# Patient Record
Sex: FEMALE | Race: WHITE | Employment: UNEMPLOYED | URBAN - METROPOLITAN AREA
[De-identification: names, ages, dates, MRNs, and addresses within clinical notes are randomized per-mention and may not be internally consistent; named-entity substitution may affect disease eponyms.]

---

## 2017-03-14 ENCOUNTER — OFFICE VISIT (OUTPATIENT)
Dept: OBGYN | Age: 72
End: 2017-03-14

## 2017-03-14 VITALS
BODY MASS INDEX: 36.11 KG/M2 | WEIGHT: 217 LBS | HEART RATE: 68 BPM | DIASTOLIC BLOOD PRESSURE: 68 MMHG | SYSTOLIC BLOOD PRESSURE: 110 MMHG

## 2017-03-14 DIAGNOSIS — N81.5 VAGINAL ENTEROCELE: ICD-10-CM

## 2017-03-14 DIAGNOSIS — N90.89 VULVAR IRRITATION: Primary | ICD-10-CM

## 2017-03-14 DIAGNOSIS — N76.0 ACUTE VAGINITIS: ICD-10-CM

## 2017-03-14 PROCEDURE — 99213 OFFICE O/P EST LOW 20 MIN: CPT | Performed by: OBSTETRICS & GYNECOLOGY

## 2017-03-14 RX ORDER — METRONIDAZOLE 500 MG/1
500 TABLET ORAL 2 TIMES DAILY
Qty: 14 TABLET | Refills: 0 | Status: SHIPPED | OUTPATIENT
Start: 2017-03-14 | End: 2017-03-21

## 2017-03-14 RX ORDER — FLUCONAZOLE 150 MG/1
TABLET ORAL
Qty: 3 TABLET | Refills: 0 | Status: SHIPPED | OUTPATIENT
Start: 2017-03-14 | End: 2017-04-18 | Stop reason: CLARIF

## 2017-03-14 ASSESSMENT — ENCOUNTER SYMPTOMS
SHORTNESS OF BREATH: 0
COUGH: 0
NAUSEA: 0
VOMITING: 0

## 2017-03-21 ENCOUNTER — OFFICE VISIT (OUTPATIENT)
Dept: OBGYN | Age: 72
End: 2017-03-21

## 2017-03-21 VITALS
WEIGHT: 217 LBS | DIASTOLIC BLOOD PRESSURE: 74 MMHG | BODY MASS INDEX: 36.11 KG/M2 | HEART RATE: 84 BPM | SYSTOLIC BLOOD PRESSURE: 122 MMHG

## 2017-03-21 DIAGNOSIS — N81.5 VAGINAL ENTEROCELE: ICD-10-CM

## 2017-03-21 DIAGNOSIS — N89.8 VAGINAL IRRITATION: Primary | ICD-10-CM

## 2017-03-21 PROCEDURE — 99213 OFFICE O/P EST LOW 20 MIN: CPT | Performed by: OBSTETRICS & GYNECOLOGY

## 2017-03-31 ASSESSMENT — ENCOUNTER SYMPTOMS
COUGH: 0
NAUSEA: 0
SHORTNESS OF BREATH: 0
VOMITING: 0

## 2017-04-18 ENCOUNTER — OFFICE VISIT (OUTPATIENT)
Dept: UROLOGY | Age: 72
End: 2017-04-18

## 2017-04-18 VITALS
SYSTOLIC BLOOD PRESSURE: 138 MMHG | HEIGHT: 65 IN | DIASTOLIC BLOOD PRESSURE: 76 MMHG | HEART RATE: 62 BPM | WEIGHT: 217 LBS | BODY MASS INDEX: 36.15 KG/M2

## 2017-04-18 DIAGNOSIS — R31.9 HEMATURIA: Primary | ICD-10-CM

## 2017-04-18 LAB
BILIRUBIN, POC: ABNORMAL
BLOOD URINE, POC: ABNORMAL
CLARITY, POC: CLEAR
COLOR, POC: YELLOW
GLUCOSE URINE, POC: ABNORMAL
KETONES, POC: ABNORMAL
LEUKOCYTE EST, POC: ABNORMAL
NITRITE, POC: ABNORMAL
PH, POC: 5.5
PROTEIN, POC: ABNORMAL
SPECIFIC GRAVITY, POC: 1.02
UROBILINOGEN, POC: 0.2

## 2017-04-18 PROCEDURE — 81003 URINALYSIS AUTO W/O SCOPE: CPT | Performed by: UROLOGY

## 2017-04-18 PROCEDURE — 99212 OFFICE O/P EST SF 10 MIN: CPT | Performed by: UROLOGY

## 2017-04-18 ASSESSMENT — ENCOUNTER SYMPTOMS
ABDOMINAL DISTENTION: 0
SHORTNESS OF BREATH: 0
ABDOMINAL PAIN: 0

## 2017-06-16 ENCOUNTER — APPOINTMENT (OUTPATIENT)
Dept: GENERAL RADIOLOGY | Age: 72
End: 2017-06-16
Payer: MEDICARE

## 2017-06-16 ENCOUNTER — HOSPITAL ENCOUNTER (EMERGENCY)
Age: 72
Discharge: HOME | End: 2017-06-16
Attending: EMERGENCY MEDICINE
Payer: MEDICARE

## 2017-06-16 VITALS
DIASTOLIC BLOOD PRESSURE: 74 MMHG | SYSTOLIC BLOOD PRESSURE: 118 MMHG | HEART RATE: 52 BPM | WEIGHT: 220 LBS | RESPIRATION RATE: 22 BRPM | BODY MASS INDEX: 37.56 KG/M2 | OXYGEN SATURATION: 94 % | HEIGHT: 64 IN | TEMPERATURE: 97.9 F

## 2017-06-16 DIAGNOSIS — J01.90 ACUTE SINUSITIS, RECURRENCE NOT SPECIFIED, UNSPECIFIED LOCATION: Primary | ICD-10-CM

## 2017-06-16 DIAGNOSIS — R05.9 COUGH: ICD-10-CM

## 2017-06-16 LAB
ALBUMIN SERPL-MCNC: 3.9 G/DL (ref 3.9–4.9)
ALP BLD-CCNC: 99 U/L (ref 40–130)
ALT SERPL-CCNC: 11 U/L (ref 0–33)
AMORPHOUS: ABNORMAL
ANION GAP SERPL CALCULATED.3IONS-SCNC: 13 MEQ/L (ref 7–13)
APTT: 25.8 SEC (ref 21.6–35.4)
AST SERPL-CCNC: 14 U/L (ref 0–35)
BACTERIA: ABNORMAL /HPF
BASOPHILS ABSOLUTE: 0.1 K/UL (ref 0–0.2)
BASOPHILS RELATIVE PERCENT: 0.8 %
BILIRUB SERPL-MCNC: 0.3 MG/DL (ref 0–1.2)
BILIRUBIN URINE: NEGATIVE
BLOOD, URINE: NEGATIVE
BUN BLDV-MCNC: 18 MG/DL (ref 8–23)
CALCIUM SERPL-MCNC: 8.7 MG/DL (ref 8.6–10.2)
CHLORIDE BLD-SCNC: 105 MEQ/L (ref 98–107)
CK MB: 1.8 NG/ML (ref 0–3.8)
CLARITY: ABNORMAL
CO2: 22 MEQ/L (ref 22–29)
COLOR: YELLOW
CREAT SERPL-MCNC: 0.95 MG/DL (ref 0.5–0.9)
CREATINE KINASE-MB INDEX: 2.8 % (ref 0–3.5)
EKG ATRIAL RATE: 61 BPM
EKG P AXIS: 31 DEGREES
EKG P-R INTERVAL: 146 MS
EKG Q-T INTERVAL: 442 MS
EKG QRS DURATION: 98 MS
EKG QTC CALCULATION (BAZETT): 444 MS
EKG R AXIS: -8 DEGREES
EKG T AXIS: 20 DEGREES
EKG VENTRICULAR RATE: 61 BPM
EOSINOPHILS ABSOLUTE: 0.1 K/UL (ref 0–0.7)
EOSINOPHILS RELATIVE PERCENT: 2 %
EPITHELIAL CELLS, UA: ABNORMAL /HPF
GFR AFRICAN AMERICAN: >60
GFR NON-AFRICAN AMERICAN: 57.9
GLOBULIN: 2.6 G/DL (ref 2.3–3.5)
GLUCOSE BLD-MCNC: 109 MG/DL (ref 74–109)
GLUCOSE URINE: NEGATIVE MG/DL
HCT VFR BLD CALC: 40.7 % (ref 37–47)
HEMOGLOBIN: 13.2 G/DL (ref 12–16)
INR BLD: 0.9
KETONES, URINE: NEGATIVE MG/DL
LACTIC ACID: 1.2 MMOL/L (ref 0.5–2.2)
LEUKOCYTE ESTERASE, URINE: ABNORMAL
LYMPHOCYTES ABSOLUTE: 1.2 K/UL (ref 1–4.8)
LYMPHOCYTES RELATIVE PERCENT: 16.3 %
MCH RBC QN AUTO: 27.4 PG (ref 27–31.3)
MCHC RBC AUTO-ENTMCNC: 32.5 % (ref 33–37)
MCV RBC AUTO: 84.3 FL (ref 82–100)
MONOCYTES ABSOLUTE: 0.6 K/UL (ref 0.2–0.8)
MONOCYTES RELATIVE PERCENT: 7.9 %
NEUTROPHILS ABSOLUTE: 5.4 K/UL (ref 1.4–6.5)
NEUTROPHILS RELATIVE PERCENT: 73 %
NITRITE, URINE: NEGATIVE
PDW BLD-RTO: 14.8 % (ref 11.5–14.5)
PH UA: 5 (ref 5–9)
PLATELET # BLD: 228 K/UL (ref 130–400)
POTASSIUM SERPL-SCNC: 4 MEQ/L (ref 3.5–5.1)
PRO-BNP: 330 PG/ML
PROTEIN UA: NEGATIVE MG/DL
PROTHROMBIN TIME: 9.6 SEC (ref 8.1–13.7)
RBC # BLD: 4.83 M/UL (ref 4.2–5.4)
RBC UA: ABNORMAL /HPF (ref 0–2)
SODIUM BLD-SCNC: 140 MEQ/L (ref 132–144)
SPECIFIC GRAVITY UA: 1.02 (ref 1–1.03)
TOTAL CK: 65 U/L (ref 0–170)
TOTAL PROTEIN: 6.5 G/DL (ref 6.4–8.1)
TROPONIN: <0.01 NG/ML (ref 0–0.01)
UROBILINOGEN, URINE: 0.2 E.U./DL
WBC # BLD: 7.4 K/UL (ref 4.8–10.8)
WBC UA: ABNORMAL /HPF (ref 0–5)

## 2017-06-16 PROCEDURE — 83880 ASSAY OF NATRIURETIC PEPTIDE: CPT

## 2017-06-16 PROCEDURE — 94640 AIRWAY INHALATION TREATMENT: CPT

## 2017-06-16 PROCEDURE — 99285 EMERGENCY DEPT VISIT HI MDM: CPT

## 2017-06-16 PROCEDURE — 2580000003 HC RX 258: Performed by: PHYSICIAN ASSISTANT

## 2017-06-16 PROCEDURE — 96365 THER/PROPH/DIAG IV INF INIT: CPT

## 2017-06-16 PROCEDURE — 6360000002 HC RX W HCPCS: Performed by: PHYSICIAN ASSISTANT

## 2017-06-16 PROCEDURE — 85730 THROMBOPLASTIN TIME PARTIAL: CPT

## 2017-06-16 PROCEDURE — 83605 ASSAY OF LACTIC ACID: CPT

## 2017-06-16 PROCEDURE — 71010 XR CHEST PORTABLE: CPT

## 2017-06-16 PROCEDURE — 84484 ASSAY OF TROPONIN QUANT: CPT

## 2017-06-16 PROCEDURE — 82553 CREATINE MB FRACTION: CPT

## 2017-06-16 PROCEDURE — 80053 COMPREHEN METABOLIC PANEL: CPT

## 2017-06-16 PROCEDURE — 6370000000 HC RX 637 (ALT 250 FOR IP): Performed by: PHYSICIAN ASSISTANT

## 2017-06-16 PROCEDURE — 82550 ASSAY OF CK (CPK): CPT

## 2017-06-16 PROCEDURE — 93005 ELECTROCARDIOGRAM TRACING: CPT

## 2017-06-16 PROCEDURE — 36415 COLL VENOUS BLD VENIPUNCTURE: CPT

## 2017-06-16 PROCEDURE — 85610 PROTHROMBIN TIME: CPT

## 2017-06-16 PROCEDURE — 81001 URINALYSIS AUTO W/SCOPE: CPT

## 2017-06-16 PROCEDURE — 96375 TX/PRO/DX INJ NEW DRUG ADDON: CPT

## 2017-06-16 PROCEDURE — 85025 COMPLETE CBC W/AUTO DIFF WBC: CPT

## 2017-06-16 RX ORDER — NITROGLYCERIN 0.4 MG/1
0.4 TABLET SUBLINGUAL EVERY 5 MIN PRN
Status: DISCONTINUED | OUTPATIENT
Start: 2017-06-16 | End: 2017-06-16 | Stop reason: HOSPADM

## 2017-06-16 RX ORDER — ASPIRIN 81 MG/1
324 TABLET, CHEWABLE ORAL ONCE
Status: COMPLETED | OUTPATIENT
Start: 2017-06-16 | End: 2017-06-16

## 2017-06-16 RX ORDER — IPRATROPIUM BROMIDE AND ALBUTEROL SULFATE 2.5; .5 MG/3ML; MG/3ML
1 SOLUTION RESPIRATORY (INHALATION) ONCE
Status: COMPLETED | OUTPATIENT
Start: 2017-06-16 | End: 2017-06-16

## 2017-06-16 RX ORDER — AMOXICILLIN AND CLAVULANATE POTASSIUM 875; 125 MG/1; MG/1
1 TABLET, FILM COATED ORAL 2 TIMES DAILY
Qty: 20 TABLET | Refills: 0 | Status: SHIPPED | OUTPATIENT
Start: 2017-06-16 | End: 2017-06-26

## 2017-06-16 RX ORDER — METHYLPREDNISOLONE SODIUM SUCCINATE 125 MG/2ML
125 INJECTION, POWDER, LYOPHILIZED, FOR SOLUTION INTRAMUSCULAR; INTRAVENOUS ONCE
Status: COMPLETED | OUTPATIENT
Start: 2017-06-16 | End: 2017-06-16

## 2017-06-16 RX ADMIN — METHYLPREDNISOLONE SODIUM SUCCINATE 125 MG: 125 INJECTION, POWDER, FOR SOLUTION INTRAMUSCULAR; INTRAVENOUS at 07:06

## 2017-06-16 RX ADMIN — IPRATROPIUM BROMIDE AND ALBUTEROL SULFATE 1 AMPULE: 2.5; .5 SOLUTION RESPIRATORY (INHALATION) at 06:51

## 2017-06-16 RX ADMIN — NITROGLYCERIN 0.4 MG: 0.4 TABLET SUBLINGUAL at 07:06

## 2017-06-16 RX ADMIN — ASPIRIN 81 MG CHEWABLE TABLET 324 MG: 81 TABLET CHEWABLE at 07:06

## 2017-06-16 RX ADMIN — CEFTRIAXONE SODIUM 1 G: 1 INJECTION, POWDER, FOR SOLUTION INTRAMUSCULAR; INTRAVENOUS at 07:06

## 2017-06-16 ASSESSMENT — PAIN DESCRIPTION - FREQUENCY: FREQUENCY: CONTINUOUS

## 2017-06-16 ASSESSMENT — PAIN DESCRIPTION - DESCRIPTORS: DESCRIPTORS: PRESSURE

## 2017-06-16 ASSESSMENT — ENCOUNTER SYMPTOMS
COUGH: 1
SHORTNESS OF BREATH: 0
VOMITING: 0
NAUSEA: 0
DIARRHEA: 0
ABDOMINAL PAIN: 0

## 2017-06-16 ASSESSMENT — PAIN DESCRIPTION - LOCATION: LOCATION: BACK;CHEST

## 2017-06-16 ASSESSMENT — PAIN SCALES - GENERAL: PAINLEVEL_OUTOF10: 4

## 2017-06-16 ASSESSMENT — PAIN DESCRIPTION - PAIN TYPE: TYPE: ACUTE PAIN

## 2017-08-10 LAB — TOTAL CK: 84 U/L (ref 0–170)

## 2018-05-07 ENCOUNTER — HOSPITAL ENCOUNTER (OUTPATIENT)
Dept: GENERAL RADIOLOGY | Age: 73
Discharge: HOME | End: 2018-05-09
Payer: MEDICARE

## 2018-05-07 DIAGNOSIS — M75.82 TENDINITIS OF LEFT ROTATOR CUFF: ICD-10-CM

## 2018-05-07 DIAGNOSIS — M50.30 DDD (DEGENERATIVE DISC DISEASE), CERVICAL: ICD-10-CM

## 2018-05-07 DIAGNOSIS — L71.9 ROSACEA: ICD-10-CM

## 2018-05-07 DIAGNOSIS — Z13.9 ENCOUNTER FOR SCREENING: ICD-10-CM

## 2018-05-07 PROCEDURE — 73030 X-RAY EXAM OF SHOULDER: CPT

## 2018-05-07 PROCEDURE — 72050 X-RAY EXAM NECK SPINE 4/5VWS: CPT

## 2018-06-05 ENCOUNTER — OFFICE VISIT (OUTPATIENT)
Dept: GASTROENTEROLOGY | Age: 73
End: 2018-06-05
Payer: MEDICARE

## 2018-06-05 VITALS
BODY MASS INDEX: 37.76 KG/M2 | SYSTOLIC BLOOD PRESSURE: 157 MMHG | DIASTOLIC BLOOD PRESSURE: 91 MMHG | HEART RATE: 72 BPM | WEIGHT: 220 LBS

## 2018-06-05 DIAGNOSIS — Z80.0 FAMILY HISTORY OF COLON CANCER: ICD-10-CM

## 2018-06-05 DIAGNOSIS — R13.10 DYSPHAGIA, UNSPECIFIED TYPE: Primary | ICD-10-CM

## 2018-06-05 PROCEDURE — 99203 OFFICE O/P NEW LOW 30 MIN: CPT | Performed by: INTERNAL MEDICINE

## 2018-06-05 RX ORDER — NAPROXEN SODIUM 550 MG/1
TABLET ORAL
Refills: 3 | Status: ON HOLD | COMMUNITY
Start: 2018-05-07 | End: 2019-01-31

## 2018-06-05 RX ORDER — GABAPENTIN 300 MG/1
CAPSULE ORAL
Refills: 1 | Status: ON HOLD | COMMUNITY
Start: 2018-05-07 | End: 2019-01-31

## 2018-06-05 RX ORDER — RANITIDINE 150 MG/1
150 TABLET ORAL
Status: ON HOLD | COMMUNITY
Start: 2017-06-29 | End: 2019-01-31

## 2018-06-05 RX ORDER — DOXYCYCLINE HYCLATE 50 MG/1
CAPSULE ORAL
Refills: 3 | Status: ON HOLD | COMMUNITY
Start: 2018-05-07 | End: 2019-01-31

## 2018-06-05 RX ORDER — CYCLOBENZAPRINE HCL 10 MG
TABLET ORAL
Refills: 0 | Status: ON HOLD | COMMUNITY
Start: 2018-05-07 | End: 2019-02-01 | Stop reason: HOSPADM

## 2018-06-11 ENCOUNTER — ANESTHESIA (OUTPATIENT)
Dept: ENDOSCOPY | Age: 73
End: 2018-06-11
Payer: MEDICARE

## 2018-06-11 ENCOUNTER — HOSPITAL ENCOUNTER (OUTPATIENT)
Age: 73
Setting detail: OUTPATIENT SURGERY
Discharge: HOME | End: 2018-06-11
Attending: INTERNAL MEDICINE | Admitting: INTERNAL MEDICINE
Payer: MEDICARE

## 2018-06-11 ENCOUNTER — ANESTHESIA EVENT (OUTPATIENT)
Dept: ENDOSCOPY | Age: 73
End: 2018-06-11
Payer: MEDICARE

## 2018-06-11 VITALS
RESPIRATION RATE: 8 BRPM | SYSTOLIC BLOOD PRESSURE: 126 MMHG | OXYGEN SATURATION: 96 % | DIASTOLIC BLOOD PRESSURE: 79 MMHG

## 2018-06-11 VITALS
SYSTOLIC BLOOD PRESSURE: 152 MMHG | RESPIRATION RATE: 16 BRPM | HEIGHT: 65 IN | BODY MASS INDEX: 36.65 KG/M2 | HEART RATE: 61 BPM | DIASTOLIC BLOOD PRESSURE: 75 MMHG | OXYGEN SATURATION: 95 % | TEMPERATURE: 99.4 F | WEIGHT: 220 LBS

## 2018-06-11 PROCEDURE — 2500000003 HC RX 250 WO HCPCS: Performed by: NURSE ANESTHETIST, CERTIFIED REGISTERED

## 2018-06-11 PROCEDURE — 6360000002 HC RX W HCPCS: Performed by: NURSE ANESTHETIST, CERTIFIED REGISTERED

## 2018-06-11 PROCEDURE — 43248 EGD GUIDE WIRE INSERTION: CPT | Performed by: INTERNAL MEDICINE

## 2018-06-11 PROCEDURE — 43239 EGD BIOPSY SINGLE/MULTIPLE: CPT | Performed by: INTERNAL MEDICINE

## 2018-06-11 PROCEDURE — 7100000010 HC PHASE II RECOVERY - FIRST 15 MIN: Performed by: INTERNAL MEDICINE

## 2018-06-11 PROCEDURE — 43251 EGD REMOVE LESION SNARE: CPT | Performed by: INTERNAL MEDICINE

## 2018-06-11 PROCEDURE — 3700000001 HC ADD 15 MINUTES (ANESTHESIA): Performed by: INTERNAL MEDICINE

## 2018-06-11 PROCEDURE — 3609027000 HC COLONOSCOPY: Performed by: INTERNAL MEDICINE

## 2018-06-11 PROCEDURE — 2580000003 HC RX 258: Performed by: NURSE ANESTHETIST, CERTIFIED REGISTERED

## 2018-06-11 PROCEDURE — 3609017100 HC EGD: Performed by: INTERNAL MEDICINE

## 2018-06-11 PROCEDURE — 3700000000 HC ANESTHESIA ATTENDED CARE: Performed by: INTERNAL MEDICINE

## 2018-06-11 PROCEDURE — 45385 COLONOSCOPY W/LESION REMOVAL: CPT | Performed by: INTERNAL MEDICINE

## 2018-06-11 RX ORDER — SODIUM CHLORIDE 0.9 % (FLUSH) 0.9 %
SYRINGE (ML) INJECTION PRN
Status: DISCONTINUED | OUTPATIENT
Start: 2018-06-11 | End: 2018-06-11 | Stop reason: SDUPTHER

## 2018-06-11 RX ORDER — PROPOFOL 10 MG/ML
INJECTION, EMULSION INTRAVENOUS PRN
Status: DISCONTINUED | OUTPATIENT
Start: 2018-06-11 | End: 2018-06-11 | Stop reason: SDUPTHER

## 2018-06-11 RX ORDER — SODIUM CHLORIDE 9 MG/ML
INJECTION, SOLUTION INTRAVENOUS CONTINUOUS
Status: DISCONTINUED | OUTPATIENT
Start: 2018-06-11 | End: 2018-06-11 | Stop reason: HOSPADM

## 2018-06-11 RX ORDER — ONDANSETRON 2 MG/ML
4 INJECTION INTRAMUSCULAR; INTRAVENOUS
Status: DISCONTINUED | OUTPATIENT
Start: 2018-06-11 | End: 2018-06-11 | Stop reason: HOSPADM

## 2018-06-11 RX ORDER — ASPIRIN 81 MG/1
81 TABLET, CHEWABLE ORAL DAILY
Status: ON HOLD | COMMUNITY
End: 2021-12-10

## 2018-06-11 RX ORDER — LIDOCAINE HYDROCHLORIDE 20 MG/ML
INJECTION, SOLUTION INFILTRATION; PERINEURAL PRN
Status: DISCONTINUED | OUTPATIENT
Start: 2018-06-11 | End: 2018-06-11 | Stop reason: SDUPTHER

## 2018-06-11 RX ADMIN — PROPOFOL 50 MG: 10 INJECTION, EMULSION INTRAVENOUS at 10:46

## 2018-06-11 RX ADMIN — SODIUM CHLORIDE, PRESERVATIVE FREE 10 ML: 5 INJECTION INTRAVENOUS at 10:44

## 2018-06-11 RX ADMIN — PROPOFOL 100 MG: 10 INJECTION, EMULSION INTRAVENOUS at 10:33

## 2018-06-11 RX ADMIN — PROPOFOL 150 MG: 10 INJECTION, EMULSION INTRAVENOUS at 10:29

## 2018-06-11 RX ADMIN — LIDOCAINE HYDROCHLORIDE 80 MG: 20 INJECTION, SOLUTION INFILTRATION; PERINEURAL at 10:29

## 2018-06-11 RX ADMIN — SODIUM CHLORIDE, PRESERVATIVE FREE 20 ML: 5 INJECTION INTRAVENOUS at 10:32

## 2018-06-11 ASSESSMENT — PAIN - FUNCTIONAL ASSESSMENT: PAIN_FUNCTIONAL_ASSESSMENT: 0-10

## 2018-06-26 ENCOUNTER — OFFICE VISIT (OUTPATIENT)
Dept: GASTROENTEROLOGY | Age: 73
End: 2018-06-26
Payer: MEDICARE

## 2018-06-26 VITALS
BODY MASS INDEX: 36.28 KG/M2 | DIASTOLIC BLOOD PRESSURE: 82 MMHG | WEIGHT: 218 LBS | SYSTOLIC BLOOD PRESSURE: 152 MMHG | HEART RATE: 67 BPM

## 2018-06-26 DIAGNOSIS — R13.10 DYSPHAGIA, UNSPECIFIED TYPE: Primary | ICD-10-CM

## 2018-06-26 PROCEDURE — 99213 OFFICE O/P EST LOW 20 MIN: CPT | Performed by: INTERNAL MEDICINE

## 2018-06-26 RX ORDER — AMOXICILLIN AND CLAVULANATE POTASSIUM 875; 125 MG/1; MG/1
TABLET, FILM COATED ORAL
Refills: 0 | COMMUNITY
Start: 2018-06-23 | End: 2019-01-28

## 2018-06-26 RX ORDER — ALBUTEROL SULFATE 2.5 MG/3ML
SOLUTION RESPIRATORY (INHALATION)
Refills: 3 | COMMUNITY
Start: 2018-06-23

## 2018-08-03 ENCOUNTER — HOSPITAL ENCOUNTER (OUTPATIENT)
Dept: ULTRASOUND IMAGING | Age: 73
Discharge: HOME | End: 2018-08-05
Payer: MEDICARE

## 2018-08-03 DIAGNOSIS — I71.40 ABDOMINAL AORTIC ANEURYSM WITHOUT RUPTURE: ICD-10-CM

## 2018-08-03 LAB
CHOLESTEROL, TOTAL: 146 MG/DL (ref 0–199)
HDLC SERPL-MCNC: 45 MG/DL (ref 40–59)
LDL CHOLESTEROL CALCULATED: 83 MG/DL (ref 0–129)
TOTAL CK: 62 U/L (ref 0–170)
TRIGL SERPL-MCNC: 90 MG/DL (ref 0–200)

## 2018-08-03 PROCEDURE — 93978 VASCULAR STUDY: CPT

## 2018-10-03 LAB
CHOLESTEROL, TOTAL: 141 MG/DL (ref 0–199)
HDLC SERPL-MCNC: 45 MG/DL (ref 40–59)
LDL CHOLESTEROL CALCULATED: 74 MG/DL (ref 0–129)
TOTAL CK: 61 U/L (ref 0–170)
TRIGL SERPL-MCNC: 108 MG/DL (ref 0–200)

## 2018-12-10 ENCOUNTER — OFFICE VISIT (OUTPATIENT)
Dept: OBGYN CLINIC | Age: 73
End: 2018-12-10
Payer: MEDICARE

## 2018-12-10 VITALS
DIASTOLIC BLOOD PRESSURE: 82 MMHG | WEIGHT: 216 LBS | BODY MASS INDEX: 35.99 KG/M2 | SYSTOLIC BLOOD PRESSURE: 118 MMHG | HEART RATE: 88 BPM | HEIGHT: 65 IN

## 2018-12-10 DIAGNOSIS — N89.8 VAGINAL IRRITATION: Primary | ICD-10-CM

## 2018-12-10 PROCEDURE — 99213 OFFICE O/P EST LOW 20 MIN: CPT | Performed by: OBSTETRICS & GYNECOLOGY

## 2018-12-10 RX ORDER — FLUCONAZOLE 150 MG/1
TABLET ORAL
Qty: 3 TABLET | Refills: 0 | Status: SHIPPED | OUTPATIENT
Start: 2018-12-10 | End: 2019-01-28

## 2018-12-10 RX ORDER — OXYBUTYNIN CHLORIDE 5 MG/1
5 TABLET, EXTENDED RELEASE ORAL DAILY
Qty: 30 TABLET | Refills: 1 | Status: SHIPPED | OUTPATIENT
Start: 2018-12-10 | End: 2018-12-18 | Stop reason: SDUPTHER

## 2018-12-14 ENCOUNTER — TELEPHONE (OUTPATIENT)
Dept: OBGYN CLINIC | Age: 73
End: 2018-12-14

## 2018-12-14 NOTE — TELEPHONE ENCOUNTER
Patient was given diflucan and oxybutinin last week and was taking them when she should. She is not any better. Her urinary issues are doing a lot better but the irritation is not going away. She has a 7 day yeast infection cream clotrimazole from Dr. Fredi Headley. Should she take this now or should she take something else. She has an appt scheduled next Tuesday, but wants to be better before she comes in.

## 2018-12-18 ENCOUNTER — OFFICE VISIT (OUTPATIENT)
Dept: OBGYN CLINIC | Age: 73
End: 2018-12-18
Payer: MEDICARE

## 2018-12-18 VITALS
BODY MASS INDEX: 36.32 KG/M2 | DIASTOLIC BLOOD PRESSURE: 84 MMHG | HEIGHT: 65 IN | HEART RATE: 68 BPM | SYSTOLIC BLOOD PRESSURE: 122 MMHG | WEIGHT: 218 LBS

## 2018-12-18 DIAGNOSIS — N81.5 VAGINAL ENTEROCELE: ICD-10-CM

## 2018-12-18 DIAGNOSIS — N89.8 VAGINAL IRRITATION: ICD-10-CM

## 2018-12-18 DIAGNOSIS — N32.81 OAB (OVERACTIVE BLADDER): Primary | ICD-10-CM

## 2018-12-18 PROCEDURE — 99213 OFFICE O/P EST LOW 20 MIN: CPT | Performed by: OBSTETRICS & GYNECOLOGY

## 2018-12-18 RX ORDER — CLOBETASOL PROPIONATE 0.5 MG/G
OINTMENT TOPICAL
Qty: 1 TUBE | Refills: 1 | Status: SHIPPED | OUTPATIENT
Start: 2018-12-18 | End: 2019-01-07 | Stop reason: SDUPTHER

## 2018-12-18 RX ORDER — OXYBUTYNIN CHLORIDE 5 MG/1
5 TABLET, EXTENDED RELEASE ORAL DAILY
Qty: 30 TABLET | Refills: 6 | Status: SHIPPED | OUTPATIENT
Start: 2018-12-18 | End: 2019-03-25 | Stop reason: SDUPTHER

## 2018-12-18 NOTE — PROGRESS NOTES
Medication FollowUp     Dena Boo is a 68y.o. year old female here todiscuss symptoms after use of medications prescribed last visit. Symptoms  Urinary urgency and incontinence, vaginal irritation . Medication/s prescribed Diflucan, oxybutynin . Side effects reported : none. Mentions symptomsimproved : Oxybutynin didn't eliminate her urinary symptoms. Diflucan did delineate some of the vaginal irritation symptoms but patient still complains of the same symptoms. Vitals:  /84 (Site: Right Upper Arm, Position: Sitting, Cuff Size: Medium Adult)   Pulse 68   Ht 5' 5\" (1.651 m)   Wt 218 lb (98.9 kg)   BMI 36.28 kg/m²   Allergies:  Codeine and Morphine  Past Medical History:   Diagnosis Date    CAD (coronary artery disease)     Environmental and seasonal allergies     GERD (gastroesophageal reflux disease)     GI bleed     Hyperlipidemia     Hypertension     Myocardial infarct (HCC)     Pityriasis rosea      Past Surgical History:   Procedure Laterality Date    ARTERIAL BYPASS SURGRY      triple    BREAST SURGERY      reduction and cysts biopsy    CATARACT REMOVAL WITH IMPLANT      CHOLECYSTECTOMY      CYSTOURETHROSCOPY  04/18/2017    FLEXIBLE    HAND SURGERY Right     plate    HYSTERECTOMY      OTHER SURGICAL HISTORY      head fatty tumors removed    IA COLORECTAL SCRN; HI RISK IND N/A 6/11/2018    COLONOSCOPY performed by Joanna Heredia MD at . [facility] ESOPHAGOGASTRODUODENOSCOPY TRANSORAL DIAGNOSTIC N/A 6/11/2018    EGD ESOPHAGOGASTRODUODENOSCOPY WITH DILATION performed by Joanna Heredia MD at [address] Left 08/2016     OB History     No data available        Family History   Problem Relation Age of Onset    Colon Cancer Father      Social History     Social History    Marital status:      Spouse name: N/A    Number of children: N/A    Years of education: N/A     Occupational History    Not on file. follow-up. Follow up:  Return in about 2 weeks (around 1/1/2019) for medication assessment.         Richy Nichols MD

## 2019-01-07 ENCOUNTER — OFFICE VISIT (OUTPATIENT)
Dept: OBGYN CLINIC | Age: 74
End: 2019-01-07
Payer: MEDICARE

## 2019-01-07 VITALS
BODY MASS INDEX: 36.32 KG/M2 | SYSTOLIC BLOOD PRESSURE: 112 MMHG | HEART RATE: 64 BPM | DIASTOLIC BLOOD PRESSURE: 74 MMHG | WEIGHT: 218 LBS | HEIGHT: 65 IN

## 2019-01-07 DIAGNOSIS — N81.6 BADEN-WALKER GRADE 2 RECTOCELE: ICD-10-CM

## 2019-01-07 DIAGNOSIS — N81.5 VAGINAL ENTEROCELE: Primary | ICD-10-CM

## 2019-01-07 PROCEDURE — 99214 OFFICE O/P EST MOD 30 MIN: CPT | Performed by: OBSTETRICS & GYNECOLOGY

## 2019-01-07 RX ORDER — FLUCONAZOLE 150 MG/1
TABLET ORAL
Qty: 3 TABLET | Refills: 0 | Status: ON HOLD | OUTPATIENT
Start: 2019-01-07 | End: 2019-05-06 | Stop reason: HOSPADM

## 2019-01-07 RX ORDER — CLOBETASOL PROPIONATE 0.5 MG/G
OINTMENT TOPICAL
Qty: 1 TUBE | Refills: 3 | Status: SHIPPED | OUTPATIENT
Start: 2019-01-07 | End: 2019-05-22

## 2019-01-21 ENCOUNTER — TELEPHONE (OUTPATIENT)
Dept: OBGYN CLINIC | Age: 74
End: 2019-01-21

## 2019-01-28 ENCOUNTER — HOSPITAL ENCOUNTER (OUTPATIENT)
Dept: PREADMISSION TESTING | Age: 74
Discharge: HOME | End: 2019-02-01
Payer: MEDICARE

## 2019-01-28 VITALS
WEIGHT: 217.2 LBS | HEART RATE: 65 BPM | DIASTOLIC BLOOD PRESSURE: 88 MMHG | BODY MASS INDEX: 36.19 KG/M2 | SYSTOLIC BLOOD PRESSURE: 157 MMHG | OXYGEN SATURATION: 98 % | HEIGHT: 65 IN | TEMPERATURE: 98 F | RESPIRATION RATE: 16 BRPM

## 2019-01-28 LAB
ABO/RH: NORMAL
ANION GAP SERPL CALCULATED.3IONS-SCNC: 10 MEQ/L (ref 7–13)
ANTIBODY SCREEN: NORMAL
BUN BLDV-MCNC: 16 MG/DL (ref 8–23)
CALCIUM SERPL-MCNC: 9.1 MG/DL (ref 8.6–10.2)
CHLORIDE BLD-SCNC: 105 MEQ/L (ref 98–107)
CO2: 25 MEQ/L (ref 22–29)
CREAT SERPL-MCNC: 0.98 MG/DL (ref 0.5–0.9)
GFR AFRICAN AMERICAN: >60
GFR NON-AFRICAN AMERICAN: 55.6
GLUCOSE BLD-MCNC: 98 MG/DL (ref 74–109)
HCT VFR BLD CALC: 40.3 % (ref 37–47)
HEMOGLOBIN: 13.7 G/DL (ref 12–16)
MCH RBC QN AUTO: 29.5 PG (ref 27–31.3)
MCHC RBC AUTO-ENTMCNC: 34.1 % (ref 33–37)
MCV RBC AUTO: 86.4 FL (ref 82–100)
PDW BLD-RTO: 14.7 % (ref 11.5–14.5)
PLATELET # BLD: 224 K/UL (ref 130–400)
POTASSIUM SERPL-SCNC: 4.2 MEQ/L (ref 3.5–5.1)
RBC # BLD: 4.66 M/UL (ref 4.2–5.4)
SODIUM BLD-SCNC: 140 MEQ/L (ref 132–144)
WBC # BLD: 11.9 K/UL (ref 4.8–10.8)

## 2019-01-28 PROCEDURE — 86900 BLOOD TYPING SEROLOGIC ABO: CPT

## 2019-01-28 PROCEDURE — 85027 COMPLETE CBC AUTOMATED: CPT

## 2019-01-28 PROCEDURE — 86901 BLOOD TYPING SEROLOGIC RH(D): CPT

## 2019-01-28 PROCEDURE — 86850 RBC ANTIBODY SCREEN: CPT

## 2019-01-28 PROCEDURE — 80048 BASIC METABOLIC PNL TOTAL CA: CPT

## 2019-01-28 RX ORDER — SODIUM CHLORIDE 0.9 % (FLUSH) 0.9 %
10 SYRINGE (ML) INJECTION EVERY 12 HOURS SCHEDULED
Status: CANCELLED | OUTPATIENT
Start: 2019-01-28

## 2019-01-28 RX ORDER — LIDOCAINE HYDROCHLORIDE 10 MG/ML
1 INJECTION, SOLUTION EPIDURAL; INFILTRATION; INTRACAUDAL; PERINEURAL
Status: CANCELLED | OUTPATIENT
Start: 2019-01-28 | End: 2019-01-28

## 2019-01-28 RX ORDER — SODIUM CHLORIDE 0.9 % (FLUSH) 0.9 %
10 SYRINGE (ML) INJECTION PRN
Status: CANCELLED | OUTPATIENT
Start: 2019-01-28

## 2019-01-28 RX ORDER — CEFAZOLIN SODIUM 2 G/50ML
2 SOLUTION INTRAVENOUS ONCE
Status: CANCELLED | OUTPATIENT
Start: 2019-01-31

## 2019-01-28 RX ORDER — SODIUM CHLORIDE, SODIUM LACTATE, POTASSIUM CHLORIDE, CALCIUM CHLORIDE 600; 310; 30; 20 MG/100ML; MG/100ML; MG/100ML; MG/100ML
INJECTION, SOLUTION INTRAVENOUS CONTINUOUS
Status: CANCELLED | OUTPATIENT
Start: 2019-01-28

## 2019-01-31 ENCOUNTER — HOSPITAL ENCOUNTER (OUTPATIENT)
Age: 74
Discharge: HOME | End: 2019-02-01
Attending: OBSTETRICS & GYNECOLOGY | Admitting: OBSTETRICS & GYNECOLOGY
Payer: MEDICARE

## 2019-01-31 ENCOUNTER — ANESTHESIA (OUTPATIENT)
Dept: OPERATING ROOM | Age: 74
End: 2019-01-31
Payer: MEDICARE

## 2019-01-31 ENCOUNTER — ANESTHESIA EVENT (OUTPATIENT)
Dept: OPERATING ROOM | Age: 74
End: 2019-01-31
Payer: MEDICARE

## 2019-01-31 VITALS
RESPIRATION RATE: 13 BRPM | OXYGEN SATURATION: 100 % | DIASTOLIC BLOOD PRESSURE: 59 MMHG | SYSTOLIC BLOOD PRESSURE: 113 MMHG | TEMPERATURE: 96.3 F

## 2019-01-31 DIAGNOSIS — G89.18 POSTOPERATIVE PAIN: Primary | ICD-10-CM

## 2019-01-31 PROBLEM — N81.6 RECTOCELE: Status: ACTIVE | Noted: 2019-01-31

## 2019-01-31 PROCEDURE — 2500000003 HC RX 250 WO HCPCS: Performed by: OBSTETRICS & GYNECOLOGY

## 2019-01-31 PROCEDURE — 3700000001 HC ADD 15 MINUTES (ANESTHESIA): Performed by: OBSTETRICS & GYNECOLOGY

## 2019-01-31 PROCEDURE — 57250 REPAIR RECTUM & VAGINA: CPT | Performed by: OBSTETRICS & GYNECOLOGY

## 2019-01-31 PROCEDURE — 2580000003 HC RX 258: Performed by: OBSTETRICS & GYNECOLOGY

## 2019-01-31 PROCEDURE — 2580000003 HC RX 258: Performed by: NURSE PRACTITIONER

## 2019-01-31 PROCEDURE — 6360000002 HC RX W HCPCS: Performed by: NURSE PRACTITIONER

## 2019-01-31 PROCEDURE — 2500000003 HC RX 250 WO HCPCS: Performed by: ANESTHESIOLOGY

## 2019-01-31 PROCEDURE — 3700000000 HC ANESTHESIA ATTENDED CARE: Performed by: OBSTETRICS & GYNECOLOGY

## 2019-01-31 PROCEDURE — 7100000001 HC PACU RECOVERY - ADDTL 15 MIN: Performed by: OBSTETRICS & GYNECOLOGY

## 2019-01-31 PROCEDURE — 6370000000 HC RX 637 (ALT 250 FOR IP): Performed by: OBSTETRICS & GYNECOLOGY

## 2019-01-31 PROCEDURE — 6360000002 HC RX W HCPCS: Performed by: ANESTHESIOLOGY

## 2019-01-31 PROCEDURE — 3600000004 HC SURGERY LEVEL 4 BASE: Performed by: OBSTETRICS & GYNECOLOGY

## 2019-01-31 PROCEDURE — 6360000002 HC RX W HCPCS: Performed by: OBSTETRICS & GYNECOLOGY

## 2019-01-31 PROCEDURE — 57282 COLPOPEXY EXTRAPERITONEAL: CPT | Performed by: OBSTETRICS & GYNECOLOGY

## 2019-01-31 PROCEDURE — 2500000003 HC RX 250 WO HCPCS: Performed by: NURSE PRACTITIONER

## 2019-01-31 PROCEDURE — 3600000014 HC SURGERY LEVEL 4 ADDTL 15MIN: Performed by: OBSTETRICS & GYNECOLOGY

## 2019-01-31 PROCEDURE — C2631 REP DEV, URINARY, W/O SLING: HCPCS | Performed by: OBSTETRICS & GYNECOLOGY

## 2019-01-31 PROCEDURE — 7100000000 HC PACU RECOVERY - FIRST 15 MIN: Performed by: OBSTETRICS & GYNECOLOGY

## 2019-01-31 PROCEDURE — 2709999900 HC NON-CHARGEABLE SUPPLY: Performed by: OBSTETRICS & GYNECOLOGY

## 2019-01-31 RX ORDER — FENTANYL CITRATE 50 UG/ML
INJECTION, SOLUTION INTRAMUSCULAR; INTRAVENOUS PRN
Status: DISCONTINUED | OUTPATIENT
Start: 2019-01-31 | End: 2019-01-31 | Stop reason: SDUPTHER

## 2019-01-31 RX ORDER — SODIUM CHLORIDE 0.9 % (FLUSH) 0.9 %
10 SYRINGE (ML) INJECTION PRN
Status: DISCONTINUED | OUTPATIENT
Start: 2019-01-31 | End: 2019-01-31 | Stop reason: HOSPADM

## 2019-01-31 RX ORDER — KETOROLAC TROMETHAMINE 30 MG/ML
30 INJECTION, SOLUTION INTRAMUSCULAR; INTRAVENOUS EVERY 6 HOURS
Status: DISCONTINUED | OUTPATIENT
Start: 2019-01-31 | End: 2019-02-01 | Stop reason: HOSPADM

## 2019-01-31 RX ORDER — DEXAMETHASONE SODIUM PHOSPHATE 4 MG/ML
INJECTION, SOLUTION INTRA-ARTICULAR; INTRALESIONAL; INTRAMUSCULAR; INTRAVENOUS; SOFT TISSUE PRN
Status: DISCONTINUED | OUTPATIENT
Start: 2019-01-31 | End: 2019-01-31 | Stop reason: SDUPTHER

## 2019-01-31 RX ORDER — OXYCODONE HYDROCHLORIDE AND ACETAMINOPHEN 5; 325 MG/1; MG/1
1 TABLET ORAL EVERY 4 HOURS PRN
Status: DISCONTINUED | OUTPATIENT
Start: 2019-01-31 | End: 2019-02-01 | Stop reason: HOSPADM

## 2019-01-31 RX ORDER — DIPHENHYDRAMINE HYDROCHLORIDE 50 MG/ML
12.5 INJECTION INTRAMUSCULAR; INTRAVENOUS
Status: DISCONTINUED | OUTPATIENT
Start: 2019-01-31 | End: 2019-01-31 | Stop reason: HOSPADM

## 2019-01-31 RX ORDER — HYDROCODONE BITARTRATE AND ACETAMINOPHEN 5; 325 MG/1; MG/1
1 TABLET ORAL PRN
Status: DISCONTINUED | OUTPATIENT
Start: 2019-01-31 | End: 2019-01-31 | Stop reason: HOSPADM

## 2019-01-31 RX ORDER — PROPOFOL 10 MG/ML
INJECTION, EMULSION INTRAVENOUS PRN
Status: DISCONTINUED | OUTPATIENT
Start: 2019-01-31 | End: 2019-01-31 | Stop reason: SDUPTHER

## 2019-01-31 RX ORDER — FENTANYL CITRATE 50 UG/ML
50 INJECTION, SOLUTION INTRAMUSCULAR; INTRAVENOUS EVERY 10 MIN PRN
Status: DISCONTINUED | OUTPATIENT
Start: 2019-01-31 | End: 2019-01-31 | Stop reason: HOSPADM

## 2019-01-31 RX ORDER — MAGNESIUM HYDROXIDE 1200 MG/15ML
LIQUID ORAL CONTINUOUS PRN
Status: COMPLETED | OUTPATIENT
Start: 2019-01-31 | End: 2019-01-31

## 2019-01-31 RX ORDER — SIMETHICONE 80 MG
80 TABLET,CHEWABLE ORAL EVERY 6 HOURS PRN
Status: DISCONTINUED | OUTPATIENT
Start: 2019-01-31 | End: 2019-02-01 | Stop reason: HOSPADM

## 2019-01-31 RX ORDER — SODIUM CHLORIDE 0.9 % (FLUSH) 0.9 %
10 SYRINGE (ML) INJECTION PRN
Status: DISCONTINUED | OUTPATIENT
Start: 2019-01-31 | End: 2019-02-01 | Stop reason: HOSPADM

## 2019-01-31 RX ORDER — ONDANSETRON 2 MG/ML
4 INJECTION INTRAMUSCULAR; INTRAVENOUS
Status: DISCONTINUED | OUTPATIENT
Start: 2019-01-31 | End: 2019-01-31 | Stop reason: HOSPADM

## 2019-01-31 RX ORDER — BUPIVACAINE HYDROCHLORIDE AND EPINEPHRINE 5; 5 MG/ML; UG/ML
INJECTION, SOLUTION EPIDURAL; INTRACAUDAL; PERINEURAL PRN
Status: DISCONTINUED | OUTPATIENT
Start: 2019-01-31 | End: 2019-01-31 | Stop reason: HOSPADM

## 2019-01-31 RX ORDER — PANTOPRAZOLE SODIUM 40 MG/1
40 TABLET, DELAYED RELEASE ORAL DAILY
COMMUNITY

## 2019-01-31 RX ORDER — SODIUM CHLORIDE 9 MG/ML
INJECTION, SOLUTION INTRAVENOUS CONTINUOUS
Status: DISCONTINUED | OUTPATIENT
Start: 2019-01-31 | End: 2019-02-01 | Stop reason: HOSPADM

## 2019-01-31 RX ORDER — MORPHINE SULFATE 2 MG/ML
2 INJECTION, SOLUTION INTRAMUSCULAR; INTRAVENOUS
Status: DISCONTINUED | OUTPATIENT
Start: 2019-01-31 | End: 2019-02-01 | Stop reason: HOSPADM

## 2019-01-31 RX ORDER — ACETAMINOPHEN 325 MG/1
650 TABLET ORAL EVERY 4 HOURS PRN
Status: DISCONTINUED | OUTPATIENT
Start: 2019-01-31 | End: 2019-02-01 | Stop reason: HOSPADM

## 2019-01-31 RX ORDER — CEFAZOLIN SODIUM 2 G/50ML
2 SOLUTION INTRAVENOUS EVERY 8 HOURS
Status: COMPLETED | OUTPATIENT
Start: 2019-01-31 | End: 2019-02-01

## 2019-01-31 RX ORDER — FAMOTIDINE 20 MG/1
20 TABLET, FILM COATED ORAL 2 TIMES DAILY
Status: DISCONTINUED | OUTPATIENT
Start: 2019-01-31 | End: 2019-02-01 | Stop reason: HOSPADM

## 2019-01-31 RX ORDER — CEFAZOLIN SODIUM 2 G/50ML
2 SOLUTION INTRAVENOUS ONCE
Status: COMPLETED | OUTPATIENT
Start: 2019-01-31 | End: 2019-01-31

## 2019-01-31 RX ORDER — MIDAZOLAM HYDROCHLORIDE 1 MG/ML
INJECTION INTRAMUSCULAR; INTRAVENOUS PRN
Status: DISCONTINUED | OUTPATIENT
Start: 2019-01-31 | End: 2019-01-31 | Stop reason: SDUPTHER

## 2019-01-31 RX ORDER — LIDOCAINE HYDROCHLORIDE 10 MG/ML
1 INJECTION, SOLUTION EPIDURAL; INFILTRATION; INTRACAUDAL; PERINEURAL
Status: COMPLETED | OUTPATIENT
Start: 2019-01-31 | End: 2019-01-31

## 2019-01-31 RX ORDER — ROCURONIUM BROMIDE 10 MG/ML
INJECTION, SOLUTION INTRAVENOUS PRN
Status: DISCONTINUED | OUTPATIENT
Start: 2019-01-31 | End: 2019-01-31 | Stop reason: SDUPTHER

## 2019-01-31 RX ORDER — ONDANSETRON 2 MG/ML
INJECTION INTRAMUSCULAR; INTRAVENOUS PRN
Status: DISCONTINUED | OUTPATIENT
Start: 2019-01-31 | End: 2019-01-31 | Stop reason: SDUPTHER

## 2019-01-31 RX ORDER — METOCLOPRAMIDE HYDROCHLORIDE 5 MG/ML
10 INJECTION INTRAMUSCULAR; INTRAVENOUS
Status: DISCONTINUED | OUTPATIENT
Start: 2019-01-31 | End: 2019-01-31 | Stop reason: HOSPADM

## 2019-01-31 RX ORDER — MEPERIDINE HYDROCHLORIDE 25 MG/ML
12.5 INJECTION INTRAMUSCULAR; INTRAVENOUS; SUBCUTANEOUS EVERY 5 MIN PRN
Status: DISCONTINUED | OUTPATIENT
Start: 2019-01-31 | End: 2019-01-31 | Stop reason: HOSPADM

## 2019-01-31 RX ORDER — ONDANSETRON 2 MG/ML
4 INJECTION INTRAMUSCULAR; INTRAVENOUS EVERY 8 HOURS PRN
Status: DISCONTINUED | OUTPATIENT
Start: 2019-01-31 | End: 2019-02-01 | Stop reason: HOSPADM

## 2019-01-31 RX ORDER — SODIUM CHLORIDE, SODIUM LACTATE, POTASSIUM CHLORIDE, CALCIUM CHLORIDE 600; 310; 30; 20 MG/100ML; MG/100ML; MG/100ML; MG/100ML
INJECTION, SOLUTION INTRAVENOUS CONTINUOUS
Status: DISCONTINUED | OUTPATIENT
Start: 2019-01-31 | End: 2019-01-31

## 2019-01-31 RX ORDER — CALCIUM CARBONATE 200(500)MG
500 TABLET,CHEWABLE ORAL 3 TIMES DAILY PRN
Status: DISCONTINUED | OUTPATIENT
Start: 2019-01-31 | End: 2019-02-01 | Stop reason: HOSPADM

## 2019-01-31 RX ORDER — DOCUSATE SODIUM 100 MG/1
100 CAPSULE, LIQUID FILLED ORAL 2 TIMES DAILY
Status: DISCONTINUED | OUTPATIENT
Start: 2019-01-31 | End: 2019-02-01 | Stop reason: HOSPADM

## 2019-01-31 RX ORDER — SODIUM CHLORIDE 0.9 % (FLUSH) 0.9 %
10 SYRINGE (ML) INJECTION EVERY 12 HOURS SCHEDULED
Status: DISCONTINUED | OUTPATIENT
Start: 2019-01-31 | End: 2019-01-31 | Stop reason: HOSPADM

## 2019-01-31 RX ORDER — SODIUM CHLORIDE 0.9 % (FLUSH) 0.9 %
10 SYRINGE (ML) INJECTION EVERY 12 HOURS SCHEDULED
Status: DISCONTINUED | OUTPATIENT
Start: 2019-01-31 | End: 2019-02-01 | Stop reason: HOSPADM

## 2019-01-31 RX ORDER — HYDROCODONE BITARTRATE AND ACETAMINOPHEN 5; 325 MG/1; MG/1
2 TABLET ORAL PRN
Status: DISCONTINUED | OUTPATIENT
Start: 2019-01-31 | End: 2019-01-31 | Stop reason: HOSPADM

## 2019-01-31 RX ADMIN — ROCURONIUM BROMIDE 50 MG: 10 INJECTION, SOLUTION INTRAVENOUS at 07:55

## 2019-01-31 RX ADMIN — ANTACID TABLETS 500 MG: 500 TABLET, CHEWABLE ORAL at 19:58

## 2019-01-31 RX ADMIN — FAMOTIDINE 20 MG: 20 TABLET, FILM COATED ORAL at 20:01

## 2019-01-31 RX ADMIN — ONDANSETRON 4 MG: 2 INJECTION INTRAMUSCULAR; INTRAVENOUS at 08:12

## 2019-01-31 RX ADMIN — CEFAZOLIN SODIUM 2 G: 2 SOLUTION INTRAVENOUS at 08:06

## 2019-01-31 RX ADMIN — OXYCODONE AND ACETAMINOPHEN 1 TABLET: 5; 325 TABLET ORAL at 17:29

## 2019-01-31 RX ADMIN — FENTANYL CITRATE 50 MCG: 50 INJECTION, SOLUTION INTRAMUSCULAR; INTRAVENOUS at 10:31

## 2019-01-31 RX ADMIN — KETOROLAC TROMETHAMINE 30 MG: 30 INJECTION, SOLUTION INTRAMUSCULAR; INTRAVENOUS at 10:19

## 2019-01-31 RX ADMIN — DEXAMETHASONE SODIUM PHOSPHATE 4 MG: 4 INJECTION, SOLUTION INTRA-ARTICULAR; INTRALESIONAL; INTRAMUSCULAR; INTRAVENOUS; SOFT TISSUE at 08:13

## 2019-01-31 RX ADMIN — FENTANYL CITRATE 50 MCG: 50 INJECTION, SOLUTION INTRAMUSCULAR; INTRAVENOUS at 07:55

## 2019-01-31 RX ADMIN — KETOROLAC TROMETHAMINE 30 MG: 30 INJECTION, SOLUTION INTRAMUSCULAR; INTRAVENOUS at 16:31

## 2019-01-31 RX ADMIN — MIDAZOLAM HYDROCHLORIDE 2 MG: 1 INJECTION, SOLUTION INTRAMUSCULAR; INTRAVENOUS at 08:07

## 2019-01-31 RX ADMIN — LIDOCAINE HYDROCHLORIDE 5 ML: 10 INJECTION, SOLUTION EPIDURAL; INFILTRATION; INTRACAUDAL; PERINEURAL at 07:55

## 2019-01-31 RX ADMIN — FENTANYL CITRATE 50 MCG: 50 INJECTION, SOLUTION INTRAMUSCULAR; INTRAVENOUS at 09:10

## 2019-01-31 RX ADMIN — OXYCODONE AND ACETAMINOPHEN 1 TABLET: 5; 325 TABLET ORAL at 12:14

## 2019-01-31 RX ADMIN — SODIUM CHLORIDE, POTASSIUM CHLORIDE, SODIUM LACTATE AND CALCIUM CHLORIDE: 600; 310; 30; 20 INJECTION, SOLUTION INTRAVENOUS at 06:49

## 2019-01-31 RX ADMIN — KETOROLAC TROMETHAMINE 30 MG: 30 INJECTION, SOLUTION INTRAMUSCULAR; INTRAVENOUS at 22:30

## 2019-01-31 RX ADMIN — DOCUSATE SODIUM 100 MG: 100 CAPSULE, LIQUID FILLED ORAL at 12:14

## 2019-01-31 RX ADMIN — PROPOFOL 200 MG: 10 INJECTION, EMULSION INTRAVENOUS at 07:55

## 2019-01-31 RX ADMIN — SUGAMMADEX 200 MG: 100 INJECTION, SOLUTION INTRAVENOUS at 09:40

## 2019-01-31 RX ADMIN — DOCUSATE SODIUM 100 MG: 100 CAPSULE, LIQUID FILLED ORAL at 20:01

## 2019-01-31 RX ADMIN — CEFAZOLIN SODIUM 2 G: 2 SOLUTION INTRAVENOUS at 16:31

## 2019-01-31 ASSESSMENT — PULMONARY FUNCTION TESTS
PIF_VALUE: 20
PIF_VALUE: 21
PIF_VALUE: 21
PIF_VALUE: 18
PIF_VALUE: 21
PIF_VALUE: 18
PIF_VALUE: 19
PIF_VALUE: 18
PIF_VALUE: 1
PIF_VALUE: 21
PIF_VALUE: 22
PIF_VALUE: 20
PIF_VALUE: 2
PIF_VALUE: 21
PIF_VALUE: 21
PIF_VALUE: 0
PIF_VALUE: 21
PIF_VALUE: 19
PIF_VALUE: 21
PIF_VALUE: 0
PIF_VALUE: 19
PIF_VALUE: 20
PIF_VALUE: 21
PIF_VALUE: 21
PIF_VALUE: 20
PIF_VALUE: 19
PIF_VALUE: 22
PIF_VALUE: 21
PIF_VALUE: 19
PIF_VALUE: 37
PIF_VALUE: 3
PIF_VALUE: 20
PIF_VALUE: 19
PIF_VALUE: 21
PIF_VALUE: 19
PIF_VALUE: 3
PIF_VALUE: 21
PIF_VALUE: 0
PIF_VALUE: 2
PIF_VALUE: 18
PIF_VALUE: 20
PIF_VALUE: 20
PIF_VALUE: 19
PIF_VALUE: 20
PIF_VALUE: 19
PIF_VALUE: 21
PIF_VALUE: 20
PIF_VALUE: 21
PIF_VALUE: 21
PIF_VALUE: 22
PIF_VALUE: 4
PIF_VALUE: 1
PIF_VALUE: 20
PIF_VALUE: 21
PIF_VALUE: 0
PIF_VALUE: 21
PIF_VALUE: 20
PIF_VALUE: 21
PIF_VALUE: 20
PIF_VALUE: 21
PIF_VALUE: 20
PIF_VALUE: 19
PIF_VALUE: 19
PIF_VALUE: 21
PIF_VALUE: 20
PIF_VALUE: 21
PIF_VALUE: 3
PIF_VALUE: 20
PIF_VALUE: 19
PIF_VALUE: 19
PIF_VALUE: 23
PIF_VALUE: 20
PIF_VALUE: 0
PIF_VALUE: 21
PIF_VALUE: 18
PIF_VALUE: 21
PIF_VALUE: 19
PIF_VALUE: 20
PIF_VALUE: 21
PIF_VALUE: 21
PIF_VALUE: 3
PIF_VALUE: 21
PIF_VALUE: 21
PIF_VALUE: 33
PIF_VALUE: 20
PIF_VALUE: 19
PIF_VALUE: 19
PIF_VALUE: 3
PIF_VALUE: 20
PIF_VALUE: 25
PIF_VALUE: 20
PIF_VALUE: 19
PIF_VALUE: 21
PIF_VALUE: 18
PIF_VALUE: 21
PIF_VALUE: 20
PIF_VALUE: 18
PIF_VALUE: 19
PIF_VALUE: 22
PIF_VALUE: 21
PIF_VALUE: 20
PIF_VALUE: 18
PIF_VALUE: 22
PIF_VALUE: 21
PIF_VALUE: 21
PIF_VALUE: 19
PIF_VALUE: 20
PIF_VALUE: 21
PIF_VALUE: 19
PIF_VALUE: 22

## 2019-01-31 ASSESSMENT — PAIN SCALES - GENERAL
PAINLEVEL_OUTOF10: 5
PAINLEVEL_OUTOF10: 3
PAINLEVEL_OUTOF10: 5
PAINLEVEL_OUTOF10: 3
PAINLEVEL_OUTOF10: 7
PAINLEVEL_OUTOF10: 7
PAINLEVEL_OUTOF10: 3

## 2019-01-31 ASSESSMENT — PAIN - FUNCTIONAL ASSESSMENT: PAIN_FUNCTIONAL_ASSESSMENT: 0-10

## 2019-01-31 ASSESSMENT — PAIN DESCRIPTION - LOCATION
LOCATION: ABDOMEN;RECTUM

## 2019-01-31 ASSESSMENT — PAIN DESCRIPTION - PAIN TYPE
TYPE: SURGICAL PAIN

## 2019-01-31 ASSESSMENT — PAIN DESCRIPTION - ORIENTATION
ORIENTATION: LOWER

## 2019-02-01 VITALS
HEART RATE: 61 BPM | SYSTOLIC BLOOD PRESSURE: 139 MMHG | HEIGHT: 65 IN | TEMPERATURE: 97.5 F | RESPIRATION RATE: 16 BRPM | OXYGEN SATURATION: 99 % | DIASTOLIC BLOOD PRESSURE: 70 MMHG | BODY MASS INDEX: 35.99 KG/M2 | WEIGHT: 216 LBS

## 2019-02-01 LAB
HCT VFR BLD CALC: 34.4 % (ref 37–47)
HEMOGLOBIN: 11.9 G/DL (ref 12–16)
MCH RBC QN AUTO: 30 PG (ref 27–31.3)
MCHC RBC AUTO-ENTMCNC: 34.6 % (ref 33–37)
MCV RBC AUTO: 86.5 FL (ref 82–100)
PDW BLD-RTO: 14.5 % (ref 11.5–14.5)
PLATELET # BLD: 208 K/UL (ref 130–400)
RBC # BLD: 3.98 M/UL (ref 4.2–5.4)
WBC # BLD: 10.9 K/UL (ref 4.8–10.8)

## 2019-02-01 PROCEDURE — 85027 COMPLETE CBC AUTOMATED: CPT

## 2019-02-01 PROCEDURE — 2580000003 HC RX 258: Performed by: OBSTETRICS & GYNECOLOGY

## 2019-02-01 PROCEDURE — 6360000002 HC RX W HCPCS: Performed by: OBSTETRICS & GYNECOLOGY

## 2019-02-01 PROCEDURE — 36415 COLL VENOUS BLD VENIPUNCTURE: CPT

## 2019-02-01 PROCEDURE — 6370000000 HC RX 637 (ALT 250 FOR IP): Performed by: OBSTETRICS & GYNECOLOGY

## 2019-02-01 PROCEDURE — 2700000000 HC OXYGEN THERAPY PER DAY

## 2019-02-01 RX ORDER — POLYETHYLENE GLYCOL 3350 17 G/17G
17 POWDER, FOR SOLUTION ORAL 2 TIMES DAILY PRN
Qty: 1020 G | Refills: 1 | Status: SHIPPED | OUTPATIENT
Start: 2019-02-01 | End: 2019-03-03

## 2019-02-01 RX ORDER — IBUPROFEN 600 MG/1
600 TABLET ORAL EVERY 6 HOURS PRN
Qty: 60 TABLET | Refills: 1 | Status: SHIPPED | OUTPATIENT
Start: 2019-02-01 | End: 2019-05-22

## 2019-02-01 RX ORDER — SIMETHICONE 80 MG
80 TABLET,CHEWABLE ORAL 4 TIMES DAILY PRN
Qty: 180 TABLET | Refills: 1 | Status: SHIPPED | OUTPATIENT
Start: 2019-02-01 | End: 2019-05-22

## 2019-02-01 RX ORDER — OXYCODONE HYDROCHLORIDE AND ACETAMINOPHEN 5; 325 MG/1; MG/1
1 TABLET ORAL EVERY 6 HOURS PRN
Qty: 20 TABLET | Refills: 0 | Status: SHIPPED | OUTPATIENT
Start: 2019-02-01 | End: 2019-02-08

## 2019-02-01 RX ORDER — RANITIDINE 150 MG/1
150 TABLET ORAL 2 TIMES DAILY
Qty: 60 TABLET | Refills: 3 | Status: ON HOLD | OUTPATIENT
Start: 2019-02-01 | End: 2021-12-10

## 2019-02-01 RX ORDER — ACETAMINOPHEN 500 MG
1000 TABLET ORAL EVERY 6 HOURS PRN
Qty: 60 TABLET | Refills: 1 | Status: SHIPPED | OUTPATIENT
Start: 2019-02-01

## 2019-02-01 RX ADMIN — Medication 10 ML: at 08:14

## 2019-02-01 RX ADMIN — DOCUSATE SODIUM 100 MG: 100 CAPSULE, LIQUID FILLED ORAL at 08:20

## 2019-02-01 RX ADMIN — FAMOTIDINE 20 MG: 20 TABLET, FILM COATED ORAL at 08:20

## 2019-02-01 RX ADMIN — KETOROLAC TROMETHAMINE 30 MG: 30 INJECTION, SOLUTION INTRAMUSCULAR; INTRAVENOUS at 09:50

## 2019-02-01 RX ADMIN — KETOROLAC TROMETHAMINE 30 MG: 30 INJECTION, SOLUTION INTRAMUSCULAR; INTRAVENOUS at 04:49

## 2019-02-01 RX ADMIN — CEFAZOLIN SODIUM 2 G: 2 SOLUTION INTRAVENOUS at 00:24

## 2019-02-01 ASSESSMENT — PAIN SCALES - GENERAL
PAINLEVEL_OUTOF10: 0

## 2019-02-11 ENCOUNTER — TELEPHONE (OUTPATIENT)
Dept: OBGYN CLINIC | Age: 74
End: 2019-02-11

## 2019-02-15 ENCOUNTER — TELEPHONE (OUTPATIENT)
Dept: OBGYN CLINIC | Age: 74
End: 2019-02-15

## 2019-02-19 ENCOUNTER — OFFICE VISIT (OUTPATIENT)
Dept: OBGYN CLINIC | Age: 74
End: 2019-02-19

## 2019-02-19 VITALS
SYSTOLIC BLOOD PRESSURE: 154 MMHG | BODY MASS INDEX: 36.15 KG/M2 | HEIGHT: 65 IN | WEIGHT: 217 LBS | DIASTOLIC BLOOD PRESSURE: 92 MMHG | HEART RATE: 80 BPM

## 2019-02-19 DIAGNOSIS — N30.00 ACUTE CYSTITIS WITHOUT HEMATURIA: ICD-10-CM

## 2019-02-19 DIAGNOSIS — Z09 POSTOP CHECK: Primary | ICD-10-CM

## 2019-02-19 PROCEDURE — 99024 POSTOP FOLLOW-UP VISIT: CPT | Performed by: OBSTETRICS & GYNECOLOGY

## 2019-02-19 RX ORDER — NITROFURANTOIN 25; 75 MG/1; MG/1
100 CAPSULE ORAL 2 TIMES DAILY
COMMUNITY
End: 2019-04-12 | Stop reason: ALTCHOICE

## 2019-02-21 DIAGNOSIS — N30.00 ACUTE CYSTITIS WITHOUT HEMATURIA: ICD-10-CM

## 2019-02-21 LAB
AMORPHOUS: ABNORMAL
BACTERIA: NEGATIVE /HPF
BILIRUBIN URINE: NEGATIVE
BLOOD, URINE: ABNORMAL
CLARITY: ABNORMAL
COLOR: YELLOW
EPITHELIAL CELLS, UA: >100 /HPF (ref 0–5)
GLUCOSE URINE: NEGATIVE MG/DL
KETONES, URINE: ABNORMAL MG/DL
LEUKOCYTE ESTERASE, URINE: ABNORMAL
NITRITE, URINE: NEGATIVE
PH UA: 5 (ref 5–9)
PROTEIN UA: 30 MG/DL
RBC UA: ABNORMAL /HPF (ref 0–5)
RENAL EPITHELIAL, UA: ABNORMAL /HPF
SPECIFIC GRAVITY UA: 1.02 (ref 1–1.03)
UROBILINOGEN, URINE: 0.2 E.U./DL
WBC UA: ABNORMAL /HPF (ref 0–5)

## 2019-02-23 LAB — URINE CULTURE, ROUTINE: NORMAL

## 2019-02-25 ENCOUNTER — TELEPHONE (OUTPATIENT)
Dept: OBGYN CLINIC | Age: 74
End: 2019-02-25

## 2019-02-25 RX ORDER — NITROFURANTOIN 25; 75 MG/1; MG/1
100 CAPSULE ORAL 2 TIMES DAILY
Qty: 20 CAPSULE | Refills: 0 | Status: SHIPPED | OUTPATIENT
Start: 2019-02-25 | End: 2019-03-07

## 2019-02-26 ENCOUNTER — HOSPITAL ENCOUNTER (OUTPATIENT)
Dept: NON INVASIVE DIAGNOSTICS | Age: 74
Discharge: HOME | End: 2019-02-26
Payer: MEDICARE

## 2019-02-26 LAB
LV EF: 65 %
LVEF MODALITY: NORMAL

## 2019-02-26 PROCEDURE — 93306 TTE W/DOPPLER COMPLETE: CPT

## 2019-03-04 ENCOUNTER — OFFICE VISIT (OUTPATIENT)
Dept: OBGYN CLINIC | Age: 74
End: 2019-03-04
Payer: MEDICARE

## 2019-03-04 VITALS
WEIGHT: 219 LBS | DIASTOLIC BLOOD PRESSURE: 84 MMHG | SYSTOLIC BLOOD PRESSURE: 138 MMHG | BODY MASS INDEX: 36.49 KG/M2 | HEIGHT: 65 IN | HEART RATE: 76 BPM

## 2019-03-04 DIAGNOSIS — N30.00 ACUTE CYSTITIS WITHOUT HEMATURIA: ICD-10-CM

## 2019-03-04 DIAGNOSIS — N30.00 ACUTE CYSTITIS WITHOUT HEMATURIA: Primary | ICD-10-CM

## 2019-03-04 LAB
AMORPHOUS: ABNORMAL
BACTERIA: ABNORMAL /HPF
BILIRUBIN URINE: ABNORMAL
BILIRUBIN, POC: ABNORMAL
BLOOD URINE, POC: ABNORMAL
BLOOD, URINE: ABNORMAL
CLARITY, POC: CLEAR
CLARITY: ABNORMAL
COLOR, POC: ABNORMAL
COLOR: ABNORMAL
GLUCOSE URINE, POC: ABNORMAL
GLUCOSE URINE: NEGATIVE MG/DL
KETONES, POC: ABNORMAL
KETONES, URINE: 15 MG/DL
LEUKOCYTE EST, POC: ABNORMAL
LEUKOCYTE ESTERASE, URINE: ABNORMAL
MUCUS: PRESENT
NITRITE, POC: ABNORMAL
NITRITE, URINE: NEGATIVE
PH UA: 5 (ref 5–9)
PH, POC: 5.5
PROTEIN UA: ABNORMAL MG/DL
PROTEIN, POC: ABNORMAL
RBC UA: ABNORMAL /HPF (ref 0–2)
RBC UA: ABNORMAL /HPF (ref 0–5)
SPECIFIC GRAVITY UA: 1.03 (ref 1–1.03)
SPECIFIC GRAVITY, POC: 1.03
UROBILINOGEN, POC: ABNORMAL
UROBILINOGEN, URINE: 1 E.U./DL
WBC UA: ABNORMAL /HPF (ref 0–5)
WBC UA: ABNORMAL /HPF (ref 0–5)

## 2019-03-04 PROCEDURE — 99024 POSTOP FOLLOW-UP VISIT: CPT | Performed by: OBSTETRICS & GYNECOLOGY

## 2019-03-04 PROCEDURE — 81003 URINALYSIS AUTO W/O SCOPE: CPT | Performed by: OBSTETRICS & GYNECOLOGY

## 2019-03-04 RX ORDER — OXYBUTYNIN CHLORIDE 5 MG/1
5 TABLET, EXTENDED RELEASE ORAL DAILY
Qty: 30 TABLET | Refills: 1 | Status: SHIPPED | OUTPATIENT
Start: 2019-03-04 | End: 2019-03-14

## 2019-03-07 LAB
ORGANISM: ABNORMAL
URINE CULTURE, ROUTINE: ABNORMAL
URINE CULTURE, ROUTINE: ABNORMAL

## 2019-03-11 RX ORDER — CIPROFLOXACIN 500 MG/1
500 TABLET, FILM COATED ORAL 2 TIMES DAILY
Qty: 20 TABLET | Refills: 0 | Status: SHIPPED | OUTPATIENT
Start: 2019-03-11 | End: 2019-03-21

## 2019-03-12 ENCOUNTER — TELEPHONE (OUTPATIENT)
Dept: OBGYN CLINIC | Age: 74
End: 2019-03-12

## 2019-03-13 ENCOUNTER — TELEPHONE (OUTPATIENT)
Dept: OBGYN CLINIC | Age: 74
End: 2019-03-13

## 2019-03-14 ENCOUNTER — HOSPITAL ENCOUNTER (OUTPATIENT)
Dept: INFUSION THERAPY | Age: 74
Setting detail: INFUSION SERIES
Discharge: HOME | End: 2019-03-14
Payer: MEDICARE

## 2019-03-14 VITALS
DIASTOLIC BLOOD PRESSURE: 63 MMHG | SYSTOLIC BLOOD PRESSURE: 119 MMHG | RESPIRATION RATE: 18 BRPM | TEMPERATURE: 98.2 F | HEART RATE: 57 BPM

## 2019-03-14 PROCEDURE — 2580000003 HC RX 258: Performed by: OBSTETRICS & GYNECOLOGY

## 2019-03-14 PROCEDURE — 96365 THER/PROPH/DIAG IV INF INIT: CPT

## 2019-03-14 PROCEDURE — 6360000002 HC RX W HCPCS: Performed by: OBSTETRICS & GYNECOLOGY

## 2019-03-14 RX ADMIN — TOBRAMYCIN SULFATE 496.5 MG: 40 INJECTION, SOLUTION INTRAMUSCULAR; INTRAVENOUS at 12:22

## 2019-03-14 NOTE — PROGRESS NOTES
Pt to [facility] for new IV antibiotics. Has never had tobramycin. lexicomp info given. Consents completed.

## 2019-03-15 ENCOUNTER — HOSPITAL ENCOUNTER (OUTPATIENT)
Dept: INFUSION THERAPY | Age: 74
Setting detail: INFUSION SERIES
Discharge: HOME | End: 2019-03-15
Payer: MEDICARE

## 2019-03-15 VITALS
SYSTOLIC BLOOD PRESSURE: 161 MMHG | DIASTOLIC BLOOD PRESSURE: 79 MMHG | HEART RATE: 53 BPM | TEMPERATURE: 97.2 F | RESPIRATION RATE: 18 BRPM

## 2019-03-15 DIAGNOSIS — N39.0 URINARY TRACT INFECTION WITHOUT HEMATURIA, SITE UNSPECIFIED: Primary | ICD-10-CM

## 2019-03-15 PROCEDURE — 6360000002 HC RX W HCPCS: Performed by: OBSTETRICS & GYNECOLOGY

## 2019-03-15 PROCEDURE — 96365 THER/PROPH/DIAG IV INF INIT: CPT

## 2019-03-15 PROCEDURE — 2580000003 HC RX 258: Performed by: OBSTETRICS & GYNECOLOGY

## 2019-03-15 RX ADMIN — TOBRAMYCIN SULFATE 496.5 MG: 40 INJECTION, SOLUTION INTRAMUSCULAR; INTRAVENOUS at 12:51

## 2019-03-15 NOTE — FLOWSHEET NOTE
Patient to the floor ambulatory for her iv antibiotic infusion. Vital signs taken. Denies any discomfort. Call light within reach.

## 2019-03-16 ENCOUNTER — HOSPITAL ENCOUNTER (OUTPATIENT)
Age: 74
Discharge: HOME | End: 2019-03-16
Attending: OBSTETRICS & GYNECOLOGY | Admitting: OBSTETRICS & GYNECOLOGY
Payer: MEDICARE

## 2019-03-16 VITALS
OXYGEN SATURATION: 98 % | HEART RATE: 62 BPM | TEMPERATURE: 97.6 F | DIASTOLIC BLOOD PRESSURE: 66 MMHG | RESPIRATION RATE: 18 BRPM | SYSTOLIC BLOOD PRESSURE: 116 MMHG

## 2019-03-16 DIAGNOSIS — N39.0 URINARY TRACT INFECTION WITHOUT HEMATURIA, SITE UNSPECIFIED: Primary | ICD-10-CM

## 2019-03-16 PROCEDURE — 96365 THER/PROPH/DIAG IV INF INIT: CPT

## 2019-03-16 PROCEDURE — 6360000002 HC RX W HCPCS: Performed by: OBSTETRICS & GYNECOLOGY

## 2019-03-16 PROCEDURE — 2580000003 HC RX 258: Performed by: OBSTETRICS & GYNECOLOGY

## 2019-03-16 RX ADMIN — TOBRAMYCIN SULFATE 496.5 MG: 40 INJECTION, SOLUTION INTRAMUSCULAR; INTRAVENOUS at 11:08

## 2019-03-17 ENCOUNTER — HOSPITAL ENCOUNTER (OUTPATIENT)
Age: 74
Discharge: HOME | End: 2019-03-17
Attending: OBSTETRICS & GYNECOLOGY | Admitting: OBSTETRICS & GYNECOLOGY
Payer: MEDICARE

## 2019-03-17 DIAGNOSIS — N39.0 URINARY TRACT INFECTION WITHOUT HEMATURIA, SITE UNSPECIFIED: Primary | ICD-10-CM

## 2019-03-17 PROCEDURE — 96365 THER/PROPH/DIAG IV INF INIT: CPT

## 2019-03-17 PROCEDURE — 6360000002 HC RX W HCPCS: Performed by: OBSTETRICS & GYNECOLOGY

## 2019-03-17 PROCEDURE — 2580000003 HC RX 258: Performed by: OBSTETRICS & GYNECOLOGY

## 2019-03-17 RX ADMIN — TOBRAMYCIN SULFATE 496.5 MG: 40 INJECTION, SOLUTION INTRAMUSCULAR; INTRAVENOUS at 11:13

## 2019-03-18 ENCOUNTER — TELEPHONE (OUTPATIENT)
Dept: OBGYN CLINIC | Age: 74
End: 2019-03-18

## 2019-03-18 ENCOUNTER — HOSPITAL ENCOUNTER (OUTPATIENT)
Dept: INFUSION THERAPY | Age: 74
Setting detail: INFUSION SERIES
Discharge: HOME | End: 2019-03-18
Payer: MEDICARE

## 2019-03-18 VITALS
DIASTOLIC BLOOD PRESSURE: 90 MMHG | RESPIRATION RATE: 18 BRPM | TEMPERATURE: 98.8 F | SYSTOLIC BLOOD PRESSURE: 150 MMHG | OXYGEN SATURATION: 98 % | HEART RATE: 77 BPM

## 2019-03-18 DIAGNOSIS — N39.0 URINARY TRACT INFECTION WITHOUT HEMATURIA, SITE UNSPECIFIED: Primary | ICD-10-CM

## 2019-03-18 PROCEDURE — 2580000003 HC RX 258: Performed by: OBSTETRICS & GYNECOLOGY

## 2019-03-18 PROCEDURE — 96365 THER/PROPH/DIAG IV INF INIT: CPT

## 2019-03-18 PROCEDURE — 6360000002 HC RX W HCPCS: Performed by: OBSTETRICS & GYNECOLOGY

## 2019-03-18 RX ADMIN — TOBRAMYCIN SULFATE 496.5 MG: 40 INJECTION, SOLUTION INTRAMUSCULAR; INTRAVENOUS at 12:04

## 2019-03-18 NOTE — PROGRESS NOTES
Patient ambulatory to infusion center for infusion. IV placed in LUE. Patient states that her last IV infiltrated during infusion. Redness noted around previous infusion site. Patient states that swelling has came down since. VSS. Infusion started and patient tolerating well.

## 2019-03-19 ENCOUNTER — TELEPHONE (OUTPATIENT)
Dept: OBGYN CLINIC | Age: 74
End: 2019-03-19

## 2019-03-19 ENCOUNTER — HOSPITAL ENCOUNTER (OUTPATIENT)
Dept: INFUSION THERAPY | Age: 74
Setting detail: INFUSION SERIES
Discharge: HOME | End: 2019-03-19
Payer: MEDICARE

## 2019-03-19 VITALS
HEART RATE: 67 BPM | SYSTOLIC BLOOD PRESSURE: 123 MMHG | RESPIRATION RATE: 18 BRPM | OXYGEN SATURATION: 98 % | TEMPERATURE: 98 F | DIASTOLIC BLOOD PRESSURE: 70 MMHG

## 2019-03-19 DIAGNOSIS — N39.0 URINARY TRACT INFECTION WITHOUT HEMATURIA, SITE UNSPECIFIED: Primary | ICD-10-CM

## 2019-03-19 PROCEDURE — 6360000002 HC RX W HCPCS: Performed by: OBSTETRICS & GYNECOLOGY

## 2019-03-19 PROCEDURE — 96365 THER/PROPH/DIAG IV INF INIT: CPT

## 2019-03-19 PROCEDURE — 2580000003 HC RX 258: Performed by: OBSTETRICS & GYNECOLOGY

## 2019-03-19 RX ADMIN — TOBRAMYCIN SULFATE 496.5 MG: 40 INJECTION, SOLUTION INTRAMUSCULAR; INTRAVENOUS at 11:47

## 2019-03-19 NOTE — PROGRESS NOTES
Patient A&OX4. Patient ambulatory to infusion center for infusion of tobramycin. IV flushed and patent. VSS. Allergies reviewed.

## 2019-03-20 ENCOUNTER — HOSPITAL ENCOUNTER (OUTPATIENT)
Dept: INFUSION THERAPY | Age: 74
Setting detail: INFUSION SERIES
Discharge: HOME | End: 2019-03-20
Payer: MEDICARE

## 2019-03-20 VITALS
DIASTOLIC BLOOD PRESSURE: 81 MMHG | RESPIRATION RATE: 18 BRPM | TEMPERATURE: 98.1 F | HEART RATE: 55 BPM | SYSTOLIC BLOOD PRESSURE: 144 MMHG | OXYGEN SATURATION: 98 %

## 2019-03-20 DIAGNOSIS — N39.0 URINARY TRACT INFECTION WITHOUT HEMATURIA, SITE UNSPECIFIED: Primary | ICD-10-CM

## 2019-03-20 PROCEDURE — 6360000002 HC RX W HCPCS: Performed by: OBSTETRICS & GYNECOLOGY

## 2019-03-20 PROCEDURE — 2580000003 HC RX 258: Performed by: OBSTETRICS & GYNECOLOGY

## 2019-03-20 PROCEDURE — 96365 THER/PROPH/DIAG IV INF INIT: CPT

## 2019-03-20 RX ADMIN — TOBRAMYCIN SULFATE 496.5 MG: 40 INJECTION, SOLUTION INTRAMUSCULAR; INTRAVENOUS at 12:22

## 2019-03-20 NOTE — PROGRESS NOTES
Infusion completed. Pt tolerated well. No c/o or requests. All equipment used in the care for this patient has been cleaned.

## 2019-03-21 ENCOUNTER — HOSPITAL ENCOUNTER (OUTPATIENT)
Dept: INFUSION THERAPY | Age: 74
Setting detail: INFUSION SERIES
Discharge: HOME | End: 2019-03-21
Payer: MEDICARE

## 2019-03-21 DIAGNOSIS — N39.0 URINARY TRACT INFECTION WITHOUT HEMATURIA, SITE UNSPECIFIED: Primary | ICD-10-CM

## 2019-03-21 LAB
CREAT SERPL-MCNC: 1.1 MG/DL (ref 0.5–0.9)
GFR AFRICAN AMERICAN: 58.9
GFR NON-AFRICAN AMERICAN: 48.7

## 2019-03-21 PROCEDURE — 96365 THER/PROPH/DIAG IV INF INIT: CPT

## 2019-03-21 PROCEDURE — 6360000002 HC RX W HCPCS: Performed by: OBSTETRICS & GYNECOLOGY

## 2019-03-21 PROCEDURE — 2580000003 HC RX 258: Performed by: OBSTETRICS & GYNECOLOGY

## 2019-03-21 PROCEDURE — 82565 ASSAY OF CREATININE: CPT

## 2019-03-21 RX ADMIN — TOBRAMYCIN SULFATE 496.5 MG: 40 INJECTION, SOLUTION INTRAMUSCULAR; INTRAVENOUS at 12:47

## 2019-03-22 ENCOUNTER — HOSPITAL ENCOUNTER (OUTPATIENT)
Dept: INFUSION THERAPY | Age: 74
Setting detail: INFUSION SERIES
Discharge: HOME | End: 2019-03-22
Payer: MEDICARE

## 2019-03-22 VITALS
SYSTOLIC BLOOD PRESSURE: 142 MMHG | DIASTOLIC BLOOD PRESSURE: 82 MMHG | HEART RATE: 72 BPM | RESPIRATION RATE: 18 BRPM | TEMPERATURE: 98.3 F

## 2019-03-22 DIAGNOSIS — N39.0 URINARY TRACT INFECTION WITHOUT HEMATURIA, SITE UNSPECIFIED: Primary | ICD-10-CM

## 2019-03-22 PROCEDURE — 2580000003 HC RX 258: Performed by: OBSTETRICS & GYNECOLOGY

## 2019-03-22 PROCEDURE — 96365 THER/PROPH/DIAG IV INF INIT: CPT

## 2019-03-22 PROCEDURE — 6360000002 HC RX W HCPCS: Performed by: OBSTETRICS & GYNECOLOGY

## 2019-03-22 RX ADMIN — TOBRAMYCIN SULFATE 496.5 MG: 40 INJECTION, SOLUTION INTRAMUSCULAR; INTRAVENOUS at 13:18

## 2019-03-23 ENCOUNTER — HOSPITAL ENCOUNTER (OUTPATIENT)
Dept: INFUSION THERAPY | Age: 74
Setting detail: INFUSION SERIES
Discharge: HOME | End: 2019-03-23
Payer: MEDICARE

## 2019-03-23 VITALS
DIASTOLIC BLOOD PRESSURE: 80 MMHG | HEART RATE: 59 BPM | SYSTOLIC BLOOD PRESSURE: 147 MMHG | TEMPERATURE: 97 F | RESPIRATION RATE: 18 BRPM

## 2019-03-23 DIAGNOSIS — N39.0 URINARY TRACT INFECTION WITHOUT HEMATURIA, SITE UNSPECIFIED: Primary | ICD-10-CM

## 2019-03-23 PROCEDURE — 6360000002 HC RX W HCPCS: Performed by: OBSTETRICS & GYNECOLOGY

## 2019-03-23 PROCEDURE — 2580000003 HC RX 258: Performed by: OBSTETRICS & GYNECOLOGY

## 2019-03-23 PROCEDURE — 96365 THER/PROPH/DIAG IV INF INIT: CPT

## 2019-03-23 RX ADMIN — TOBRAMYCIN SULFATE 496.5 MG: 40 INJECTION, SOLUTION INTRAMUSCULAR; INTRAVENOUS at 10:36

## 2019-03-23 NOTE — FLOWSHEET NOTE
Infusion complete. Tolerated well. Iv removed. Patient ambulated off of the unit. All equipment used in the care for this patient has been cleaned.

## 2019-03-23 NOTE — FLOWSHEET NOTE
Patient to the floor ambulatory for her last iv antibiotic. Vitals signs taken. Denies any discomfort. Call light within reach.

## 2019-03-25 ENCOUNTER — OFFICE VISIT (OUTPATIENT)
Dept: OBGYN CLINIC | Age: 74
End: 2019-03-25

## 2019-03-25 VITALS
HEIGHT: 65 IN | SYSTOLIC BLOOD PRESSURE: 114 MMHG | HEART RATE: 84 BPM | DIASTOLIC BLOOD PRESSURE: 72 MMHG | BODY MASS INDEX: 36.15 KG/M2 | WEIGHT: 217 LBS | TEMPERATURE: 97.3 F

## 2019-03-25 DIAGNOSIS — N30.00 ACUTE CYSTITIS WITHOUT HEMATURIA: ICD-10-CM

## 2019-03-25 DIAGNOSIS — N10 ACUTE PYELONEPHRITIS: Primary | ICD-10-CM

## 2019-03-25 DIAGNOSIS — N10 ACUTE PYELONEPHRITIS: ICD-10-CM

## 2019-03-25 LAB
BILIRUBIN URINE: NEGATIVE
BLOOD, URINE: NEGATIVE
CLARITY: CLEAR
COLOR: YELLOW
GLUCOSE URINE: NEGATIVE MG/DL
KETONES, URINE: NEGATIVE MG/DL
LEUKOCYTE ESTERASE, URINE: NEGATIVE
NITRITE, URINE: NEGATIVE
PH UA: 5.5 (ref 5–9)
PROTEIN UA: NEGATIVE MG/DL
SPECIFIC GRAVITY UA: 1.02 (ref 1–1.03)
UROBILINOGEN, URINE: 0.2 E.U./DL

## 2019-03-25 PROCEDURE — 99024 POSTOP FOLLOW-UP VISIT: CPT | Performed by: OBSTETRICS & GYNECOLOGY

## 2019-03-25 RX ORDER — OXYBUTYNIN CHLORIDE 5 MG/1
5 TABLET, EXTENDED RELEASE ORAL DAILY
Qty: 30 TABLET | Refills: 6 | Status: ON HOLD | OUTPATIENT
Start: 2019-03-25 | End: 2019-05-06 | Stop reason: HOSPADM

## 2019-03-28 DIAGNOSIS — N30.00 ACUTE CYSTITIS WITHOUT HEMATURIA: Primary | ICD-10-CM

## 2019-03-28 LAB
ORGANISM: ABNORMAL
URINE CULTURE, ROUTINE: ABNORMAL
URINE CULTURE, ROUTINE: ABNORMAL

## 2019-03-28 RX ORDER — NITROFURANTOIN 25; 75 MG/1; MG/1
100 CAPSULE ORAL 2 TIMES DAILY
Qty: 20 CAPSULE | Refills: 0 | Status: SHIPPED | OUTPATIENT
Start: 2019-03-28 | End: 2019-06-07 | Stop reason: SDUPTHER

## 2019-03-29 ENCOUNTER — OFFICE VISIT (OUTPATIENT)
Dept: OBGYN CLINIC | Age: 74
End: 2019-03-29

## 2019-03-29 VITALS
DIASTOLIC BLOOD PRESSURE: 80 MMHG | HEART RATE: 64 BPM | SYSTOLIC BLOOD PRESSURE: 148 MMHG | WEIGHT: 217 LBS | BODY MASS INDEX: 36.15 KG/M2 | HEIGHT: 65 IN

## 2019-03-29 DIAGNOSIS — N30.00 ACUTE CYSTITIS WITHOUT HEMATURIA: Primary | ICD-10-CM

## 2019-03-29 PROCEDURE — 99024 POSTOP FOLLOW-UP VISIT: CPT | Performed by: OBSTETRICS & GYNECOLOGY

## 2019-04-08 ENCOUNTER — OFFICE VISIT (OUTPATIENT)
Dept: OBGYN CLINIC | Age: 74
End: 2019-04-08
Payer: MEDICARE

## 2019-04-08 VITALS
DIASTOLIC BLOOD PRESSURE: 86 MMHG | HEART RATE: 68 BPM | WEIGHT: 215 LBS | SYSTOLIC BLOOD PRESSURE: 134 MMHG | BODY MASS INDEX: 35.82 KG/M2 | HEIGHT: 65 IN

## 2019-04-08 DIAGNOSIS — N30.00 ACUTE CYSTITIS WITHOUT HEMATURIA: Primary | ICD-10-CM

## 2019-04-08 DIAGNOSIS — N30.00 ACUTE CYSTITIS WITHOUT HEMATURIA: ICD-10-CM

## 2019-04-08 LAB
BACTERIA: NEGATIVE /HPF
BILIRUBIN URINE: NEGATIVE
BLOOD, URINE: NEGATIVE
CLARITY: ABNORMAL
COLOR: YELLOW
EPITHELIAL CELLS, UA: ABNORMAL /HPF (ref 0–5)
GLUCOSE URINE: NEGATIVE MG/DL
HYALINE CASTS: ABNORMAL /HPF (ref 0–5)
KETONES, URINE: NEGATIVE MG/DL
LEUKOCYTE ESTERASE, URINE: ABNORMAL
NITRITE, URINE: NEGATIVE
PH UA: 5 (ref 5–9)
PROTEIN UA: NEGATIVE MG/DL
RBC UA: ABNORMAL /HPF (ref 0–5)
SPECIFIC GRAVITY UA: 1.02 (ref 1–1.03)
UROBILINOGEN, URINE: 0.2 E.U./DL
WBC UA: ABNORMAL /HPF (ref 0–5)

## 2019-04-08 PROCEDURE — 99213 OFFICE O/P EST LOW 20 MIN: CPT | Performed by: OBSTETRICS & GYNECOLOGY

## 2019-04-08 RX ORDER — HYDROXYZINE HYDROCHLORIDE 25 MG/1
25 TABLET, FILM COATED ORAL NIGHTLY
Qty: 30 TABLET | Refills: 0 | Status: SHIPPED | OUTPATIENT
Start: 2019-04-08 | End: 2019-05-08

## 2019-04-09 NOTE — PROGRESS NOTES
Results Review      Queen Brigida is a 69y.o. year old female here to discuss results. PREVIOUS VISIT: was complaining of urgency, frequency, incontinence, UTI multi drug resistant staph. Simulans , ONLY SENSITIVE TO macrobid or vancomycin.      Vitals:  /86 (Site: Left Upper Arm, Position: Sitting, Cuff Size: Large Adult)   Pulse 68   Ht 5' 5\" (1.651 m)   Wt 215 lb (97.5 kg)   BMI 35.78 kg/m²   Allergies:  Codeine and Morphine  Past Medical History:   Diagnosis Date    CAD (coronary artery disease) 2005    3 vessel cardiac bypass    Environmental and seasonal allergies     GERD (gastroesophageal reflux disease)     GI bleed 2017    with LTKR / blood transfusion    History of blood transfusion 2017    post op LTKR    History of blood transfusion 2005    post op cardiac bypass    Hyperlipidemia     meds > 15 yrs    Hypertension     meds > 20 yrs    Myocardial infarct ([facility] [address] 2005    followed by 3 vessal cardiac bypass    Pityriasis rosea     Prolonged emergence from general anesthesia      Past Surgical History:   Procedure Laterality Date    ARTERIAL BYPASS SURGRY      triple / cardiac    BREAST SURGERY Bilateral     reduction and cysts biopsy / has had several biopsies    CARDIAC SURGERY  2005    3 vessel bypass    CATARACT REMOVAL WITH IMPLANT      CHOLECYSTECTOMY      COLONOSCOPY      CYSTOURETHROSCOPY  04/18/2017    FLEXIBLE / due to UTI    ENDOSCOPY, COLON, DIAGNOSTIC      EYE SURGERY      Phaco with IOL OU    HAND SURGERY Right     plate fracture wrist / hardware still remains    HYSTERECTOMY      JOINT REPLACEMENT  2017    LTKR    OTHER SURGICAL HISTORY      head fatty tumors removed    SC COLORECTAL SCRN; HI RISK IND N/A 6/11/2018    COLONOSCOPY performed by Elza Sr MD at . [facility] [address] ESOPHAGOGASTRODUODENOSCOPY TRANSORAL DIAGNOSTIC N/A 6/11/2018    EGD ESOPHAGOGASTRODUODENOSCOPY WITH DILATION performed by Elza Sr MD at [facility] referral , appointment with Dr Isael Marroquin on Friday . D/w patient . Repeat UA and CX   C/o of anxiety will give hydroxyzine 25 mg prn for sedation. Orders Placed This Encounter   Procedures    Urine Culture     Standing Status:   Future     Number of Occurrences:   1     Standing Expiration Date:   4/7/2020     Order Specific Question:   Specify (ex-cath, midstream, cysto, etc)? Answer:   midstream    Urinalysis     Standing Status:   Future     Number of Occurrences:   1     Standing Expiration Date:   4/7/2020     Orders Placed This Encounter   Medications    hydrOXYzine (ATARAX) 25 MG tablet     Sig: Take 1 tablet by mouth nightly     Dispense:  30 tablet     Refill:  0       Follow up:  Return if symptoms worsen or fail to improve, for f/u with ID as discussed. F/U with me as needed.  Wilford Schilder, MD

## 2019-04-10 LAB — URINE CULTURE, ROUTINE: NORMAL

## 2019-04-12 ENCOUNTER — OFFICE VISIT (OUTPATIENT)
Dept: INFECTIOUS DISEASES | Age: 74
End: 2019-04-12
Payer: MEDICARE

## 2019-04-12 VITALS
WEIGHT: 215.6 LBS | BODY MASS INDEX: 35.92 KG/M2 | RESPIRATION RATE: 16 BRPM | HEIGHT: 65 IN | HEART RATE: 54 BPM | TEMPERATURE: 97.9 F | DIASTOLIC BLOOD PRESSURE: 86 MMHG | SYSTOLIC BLOOD PRESSURE: 137 MMHG

## 2019-04-12 DIAGNOSIS — R42 DIZZINESS: Primary | ICD-10-CM

## 2019-04-12 DIAGNOSIS — R42 DIZZINESS: ICD-10-CM

## 2019-04-12 DIAGNOSIS — N39.0 RECURRENT UTI: Primary | ICD-10-CM

## 2019-04-12 LAB
ANION GAP SERPL CALCULATED.3IONS-SCNC: 17 MEQ/L (ref 9–15)
BUN BLDV-MCNC: 22 MG/DL (ref 8–23)
CALCIUM SERPL-MCNC: 8.9 MG/DL (ref 8.5–9.9)
CHLORIDE BLD-SCNC: 104 MEQ/L (ref 95–107)
CO2: 18 MEQ/L (ref 20–31)
CREAT SERPL-MCNC: 1.49 MG/DL (ref 0.5–0.9)
GFR AFRICAN AMERICAN: 41.5
GFR NON-AFRICAN AMERICAN: 34.3
GLUCOSE BLD-MCNC: 99 MG/DL (ref 70–99)
HCT VFR BLD CALC: 39.3 % (ref 37–47)
HEMOGLOBIN: 13 G/DL (ref 12–16)
MCH RBC QN AUTO: 28.4 PG (ref 27–31.3)
MCHC RBC AUTO-ENTMCNC: 33.1 % (ref 33–37)
MCV RBC AUTO: 86 FL (ref 82–100)
PDW BLD-RTO: 15.1 % (ref 11.5–14.5)
PLATELET # BLD: 226 K/UL (ref 130–400)
POTASSIUM SERPL-SCNC: 4.2 MEQ/L (ref 3.4–4.9)
RBC # BLD: 4.57 M/UL (ref 4.2–5.4)
SODIUM BLD-SCNC: 139 MEQ/L (ref 135–144)
WBC # BLD: 6.7 K/UL (ref 4.8–10.8)

## 2019-04-12 PROCEDURE — 99203 OFFICE O/P NEW LOW 30 MIN: CPT | Performed by: INTERNAL MEDICINE

## 2019-04-12 NOTE — PROGRESS NOTES
Subjective:      Patient ID: Alma Rosa Butlre is a 69 y.o. female. HPI  Referred by Dr Elijah Rice for MRSE cystitis. Was treated with different courses of oral antibiotics since January. Last Rx was PO Bactrim, done 1 week ago. Has H/O recurrent UTI with fatigue and frequency. S/P release of ureter and cystoscopy on 01/21/2019  Still with frequency and 2 x nocturia  Past Medical History:   Diagnosis Date    CAD (coronary artery disease) 2005    3 vessel cardiac bypass    Environmental and seasonal allergies     GERD (gastroesophageal reflux disease)     GI bleed 2017    with LTKR / blood transfusion    History of blood transfusion 2017    post op LTKR    History of blood transfusion 2005    post op cardiac bypass    Hyperlipidemia     meds > 15 yrs    Hypertension     meds > 20 yrs    Myocardial infarct ([facility] [address] 2005    followed by 3 vessal cardiac bypass    Pityriasis rosea     Prolonged emergence from general anesthesia      Past Surgical History:   Procedure Laterality Date    ARTERIAL BYPASS SURGRY      triple / cardiac    BREAST SURGERY Bilateral     reduction and cysts biopsy / has had several biopsies    CARDIAC SURGERY  2005    3 vessel bypass    CATARACT REMOVAL WITH IMPLANT      CHOLECYSTECTOMY      COLONOSCOPY      CYSTOURETHROSCOPY  04/18/2017    FLEXIBLE / due to UTI    ENDOSCOPY, COLON, DIAGNOSTIC      EYE SURGERY      Phaco with IOL OU    HAND SURGERY Right     plate fracture wrist / hardware still remains    HYSTERECTOMY      JOINT REPLACEMENT  2017    LTKR    OTHER SURGICAL HISTORY      head fatty tumors removed    MT COLORECTAL SCRN; HI RISK IND N/A 6/11/2018    COLONOSCOPY performed by Jamaal Carter MD at . [facility] [address] ESOPHAGOGASTRODUODENOSCOPY TRANSORAL DIAGNOSTIC N/A 6/11/2018    EGD ESOPHAGOGASTRODUODENOSCOPY WITH DILATION performed by Jamaal Carter MD at . [address] N/A 1/31/2019    WITH S.S. L. S performed by Loli Sanchez MD at [address] Left 2016    VAGINA SURGERY N/A 2019    RECTOCELE REPAIR WITH ENTEROCELE REPAIR performed by Loli Sanchez MD at [address] History     Socioeconomic History    Marital status:      Spouse name: Not on file    Number of children: Not on file    Years of education: Not on file    Highest education level: Not on file   Occupational History    Not on file   Social Needs    Financial resource strain: Not on file    Food insecurity:     Worry: Not on file     Inability: Not on file    Transportation needs:     Medical: Not on file     Non-medical: Not on file   Tobacco Use    Smoking status: Never Smoker    Smokeless tobacco: Never Used   Substance and Sexual Activity    Alcohol use:  Yes     Alcohol/week: 0.0 oz     Comment: rare social    Drug use: No    Sexual activity: Not on file   Lifestyle    Physical activity:     Days per week: Not on file     Minutes per session: Not on file    Stress: Not on file   Relationships    Social connections:     Talks on phone: Not on file     Gets together: Not on file     Attends Confucianism service: Not on file     Active member of club or organization: Not on file     Attends meetings of clubs or organizations: Not on file     Relationship status: Not on file    Intimate partner violence:     Fear of current or ex partner: Not on file     Emotionally abused: Not on file     Physically abused: Not on file     Forced sexual activity: Not on file   Other Topics Concern    Not on file   Social History Narrative    Not on file     Family History   Problem Relation Age of Onset    Colon Cancer Father     Other Father         polio    Heart Disease Mother         pacemaker    Cancer Mother         stomach cancer    High Blood Pressure Mother     High Cholesterol Mother     Other Brother          at age 27 / incident in care facility / pt was mentally challenged    Heart Disease negative. Objective:   Physical Exam   Constitutional: She is oriented to person, place, and time. No distress. HENT:   Mouth/Throat: No oropharyngeal exudate. Eyes: No scleral icterus. Neck: Normal range of motion. Neck supple. Cardiovascular: Normal rate, regular rhythm and normal heart sounds. No murmur heard. Pulmonary/Chest: Effort normal and breath sounds normal. No respiratory distress. She has no wheezes. She has no rales. Abdominal: Soft. Bowel sounds are normal. She exhibits no distension and no mass. There is no hepatosplenomegaly. There is no tenderness. There is no CVA tenderness. Musculoskeletal: She exhibits edema (trace). Lymphadenopathy:     She has no cervical adenopathy. Neurological: She is alert and oriented to person, place, and time. No cranial nerve deficit. She exhibits normal muscle tone. Coordination normal.   Skin: No rash noted. No erythema. Psychiatric: She has a normal mood and affect. Her behavior is normal.   Nursing note and vitals reviewed. 4/10/2019  8:39 AM - Veronica Davenport Incoming Lab Results From Soft     Specimen Information: Urine, clean catch        Component Collected Lab   Urine Culture, Routine 04/08/2019  6:52 PM [address] [facility]   No growth 24 hours    Testing Performed By     Lab - [address] Name Director     4/8/2019  7:58 PM - Veronica Davenport Incoming Lab Results From Soft     Component Value Ref Range & Units Status Collected Lab   Bacteria, UA Negative  /HPF Final 04/08/2019  6:51 PM [address] [facility]   Hyaline Casts, UA 10-20  0 - 5 /HPF Final 04/08/2019  6:51 PM [address] [facility]   Effective 11/26/2018     Urinalysis microscopic performed using the   automated methodology (AUWI analyzer).     WBC, UA 20-50Abnormal   0 - 5 /HPF Final 04/08/2019  6:51 PM [address] [facility]   Effective 11/26/2018     Urinalysis microscopic performed        Assessment:      Recurrent UTI  cystocele      Plan:      Urine with reflex Cx  Chronic suppressive Rx with low dose antibiotics, Renal and postvoid U/S if recurrent UTI          Maribell Coronado MD

## 2019-04-14 PROBLEM — N39.0 UTI (URINARY TRACT INFECTION): Status: RESOLVED | Noted: 2019-03-15 | Resolved: 2019-04-14

## 2019-04-15 ENCOUNTER — TELEPHONE (OUTPATIENT)
Dept: OBGYN CLINIC | Age: 74
End: 2019-04-15

## 2019-04-15 ENCOUNTER — TELEPHONE (OUTPATIENT)
Dept: INFECTIOUS DISEASES | Age: 74
End: 2019-04-15

## 2019-04-17 NOTE — TELEPHONE ENCOUNTER
Patient returned call. She states she still has Dr Brenton Hampton and also see's Dr Moi Carey. Advised the Labs drawn, per Dr Cornelius Marialuisa are Fine. But the Creatinine is slightly elevated. We will send OV Progness Note with recent labs to PCP and Nephrologist.   Patient agreed and appreciated the update. Patient still has C/o of Headache and is trying to see PCP. She thinks it may be her Sinuses, she has a Cold.

## 2019-04-18 NOTE — TELEPHONE ENCOUNTER
Patient contacted and made aware of prescription not being covered and out of pocket cost. She said she will call the office back and let us know.

## 2019-04-23 ENCOUNTER — TELEPHONE (OUTPATIENT)
Dept: OBGYN CLINIC | Age: 74
End: 2019-04-23

## 2019-04-24 NOTE — TELEPHONE ENCOUNTER
Myriam Meyer called again this morning in regards to this. She said she stopped taking the Oxybutnin because of side effects but she's still dizzy.

## 2019-04-24 NOTE — TELEPHONE ENCOUNTER
If she is still dizzy after stopping the oxybutynin, she needs to go to ED. The dizziness from the oxybutynin should stop, and plus its a very rare side effect , she is the first patient that complains of that on the medication, typically it causes sedation not dizziness.  TD

## 2019-05-03 ENCOUNTER — HOSPITAL ENCOUNTER (OUTPATIENT)
Dept: GENERAL RADIOLOGY | Age: 74
Discharge: HOME | DRG: 149 | End: 2019-05-05
Payer: MEDICARE

## 2019-05-03 DIAGNOSIS — I10 HYPERTENSION, UNSPECIFIED TYPE: ICD-10-CM

## 2019-05-03 DIAGNOSIS — G44.201 ACUTE INTRACTABLE TENSION-TYPE HEADACHE: ICD-10-CM

## 2019-05-03 PROCEDURE — 72050 X-RAY EXAM NECK SPINE 4/5VWS: CPT

## 2019-05-04 ENCOUNTER — APPOINTMENT (OUTPATIENT)
Dept: CT IMAGING | Age: 74
DRG: 149 | End: 2019-05-04
Payer: MEDICARE

## 2019-05-04 ENCOUNTER — APPOINTMENT (OUTPATIENT)
Dept: MRI IMAGING | Age: 74
DRG: 149 | End: 2019-05-04
Payer: MEDICARE

## 2019-05-04 ENCOUNTER — HOSPITAL ENCOUNTER (INPATIENT)
Age: 74
LOS: 1 days | Discharge: HOME | DRG: 149 | End: 2019-05-06
Attending: EMERGENCY MEDICINE | Admitting: INTERNAL MEDICINE
Payer: MEDICARE

## 2019-05-04 ENCOUNTER — APPOINTMENT (OUTPATIENT)
Dept: GENERAL RADIOLOGY | Age: 74
DRG: 149 | End: 2019-05-04
Payer: MEDICARE

## 2019-05-04 PROBLEM — R27.0 ATAXIA: Status: ACTIVE | Noted: 2019-05-04

## 2019-05-04 LAB
ALBUMIN SERPL-MCNC: 3.6 G/DL (ref 3.5–4.6)
ALP BLD-CCNC: 92 U/L (ref 40–130)
ALT SERPL-CCNC: 14 U/L (ref 0–33)
ANION GAP SERPL CALCULATED.3IONS-SCNC: 13 MEQ/L (ref 9–15)
AST SERPL-CCNC: 15 U/L (ref 0–35)
BACTERIA: NEGATIVE /HPF
BASOPHILS ABSOLUTE: 0.1 K/UL (ref 0–0.2)
BASOPHILS RELATIVE PERCENT: 0.7 %
BILIRUB SERPL-MCNC: 0.3 MG/DL (ref 0.2–0.7)
BILIRUBIN URINE: NEGATIVE
BLOOD, URINE: NEGATIVE
BUN BLDV-MCNC: 15 MG/DL (ref 8–23)
CALCIUM SERPL-MCNC: 8.6 MG/DL (ref 8.5–9.9)
CHLORIDE BLD-SCNC: 106 MEQ/L (ref 95–107)
CLARITY: CLEAR
CO2: 22 MEQ/L (ref 20–31)
COLOR: YELLOW
CREAT SERPL-MCNC: 1.01 MG/DL (ref 0.5–0.9)
EOSINOPHILS ABSOLUTE: 0.2 K/UL (ref 0–0.7)
EOSINOPHILS RELATIVE PERCENT: 2.3 %
EPITHELIAL CELLS, UA: NORMAL /HPF (ref 0–5)
GFR AFRICAN AMERICAN: >60
GFR NON-AFRICAN AMERICAN: 53.7
GLOBULIN: 3.2 G/DL (ref 2.3–3.5)
GLUCOSE BLD-MCNC: 113 MG/DL (ref 70–99)
GLUCOSE URINE: NEGATIVE MG/DL
HCT VFR BLD CALC: 36.7 % (ref 37–47)
HEMOGLOBIN: 12.6 G/DL (ref 12–16)
HYALINE CASTS: NORMAL /HPF (ref 0–5)
KETONES, URINE: NEGATIVE MG/DL
LEUKOCYTE ESTERASE, URINE: ABNORMAL
LYMPHOCYTES ABSOLUTE: 1.4 K/UL (ref 1–4.8)
LYMPHOCYTES RELATIVE PERCENT: 19.9 %
MCH RBC QN AUTO: 29.8 PG (ref 27–31.3)
MCHC RBC AUTO-ENTMCNC: 34.4 % (ref 33–37)
MCV RBC AUTO: 86.5 FL (ref 82–100)
MONOCYTES ABSOLUTE: 0.7 K/UL (ref 0.2–0.8)
MONOCYTES RELATIVE PERCENT: 10 %
NEUTROPHILS ABSOLUTE: 4.8 K/UL (ref 1.4–6.5)
NEUTROPHILS RELATIVE PERCENT: 67.1 %
NITRITE, URINE: NEGATIVE
PDW BLD-RTO: 15.1 % (ref 11.5–14.5)
PH UA: 5.5 (ref 5–9)
PLATELET # BLD: 219 K/UL (ref 130–400)
POTASSIUM SERPL-SCNC: 3.9 MEQ/L (ref 3.4–4.9)
PROTEIN UA: NEGATIVE MG/DL
RBC # BLD: 4.25 M/UL (ref 4.2–5.4)
RBC UA: NORMAL /HPF (ref 0–5)
SODIUM BLD-SCNC: 141 MEQ/L (ref 135–144)
SPECIFIC GRAVITY UA: 1.01 (ref 1–1.03)
TOTAL PROTEIN: 6.8 G/DL (ref 6.3–8)
URINE REFLEX TO CULTURE: YES
UROBILINOGEN, URINE: 0.2 E.U./DL
WBC # BLD: 7.2 K/UL (ref 4.8–10.8)
WBC UA: NORMAL /HPF (ref 0–5)

## 2019-05-04 PROCEDURE — 85025 COMPLETE CBC W/AUTO DIFF WBC: CPT

## 2019-05-04 PROCEDURE — 36415 COLL VENOUS BLD VENIPUNCTURE: CPT

## 2019-05-04 PROCEDURE — 6370000000 HC RX 637 (ALT 250 FOR IP): Performed by: STUDENT IN AN ORGANIZED HEALTH CARE EDUCATION/TRAINING PROGRAM

## 2019-05-04 PROCEDURE — 87086 URINE CULTURE/COLONY COUNT: CPT

## 2019-05-04 PROCEDURE — 80053 COMPREHEN METABOLIC PANEL: CPT

## 2019-05-04 PROCEDURE — 70551 MRI BRAIN STEM W/O DYE: CPT

## 2019-05-04 PROCEDURE — 99285 EMERGENCY DEPT VISIT HI MDM: CPT

## 2019-05-04 PROCEDURE — 81001 URINALYSIS AUTO W/SCOPE: CPT

## 2019-05-04 PROCEDURE — 71045 X-RAY EXAM CHEST 1 VIEW: CPT

## 2019-05-04 PROCEDURE — G0378 HOSPITAL OBSERVATION PER HR: HCPCS

## 2019-05-04 PROCEDURE — 70450 CT HEAD/BRAIN W/O DYE: CPT

## 2019-05-04 PROCEDURE — 93005 ELECTROCARDIOGRAM TRACING: CPT

## 2019-05-04 PROCEDURE — 2580000003 HC RX 258: Performed by: INTERNAL MEDICINE

## 2019-05-04 PROCEDURE — 97165 OT EVAL LOW COMPLEX 30 MIN: CPT

## 2019-05-04 PROCEDURE — 94664 DEMO&/EVAL PT USE INHALER: CPT

## 2019-05-04 PROCEDURE — 97161 PT EVAL LOW COMPLEX 20 MIN: CPT

## 2019-05-04 PROCEDURE — 6370000000 HC RX 637 (ALT 250 FOR IP): Performed by: EMERGENCY MEDICINE

## 2019-05-04 PROCEDURE — 70544 MR ANGIOGRAPHY HEAD W/O DYE: CPT

## 2019-05-04 PROCEDURE — 97162 PT EVAL MOD COMPLEX 30 MIN: CPT

## 2019-05-04 PROCEDURE — 6370000000 HC RX 637 (ALT 250 FOR IP): Performed by: INTERNAL MEDICINE

## 2019-05-04 RX ORDER — SIMETHICONE 80 MG
80 TABLET,CHEWABLE ORAL 4 TIMES DAILY PRN
Status: DISCONTINUED | OUTPATIENT
Start: 2019-05-04 | End: 2019-05-06 | Stop reason: HOSPADM

## 2019-05-04 RX ORDER — ATORVASTATIN CALCIUM 80 MG/1
80 TABLET, FILM COATED ORAL NIGHTLY
Status: DISCONTINUED | OUTPATIENT
Start: 2019-05-04 | End: 2019-05-04

## 2019-05-04 RX ORDER — NITROGLYCERIN 0.4 MG/1
0.4 TABLET SUBLINGUAL EVERY 5 MIN PRN
Status: DISCONTINUED | OUTPATIENT
Start: 2019-05-04 | End: 2019-05-06 | Stop reason: HOSPADM

## 2019-05-04 RX ORDER — ATORVASTATIN CALCIUM 40 MG/1
40 TABLET, FILM COATED ORAL NIGHTLY
Status: DISCONTINUED | OUTPATIENT
Start: 2019-05-04 | End: 2019-05-06 | Stop reason: HOSPADM

## 2019-05-04 RX ORDER — LEVOTHYROXINE SODIUM 0.03 MG/1
25 TABLET ORAL DAILY
COMMUNITY

## 2019-05-04 RX ORDER — AMLODIPINE BESYLATE 2.5 MG/1
2.5 TABLET ORAL DAILY
Status: DISCONTINUED | OUTPATIENT
Start: 2019-05-04 | End: 2019-05-06

## 2019-05-04 RX ORDER — MECLIZINE HCL 12.5 MG/1
12.5 TABLET ORAL 2 TIMES DAILY
Status: ON HOLD | COMMUNITY
End: 2019-05-06 | Stop reason: HOSPADM

## 2019-05-04 RX ORDER — ALBUTEROL SULFATE 2.5 MG/3ML
2.5 SOLUTION RESPIRATORY (INHALATION) EVERY 4 HOURS PRN
Status: DISCONTINUED | OUTPATIENT
Start: 2019-05-04 | End: 2019-05-06 | Stop reason: HOSPADM

## 2019-05-04 RX ORDER — HYDROXYZINE HYDROCHLORIDE 25 MG/1
25 TABLET, FILM COATED ORAL NIGHTLY
Status: DISCONTINUED | OUTPATIENT
Start: 2019-05-04 | End: 2019-05-06 | Stop reason: HOSPADM

## 2019-05-04 RX ORDER — ASPIRIN 81 MG/1
81 TABLET, CHEWABLE ORAL DAILY
Status: DISCONTINUED | OUTPATIENT
Start: 2019-05-04 | End: 2019-05-04

## 2019-05-04 RX ORDER — AMLODIPINE BESYLATE 2.5 MG/1
2.5 TABLET ORAL DAILY
Status: DISCONTINUED | OUTPATIENT
Start: 2019-05-04 | End: 2019-05-04

## 2019-05-04 RX ORDER — ACETAMINOPHEN 325 MG/1
650 TABLET ORAL EVERY 4 HOURS PRN
Status: DISCONTINUED | OUTPATIENT
Start: 2019-05-04 | End: 2019-05-06 | Stop reason: HOSPADM

## 2019-05-04 RX ORDER — CARVEDILOL 3.12 MG/1
6.25 TABLET ORAL 2 TIMES DAILY WITH MEALS
Status: DISCONTINUED | OUTPATIENT
Start: 2019-05-04 | End: 2019-05-05

## 2019-05-04 RX ORDER — MECLIZINE HCL 12.5 MG/1
12.5 TABLET ORAL 2 TIMES DAILY
Status: DISCONTINUED | OUTPATIENT
Start: 2019-05-04 | End: 2019-05-05

## 2019-05-04 RX ORDER — ONDANSETRON 2 MG/ML
4 INJECTION INTRAMUSCULAR; INTRAVENOUS EVERY 6 HOURS PRN
Status: DISCONTINUED | OUTPATIENT
Start: 2019-05-04 | End: 2019-05-06 | Stop reason: HOSPADM

## 2019-05-04 RX ORDER — SODIUM CHLORIDE 0.9 % (FLUSH) 0.9 %
10 SYRINGE (ML) INJECTION EVERY 12 HOURS SCHEDULED
Status: DISCONTINUED | OUTPATIENT
Start: 2019-05-04 | End: 2019-05-06 | Stop reason: HOSPADM

## 2019-05-04 RX ORDER — CLONIDINE HYDROCHLORIDE 0.1 MG/1
0.1 TABLET ORAL ONCE
Status: COMPLETED | OUTPATIENT
Start: 2019-05-04 | End: 2019-05-04

## 2019-05-04 RX ORDER — SODIUM CHLORIDE 0.9 % (FLUSH) 0.9 %
10 SYRINGE (ML) INJECTION PRN
Status: DISCONTINUED | OUTPATIENT
Start: 2019-05-04 | End: 2019-05-06 | Stop reason: HOSPADM

## 2019-05-04 RX ORDER — PANTOPRAZOLE SODIUM 40 MG/1
40 TABLET, DELAYED RELEASE ORAL DAILY
Status: DISCONTINUED | OUTPATIENT
Start: 2019-05-04 | End: 2019-05-06 | Stop reason: HOSPADM

## 2019-05-04 RX ORDER — ASPIRIN 81 MG/1
81 TABLET ORAL DAILY
Status: DISCONTINUED | OUTPATIENT
Start: 2019-05-04 | End: 2019-05-06 | Stop reason: HOSPADM

## 2019-05-04 RX ORDER — LEVOTHYROXINE SODIUM 0.03 MG/1
25 TABLET ORAL DAILY
Status: DISCONTINUED | OUTPATIENT
Start: 2019-05-04 | End: 2019-05-06 | Stop reason: HOSPADM

## 2019-05-04 RX ORDER — AMLODIPINE BESYLATE 2.5 MG/1
2.5 TABLET ORAL DAILY
COMMUNITY
End: 2019-05-22

## 2019-05-04 RX ORDER — ASPIRIN 81 MG/1
81 TABLET, CHEWABLE ORAL ONCE
Status: COMPLETED | OUTPATIENT
Start: 2019-05-04 | End: 2019-05-04

## 2019-05-04 RX ORDER — OXYBUTYNIN CHLORIDE 5 MG/1
5 TABLET, EXTENDED RELEASE ORAL DAILY
Status: DISCONTINUED | OUTPATIENT
Start: 2019-05-04 | End: 2019-05-05

## 2019-05-04 RX ADMIN — MECLIZINE HCL 12.5 MG: 12.5 TABLET ORAL at 22:55

## 2019-05-04 RX ADMIN — ATORVASTATIN CALCIUM 40 MG: 40 TABLET, FILM COATED ORAL at 22:55

## 2019-05-04 RX ADMIN — Medication 10 ML: at 22:53

## 2019-05-04 RX ADMIN — CARVEDILOL 6.25 MG: 3.12 TABLET ORAL at 16:24

## 2019-05-04 RX ADMIN — HYDROXYZINE HYDROCHLORIDE 25 MG: 25 TABLET, FILM COATED ORAL at 22:54

## 2019-05-04 RX ADMIN — LEVOTHYROXINE SODIUM 25 MCG: 0.03 TABLET ORAL at 16:24

## 2019-05-04 RX ADMIN — PANTOPRAZOLE SODIUM 40 MG: 40 TABLET, DELAYED RELEASE ORAL at 16:24

## 2019-05-04 RX ADMIN — AMLODIPINE BESYLATE 2.5 MG: 2.5 TABLET ORAL at 16:24

## 2019-05-04 RX ADMIN — OXYBUTYNIN CHLORIDE 5 MG: 5 TABLET, EXTENDED RELEASE ORAL at 16:24

## 2019-05-04 RX ADMIN — CLONIDINE HYDROCHLORIDE 0.1 MG: 0.1 TABLET ORAL at 05:09

## 2019-05-04 RX ADMIN — ASPIRIN 81 MG 81 MG: 81 TABLET ORAL at 08:06

## 2019-05-04 ASSESSMENT — ENCOUNTER SYMPTOMS
PHOTOPHOBIA: 0
SHORTNESS OF BREATH: 0
BLOOD IN STOOL: 0
EYE REDNESS: 0
EYE DISCHARGE: 0
WHEEZING: 0
COUGH: 0
ABDOMINAL PAIN: 0
CONSTIPATION: 0
SORE THROAT: 0
STRIDOR: 0
VOMITING: 0
DIARRHEA: 0
BACK PAIN: 0
NAUSEA: 0
EYE PAIN: 0

## 2019-05-04 ASSESSMENT — PAIN SCALES - GENERAL
PAINLEVEL_OUTOF10: 8
PAINLEVEL_OUTOF10: 5

## 2019-05-04 ASSESSMENT — PAIN DESCRIPTION - LOCATION: LOCATION: HEAD

## 2019-05-04 NOTE — PROGRESS NOTES
off regular upper body clothing?: None  How much help for taking care of personal grooming?: None  How much help for eating meals?: None  AM-PAC Inpatient Daily Activity Raw Score: 24  AM-PAC Inpatient ADL T-Scale Score : 57.54  ADL Inpatient CMS 0-100% Score: 0    Plan:  Plan  Times per week: No acute OT recommended at this time. Goals:  N/A    Patient Goal:    Home   Discussed and agreed upon: Yes Comments:     Therapy Time:   OT Individual Minutes  Time In: 1550  Time Out: [address] [facility]  Minutes: 20    Electronically signed by:     ESME Paz  5/4/2019, 4:18 PM

## 2019-05-04 NOTE — H&P
 ARTERIAL BYPASS SURGRY      triple / cardiac    BREAST SURGERY Bilateral     reduction and cysts biopsy / has had several biopsies    CARDIAC SURGERY  2005    3 vessel bypass    CATARACT REMOVAL WITH IMPLANT      CHOLECYSTECTOMY      COLONOSCOPY      CYSTOURETHROSCOPY  04/18/2017    FLEXIBLE / due to UTI    ENDOSCOPY, COLON, DIAGNOSTIC      EYE SURGERY      Phaco with IOL OU    HAND SURGERY Right     plate fracture wrist / hardware still remains    HYSTERECTOMY      JOINT REPLACEMENT  2017    LTKR    OTHER SURGICAL HISTORY      head fatty tumors removed    MT COLORECTAL SCRN; HI RISK IND N/A 6/11/2018    COLONOSCOPY performed by Saige Nayak MD at [facility] ESOPHAGOGASTRODUODENOSCOPY TRANSORAL DIAGNOSTIC N/A 6/11/2018    EGD ESOPHAGOGASTRODUODENOSCOPY WITH DILATION performed by Saige Nayak MD at [place] [address] 1/31/2019    WITH S.S. L. S performed by Jorge Verma MD at [address] [place] [address] 08/2016    VAGINA SURGERY N/A 1/31/2019    RECTOCELE REPAIR WITH ENTEROCELE REPAIR performed by Jorge Verma MD at [facility]       Medications Prior to Admission:    Prior to Admission medications    Medication Sig Start Date End Date Taking?  Authorizing Provider   meclizine (ANTIVERT) 12.5 MG tablet Take 12.5 mg by mouth 2 times daily   Yes Historical Provider, MD   amLODIPine (NORVASC) 2.5 MG tablet Take 2.5 mg by mouth daily   Yes Historical Provider, MD   levothyroxine (SYNTHROID) 25 MCG tablet Take 25 mcg by mouth Daily   Yes Historical Provider, MD   hydrOXYzine (ATARAX) 25 MG tablet Take 1 tablet by mouth nightly 4/8/19 5/8/19 Yes Jorge Verma MD   oxybutynin (DITROPAN-XL) 5 MG extended release tablet Take 1 tablet by mouth daily 3/25/19  Yes Jorge Verma MD   ranitidine (ZANTAC) 150 MG tablet Take 1 tablet by mouth 2 times daily 2/1/19  Yes Jorge Verma MD   acetaminophen (APAP EXTRA STRENGTH) 500 MG tablet Take 2 Active Problem List   Diagnosis Code    Gastric polyp K31.7    Dysphagia R13.10    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

## 2019-05-04 NOTE — PROGRESS NOTES
plate fracture wrist / hardware still remains    HYSTERECTOMY      JOINT REPLACEMENT  2017    LTKR    OTHER SURGICAL HISTORY      head fatty tumors removed    AR COLORECTAL SCRN; HI RISK IND N/A 6/11/2018    COLONOSCOPY performed by Tia Thomas MD at . [address] ESOPHAGOGASTRODUODENOSCOPY TRANSORAL DIAGNOSTIC N/A 6/11/2018    EGD ESOPHAGOGASTRODUODENOSCOPY WITH DILATION performed by Tia Thomas MD at . [address] N/A 1/31/2019    WITH S.S. L. S performed by Shantanu Waldron MD at [facility] Left 08/2016    VAGINA SURGERY N/A 1/31/2019    RECTOCELE REPAIR WITH ENTEROCELE REPAIR performed by Shantanu Waldron MD at [facility]            Restrictions:  Restrictions/Precautions: Fall Risk     SUBJECTIVE:     Pt denies pain. Upset that she has not had meds today. Per RN, waiting on pharmacy    Post Treatment Pain Screening:    none    Prior Level of Function:  Social/Functional History  Lives With: Alone  Type of Home: House  Home Layout: Multi-level(5 down, up 5, up 5)  Home Access: Stairs to enter with rails  Entrance Stairs - Number of Steps: Step up   Entrance Stairs - Rails: Left(up )  Bathroom Shower/Tub: Walk-in shower  Bathroom Toilet: Handicap height  Bathroom Equipment: Grab bars in shower, Shower chair  Home Equipment: Standard walker, Cane  ADL Assistance: Independent  Homemaking Assistance: Independent  Ambulation Assistance: Independent(no AD)  Transfer Assistance: Independent  Active : Yes    OBJECTIVE:   Vision/Hearing:  Vision: Within Functional Limits  Hearing: Exceptions to [facility]  Hearing Exceptions: Hard of hearing/hearing concerns(pt reported [place])    Cognition:none     Observation/Palpation  Observation: Pt in bed, no signs of distress.      ROM:  RLE AROM: WFL  LLE AROM : WFL     Strength:  Strength RLE  Strength RLE: WFL  Strength LLE  Strength LLE: WFL    Neuro:  Balance  Sitting - Static: Good  Sitting - Dynamic: Good  Standing - Static: Good  Standing - Dynamic: Good;-  Comments: Pt able to  object from floor without LOB     Motor Control  Gross Motor?: WFL  Sensation  Overall Sensation Status: WFL    Bed mobility  Supine to Sit: Modified independent  Sit to Supine: Modified independent    Transfers  Stand to sit: Supervision  Bed to Chair: Supervision    Ambulation  Ambulation?: Yes  Ambulation 1  Surface: level tile  Device: No Device  Assistance: Supervision  Distance: 25ft x 2  Comments: Very difficulty to redirect pt this date d/t pt perseverating regarding PMH, excessive bills from medical care and IV being infultrated during last hospital admission. Pt demo's poor safety awarenss however no LOB noted during eval. Pt feels like she was near baseline. ASSESSMENT:   Body structures, Functions, Activity limitations: Decreased balance  Decision Making: Medium Complexity  History: MED  Exam: MED  Clinical Presentation: LOW    Prognosis: Good  Patient Education: PT POC, none,     DISCHARGE RECOMMENDATIONS:  Assessment: Pt amb with supervision with no AD while in house and non compliant with fall risk protocol. Pt educated in benefit in out pt PT if pt desires though pt expressing concern regarding expense of medical care. REQUIRES PT FOLLOW UP: Yes      PLAN OF CARE:  Plan  Times per week: eval only  Safety Devices  Type of devices:  All fall risk precautions in place    [facility] (6 CLICK) Ludin Barrios 28 Inpatient Mobility Raw Score : 24     Therapy Time:   Individual   Time In 1550   Time Out 1610   Minutes Primo Grady [place], 05/04/19 at 4:31 PM

## 2019-05-04 NOTE — PROGRESS NOTES
Pulmonary Disorder  (acute or chronic)  []   Severe or Chronic w/ Exacerbation  []     Surgical Status No [x]   Surgeries     General []   Surgery Lower []   Abdominal Thoracic or []   Upper Abdominal Thoracic with  PulmonaryDisorder  []     Chest X-ray Clear/Not  Ordered     [x]  Chronic Changes  Results Pending  []  Infiltrates, atelectasis, pleural effusion, or edema  []  Infiltrates in more than one lobe []  Infiltrate + Atelectasis, &/or pleural effusion  []    Respiratory Pattern Regular,  RR = 12-20 [x]  Increased,  RR = 21-25 []  GIL, irregular,  or RR = 26-30 []  Decreased FEV1  or RR = 31-35 []  Severe SOB, use  of accessory muscles, or RR ? 35  []    Mental Status Alert, oriented,  Cooperative [x]  Confused but Follows commands []  Lethargic or unable to follow commands []  Obtunded  []  Comatose  []    Breath Sounds Clear to  auscultation  []  Decreased unilaterally or  in bases only [x]  Decreased  bilaterally  []  Crackles or intermittent wheezes []  Wheezes []    Cough Strong, Spontan., & nonproductive [x]  Strong,  spontaneous, &  productive []  Weak,  Nonproductive []  Weak, productive or  with wheezes []  No spontaneous  cough or may require suctioning []    Level of Activity Ambulatory []  Ambulatory w/ Assist  [x]  Non-ambulatory []  Paraplegic []  Quadriplegic []    Total    Score:____4___     Triage Score:___5_____      Tri       Triage:     1. (>20) Freq: Q3    2. (16-20) Freq: Q4   3. (11-15) Freq: QID & Albuterol Q2 PRN    4. (6-10) Freq: TID & Albuterol Q2 PRN    5. (0-5) Freq Q4prn

## 2019-05-05 ENCOUNTER — APPOINTMENT (OUTPATIENT)
Dept: ULTRASOUND IMAGING | Age: 74
DRG: 149 | End: 2019-05-05
Payer: MEDICARE

## 2019-05-05 PROBLEM — R42 DIZZINESS: Status: ACTIVE | Noted: 2019-05-05

## 2019-05-05 LAB
CHOLESTEROL, TOTAL: 166 MG/DL (ref 0–199)
HDLC SERPL-MCNC: 39 MG/DL (ref 40–59)
LDL CHOLESTEROL CALCULATED: 100 MG/DL (ref 0–129)
TRIGL SERPL-MCNC: 135 MG/DL (ref 0–150)
URINE CULTURE, ROUTINE: NORMAL

## 2019-05-05 PROCEDURE — 1210000000 HC MED SURG R&B

## 2019-05-05 PROCEDURE — 2580000003 HC RX 258: Performed by: INTERNAL MEDICINE

## 2019-05-05 PROCEDURE — 93880 EXTRACRANIAL BILAT STUDY: CPT

## 2019-05-05 PROCEDURE — 80061 LIPID PANEL: CPT

## 2019-05-05 PROCEDURE — 36415 COLL VENOUS BLD VENIPUNCTURE: CPT

## 2019-05-05 PROCEDURE — 6370000000 HC RX 637 (ALT 250 FOR IP): Performed by: INTERNAL MEDICINE

## 2019-05-05 RX ORDER — FAMOTIDINE 20 MG/1
20 TABLET, FILM COATED ORAL 2 TIMES DAILY
Status: DISCONTINUED | OUTPATIENT
Start: 2019-05-05 | End: 2019-05-06 | Stop reason: HOSPADM

## 2019-05-05 RX ORDER — CARVEDILOL 6.25 MG/1
6.25 TABLET ORAL
Status: DISCONTINUED | OUTPATIENT
Start: 2019-05-05 | End: 2019-05-06

## 2019-05-05 RX ORDER — CETIRIZINE HYDROCHLORIDE 10 MG/1
10 TABLET ORAL DAILY
Status: DISCONTINUED | OUTPATIENT
Start: 2019-05-05 | End: 2019-05-06 | Stop reason: HOSPADM

## 2019-05-05 RX ADMIN — CETIRIZINE HYDROCHLORIDE 10 MG: 10 TABLET, FILM COATED ORAL at 10:27

## 2019-05-05 RX ADMIN — ATORVASTATIN CALCIUM 40 MG: 40 TABLET, FILM COATED ORAL at 21:59

## 2019-05-05 RX ADMIN — LEVOTHYROXINE SODIUM 25 MCG: 0.03 TABLET ORAL at 05:31

## 2019-05-05 RX ADMIN — AMLODIPINE BESYLATE 2.5 MG: 2.5 TABLET ORAL at 10:26

## 2019-05-05 RX ADMIN — HYDROXYZINE HYDROCHLORIDE 25 MG: 25 TABLET, FILM COATED ORAL at 22:00

## 2019-05-05 RX ADMIN — Medication 10 ML: at 22:04

## 2019-05-05 RX ADMIN — ASPIRIN 81 MG: 81 TABLET, COATED ORAL at 10:27

## 2019-05-05 RX ADMIN — CARVEDILOL 6.25 MG: 6.25 TABLET ORAL at 16:48

## 2019-05-05 RX ADMIN — FAMOTIDINE 20 MG: 20 TABLET ORAL at 22:00

## 2019-05-05 RX ADMIN — CARVEDILOL 6.25 MG: 3.12 TABLET ORAL at 10:26

## 2019-05-05 RX ADMIN — CARVEDILOL 6.25 MG: 6.25 TABLET ORAL at 22:00

## 2019-05-05 RX ADMIN — PANTOPRAZOLE SODIUM 40 MG: 40 TABLET, DELAYED RELEASE ORAL at 10:25

## 2019-05-05 RX ADMIN — MECLIZINE HCL 12.5 MG: 12.5 TABLET ORAL at 10:25

## 2019-05-05 NOTE — PROGRESS NOTES
2/2 ?uncontrolled HTN    Functional Status: Fall precautions. Up with assistance.  PT OT  Diet: regular   DVT ppx: Lovenox           DISCHARGE PLANNING  Pending possible placement        Electronically signed by Max Waite DO on 5/5/2019 at 9:56 AM

## 2019-05-06 VITALS
HEART RATE: 51 BPM | OXYGEN SATURATION: 98 % | WEIGHT: 216 LBS | RESPIRATION RATE: 18 BRPM | SYSTOLIC BLOOD PRESSURE: 144 MMHG | TEMPERATURE: 97.7 F | BODY MASS INDEX: 36.88 KG/M2 | DIASTOLIC BLOOD PRESSURE: 73 MMHG | HEIGHT: 64 IN

## 2019-05-06 LAB
ALBUMIN SERPL-MCNC: 3.2 G/DL (ref 3.5–4.6)
ALP BLD-CCNC: 92 U/L (ref 40–130)
ALT SERPL-CCNC: 12 U/L (ref 0–33)
ANION GAP SERPL CALCULATED.3IONS-SCNC: 15 MEQ/L (ref 9–15)
AST SERPL-CCNC: 14 U/L (ref 0–35)
BILIRUB SERPL-MCNC: 0.4 MG/DL (ref 0.2–0.7)
BUN BLDV-MCNC: 20 MG/DL (ref 8–23)
C-REACTIVE PROTEIN, HIGH SENSITIVITY: 4.5 MG/L (ref 0–5)
CALCIUM SERPL-MCNC: 8.6 MG/DL (ref 8.5–9.9)
CHLORIDE BLD-SCNC: 106 MEQ/L (ref 95–107)
CO2: 19 MEQ/L (ref 20–31)
CREAT SERPL-MCNC: 1.2 MG/DL (ref 0.5–0.9)
EKG ATRIAL RATE: 53 BPM
EKG ATRIAL RATE: 55 BPM
EKG P AXIS: 49 DEGREES
EKG P AXIS: 92 DEGREES
EKG P-R INTERVAL: 170 MS
EKG P-R INTERVAL: 172 MS
EKG Q-T INTERVAL: 460 MS
EKG Q-T INTERVAL: 474 MS
EKG QRS DURATION: 118 MS
EKG QRS DURATION: 128 MS
EKG QTC CALCULATION (BAZETT): 440 MS
EKG QTC CALCULATION (BAZETT): 444 MS
EKG R AXIS: -13 DEGREES
EKG R AXIS: -5 DEGREES
EKG T AXIS: 14 DEGREES
EKG T AXIS: 18 DEGREES
EKG VENTRICULAR RATE: 53 BPM
EKG VENTRICULAR RATE: 55 BPM
GFR AFRICAN AMERICAN: 53.3
GFR NON-AFRICAN AMERICAN: 44
GLOBULIN: 3.1 G/DL (ref 2.3–3.5)
GLUCOSE BLD-MCNC: 101 MG/DL (ref 70–99)
HCT VFR BLD CALC: 36.7 % (ref 37–47)
HEMOGLOBIN: 12.1 G/DL (ref 12–16)
MAGNESIUM: 2 MG/DL (ref 1.7–2.4)
MCH RBC QN AUTO: 28.7 PG (ref 27–31.3)
MCHC RBC AUTO-ENTMCNC: 32.9 % (ref 33–37)
MCV RBC AUTO: 87.3 FL (ref 82–100)
PDW BLD-RTO: 15.4 % (ref 11.5–14.5)
PLATELET # BLD: 197 K/UL (ref 130–400)
PLATELET SLIDE REVIEW: ADEQUATE
POTASSIUM SERPL-SCNC: 4 MEQ/L (ref 3.4–4.9)
RBC # BLD: 4.2 M/UL (ref 4.2–5.4)
REASON FOR REJECTION: NORMAL
REJECTED TEST: NORMAL
SEDIMENTATION RATE, ERYTHROCYTE: 16 MM (ref 0–30)
SODIUM BLD-SCNC: 140 MEQ/L (ref 135–144)
TOTAL PROTEIN: 6.3 G/DL (ref 6.3–8)
TROPONIN: <0.01 NG/ML (ref 0–0.01)
WBC # BLD: 8.1 K/UL (ref 4.8–10.8)

## 2019-05-06 PROCEDURE — 85027 COMPLETE CBC AUTOMATED: CPT

## 2019-05-06 PROCEDURE — 93005 ELECTROCARDIOGRAM TRACING: CPT

## 2019-05-06 PROCEDURE — 83735 ASSAY OF MAGNESIUM: CPT

## 2019-05-06 PROCEDURE — 84484 ASSAY OF TROPONIN QUANT: CPT

## 2019-05-06 PROCEDURE — 86141 C-REACTIVE PROTEIN HS: CPT

## 2019-05-06 PROCEDURE — 93010 ELECTROCARDIOGRAM REPORT: CPT | Performed by: INTERNAL MEDICINE

## 2019-05-06 PROCEDURE — 36415 COLL VENOUS BLD VENIPUNCTURE: CPT

## 2019-05-06 PROCEDURE — 6370000000 HC RX 637 (ALT 250 FOR IP): Performed by: INTERNAL MEDICINE

## 2019-05-06 PROCEDURE — 80053 COMPREHEN METABOLIC PANEL: CPT

## 2019-05-06 PROCEDURE — 85652 RBC SED RATE AUTOMATED: CPT

## 2019-05-06 RX ORDER — CARVEDILOL 6.25 MG/1
6.25 TABLET ORAL 2 TIMES DAILY
Status: DISCONTINUED | OUTPATIENT
Start: 2019-05-06 | End: 2019-05-06 | Stop reason: HOSPADM

## 2019-05-06 RX ORDER — AMLODIPINE BESYLATE 5 MG/1
5 TABLET ORAL DAILY
Status: DISCONTINUED | OUTPATIENT
Start: 2019-05-07 | End: 2019-05-06 | Stop reason: HOSPADM

## 2019-05-06 RX ORDER — MECLIZINE HYDROCHLORIDE 25 MG/1
25 TABLET ORAL 3 TIMES DAILY
Qty: 30 TABLET | Refills: 0 | Status: SHIPPED | OUTPATIENT
Start: 2019-05-06 | End: 2019-05-16

## 2019-05-06 RX ORDER — AMLODIPINE BESYLATE 5 MG/1
5 TABLET ORAL DAILY
Qty: 30 TABLET | Refills: 3 | Status: SHIPPED | OUTPATIENT
Start: 2019-05-07 | End: 2019-05-22

## 2019-05-06 RX ORDER — CARVEDILOL 6.25 MG/1
6.25 TABLET ORAL
Qty: 60 TABLET | Refills: 3 | Status: SHIPPED | OUTPATIENT
Start: 2019-05-06 | End: 2019-05-06 | Stop reason: HOSPADM

## 2019-05-06 RX ORDER — CARVEDILOL 6.25 MG/1
6.25 TABLET ORAL 2 TIMES DAILY
Qty: 60 TABLET | Refills: 3 | Status: SHIPPED | OUTPATIENT
Start: 2019-05-06

## 2019-05-06 RX ADMIN — PANTOPRAZOLE SODIUM 40 MG: 40 TABLET, DELAYED RELEASE ORAL at 10:25

## 2019-05-06 RX ADMIN — CETIRIZINE HYDROCHLORIDE 10 MG: 10 TABLET, FILM COATED ORAL at 10:27

## 2019-05-06 RX ADMIN — FAMOTIDINE 20 MG: 20 TABLET ORAL at 10:22

## 2019-05-06 RX ADMIN — LEVOTHYROXINE SODIUM 25 MCG: 0.03 TABLET ORAL at 05:42

## 2019-05-06 RX ADMIN — AMLODIPINE BESYLATE 2.5 MG: 2.5 TABLET ORAL at 10:24

## 2019-05-06 RX ADMIN — CARVEDILOL 6.25 MG: 6.25 TABLET ORAL at 10:23

## 2019-05-06 RX ADMIN — CARVEDILOL 6.25 MG: 6.25 TABLET ORAL at 13:20

## 2019-05-06 RX ADMIN — ASPIRIN 81 MG: 81 TABLET, COATED ORAL at 10:22

## 2019-05-06 ASSESSMENT — ENCOUNTER SYMPTOMS
COUGH: 0
CHEST TIGHTNESS: 0
VOMITING: 0
COLOR CHANGE: 0
CONSTIPATION: 0
TROUBLE SWALLOWING: 0
SHORTNESS OF BREATH: 0
WHEEZING: 0
NAUSEA: 0

## 2019-05-06 ASSESSMENT — PAIN SCALES - GENERAL: PAINLEVEL_OUTOF10: 0

## 2019-05-06 NOTE — PROGRESS NOTES
Neurology Daily Progress Note  Name: Carlee Webb  Age: 69 y.o. Gender: female  CodeStatus: Full Code  Allergies: Codeine  Morphine    Chief Complaint:Hypertension    Primary Care Provider: Saundra Wing DO  InpatientTreatment Team: Treatment Team: Attending Provider: Nicole Todd MD; Consulting Physician: Sanjana Rojo MD; Consulting Physician: Marion Quiroz MD; Registered Nurse: Phil Maguire LPN; : Merle Richardson RN; Patient Care Tech: Dianne Schaumann  Admission Date: 5/4/2019      HPI   Pt seen and examined for neuro follow up for dizziness. A&O x3, NAD, pleasant and cooperative. No dizziness today. Ambulating without difficulty. No tinnitus. Denies  Headache, double vision, blurry vision, difficulty with speech, difficulty with swallowing, weakness, numbness, pain, nausea, vomiting, choking, neck pain, dizziness. No new focal deficits. Vitals:    05/06/19 0551   BP: (!) 144/73   Pulse: 51   Resp: 18   Temp: 97.7 °F (36.5 °C)   SpO2: 98%        Physical Exam   Constitutional: She is oriented to person, place, and time. She appears well-nourished. No distress. HENT:   Head: Normocephalic and atraumatic. Eyes: Pupils are equal, round, and reactive to light. EOM are normal. Right eye exhibits no discharge. Neck: Neck supple. No JVD present. Cardiovascular: Normal rate and regular rhythm. Pulmonary/Chest: Effort normal and breath sounds normal. No respiratory distress. Abdominal: Soft. Bowel sounds are normal. She exhibits no distension. There is no tenderness. Musculoskeletal: She exhibits no edema or deformity. Neurological: She is alert and oriented to person, place, and time. No cranial nerve deficit. Skin: Skin is warm and dry. She is not diaphoretic. Psychiatric: She has a normal mood and affect. Review of Systems   Constitutional: Negative for appetite change, chills, fatigue and fever.    HENT: Negative for hearing loss and trouble swallowing. Eyes: Negative for visual disturbance. Respiratory: Negative for cough, chest tightness, shortness of breath and wheezing. Cardiovascular: Negative for chest pain, palpitations and leg swelling. Gastrointestinal: Negative for constipation, nausea and vomiting. Musculoskeletal: Negative for gait problem. Skin: Negative for color change and rash. Neurological: Negative for dizziness, tremors, seizures, syncope, facial asymmetry, speech difficulty, weakness, light-headedness, numbness and headaches. Psychiatric/Behavioral: Negative for agitation, confusion and hallucinations. The patient is not nervous/anxious. Medications:  Reviewed    Infusion Medications:   Scheduled Medications:    cetirizine  10 mg Oral Daily    famotidine  20 mg Oral BID    carvedilol  6.25 mg Oral 4x Daily WC    sodium chloride flush  10 mL Intravenous 2 times per day    enoxaparin  40 mg Subcutaneous Daily    aspirin  81 mg Oral Daily    atorvastatin  40 mg Oral Nightly    hydrOXYzine  25 mg Oral Nightly    levothyroxine  25 mcg Oral Daily    pantoprazole  40 mg Oral Daily    amLODIPine  2.5 mg Oral Daily     PRN Meds: sodium chloride flush, magnesium hydroxide, ondansetron, acetaminophen, albuterol, nitroGLYCERIN, hypromellose, simethicone    Labs:   Recent Labs     05/04/19  0500 05/06/19  0539   WBC 7.2 8.1   HGB 12.6 12.1   HCT 36.7* 36.7*    197     Recent Labs     05/04/19  0500 05/06/19  0539    140   K 3.9 4.0    106   CO2 22 19*   BUN 15 20   CREATININE 1.01* 1.20*   CALCIUM 8.6 8.6     Recent Labs     05/04/19  0500 05/06/19  0539   AST 15 14   ALT 14 12   BILITOT 0.3 0.4   ALKPHOS 92 92     No results for input(s): INR in the last 72 hours.   Recent Labs     05/06/19  0539   TROPONINI <0.010       Urinalysis:   Lab Results   Component Value Date    NITRU Negative 05/04/2019    WBCUA 3-5 05/04/2019    BACTERIA Negative 05/04/2019    RBCUA 0-2 05/04/2019    BLOODU

## 2019-05-06 NOTE — DISCHARGE SUMMARY
Physician Discharge Summary     Patient ID:  Maria Teresa Mancia  [id number]  69 y.o.  1945    Admit date: 5/4/2019    Discharge date and time: 5/6/19    Admitting Physician: Olimpia Dela Cruz DO     Discharge Physician:  [facility]    Admission Diagnoses: Ataxia [R27.0]  Dizziness [R42]    Discharge Diagnoses: atxia, BPPV, accelerated HTN  Past Medical History:   Diagnosis Date    CAD (coronary artery disease) 2005    3 vessel cardiac bypass    Environmental and seasonal allergies     GERD (gastroesophageal reflux disease)     GI bleed 2017    with LTKR / blood transfusion    History of blood transfusion 2017    post op LTKR    History of blood transfusion 2005    post op cardiac bypass    Hyperlipidemia     meds > 15 yrs    Hypertension     meds > 20 yrs    Myocardial infarct [facility]) 2005    followed by 3 vessal cardiac bypass    Pityriasis rosea     Prolonged emergence from general anesthesia          Admission Condition: stable  Discharged Condition: stable    Indication for Admission: dizziness    Hospital Course: CT head, MRI head, cartoid u/s    Consults: neuro    Significant Diagnostic Studies:   Lab Results   Component Value Date    WBC 8.1 05/06/2019    HGB 12.1 05/06/2019    HCT 36.7 (L) 05/06/2019    MCV 87.3 05/06/2019     05/06/2019     Lab Results   Component Value Date     05/06/2019    K 4.0 05/06/2019     05/06/2019    CO2 19 05/06/2019    BUN 20 05/06/2019    CREATININE 1.20 05/06/2019    GLUCOSE 101 05/06/2019    GLUCOSE 88 01/09/2012    CALCIUM 8.6 05/06/2019          Treatments:   Current Facility-Administered Medications   Medication Dose Route Frequency Provider Last Rate Last Dose    cetirizine (ZYRTEC) tablet 10 mg  10 mg Oral Daily Olimpia Dela Cruz DO   10 mg at 05/06/19 1027    famotidine (PEPCID) tablet 20 mg  20 mg Oral BID Olimpia Dela Cruz DO   20 mg at 05/06/19 1022    carvedilol (COREG) tablet 6.25 mg  6.25 mg Oral 4x Daily  Avila Bacon MD Activity: as tolerated  Diet: regular diet    Follow-up with PCP in 1 week  Overtime on dc summary was 45 min    Signed:  Brooke Romero  5/6/2019  11:10 AM

## 2019-05-07 ENCOUNTER — TELEPHONE (OUTPATIENT)
Dept: OBGYN CLINIC | Age: 74
End: 2019-05-07

## 2019-05-07 NOTE — TELEPHONE ENCOUNTER
Alyssa Hernández called today stating that while she was in the hospital, they gave her Hydroxyzine. The prescription for this was never filled by the patient because the prior authorization was denied. She needs to try Buspirone, Duloxetine, Escitalopram, Sertraline, or Venlafaxine. Also, while she was in the hospital, they gave her one dose of Oxybutynin. She said she was fine while she was in the hospital but last night she woke up to use the restroom and had to change her clothes three times. She's currently complaining of pressure, urgency, and leaking.

## 2019-05-07 NOTE — PROGRESS NOTES
Physical Therapy  Facility/Department: [facility] MED SURG [id number]  Physical Therapy Discharge      NAME: Ronen Pontiff    : 1945 (84 y.o.)  MRN: [id number]    Account: [de-identified]  Gender: female      Patient has been discharged from acute care hospital. DC patient from current PT program.      Electronically signed by Diane Davis PT on 19 at 11:41 AM

## 2019-05-08 NOTE — TELEPHONE ENCOUNTER
That these are a lot of complaints cannot be addressed over messaging. The leakage in the sudden change in her urinary symptoms could be associated with the urinary tract infection that she was being treated for a urine specimen is needed.   Patient could come in for evaluation

## 2019-05-14 ENCOUNTER — OFFICE VISIT (OUTPATIENT)
Dept: OBGYN CLINIC | Age: 74
End: 2019-05-14
Payer: MEDICARE

## 2019-05-14 ENCOUNTER — TELEPHONE (OUTPATIENT)
Dept: OBGYN CLINIC | Age: 74
End: 2019-05-14

## 2019-05-14 ENCOUNTER — HOSPITAL ENCOUNTER (OUTPATIENT)
Dept: PHYSICAL THERAPY | Age: 74
Setting detail: THERAPIES SERIES
Discharge: HOME | End: 2019-05-14
Payer: MEDICARE

## 2019-05-14 VITALS
WEIGHT: 220 LBS | DIASTOLIC BLOOD PRESSURE: 62 MMHG | HEART RATE: 64 BPM | SYSTOLIC BLOOD PRESSURE: 114 MMHG | BODY MASS INDEX: 37.56 KG/M2 | HEIGHT: 64 IN

## 2019-05-14 DIAGNOSIS — N39.41 URGE INCONTINENCE OF URINE: ICD-10-CM

## 2019-05-14 DIAGNOSIS — N32.81 OAB (OVERACTIVE BLADDER): ICD-10-CM

## 2019-05-14 DIAGNOSIS — N39.41 URGE INCONTINENCE OF URINE: Primary | ICD-10-CM

## 2019-05-14 LAB
BACTERIA: ABNORMAL /HPF
BILIRUBIN URINE: NEGATIVE
BLOOD, URINE: NEGATIVE
CLARITY: CLEAR
COLOR: YELLOW
EPITHELIAL CELLS, UA: ABNORMAL /HPF (ref 0–5)
GLUCOSE URINE: NEGATIVE MG/DL
HYALINE CASTS: ABNORMAL /HPF (ref 0–5)
KETONES, URINE: ABNORMAL MG/DL
LEUKOCYTE ESTERASE, URINE: ABNORMAL
NITRITE, URINE: NEGATIVE
PH UA: 5 (ref 5–9)
PROTEIN UA: NEGATIVE MG/DL
RBC UA: ABNORMAL /HPF (ref 0–5)
SPECIFIC GRAVITY UA: 1.02 (ref 1–1.03)
UROBILINOGEN, URINE: 0.2 E.U./DL
WBC UA: ABNORMAL /HPF (ref 0–5)

## 2019-05-14 PROCEDURE — 99213 OFFICE O/P EST LOW 20 MIN: CPT | Performed by: OBSTETRICS & GYNECOLOGY

## 2019-05-14 PROCEDURE — 97162 PT EVAL MOD COMPLEX 30 MIN: CPT

## 2019-05-14 ASSESSMENT — PAIN SCALES - GENERAL: PAINLEVEL_OUTOF10: 8

## 2019-05-14 ASSESSMENT — PAIN DESCRIPTION - LOCATION: LOCATION: HEAD

## 2019-05-14 NOTE — PROGRESS NOTES
[address] [facility]    [] [address]    [] [facility]             of [address]    Date: 2019  Patient: Gabriele Torres  : 1945  ACCT #: [de-identified]    Referring Practitioner: Dr. Eli Ruiz    Referral Date : 19    Diagnosis: Vertigo    Treatment Diagnosis: Imbalance, Vertigo  PT Insurance Information: [employer] Medicare    Per Physician Order  Total # of Visits to Date: 1  No Show: 0  Canceled Appointment: 0    History   has a past medical history of CAD (coronary artery disease), Environmental and seasonal allergies, GERD (gastroesophageal reflux disease), GI bleed, History of blood transfusion, History of blood transfusion, Hyperlipidemia, Hypertension, Myocardial infarct ([facility] [address] Pityriasis rosea, and Prolonged emergence from general anesthesia. has a past surgical history that includes Cataract removal with implant; Hysterectomy; Cholecystectomy; Hand surgery (Right); Arterial bypass surgry; other surgical history; Total knee arthroplasty (Left, 2016); cystourethroscopy (2017); pr esophagogastroduodenoscopy transoral diagnostic (N/A, 2018); pr colorectal scrn; hi risk ind (N/A, 2018); eye surgery; Endoscopy, colon, diagnostic; Colonoscopy; joint replacement (); Breast surgery (Bilateral); Cardiac surgery (); Vagina surgery (N/A, 2019); and pr release of ureter (N/A, 2019). Subjective  Subjective: Has a HA and is dizzy. Doctor told her she had an ear problem and something is out of line. Had surgery 19 followed by UTI, then pneumonia. Started having HA and dizziness ~. BP was going up - went to ER last Saturday. DR Dylan Villalobos said pt has something going on with inner ear. HAs seem to just hit her. Dizziness isn't too bad with sitting but gets worse with walking. Feels she loses her balance. No spinning sensation but just feels weird.   No falls right((+) emely Rt > LT with difficulty focusing, pt with difficulty relaxing)       Positional Vertigo:   Intensity (0-5) Nystagmus Duration   Sit -> Rt Sidelying 0     Rt Sidelying -> Sit 0     Sit -> Lt Sidelying 0     Lt Sidelying -> Sit 0       Exercises:   Exercises  Exercise 1: Static standing*  Exercise 2: Single stepping*  Exercise 3: Foam/Tuning board*  Exercise 4: Gait drills*  Exercise 5: DGI tasks*  Exercise 6: Dual tasking  Exercise 7: VOR*  Exercise 8: Saccades*      POST-PAIN    Pain Rating (0-10 pain scale): 8/10  Location and Pain Description same as pre-pain unless otherwise indicated. Action: [] NA  [] Call Physician  [] Perform HEP  [x] Meds as prescribed     Assessment   Conditions Requiring Skilled Therapeutic Intervention  Body structures, Functions, Activity limitations: Decreased functional mobility , Decreased balance, Decreased coordination, Vestibular Impairment  Assessment: Pt presents with ongoing dizziness and off balance feeling. Pt with normal smooth pursuits but reports increased symptoms with saccades with occasional undershooting. Pt also with symptoms with VOR with difficulty focusing. Pt with with 2-3 corrective saccades with emely Head Thrust test but unable to differentiate if pt having difficulty focusing vs positive test.  Pt is at an increased risk for falls per Coronel and DGI score. Pt would benefit from further skilled PT to improve her symptoms, balance and gait with increased safety. Treatment Diagnosis: Imbalance, Vertigo  Prognosis: Good, Fair  Decision Making: Medium Complexity  History: PMH: HTN, MI, OA, emely TKR  Exam: Decreased balance with increased dizziness symptoms impacting safety and mobility.   Coronel = 41/56, DGI = 12/24  Clinical Presentation: Evolving  REQUIRES PT FOLLOW UP: Yes  Discharge Recommendations: Continue to assess pending progress     Plan:  Plan  Times per week: 2  Plan weeks: 5  Current Treatment Recommendations: Balance Training, Functional Mobility Training, Gait Training, Stair training, Neuromuscular Re-education, Transfer Training, Strengthening, Manual Therapy - Soft Tissue Mobilization, Home Exercise Program, Patient/Caregiver Education & Training, Equipment Evaluation, Education, & procurement, Vestibular Rehab       Evaluation and patient rights have been reviewed and patient agrees with plan of care. [x] Yes         [] No,    Signature:  Electronically signed by Laurence Sahu PT on 5/14/2019 at 5:40 PM    PT Individual Minutes  Time In: 1411  Time Out: 1500  Minutes: 52     Procedure Minutes:49'    Montaño Fall Risk Assessment  Risk Factor Scale  Score   History of Falls [] Yes  [x] No 25  0 0   Secondary Diagnosis [] Yes  [x] No 15  0 0   Ambulatory Aid [] Furniture  [] Crutches/cane/walker  [x] None/bedrest/wheelchair/nurse 30  15  0 0   IV/Heparin Lock [] Yes  [x] No 20  0 0   Gait/Transferring [x] Impaired  [] Weak  [] Normal/bedrest/immobile 20  10  0 20   Mental Status [] Forgets limitations  [x] Oriented to own ability 15  0 0      Total:20     Based on the Assessment score: check the appropriate box.   [x]  No intervention needed   Low =   Score of 0-24  []  Use standard prevention interventions Moderate =  Score of 24-44   [] Discuss fall prevention strategies   [] Indicate moderate falls risk on eval  []  Use high risk prevention interventions High = Score of 45 and higher   [] Discuss fall prevention strategies   [] Provide supervision during treatment time

## 2019-05-14 NOTE — TELEPHONE ENCOUNTER
Pt was given Mirabegron today, there is $100 deductable, can't afford it, so cancelled prescription. Asking what else she should do, do you have any other suggestions.

## 2019-05-14 NOTE — PROGRESS NOTES
Medication FollowUp     Erica Lundborg is a 69y.o. year old female here todiscuss symptoms after use of medications prescribed last visit. Symptoms  Urgency , frequency, incontinence . Medication/s prescribed oxybutynin xl 5 mg QD  . Side effects reported : cannot tolerate medications, many side effects related to medication , stopped medication . Mentions symptomsimproved : yes - but with many side effects.      Vitals:  /62 (Site: Right Upper Arm, Position: Sitting, Cuff Size: Large Adult)   Pulse 64   Ht 5' 4\" (1.626 m)   Wt 220 lb (99.8 kg)   BMI 37.76 kg/m²   Allergies:  Codeine and Morphine  Past Medical History:   Diagnosis Date    CAD (coronary artery disease) 2005    3 vessel cardiac bypass    Environmental and seasonal allergies     GERD (gastroesophageal reflux disease)     GI bleed 2017    with LTKR / blood transfusion    History of blood transfusion 2017    post op LTKR    History of blood transfusion 2005    post op cardiac bypass    Hyperlipidemia     meds > 15 yrs    Hypertension     meds > 20 yrs    Myocardial infarct ([facility] [address] 2005    followed by 3 vessal cardiac bypass    Pityriasis rosea     Prolonged emergence from general anesthesia         Past Surgical History:   Procedure Laterality Date    ARTERIAL BYPASS SURGRY      triple / cardiac    BREAST SURGERY Bilateral     reduction and cysts biopsy / has had several biopsies    CARDIAC SURGERY  2005    3 vessel bypass    CATARACT REMOVAL WITH IMPLANT      CHOLECYSTECTOMY      COLONOSCOPY      CYSTOURETHROSCOPY  04/18/2017    FLEXIBLE / due to UTI    ENDOSCOPY, COLON, DIAGNOSTIC      EYE SURGERY      [place] with IOL OU    HAND SURGERY Right     plate fracture wrist / hardware still remains    HYSTERECTOMY      JOINT REPLACEMENT  2017    LTKR    OTHER SURGICAL HISTORY      head fatty tumors removed    [place] COLORECTAL SCRN; HI RISK IND N/A 6/11/2018    COLONOSCOPY [facility]red[facility] [place]    OR ESOPHAGOGASTRODUODENOSCOPY TRANSORAL DIAGNOSTIC N/A 2018    EGD ESOPHAGOGASTRODUODENOSCOPY WITH DILATION performed by Albert Moran MD at .  [place]  N/A 2019    WITH S.S. L. S performed [facility] Left 2016    VAGINA SURGERY N/A 2019    RECTOCELE REPAIR WITH ENTEROCELE REPAIR performed by Toney Arell[facility] at [facility]     OB History    None       Family History   Problem Relation Age of Onset    Colon Cancer Father     Other Father         polio    Heart Disease Mother         pacemaker    Cancer Mother         stomach cancer    High Blood Pressure Mother     High Cholesterol Mother     Other Brother          at age 27 / incident in care facility / pt was mentally challenged    Heart Disease Daughter     Other Son         blood dyscrasia     Social History     Socioeconomic History    Marital status:      Spouse name: Not on file    Number of children: Not on file    Years of education: Not on file    Highest education level: Not on file   Occupational History    Not on file   Social Needs    Financial resource strain: Not on file    Food insecurity:     Worry: Not on file     Inability: Not on file    Transportation needs:     Medical: Not on file     Non-medical: Not on file   Tobacco Use    Smoking status: Never Smoker    Smokeless tobacco: Never Used   Substance and Sexual Activity    Alcohol use:  Yes     Alcohol/week: 0.0 oz     Comment: rare social    Drug use: No    Sexual activity: Not on file   Lifestyle    Physical activity:     Days per week: Not on file     Minutes per session: Not on file    Stress: Not on file   Relationships    Social connections:     Talks on phone: Not on file     Gets together: Not on file     Attends [employer] service: Not on file     Active member of club or organization: Not on file     Attends meetings of clubs or organizations: Not on file     Relationship status: Not on file    Intimate partner violence:     Fear of current or ex partner: Not on file     Emotionally abused: Not on file     Physically abused: Not on file     Forced sexual activity: Not on file   Other Topics Concern    Not on file   Social History Narrative    Not on file         Patient's medications,allergies, past medical, surgical, social and family histories were reviewed andupdated as appropriate. Current Outpatient Medications:     Mirabegron ER 25 MG TB24, Take 1 tablet by mouth daily, Disp: 30 tablet, Rfl: 0    meclizine (ANTIVERT) 25 MG tablet, Take 1 tablet by mouth 3 times daily for 10 days, Disp: 30 tablet, Rfl: 0    carvedilol (COREG) 6.25 MG tablet, Take 1 tablet by mouth 2 times daily, Disp: 60 tablet, Rfl: 3    amLODIPine (NORVASC) 5 MG tablet, Take 1 tablet by mouth daily, Disp: 30 tablet, Rfl: 3    amLODIPine (NORVASC) 2.5 MG tablet, Take 2.5 mg by mouth daily, Disp: , Rfl:     levothyroxine (SYNTHROID) 25 MCG tablet, Take 25 mcg by mouth Daily, Disp: , Rfl:     ranitidine (ZANTAC) 150 MG tablet, Take 1 tablet by mouth 2 times daily, Disp: 60 tablet, Rfl: 3    ibuprofen (ADVIL;MOTRIN) 600 MG tablet, Take 1 tablet by mouth every 6 hours as needed for Pain, Disp: 60 tablet, Rfl: 1    acetaminophen (APAP EXTRA STRENGTH) 500 MG tablet, Take 2 tablets by mouth every 6 hours as needed for Pain, Disp: 60 tablet, Rfl: 1    simethicone (MYLICON) 80 MG chewable tablet, Take 1 tablet by mouth 4 times daily as needed for Flatulence, Disp: 180 tablet, Rfl: 1    pantoprazole (PROTONIX) 40 MG tablet, Take 40 mg by mouth daily, Disp: , Rfl:     polyethyl glycol-propyl glycol 0.4-0.3 % (SYSTANE) 0.4-0.3 % ophthalmic solution, Place 1 drop into both eyes as needed for Dry Eyes, Disp: , Rfl:     clobetasol (TEMOVATE) 0.05 % ointment, Apply topically 2 times daily. , Disp: 1 Tube, Rfl: 3    albuterol (PROVENTIL) (2.5 MG/3ML) 0.083% nebulizer solution, use 1 ampule with nebulizer every 4 hours as needed, Disp: , Rfl: 3    LORATADINE PO, Take 10 mg by mouth daily , Disp: , Rfl:     aspirin 81 MG chewable tablet, Take 81 mg by mouth daily, Disp: , Rfl:     atorvastatin (LIPITOR) 80 MG tablet, Take 80 mg by mouth nightly , Disp: , Rfl:     nitroGLYCERIN (NITROSTAT) 0.4 MG SL tablet, Place 0.4 mg under the tongue , Disp: , Rfl:     cetirizine (ZYRTEC) 10 MG tablet, Take 1 tablet by mouth daily, Disp: , Rfl:     Review of Systems  Review of Systems    All other systems reviewed and are negative. Urgency , frequency , incontinence. Physical exam :   General Appearance: alert and oriented to person, placeand time, well-developed and well-nourished, in no acute distress  Pulmonary/Chest:clear to auscultation bilaterally- no wheezes, rales or rhonchi, normal air movement,no respiratory distress  Cardiovascular: normal rate, normal S1 and S2, no gallops,intact distal pulses and no carotid bruits  Abdomen: soft, non-tender  Pelvic:deferred    Assessment:      Diagnosis Orders   1. Urge incontinence of urine     2. OAB (overactive bladder)         Was medication effective in resolving symptomsfor patient ? yes - many side effects   Plan:     Switch to Mirabegron 25 mg po daily , patient cannot tolerate anti-cholinergics. No orders of the defined types were placed in this encounter. Orders Placed This Encounter   Medications    Mirabegron ER 25 MG TB24     Sig: Take 1 tablet by mouth daily     Dispense:  30 tablet     Refill:  0     Patient was seen with total face to face time of 15 minutes. More than 50% of this visit was counseling and education regarding The primary encounter diagnosis was Urge incontinence of urine. A diagnosis of OAB (overactive bladder) was also pertinent to this visit. and Incontinence (c/o pressure, urgency, and leaking. )   as well as  counseling on preventative health maintenance follow-up.       Follow up: Return for medication assessment.         Vianney Braun MD

## 2019-05-14 NOTE — PROGRESS NOTES
[address]     Ph: [phone]  Fax: [phone]    [x] Certification  [] Recertification []  Plan of Care  [] Progress Note [] Discharge      To:  Dr. Dina Mason      From:  Dennis Ruiz, PT  Patient: Deidra Samano     : 1945  Diagnosis: Vertigo     Date: 2019  Treatment Diagnosis: Imbalance, Vertigo    Plan of Care/Certification Expiration Date: 19  Progress Report Period from:  2019  to 2019    Total # of Visits to Date: 1   No Show: 0    Canceled Appointment: 0     OBJECTIVE:   Long Term Goals - Time Frame for Long term goals : 5 weeks  Goals Current/ Discharge status Met   Long term goal 1: Pt will report >/= 50% reduction in dizziness and off balance symptoms. TBD [] yes  [] no   Long term goal 2: Pt will ambulate throughout clinic with minimal to no deivaitons. Ambulation 1  Surface: carpet  Device: No Device  Assistance: Supervision, Independent  Quality of Gait: Occasional veering and unsteadiness with self correction  Distance: [de-identified]' [] yes  [] no   Long term goal 3: Coronel >/= 47/56 and DGI >/= 18/24 to reduce risk for falls. Coronel Balance Score: 41  Dynamic Gait Total Score: 12 [] yes  [] no   Long term goal 4: Saccades and VOR WFL with decreased symptoms. Increased symptoms with occasional undershooting with saccades, Symptoms and possibly (+) emely Head Thrust test [] yes  [] no       Body structures, Functions, Activity limitations: Decreased functional mobility , Decreased balance, Decreased coordination, Vestibular Impairment  Assessment: Pt presents with ongoing dizziness and off balance feeling. Pt with normal smooth pursuits but reports increased symptoms with saccades with occasional undershooting. Pt also with symptoms with VOR with difficulty focusing.   Pt with with 2-3 corrective saccades with emely Head Thrust test but unable to differentiate if pt having

## 2019-05-17 ENCOUNTER — HOSPITAL ENCOUNTER (OUTPATIENT)
Dept: PHYSICAL THERAPY | Age: 74
Setting detail: THERAPIES SERIES
Discharge: HOME | End: 2019-05-17
Payer: MEDICARE

## 2019-05-17 PROCEDURE — 97112 NEUROMUSCULAR REEDUCATION: CPT

## 2019-05-17 NOTE — PROGRESS NOTES
[address] [address]  Outpatient Physical Therapy    Treatment Note        Date: 2019  Patient: Sandrine Grant  : 1945  ACCT #: [de-identified]  Referring Practitioner: Dr. Edith Knight  Diagnosis: Vertigo    Visit Information:  PT Visit Information  PT Insurance Information: Aetna Medicare  Total # of Visits to Date: 2  Plan of Care/Certification Expiration Date: 19  No Show: 0  Progress Note Due Date: 19  Canceled Appointment: 0  Progress Note Counter: 2/10    Subjective: Pt reports reducing BP meds , increased leg swelling. Pt reporting having a miserable night, coughing and sinus issues. still reports 3-4/5 dizziness     HEP Compliance:  [] Good [x] Fair [] Poor [] Reports not doing due to:    Vital Signs  Patient Currently in Pain: Denies   Pain Screening  Patient Currently in Pain: Denies    OBJECTIVE:   Exercises  Exercise 1: Static standing: partial tandem, FT/EO/EC, SLS 3-4s b/l  Exercise 2: Single stepping ove rTB x 10  Exercise 3: Foam/Tuning board: FOAM DISCs with head turns, H>v sxs  Exercise 4: Gait drills: /L//March/  Exercise 5: DGI tasks gait with head turns and scanning  Exercise 6: Dual tasking: stepping with reaching, F> L inc'd sx's, carrying ball on cone with head turns, multile drops  Exercise 7: VOR, seated no sx's, on foam, slight c/o dizziness with vertical head turns  Exercise 8: Saccades, no sx's  Exercise 9: alt toe taps with arms crossed        *Indicates exercise, modality, or manual techniques to be initiated when appropriate    Assessment: Body structures, Functions, Activity limitations: Decreased functional mobility , Decreased balance, Decreased coordination, Vestibular Impairment  Assessment: Pt observed with lateral excursion upon walking into clinic. Pt demo'd no sx's with saccades or seated VOR, slight c/o dizziness on compliant surface with vertical VOR, no LOB , reports legs feel shaky.  Pt with difficulty halina tasking with head turns and carying ball on cone, dropping several times. Pt reports overall doing better since MD reduced BP meds. \" Im much better than I was the first time she seen me\"   Treatment Diagnosis: Imbalance, Vertigo     Goals:       Long term goals  Time Frame for Long term goals : 5 weeks  Long term goal 1: Pt will report >/= 50% reduction in dizziness and off balance symptoms. Long term goal 2: Pt will ambulate throughout clinic with minimal to no deivaitons. Long term goal 3: Coronel >/= 47/56 and DGI >/= 18/24 to reduce risk for falls. Long term goal 4: Saccades and VOR WFL with decreased symptoms. Progress toward goals:saccades, dynamic balance    POST-PAIN       Pain Rating (0-10 pain scale):  0 /10   Location and pain description same as pre-treatment unless indicated. Action: [x] NA   [] Perform HEP  [] Meds as prescribed  [] Modalities as prescribed   [] Call Physician     Frequency/Duration:  Plan  Times per week: 2  Plan weeks: 5  Current Treatment Recommendations: Balance Training, Functional Mobility Training, Gait Training, Stair training, Neuromuscular Re-education, Transfer Training, Strengthening, Manual Therapy - Soft Tissue Mobilization, Home Exercise Program, Patient/Caregiver Education & Training, Equipment Evaluation, Education, & procurement, Vestibular Rehab     Pt to continue current HEP. See objective section for any therapeutic exercise changes, additions or modifications this date.          PT Individual Minutes  Time In: 0800  Time Out: 8653  Minutes: 55   Neuro re ed x 55  Procedure Minutes:0    Signature:  Electronically signed by Scott Charles PTA on 5/17/19 at 2:55 PM

## 2019-05-17 NOTE — TELEPHONE ENCOUNTER
Needs to come in to discuss. I do not have any other suggestions because she cannot tolerate anti-cholinergics.  TD

## 2019-05-21 ENCOUNTER — TELEPHONE (OUTPATIENT)
Dept: OBGYN CLINIC | Age: 74
End: 2019-05-21

## 2019-05-21 NOTE — TELEPHONE ENCOUNTER
Spoke with [address] MICRO state UC was not ordered.  Pt with appt 5/28, would you like pt to come in and leave urine sample

## 2019-05-21 NOTE — TELEPHONE ENCOUNTER
----- Message from Marcela Mai MD sent at 5/21/2019 10:01 AM EDT -----  Was a urine culture ordered for this patient.  TD

## 2019-05-22 ENCOUNTER — HOSPITAL ENCOUNTER (OUTPATIENT)
Dept: PHYSICAL THERAPY | Age: 74
Setting detail: THERAPIES SERIES
Discharge: HOME | End: 2019-05-22
Payer: MEDICARE

## 2019-05-22 DIAGNOSIS — N39.41 URGE INCONTINENCE OF URINE: ICD-10-CM

## 2019-05-22 PROCEDURE — 97112 NEUROMUSCULAR REEDUCATION: CPT

## 2019-05-22 NOTE — PROGRESS NOTES
[address] [address]  Outpatient Physical Therapy    Treatment Note        Date: 2019  Patient: Lanre Alvarez  : 1945  ACCT #: [de-identified]  Referring Practitioner: Dr. Marce Ortiz  Diagnosis: Vertigo    Visit Information:  PT Visit Information  PT Insurance Information: Aetna Medicare  Total # of Visits to Date: 3  Plan of Care/Certification Expiration Date: 19  No Show: 0  Progress Note Due Date: 19  Canceled Appointment: 0  Progress Note Counter: 3/10    Subjective: Pt reports taking thyroid medication and pt states she is confident that medication is contributing to dizziness. Reports was feeling okay this morning prior to taking medication. Unable to rate dizziness, though states head feels \"heavy. \" States has been feeling like there is water in her L ear periodically. HEP Compliance:  [x] Good [] Fair [] Poor [] Reports not doing due to:    Vital Signs  Patient Currently in Pain: Denies   Pain Screening  Patient Currently in Pain: Denies    OBJECTIVE:   Exercises  Exercise 1: Static standing: partial tandem, SLS x3s R, x5s L  Exercise 4: Gait drills: L/R/March/  Exercise 5: DGI tasks gait with head turns and scanning  Exercise 7: VOR, standing no sx's, on foam, slight c/o dizziness with vertical head turns  Exercise 9: alt toe taps on 6\" step without UE support  Exercise 10: Smooth pursuits without increased symptoms. *Indicates exercise, modality, or manual techniques to be initiated when appropriate    Assessment: Body structures, Functions, Activity limitations: Decreased functional mobility , Decreased balance, Decreased coordination, Vestibular Impairment  Assessment: Therapist encouraged pt to call MD before discharging medications. Pt observed with lateral excursion upon walking into clinic. Noted corrective saccades with horizontal smooth pursuits though without report of increased dizziness. Overshoots noted with vertical smooth pursuits.  Pt reports floaters with vertical VORs only though denies dizziness. Treatment Diagnosis: Imbalance, Vertigo  Prognosis: Good, Fair     Goals:  Long term goals  Time Frame for Long term goals : 5 weeks  Long term goal 1: Pt will report >/= 50% reduction in dizziness and off balance symptoms. Long term goal 2: Pt will ambulate throughout clinic with minimal to no deivaitons. Long term goal 3: Coronel >/= 47/56 and DGI >/= 18/24 to reduce risk for falls. Long term goal 4: Saccades and VOR WFL with decreased symptoms. Progress toward goals: Progressing towards all    POST-PAIN       Pain Rating (0-10 pain scale):   0/10   Location and pain description same as pre-treatment unless indicated. Action: [] NA   [x] Perform HEP  [] Meds as prescribed  [] Modalities as prescribed   [] Call Physician     Frequency/Duration:  Plan  Times per week: 2  Plan weeks: 5  Current Treatment Recommendations: Balance Training, Functional Mobility Training, Gait Training, Stair training, Neuromuscular Re-education, Transfer Training, Strengthening, Manual Therapy - Soft Tissue Mobilization, Home Exercise Program, Patient/Caregiver Education & Training, Equipment Evaluation, Education, & procurement, Vestibular Rehab     Pt to continue current HEP. See objective section for any therapeutic exercise changes, additions or modifications this date.          PT Individual Minutes  Time In: 4464  Time Out: 6781  Minutes: 54  Timed Code Treatment Minutes: 54 Minutes  Procedure Minutes: 0    Activity Minutes Units   Neuro Ed 54 4         Signature:  Electronically signed by Lilly Gagnon PTA on 5/22/19 at 9:17 AM

## 2019-05-22 NOTE — TELEPHONE ENCOUNTER
Called patient to see if she could stop by the office to leave a urine sample. She started to rant about \"having enough of all these doctors\" and the phone disconnected. Will try calling her back later.

## 2019-05-22 NOTE — TELEPHONE ENCOUNTER
Patient called back and will stop by the office to leave a urine sample when she is done with therapy.

## 2019-05-24 ENCOUNTER — HOSPITAL ENCOUNTER (OUTPATIENT)
Dept: PHYSICAL THERAPY | Age: 74
Setting detail: THERAPIES SERIES
Discharge: HOME | End: 2019-05-24
Payer: MEDICARE

## 2019-05-24 PROCEDURE — 97112 NEUROMUSCULAR REEDUCATION: CPT

## 2019-05-24 NOTE — PROGRESS NOTES
[address] [address]  Outpatient Physical Therapy    Treatment Note        Date: 2019  Patient: Marisel Perez  : 1945  ACCT #: [de-identified]  Referring Practitioner: Dr. Daquan Ojeda  Diagnosis: Vertigo    Visit Information:  PT Visit Information  PT Insurance Information: Aetna Medicare  Total # of Visits to Date: 4  Plan of Care/Certification Expiration Date: 19  No Show: 0  Progress Note Due Date: 19  Canceled Appointment: 0  Progress Note Counter: 4/10    Subjective: Pt reports feeling of fullness in both ears. Reports hearing feels muffled. States tries ear wash solution in R ear without relief. Pt reports is going to call PCP following tx to try to schedule appt. Notes increased lateral sway with walking this date. Pt reports fell onto backside while beding forward to pick something up in yard yesterday. Denies injury. HEP Compliance:  [] Good [] Fair [] Poor [] Reports not doing due to:    Vital Signs  Patient Currently in Pain: Denies   Pain Screening  Patient Currently in Pain: Denies    OBJECTIVE:   Exercises  Exercise 1: Static standing: FT x60s, semitandem 30s x 2  Exercise 2: 4 square stepping over TB, 90 deg turns with 4 square  Exercise 3: Tuning board: toe taps, static stand with head turns and with trunk rotation  Exercise 4: Gait drills: March/  Exercise 5: DGI tasks gait with head turns and scanning  Exercise 6: Dual tasking: walk with rubber ball on cone with and without naming objects and head turns - one drop with person walking by in hallway  Exercise 7: VOR, standing no sx's, on foam - no report of dizziness, though states feels \"weird\" with head turn to R    *Indicates exercise, modality, or manual techniques to be initiated when appropriate    Assessment:    Body structures, Functions, Activity limitations: Decreased functional mobility , Decreased balance, Decreased coordination, Vestibular Impairment  Assessment: Pt denies dizziness, though states feeling very off balance with tx this date. /67 with activity. Increased scissoring and lateral sway with ambulation with pt reaching for walls and furniture walking into clinic. Grossly steady with tx activities this date with cues to decrease speed and for spotting with most balance activities. Treatment Diagnosis: Imbalance, Vertigo  Prognosis: Good, Fair     Goals:  Long term goals  Time Frame for Long term goals : 5 weeks  Long term goal 1: Pt will report >/= 50% reduction in dizziness and off balance symptoms. Long term goal 2: Pt will ambulate throughout clinic with minimal to no deivaitons. Long term goal 3: Coronel >/= 47/56 and DGI >/= 18/24 to reduce risk for falls. Long term goal 4: Saccades and VOR WFL with decreased symptoms. Progress toward goals: Progressing towards all    POST-PAIN       Pain Rating (0-10 pain scale):   0/10   Location and pain description same as pre-treatment unless indicated. Action: [] NA   [x] Perform HEP  [] Meds as prescribed  [] Modalities as prescribed   [] Call Physician     Frequency/Duration:  Plan  Times per week: 2  Plan weeks: 5  Current Treatment Recommendations: Balance Training, Functional Mobility Training, Gait Training, Stair training, Neuromuscular Re-education, Transfer Training, Strengthening, Manual Therapy - Soft Tissue Mobilization, Home Exercise Program, Patient/Caregiver Education & Training, Equipment Evaluation, Education, & procurement, Vestibular Rehab     Pt to continue current HEP. See objective section for any therapeutic exercise changes, additions or modifications this date.          PT Individual Minutes  Time In: 2228  Time Out: 9744  Minutes: 57  Timed Code Treatment Minutes: 57 Minutes  Procedure Minutes: 0    Activity Minutes Units   Neuro Ed 57 4         Signature:  Electronically signed by Carlee Corral PTA on 5/24/19 at 9:03 AM

## 2019-05-25 LAB
ORGANISM: ABNORMAL
URINE CULTURE, ROUTINE: ABNORMAL
URINE CULTURE, ROUTINE: ABNORMAL

## 2019-05-30 ENCOUNTER — HOSPITAL ENCOUNTER (OUTPATIENT)
Dept: PHYSICAL THERAPY | Age: 74
Setting detail: THERAPIES SERIES
Discharge: HOME | End: 2019-05-30
Payer: MEDICARE

## 2019-05-30 NOTE — PROGRESS NOTES
[facility]    [x]  [address]  []  [address] Sydnee Daniel [address] [place] [address]  Ph: [phone]     Ph: [phone]  Fax: [phone]     Fax: [phone]    [] Certification  [] Recertification []  Plan of Care  [] Progress Note [x] Discharge    Date: 2019  Patient Name: Kenneth Homans  : 1945  MRN: [id number]    To:  Referring Practitioner: Dr. Elia Pate    From: Laurence Sahu PT DPT     [x]    FYI: Pt cancelling all further appointments and requesting to be discharged. Thank you for your referral and the opportunity to treat this patient. Please contact us with any questions or concerns.     Electronically signed by Laurence Sahu PT on 2019 at 9:30 AM

## 2019-05-30 NOTE — PROGRESS NOTES
[address]       Physical Therapy  Cancellation/No-show Note  Patient Name:  Toya Hazel  :  1945   Date:  2019  Referring Practitioner: Dr. Toni Acevedo  Diagnosis: Vertigo    Visit Information:  PT Visit Information  PT Insurance Information: Aetna Medicare  Total # of Visits to Date: 4  Plan of Care/Certification Expiration Date: 19  No Show: 0  Progress Note Due Date: 19  Canceled Appointment: 1  Progress Note Counter: 4/10 cxl 19    For today's appointment patient:  [x]  Cancelled  []  Rescheduled appointment  []  No-show   []  Called pt to remind of next appointment     Reason given by patient:  []  Patient ill  []  Conflicting appointment  []  No transportation    []  Conflict with work  []  No reason given  [x]  Other:  Requesting D/C from therapy     Comments:       Signature: Electronically signed by Diana Garrido PTA on 19 at 11:26 AM

## 2019-06-05 ENCOUNTER — TELEPHONE (OUTPATIENT)
Dept: OBGYN CLINIC | Age: 74
End: 2019-06-05

## 2019-06-05 NOTE — TELEPHONE ENCOUNTER
Gael Strong called this afternoon requesting urine culture results. She also c/o frequency, having to change her clothes 2-3 times per night, and leakage. She's wondering if another medication can be sent besides Oxybutynin.

## 2019-06-06 ENCOUNTER — TELEPHONE (OUTPATIENT)
Dept: INFECTIOUS DISEASES | Age: 74
End: 2019-06-06

## 2019-06-06 NOTE — TELEPHONE ENCOUNTER
Pt is aware and would like an RX sent to [facility]. She states that what you gave her before worked.  Elaina Lan

## 2019-06-06 NOTE — TELEPHONE ENCOUNTER
Patient does have a urinary tract infeciton. Please ask her which antibiotic worked for her last time. I know patient is very particular about the antibiotics she receives based on previous attempts.  TD

## 2019-06-07 ENCOUNTER — TELEPHONE (OUTPATIENT)
Dept: FAMILY MEDICINE CLINIC | Age: 74
End: 2019-06-07

## 2019-06-07 RX ORDER — NITROFURANTOIN 25; 75 MG/1; MG/1
100 CAPSULE ORAL DAILY
Qty: 30 CAPSULE | Refills: 0 | Status: SHIPPED | OUTPATIENT
Start: 2019-06-18 | End: 2019-06-07

## 2019-06-07 RX ORDER — NITROFURANTOIN 25; 75 MG/1; MG/1
100 CAPSULE ORAL 2 TIMES DAILY
Qty: 20 CAPSULE | Refills: 0 | Status: SHIPPED | OUTPATIENT
Start: 2019-06-07 | End: 2019-06-17

## 2019-06-07 RX ORDER — NITROFURANTOIN 25; 75 MG/1; MG/1
100 CAPSULE ORAL DAILY
Qty: 30 CAPSULE | Refills: 0 | Status: SHIPPED | OUTPATIENT
Start: 2019-06-18 | End: 2019-07-18

## 2019-06-10 ENCOUNTER — TELEPHONE (OUTPATIENT)
Dept: INFECTIOUS DISEASES | Age: 74
End: 2019-06-10

## 2019-06-10 NOTE — TELEPHONE ENCOUNTER
Patient calls to inform office she started on Sat. 6/8/19 with the 'Macrobid'. She states she could not afford this, but the Pharmacist found a discount. F/u appt provided with Dr Vivienne Salas for Mon. 6-24-19. Patient verbalized understanding.

## 2019-06-14 NOTE — TELEPHONE ENCOUNTER
I contacted pharmacy and spoke to Keely Rodney, it is confirmed that the patient picked up 10 day supply of Macrobid on 6-8-19.

## 2019-06-24 ENCOUNTER — OFFICE VISIT (OUTPATIENT)
Dept: INFECTIOUS DISEASES | Age: 74
End: 2019-06-24
Payer: MEDICARE

## 2019-06-24 VITALS
RESPIRATION RATE: 22 BRPM | HEART RATE: 58 BPM | SYSTOLIC BLOOD PRESSURE: 114 MMHG | TEMPERATURE: 97.7 F | DIASTOLIC BLOOD PRESSURE: 71 MMHG | WEIGHT: 216 LBS | BODY MASS INDEX: 36.88 KG/M2 | HEIGHT: 64 IN

## 2019-06-24 DIAGNOSIS — N39.0 RECURRENT UTI: Primary | ICD-10-CM

## 2019-06-24 PROCEDURE — 99213 OFFICE O/P EST LOW 20 MIN: CPT | Performed by: INTERNAL MEDICINE

## 2019-06-24 RX ORDER — SULFAMETHOXAZOLE AND TRIMETHOPRIM 800; 160 MG/1; MG/1
1 TABLET ORAL DAILY
Qty: 30 TABLET | Refills: 0 | Status: SHIPPED | OUTPATIENT
Start: 2019-06-24 | End: 2019-09-28 | Stop reason: SDUPTHER

## 2019-11-07 LAB
ANION GAP SERPL CALCULATED.3IONS-SCNC: 14 MEQ/L (ref 9–15)
BUN BLDV-MCNC: 21 MG/DL (ref 8–23)
CALCIUM SERPL-MCNC: 8.9 MG/DL (ref 8.5–9.9)
CHLORIDE BLD-SCNC: 105 MEQ/L (ref 95–107)
CHOLESTEROL, TOTAL: 182 MG/DL (ref 0–199)
CO2: 21 MEQ/L (ref 20–31)
CREAT SERPL-MCNC: 1.29 MG/DL (ref 0.5–0.9)
GFR AFRICAN AMERICAN: 48.9
GFR NON-AFRICAN AMERICAN: 40.4
GLUCOSE BLD-MCNC: 98 MG/DL (ref 70–99)
HDLC SERPL-MCNC: 50 MG/DL (ref 40–59)
LDL CHOLESTEROL CALCULATED: 112 MG/DL (ref 0–129)
MAGNESIUM: 2.2 MG/DL (ref 1.7–2.4)
POTASSIUM SERPL-SCNC: 4.4 MEQ/L (ref 3.4–4.9)
SODIUM BLD-SCNC: 140 MEQ/L (ref 135–144)
TOTAL CK: 77 U/L (ref 0–170)
TRIGL SERPL-MCNC: 98 MG/DL (ref 0–150)

## 2020-02-27 ENCOUNTER — HOSPITAL ENCOUNTER (OUTPATIENT)
Dept: ULTRASOUND IMAGING | Age: 75
Discharge: HOME | End: 2020-02-29
Payer: MEDICARE

## 2020-02-27 PROCEDURE — 76700 US EXAM ABDOM COMPLETE: CPT

## 2020-09-11 LAB
ANION GAP SERPL CALCULATED.3IONS-SCNC: 14 MEQ/L (ref 9–15)
BUN BLDV-MCNC: 15 MG/DL (ref 8–23)
CHLORIDE BLD-SCNC: 105 MEQ/L (ref 95–107)
CHOLESTEROL, TOTAL: 134 MG/DL (ref 0–199)
CO2: 22 MEQ/L (ref 20–31)
CREAT SERPL-MCNC: 1.1 MG/DL (ref 0.5–0.9)
GFR AFRICAN AMERICAN: 58.7
GFR NON-AFRICAN AMERICAN: 48.5
HDLC SERPL-MCNC: 48 MG/DL (ref 40–59)
LDL CHOLESTEROL CALCULATED: 65 MG/DL (ref 0–129)
MAGNESIUM: 2.1 MG/DL (ref 1.7–2.4)
POTASSIUM SERPL-SCNC: 4.1 MEQ/L (ref 3.4–4.9)
SODIUM BLD-SCNC: 141 MEQ/L (ref 135–144)
TOTAL CK: 58 U/L (ref 0–170)
TRIGL SERPL-MCNC: 105 MG/DL (ref 0–150)

## 2020-12-17 ENCOUNTER — APPOINTMENT (OUTPATIENT)
Dept: GENERAL RADIOLOGY | Age: 75
End: 2020-12-17
Payer: COMMERCIAL

## 2020-12-17 ENCOUNTER — HOSPITAL ENCOUNTER (EMERGENCY)
Age: 75
Discharge: HOME | End: 2020-12-17
Payer: COMMERCIAL

## 2020-12-17 ENCOUNTER — APPOINTMENT (OUTPATIENT)
Dept: CT IMAGING | Age: 75
End: 2020-12-17
Payer: COMMERCIAL

## 2020-12-17 VITALS
HEART RATE: 55 BPM | TEMPERATURE: 98.8 F | WEIGHT: 216 LBS | OXYGEN SATURATION: 100 % | SYSTOLIC BLOOD PRESSURE: 148 MMHG | DIASTOLIC BLOOD PRESSURE: 69 MMHG | RESPIRATION RATE: 16 BRPM | BODY MASS INDEX: 35.99 KG/M2 | HEIGHT: 65 IN

## 2020-12-17 LAB
ALBUMIN SERPL-MCNC: 4 G/DL (ref 3.5–4.6)
ALP BLD-CCNC: 93 U/L (ref 40–130)
ALT SERPL-CCNC: 12 U/L (ref 0–33)
ANION GAP SERPL CALCULATED.3IONS-SCNC: 10 MEQ/L (ref 9–15)
APTT: 30.4 SEC (ref 24.4–36.8)
AST SERPL-CCNC: 17 U/L (ref 0–35)
BASOPHILS ABSOLUTE: 0.1 K/UL (ref 0–0.2)
BASOPHILS RELATIVE PERCENT: 0.9 %
BILIRUB SERPL-MCNC: 0.7 MG/DL (ref 0.2–0.7)
BUN BLDV-MCNC: 17 MG/DL (ref 8–23)
CALCIUM SERPL-MCNC: 9.2 MG/DL (ref 8.5–9.9)
CHLORIDE BLD-SCNC: 104 MEQ/L (ref 95–107)
CO2: 26 MEQ/L (ref 20–31)
CREAT SERPL-MCNC: 1.18 MG/DL (ref 0.5–0.9)
EKG ATRIAL RATE: 56 BPM
EKG P AXIS: 42 DEGREES
EKG P-R INTERVAL: 162 MS
EKG Q-T INTERVAL: 482 MS
EKG QRS DURATION: 128 MS
EKG QTC CALCULATION (BAZETT): 465 MS
EKG R AXIS: -14 DEGREES
EKG T AXIS: 29 DEGREES
EKG VENTRICULAR RATE: 56 BPM
EOSINOPHILS ABSOLUTE: 0.1 K/UL (ref 0–0.7)
EOSINOPHILS RELATIVE PERCENT: 1.8 %
ETHANOL PERCENT: NORMAL G/DL
ETHANOL: <10 MG/DL (ref 0–0.08)
GFR AFRICAN AMERICAN: 48
GFR AFRICAN AMERICAN: 54.1
GFR NON-AFRICAN AMERICAN: 40
GFR NON-AFRICAN AMERICAN: 44.7
GLOBULIN: 3.5 G/DL (ref 2.3–3.5)
GLUCOSE BLD-MCNC: 94 MG/DL (ref 70–99)
HCT VFR BLD CALC: 39.9 % (ref 37–47)
HEMOGLOBIN: 13.5 G/DL (ref 12–16)
INR BLD: 1
LYMPHOCYTES ABSOLUTE: 1.3 K/UL (ref 1–4.8)
LYMPHOCYTES RELATIVE PERCENT: 21.6 %
MCH RBC QN AUTO: 29.4 PG (ref 27–31.3)
MCHC RBC AUTO-ENTMCNC: 33.9 % (ref 33–37)
MCV RBC AUTO: 86.8 FL (ref 82–100)
MONOCYTES ABSOLUTE: 0.5 K/UL (ref 0.2–0.8)
MONOCYTES RELATIVE PERCENT: 8.9 %
NEUTROPHILS ABSOLUTE: 4.1 K/UL (ref 1.4–6.5)
NEUTROPHILS RELATIVE PERCENT: 66.8 %
PDW BLD-RTO: 14.1 % (ref 11.5–14.5)
PERFORMED ON: ABNORMAL
PLATELET # BLD: 195 K/UL (ref 130–400)
POC CREATININE WHOLE BLOOD: 1.3
POC CREATININE: 1.3 MG/DL (ref 0.6–1.2)
POC SAMPLE TYPE: ABNORMAL
POTASSIUM SERPL-SCNC: 4.8 MEQ/L (ref 3.4–4.9)
PROTHROMBIN TIME: 12.8 SEC (ref 12.3–14.9)
RBC # BLD: 4.59 M/UL (ref 4.2–5.4)
SODIUM BLD-SCNC: 140 MEQ/L (ref 135–144)
TOTAL PROTEIN: 7.5 G/DL (ref 6.3–8)
TROPONIN: <0.01 NG/ML (ref 0–0.01)
WBC # BLD: 6.2 K/UL (ref 4.8–10.8)

## 2020-12-17 PROCEDURE — 6370000000 HC RX 637 (ALT 250 FOR IP): Performed by: PERSONAL EMERGENCY RESPONSE ATTENDANT

## 2020-12-17 PROCEDURE — 99283 EMERGENCY DEPT VISIT LOW MDM: CPT | Performed by: SURGERY

## 2020-12-17 PROCEDURE — 70450 CT HEAD/BRAIN W/O DYE: CPT

## 2020-12-17 PROCEDURE — 93005 ELECTROCARDIOGRAM TRACING: CPT | Performed by: PHYSICIAN ASSISTANT

## 2020-12-17 PROCEDURE — 85610 PROTHROMBIN TIME: CPT

## 2020-12-17 PROCEDURE — 84484 ASSAY OF TROPONIN QUANT: CPT

## 2020-12-17 PROCEDURE — 6360000004 HC RX CONTRAST MEDICATION: Performed by: PHYSICIAN ASSISTANT

## 2020-12-17 PROCEDURE — 80053 COMPREHEN METABOLIC PANEL: CPT

## 2020-12-17 PROCEDURE — 73610 X-RAY EXAM OF ANKLE: CPT

## 2020-12-17 PROCEDURE — G0480 DRUG TEST DEF 1-7 CLASSES: HCPCS

## 2020-12-17 PROCEDURE — 72125 CT NECK SPINE W/O DYE: CPT

## 2020-12-17 PROCEDURE — 71260 CT THORAX DX C+: CPT

## 2020-12-17 PROCEDURE — 36415 COLL VENOUS BLD VENIPUNCTURE: CPT

## 2020-12-17 PROCEDURE — 73564 X-RAY EXAM KNEE 4 OR MORE: CPT

## 2020-12-17 PROCEDURE — 6830039000 HC L3 TRAUMA ALERT

## 2020-12-17 PROCEDURE — 85730 THROMBOPLASTIN TIME PARTIAL: CPT

## 2020-12-17 PROCEDURE — 85025 COMPLETE CBC W/AUTO DIFF WBC: CPT

## 2020-12-17 PROCEDURE — 99285 EMERGENCY DEPT VISIT HI MDM: CPT

## 2020-12-17 RX ORDER — HYDROCODONE BITARTRATE AND ACETAMINOPHEN 5; 325 MG/1; MG/1
1 TABLET ORAL ONCE
Status: COMPLETED | OUTPATIENT
Start: 2020-12-17 | End: 2020-12-17

## 2020-12-17 RX ORDER — HYDROCODONE BITARTRATE AND ACETAMINOPHEN 5; 325 MG/1; MG/1
1 TABLET ORAL EVERY 6 HOURS PRN
Qty: 10 TABLET | Refills: 0 | Status: SHIPPED | OUTPATIENT
Start: 2020-12-17 | End: 2020-12-20

## 2020-12-17 RX ADMIN — IOPAMIDOL 100 ML: 612 INJECTION, SOLUTION INTRAVENOUS at 17:53

## 2020-12-17 RX ADMIN — HYDROCODONE BITARTRATE AND ACETAMINOPHEN 1 TABLET: 5; 325 TABLET ORAL at 19:41

## 2020-12-17 ASSESSMENT — ENCOUNTER SYMPTOMS
SHORTNESS OF BREATH: 0
RHINORRHEA: 0
ABDOMINAL DISTENTION: 0
EYE DISCHARGE: 0
SORE THROAT: 0
ABDOMINAL PAIN: 0
COLOR CHANGE: 0
BACK PAIN: 0
CONSTIPATION: 0

## 2020-12-17 ASSESSMENT — PAIN DESCRIPTION - PAIN TYPE: TYPE: ACUTE PAIN

## 2020-12-17 ASSESSMENT — PAIN SCALES - GENERAL
PAINLEVEL_OUTOF10: 7
PAINLEVEL_OUTOF10: 5

## 2020-12-17 ASSESSMENT — PAIN DESCRIPTION - DESCRIPTORS: DESCRIPTORS: ACHING

## 2020-12-17 ASSESSMENT — PAIN DESCRIPTION - ORIENTATION: ORIENTATION: RIGHT;LEFT

## 2020-12-17 ASSESSMENT — PAIN DESCRIPTION - FREQUENCY: FREQUENCY: CONTINUOUS
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
## 2020-12-18 PROCEDURE — 93010 ELECTROCARDIOGRAM REPORT: CPT | Performed by: INTERNAL MEDICINE

## 2021-12-09 ENCOUNTER — HOSPITAL ENCOUNTER (INPATIENT)
Age: 76
LOS: 4 days | Discharge: HOME HEALTH | DRG: 481 | End: 2021-12-13
Attending: ORTHOPAEDIC SURGERY | Admitting: ORTHOPAEDIC SURGERY
Payer: MEDICARE

## 2021-12-09 ENCOUNTER — APPOINTMENT (OUTPATIENT)
Dept: GENERAL RADIOLOGY | Age: 76
DRG: 481 | End: 2021-12-09
Payer: MEDICARE

## 2021-12-09 ENCOUNTER — APPOINTMENT (OUTPATIENT)
Dept: CT IMAGING | Age: 76
DRG: 481 | End: 2021-12-09
Payer: MEDICARE

## 2021-12-09 DIAGNOSIS — S72.001A CLOSED FRACTURE OF NECK OF RIGHT FEMUR, INITIAL ENCOUNTER (HCC): Primary | ICD-10-CM

## 2021-12-09 DIAGNOSIS — G89.18 POST-OP PAIN: ICD-10-CM

## 2021-12-09 LAB
ABO/RH: NORMAL
ANION GAP SERPL CALCULATED.3IONS-SCNC: 12 MEQ/L (ref 9–15)
ANTIBODY SCREEN: NORMAL
APTT: 28 SEC (ref 24.4–36.8)
BASOPHILS ABSOLUTE: 0 K/UL (ref 0–0.2)
BASOPHILS RELATIVE PERCENT: 0.2 %
BILIRUBIN URINE: NEGATIVE
BLOOD, URINE: NEGATIVE
BUN BLDV-MCNC: 16 MG/DL (ref 8–23)
CALCIUM SERPL-MCNC: 9.6 MG/DL (ref 8.5–9.9)
CHLORIDE BLD-SCNC: 101 MEQ/L (ref 95–107)
CLARITY: CLEAR
CO2: 24 MEQ/L (ref 20–31)
COLOR: YELLOW
CREAT SERPL-MCNC: 1.38 MG/DL (ref 0.5–0.9)
EOSINOPHILS ABSOLUTE: 0.1 K/UL (ref 0–0.7)
EOSINOPHILS RELATIVE PERCENT: 0.5 %
GFR AFRICAN AMERICAN: 45
GFR NON-AFRICAN AMERICAN: 37.2
GLUCOSE BLD-MCNC: 98 MG/DL (ref 70–99)
GLUCOSE URINE: NEGATIVE MG/DL
HCT VFR BLD CALC: 44.2 % (ref 37–47)
HEMOGLOBIN: 14.3 G/DL (ref 12–16)
INR BLD: 1
KETONES, URINE: NEGATIVE MG/DL
LEUKOCYTE ESTERASE, URINE: NEGATIVE
LYMPHOCYTES ABSOLUTE: 1.3 K/UL (ref 1–4.8)
LYMPHOCYTES RELATIVE PERCENT: 10.2 %
MCH RBC QN AUTO: 28.9 PG (ref 27–31.3)
MCHC RBC AUTO-ENTMCNC: 32.4 % (ref 33–37)
MCV RBC AUTO: 89.2 FL (ref 82–100)
MONOCYTES ABSOLUTE: 0.9 K/UL (ref 0.2–0.8)
MONOCYTES RELATIVE PERCENT: 6.9 %
NEUTROPHILS ABSOLUTE: 10.3 K/UL (ref 1.4–6.5)
NEUTROPHILS RELATIVE PERCENT: 82.2 %
NITRITE, URINE: NEGATIVE
PDW BLD-RTO: 14.2 % (ref 11.5–14.5)
PH UA: 7 (ref 5–9)
PLATELET # BLD: 198 K/UL (ref 130–400)
POTASSIUM SERPL-SCNC: 4.7 MEQ/L (ref 3.4–4.9)
POTASSIUM SERPL-SCNC: 5.3 MEQ/L (ref 3.4–4.9)
PROTEIN UA: NEGATIVE MG/DL
PROTHROMBIN TIME: 12.8 SEC (ref 12.3–14.9)
RBC # BLD: 4.95 M/UL (ref 4.2–5.4)
SODIUM BLD-SCNC: 137 MEQ/L (ref 135–144)
SPECIFIC GRAVITY UA: 1.02 (ref 1–1.03)
URINE REFLEX TO CULTURE: NORMAL
UROBILINOGEN, URINE: 0.2 E.U./DL
WBC # BLD: 12.5 K/UL (ref 4.8–10.8)

## 2021-12-09 PROCEDURE — 86900 BLOOD TYPING SEROLOGIC ABO: CPT

## 2021-12-09 PROCEDURE — 81003 URINALYSIS AUTO W/O SCOPE: CPT

## 2021-12-09 PROCEDURE — 73610 X-RAY EXAM OF ANKLE: CPT

## 2021-12-09 PROCEDURE — 86901 BLOOD TYPING SEROLOGIC RH(D): CPT

## 2021-12-09 PROCEDURE — 73030 X-RAY EXAM OF SHOULDER: CPT

## 2021-12-09 PROCEDURE — 96375 TX/PRO/DX INJ NEW DRUG ADDON: CPT

## 2021-12-09 PROCEDURE — 85610 PROTHROMBIN TIME: CPT

## 2021-12-09 PROCEDURE — 74176 CT ABD & PELVIS W/O CONTRAST: CPT

## 2021-12-09 PROCEDURE — 73552 X-RAY EXAM OF FEMUR 2/>: CPT

## 2021-12-09 PROCEDURE — 85025 COMPLETE CBC W/AUTO DIFF WBC: CPT

## 2021-12-09 PROCEDURE — 86850 RBC ANTIBODY SCREEN: CPT

## 2021-12-09 PROCEDURE — 80048 BASIC METABOLIC PNL TOTAL CA: CPT

## 2021-12-09 PROCEDURE — 6360000002 HC RX W HCPCS: Performed by: STUDENT IN AN ORGANIZED HEALTH CARE EDUCATION/TRAINING PROGRAM

## 2021-12-09 PROCEDURE — 36415 COLL VENOUS BLD VENIPUNCTURE: CPT

## 2021-12-09 PROCEDURE — 96374 THER/PROPH/DIAG INJ IV PUSH: CPT

## 2021-12-09 PROCEDURE — 73502 X-RAY EXAM HIP UNI 2-3 VIEWS: CPT

## 2021-12-09 PROCEDURE — 85730 THROMBOPLASTIN TIME PARTIAL: CPT

## 2021-12-09 PROCEDURE — 99285 EMERGENCY DEPT VISIT HI MDM: CPT

## 2021-12-09 PROCEDURE — 2580000003 HC RX 258: Performed by: STUDENT IN AN ORGANIZED HEALTH CARE EDUCATION/TRAINING PROGRAM

## 2021-12-09 PROCEDURE — 84132 ASSAY OF SERUM POTASSIUM: CPT

## 2021-12-09 PROCEDURE — 71045 X-RAY EXAM CHEST 1 VIEW: CPT

## 2021-12-09 PROCEDURE — 93005 ELECTROCARDIOGRAM TRACING: CPT | Performed by: STUDENT IN AN ORGANIZED HEALTH CARE EDUCATION/TRAINING PROGRAM

## 2021-12-09 PROCEDURE — 1210000000 HC MED SURG R&B

## 2021-12-09 RX ORDER — ONDANSETRON 2 MG/ML
4 INJECTION INTRAMUSCULAR; INTRAVENOUS ONCE
Status: COMPLETED | OUTPATIENT
Start: 2021-12-09 | End: 2021-12-09

## 2021-12-09 RX ORDER — ONDANSETRON 2 MG/ML
4 INJECTION INTRAMUSCULAR; INTRAVENOUS EVERY 6 HOURS PRN
Status: DISCONTINUED | OUTPATIENT
Start: 2021-12-09 | End: 2021-12-13 | Stop reason: HOSPADM

## 2021-12-09 RX ORDER — SODIUM CHLORIDE 0.9 % (FLUSH) 0.9 %
5-40 SYRINGE (ML) INJECTION EVERY 12 HOURS SCHEDULED
Status: DISCONTINUED | OUTPATIENT
Start: 2021-12-10 | End: 2021-12-13 | Stop reason: HOSPADM

## 2021-12-09 RX ORDER — ACETAMINOPHEN 325 MG/1
650 TABLET ORAL EVERY 4 HOURS PRN
Status: DISCONTINUED | OUTPATIENT
Start: 2021-12-09 | End: 2021-12-13 | Stop reason: HOSPADM

## 2021-12-09 RX ORDER — 0.9 % SODIUM CHLORIDE 0.9 %
500 INTRAVENOUS SOLUTION INTRAVENOUS ONCE
Status: COMPLETED | OUTPATIENT
Start: 2021-12-09 | End: 2021-12-09

## 2021-12-09 RX ORDER — ORPHENADRINE CITRATE 30 MG/ML
60 INJECTION INTRAMUSCULAR; INTRAVENOUS ONCE
Status: DISCONTINUED | OUTPATIENT
Start: 2021-12-09 | End: 2021-12-09

## 2021-12-09 RX ORDER — FENTANYL CITRATE 50 UG/ML
25 INJECTION, SOLUTION INTRAMUSCULAR; INTRAVENOUS ONCE
Status: COMPLETED | OUTPATIENT
Start: 2021-12-09 | End: 2021-12-09

## 2021-12-09 RX ORDER — KETOROLAC TROMETHAMINE 30 MG/ML
30 INJECTION, SOLUTION INTRAMUSCULAR; INTRAVENOUS ONCE
Status: DISCONTINUED | OUTPATIENT
Start: 2021-12-09 | End: 2021-12-09

## 2021-12-09 RX ORDER — FENTANYL CITRATE 50 UG/ML
25 INJECTION, SOLUTION INTRAMUSCULAR; INTRAVENOUS
Status: DISCONTINUED | OUTPATIENT
Start: 2021-12-09 | End: 2021-12-10 | Stop reason: CLARIF

## 2021-12-09 RX ORDER — SODIUM CHLORIDE 9 MG/ML
25 INJECTION, SOLUTION INTRAVENOUS PRN
Status: DISCONTINUED | OUTPATIENT
Start: 2021-12-09 | End: 2021-12-13 | Stop reason: HOSPADM

## 2021-12-09 RX ORDER — SODIUM CHLORIDE 0.9 % (FLUSH) 0.9 %
5-40 SYRINGE (ML) INJECTION PRN
Status: DISCONTINUED | OUTPATIENT
Start: 2021-12-09 | End: 2021-12-13 | Stop reason: HOSPADM

## 2021-12-09 RX ORDER — ONDANSETRON 4 MG/1
4 TABLET, ORALLY DISINTEGRATING ORAL EVERY 8 HOURS PRN
Status: DISCONTINUED | OUTPATIENT
Start: 2021-12-09 | End: 2021-12-13 | Stop reason: HOSPADM

## 2021-12-09 RX ADMIN — ONDANSETRON 4 MG: 2 INJECTION INTRAMUSCULAR; INTRAVENOUS at 18:51

## 2021-12-09 RX ADMIN — FENTANYL CITRATE 25 MCG: 50 INJECTION INTRAMUSCULAR; INTRAVENOUS at 18:51

## 2021-12-09 RX ADMIN — ONDANSETRON 4 MG: 2 INJECTION INTRAMUSCULAR; INTRAVENOUS at 22:18

## 2021-12-09 RX ADMIN — SODIUM CHLORIDE 500 ML: 9 INJECTION, SOLUTION INTRAVENOUS at 18:51

## 2021-12-09 RX ADMIN — FENTANYL CITRATE 25 MCG: 50 INJECTION INTRAMUSCULAR; INTRAVENOUS at 22:17

## 2021-12-09 ASSESSMENT — PAIN DESCRIPTION - DESCRIPTORS: DESCRIPTORS: ACHING

## 2021-12-09 ASSESSMENT — PAIN DESCRIPTION - LOCATION: LOCATION: HIP;GROIN

## 2021-12-09 ASSESSMENT — ENCOUNTER SYMPTOMS
EYE PAIN: 0
APNEA: 0
ALLERGIC/IMMUNOLOGIC NEGATIVE: 1
SHORTNESS OF BREATH: 0
COLOR CHANGE: 0
TROUBLE SWALLOWING: 0
ABDOMINAL PAIN: 0

## 2021-12-09 ASSESSMENT — PAIN DESCRIPTION - PAIN TYPE: TYPE: ACUTE PAIN

## 2021-12-09 ASSESSMENT — PAIN SCALES - GENERAL
PAINLEVEL_OUTOF10: 10
PAINLEVEL_OUTOF10: 10
PAINLEVEL_OUTOF10: 7

## 2021-12-09 ASSESSMENT — PAIN DESCRIPTION - FREQUENCY: FREQUENCY: CONTINUOUS

## 2021-12-09 ASSESSMENT — PAIN DESCRIPTION - ORIENTATION: ORIENTATION: RIGHT

## 2021-12-09 NOTE — ED TRIAGE NOTES
[facility] with [facility] fall from standing right hip. groin pain. Pt states she is unable to move her right leg. Pt states she did not hit her head no loc. Pt is aox4 pwd.

## 2021-12-09 NOTE — ED PROVIDER NOTES
[facility]  eMERGENCYdEPARTMENT eNCOUnter      Pt Name: Babar Salazar  MRN: [id number]  Jilliangfambrosio 1945  Date of evaluation: 12/9/2021  Provider:Rylee Layne PA-C    CHIEF COMPLAINT       Chief Complaint   Patient presents with   [place] from standing right hip, groin pain         HISTORY OF PRESENT ILLNESS  (Location/Symptom, Timing/Onset, Context/Setting, Quality, Duration, Modifying Factors, Severity.)   Babar Salazar is a 77 y.o. female who per chart review has pmhx of MI, CAD, GERD, HLD, HTN presents to the emergency department with complaints of right leg right hip and groin pain as well as mild right shoulder pain following a mechanical fall at home prior to arrival. Patient states her ankle twisted when she got up from her chair, which causing her to fall forward, Landing on the R side. No head or neck pain or injury. No thinners. Cannot lift leg due to pain, bear weight or ambulate. HPI    Nursing Notes were reviewed and I agree. REVIEW OF SYSTEMS    (2-9 systems for level 4, 10 or more for level 5)     Review of Systems   Constitutional: Negative for diaphoresis and fever. HENT: Negative for hearing loss and trouble swallowing. Eyes: Negative for pain. Respiratory: Negative for apnea and shortness of breath. Cardiovascular: Negative for chest pain. Gastrointestinal: Negative for abdominal pain. Endocrine: Negative. Genitourinary: Negative for hematuria. Musculoskeletal: Positive for arthralgias and gait problem. Negative for neck pain and neck stiffness. Skin: Negative for color change. Allergic/Immunologic: Negative. Neurological: Negative for dizziness and numbness. Hematological: Negative. Psychiatric/Behavioral: Negative. All other systems reviewed and are negative. Except as noted above the remainder of the review of systems was reviewed and negative.        PAST MEDICAL HISTORY     Past Medical History:   Diagnosis Date    CAD (coronary artery disease) 2005    3 vessel cardiac bypass    Environmental and seasonal allergies     GERD (gastroesophageal reflux disease)     GI bleed 2017    with LTKR / blood transfusion    History of blood transfusion 2017    post op LTKR    History of blood transfusion 2005    post op cardiac bypass    Hyperlipidemia     meds > 15 yrs    Hypertension     meds > 20 yrs    Myocardial infarct [facility]) 2005    followed by 3 vessal cardiac bypass    Pityriasis rosea     Prolonged emergence from general anesthesia          SURGICAL HISTORY       Past Surgical History:   Procedure Laterality Date    ARTERIAL BYPASS SURGRY      triple / cardiac    BREAST SURGERY Bilateral     reduction and cysts biopsy / has had several biopsies    CARDIAC SURGERY  2005    3 vessel bypass    CATARACT REMOVAL WITH IMPLANT      CHOLECYSTECTOMY      COLONOSCOPY      CYSTOURETHROSCOPY  04/18/2017    FLEXIBLE / due to UTI    ENDOSCOPY, COLON, DIAGNOSTIC      EYE SURGERY      [place] with IOL OU    HAND SURGERY Right     plate fracture wrist / hardware still remains    HYSTERECTOMY      JOINT REPLACEMENT  2017    LTKR    OTHER SURGICAL HISTORY      head fatty tumors removed    OK COLORECTAL SCRN; HI RISK IND N/A 6/11/2018    COLONOSCOPY performed by Ivy Bae MD at . [address] ESOPHAGOGASTRODUODENOSCOPY TRANSORAL DIAGNOSTIC N/A 6/11/2018    EGD ESOPHAGOGASTRODUODENOSCOPY WITH DILATION performed by Ivy Bae MD at . [address] N/A 1/31/2019    WITH S.S. L. S performed by Kiran Millan MD at [address] Left 08/2016    VAGINA SURGERY N/A 1/31/2019    RECTOCELE REPAIR WITH ENTEROCELE REPAIR performed [facility] [address]       Current Discharge Medication List      CONTINUE these medications which have NOT CHANGED    Details   carvedilol (COREG) 6.25 MG tablet Take 1 tablet by mouth 2 times daily  Qty: 60 tablet, Refills: 3      acetaminophen (APAP EXTRA STRENGTH) 500 MG tablet Take 2 tablets by mouth every 6 hours as needed for Pain  Qty: 60 tablet, Refills: 1      pantoprazole (PROTONIX) 40 MG tablet Take 40 mg by mouth daily      atorvastatin (LIPITOR) 80 MG tablet Take 80 mg by mouth nightly       levothyroxine (SYNTHROID) 25 MCG tablet Take 25 mcg by mouth Daily      polyethyl glycol-propyl glycol 0.4-0.3 % (SYSTANE) 0.4-0.3 % ophthalmic solution Place 1 drop into both eyes as needed for Dry Eyes      albuterol (PROVENTIL) (2.5 MG/3ML) 0.083% nebulizer solution use 1 ampule with nebulizer every 4 hours as needed  Refills: 3      LORATADINE PO Take 10 mg by mouth daily       cetirizine (ZYRTEC) 10 MG tablet Take 1 tablet by mouth daily      nitroGLYCERIN (NITROSTAT) 0.4 MG SL tablet Place 0.4 mg under the tongue              ALLERGIES     Codeine and Morphine    FAMILY HISTORY       Family History   Problem Relation Age of Onset    Colon Cancer Father     Other Father         polio    Heart Disease Mother         pacemaker    Cancer Mother         stomach cancer    High Blood Pressure Mother     High Cholesterol Mother     Other Brother          at age 27 / incident in care facility / pt was mentally challenged    Heart Disease Daughter     Other Son         blood dyscrasia          SOCIAL HISTORY       Social History     Socioeconomic History    Marital status:      Spouse name: None    Number of children: None    Years of education: None    Highest education level: None   Occupational History    None   Tobacco Use    Smoking status: Never Smoker    Smokeless tobacco: Never Used   Vaping Use    Vaping Use: Never used   Substance and Sexual Activity    Alcohol use:  Yes     Alcohol/week: 0.0 standard drinks     Comment: rare social    Drug use: No    Sexual activity: None   Other Topics Concern    None   Social History Narrative    None     Social Determinants of Health     Financial Resource Strain:     Difficulty of Paying Living Expenses: Not on file   Food Insecurity:     Worried About Running Out of Food in the Last Year: Not on file    Judy of Food in the Last Year: Not on file   Transportation Needs:     Lack of Transportation (Medical): Not on file    Lack of Transportation (Non-Medical): Not on file   Physical Activity:     Days of Exercise per Week: Not on file    Minutes of Exercise per Session: Not on file   Stress:     Feeling of Stress : Not on file   Social Connections:     Frequency of Communication with Friends and Family: Not on file    Frequency of Social Gatherings with Friends and Family: Not on file    Attends Restorationism Services: Not on file    Active Member of [address] [place] [address] [employer] or Organizations: Not on file    Attends Club or Organization Meetings: Not on file    Marital Status: Not on file   Intimate Partner Violence:     Fear of Current or Ex-Partner: Not on file    Emotionally Abused: Not on file    Physically Abused: Not on file    Sexually Abused: Not on file   Housing Stability:     Unable to Pay for Housing in the Last Year: Not on file    Number of [place] in the Last Year: Not on file    Unstable Housing in the Last Year: Not on file       SCREENINGS    [place] Coma Scale  Eye Opening: Spontaneous  Best Verbal Response: Oriented  Best Motor Response: Obeys commands  [place] Coma Scale Score: 15      PHYSICAL EXAM    (up to 7 forlevel 4, 8 or more for level 5)     ED Triage Vitals   BP Temp Temp src Pulse Resp SpO2 Height Weight   12/09/21 1816 12/09/21 1816 -- 12/09/21 1816 12/09/21 1816 12/09/21 1816 12/09/21 1818 12/09/21 1818   (!) 139/95 97.9 °F (36.6 °C)  60 18 98 % 5' 4\" (1.626 m) 220 lb (99.8 kg)       Physical Exam  Vitals and nursing note reviewed. Constitutional:       General: She is not in acute distress. Appearance: She is well-developed. She is not diaphoretic.    HENT:      Head: Normocephalic and atraumatic. Mouth/Throat:      Pharynx: No oropharyngeal exudate. Eyes:      General: No scleral icterus. Conjunctiva/sclera: Conjunctivae normal.      Pupils: Pupils are equal, round, and reactive to light. Neck:      Trachea: No tracheal deviation. Cardiovascular:      Rate and Rhythm: Normal rate. Heart sounds: Normal heart sounds. Pulmonary:      Effort: Pulmonary effort is normal. No respiratory distress. Breath sounds: Normal breath sounds. Abdominal:      General: Bowel sounds are normal. There is no distension. Palpations: Abdomen is soft. Musculoskeletal:      Cervical back: Normal range of motion and neck supple. Right hip: Tenderness present. Decreased range of motion. Legs:    Skin:     General: Skin is warm and dry. Findings: No erythema or rash. Neurological:      Mental Status: She is alert and oriented to person, place, and time. Cranial Nerves: No cranial nerve deficit. Motor: No abnormal muscle tone. Psychiatric:         Behavior: Behavior normal.         Thought Content: Thought content normal.         Judgment: Judgment normal.           DIAGNOSTIC RESULTS     RADIOLOGY:   Non-plain film images such as CT, Ultrasound and MRI are read by the radiologist. Plain radiographic images are visualized and preliminarilyinterpreted by Gretchen Medina PA-C with the below findings:    EKG shows NSR with HR 70, normal axis, normal intervals, no ST changes. Incomplete RBBB.            Interpretation per the Radiologist below, if available at the time of this note:    XR HIP 2-3 VW W PELVIS RIGHT    (Results Pending)   XR FEMUR RIGHT (MIN 2 VIEWS)    (Results Pending)   XR CHEST (SINGLE VIEW FRONTAL)    (Results Pending)   XR SHOULDER RIGHT (MIN 2 VIEWS)    (Results Pending)   CT ABDOMEN PELVIS WO CONTRAST Additional Contrast? None    (Results Pending)   XR ANKLE RIGHT (MIN 3 VIEWS)    (Results Pending)       LABS:  Labs Reviewed   CBC WITH AUTO DIFFERENTIAL - Abnormal; Notable for the following components:       Result Value    WBC 12.5 (*)     MCHC 32.4 (*)     Neutrophils Absolute 10.3 (*)     Monocytes Absolute 0.9 (*)     All other components within normal limits   BASIC METABOLIC PANEL - Abnormal; Notable for the following components:    Potassium 5.3 (*)     CREATININE 1.38 (*)     GFR Non- 37.2 (*)     GFR  45.0 (*)     All other components within normal limits   APTT   PROTIME-INR   URINE RT REFLEX TO CULTURE   POTASSIUM   TYPE AND SCREEN       All other labs were within normal range or not returnedas of this dictation. EMERGENCYDEPARTMENT COURSE and DIFFERENTIAL DIAGNOSIS/MDM:   Vitals:    Vitals:    12/09/21 1912 12/09/21 1915 12/09/21 1930 12/09/21 2350   BP: (!) 139/95  (!) 179/77 (!) 153/88   Pulse: 60 64 68 80   Resp: 18 21 26 18   Temp: 97.9 °F (36.6 °C)   98.6 °F (37 °C)   TempSrc: Oral   Oral   SpO2: 98% 96% 95% 95%   Weight:       Height:           REASSESSMENT        MDM     Pt is a 76 yo F who presents to the ED s/p mechanical fall. She is afebrile and HD stable. Given 500 cc IV NS, IV fentanyl, IV zofran in the ED. CBC remarkable for white count of 12.5, likely reactive, denies any signs/sx of infection at this time, urine pending. BMP remarkable fo K of 5.3, will repeat after fluids. Cr 1.38, GFR 37, stable kidney function. Repeat K 4.7. CT abd pelvis shows an impacted subcapital fracture of the R femoral neck. Incidental findings include moderate hiatal hernia, ectatic infrarenal aorta measuring 2.8 cm. Remainder of imaging negative for traumatic injury. Pt has seen Dr. Jeremy Kee in the past therefore [facility] placed [facility] for admission. Dr. Jazz Lopez accepts pt to his service. Npo after midnight for surgery. Transition orders placed. Spoke with Dr. Nicky Siddiqi who will consult for operative medical clearance tonight. Pt stable for admission.       PROCEDURES:    Procedures      FINAL IMPRESSION 1. Closed fracture of neck of right femur, initial encounter ([facility] [address]          DISPOSITION/PLAN   DISPOSITION        PATIENT REFERRED TO:  No follow-up provider specified.     DISCHARGE MEDICATIONS:  Current Discharge Medication List          (Please note that portions of this note were completed with a voice recognition program.  Efforts were made to edit the dictations but occasionally words are mis-transcribed.)    SHWETHA Gr, [place]  12/10/21 4436

## 2021-12-10 ENCOUNTER — ANESTHESIA (OUTPATIENT)
Dept: OPERATING ROOM | Age: 76
DRG: 481 | End: 2021-12-10
Payer: MEDICARE

## 2021-12-10 ENCOUNTER — ANESTHESIA EVENT (OUTPATIENT)
Dept: OPERATING ROOM | Age: 76
DRG: 481 | End: 2021-12-10
Payer: MEDICARE

## 2021-12-10 ENCOUNTER — APPOINTMENT (OUTPATIENT)
Dept: GENERAL RADIOLOGY | Age: 76
DRG: 481 | End: 2021-12-10
Payer: MEDICARE

## 2021-12-10 VITALS — SYSTOLIC BLOOD PRESSURE: 107 MMHG | DIASTOLIC BLOOD PRESSURE: 58 MMHG | OXYGEN SATURATION: 80 %

## 2021-12-10 PROBLEM — E78.5 HLD (HYPERLIPIDEMIA): Status: ACTIVE | Noted: 2021-12-10

## 2021-12-10 PROBLEM — I10 HTN (HYPERTENSION): Status: ACTIVE | Noted: 2021-12-10

## 2021-12-10 PROBLEM — K21.9 GERD (GASTROESOPHAGEAL REFLUX DISEASE): Status: ACTIVE | Noted: 2021-12-10

## 2021-12-10 LAB
ANION GAP SERPL CALCULATED.3IONS-SCNC: 13 MEQ/L (ref 9–15)
BASOPHILS ABSOLUTE: 0 K/UL (ref 0–0.2)
BASOPHILS RELATIVE PERCENT: 0.2 %
BUN BLDV-MCNC: 13 MG/DL (ref 8–23)
CALCIUM SERPL-MCNC: 8.9 MG/DL (ref 8.5–9.9)
CHLORIDE BLD-SCNC: 106 MEQ/L (ref 95–107)
CO2: 22 MEQ/L (ref 20–31)
CREAT SERPL-MCNC: 1.27 MG/DL (ref 0.5–0.9)
EKG ATRIAL RATE: 70 BPM
EKG ATRIAL RATE: 73 BPM
EKG P AXIS: 39 DEGREES
EKG P AXIS: 65 DEGREES
EKG P-R INTERVAL: 164 MS
EKG P-R INTERVAL: 168 MS
EKG Q-T INTERVAL: 422 MS
EKG Q-T INTERVAL: 430 MS
EKG QRS DURATION: 118 MS
EKG QRS DURATION: 126 MS
EKG QTC CALCULATION (BAZETT): 464 MS
EKG QTC CALCULATION (BAZETT): 464 MS
EKG R AXIS: -16 DEGREES
EKG R AXIS: -4 DEGREES
EKG T AXIS: 31 DEGREES
EKG T AXIS: 35 DEGREES
EKG VENTRICULAR RATE: 70 BPM
EKG VENTRICULAR RATE: 73 BPM
EOSINOPHILS ABSOLUTE: 0.1 K/UL (ref 0–0.7)
EOSINOPHILS RELATIVE PERCENT: 1.4 %
GFR AFRICAN AMERICAN: 49.5
GFR NON-AFRICAN AMERICAN: 40.9
GLUCOSE BLD-MCNC: 122 MG/DL (ref 70–99)
HCT VFR BLD CALC: 40.1 % (ref 37–47)
HEMOGLOBIN: 13.3 G/DL (ref 12–16)
LYMPHOCYTES ABSOLUTE: 0.7 K/UL (ref 1–4.8)
LYMPHOCYTES RELATIVE PERCENT: 7.4 %
MAGNESIUM: 2 MG/DL (ref 1.7–2.4)
MCH RBC QN AUTO: 29.4 PG (ref 27–31.3)
MCHC RBC AUTO-ENTMCNC: 33.3 % (ref 33–37)
MCV RBC AUTO: 88.2 FL (ref 82–100)
MONOCYTES ABSOLUTE: 0.7 K/UL (ref 0.2–0.8)
MONOCYTES RELATIVE PERCENT: 7.2 %
NEUTROPHILS ABSOLUTE: 7.8 K/UL (ref 1.4–6.5)
NEUTROPHILS RELATIVE PERCENT: 83.8 %
PDW BLD-RTO: 14.2 % (ref 11.5–14.5)
PLATELET # BLD: 185 K/UL (ref 130–400)
POTASSIUM SERPL-SCNC: 4.3 MEQ/L (ref 3.4–4.9)
RBC # BLD: 4.54 M/UL (ref 4.2–5.4)
SODIUM BLD-SCNC: 141 MEQ/L (ref 135–144)
WBC # BLD: 9.3 K/UL (ref 4.8–10.8)

## 2021-12-10 PROCEDURE — 7100000000 HC PACU RECOVERY - FIRST 15 MIN: Performed by: ORTHOPAEDIC SURGERY

## 2021-12-10 PROCEDURE — 85025 COMPLETE CBC W/AUTO DIFF WBC: CPT

## 2021-12-10 PROCEDURE — 36415 COLL VENOUS BLD VENIPUNCTURE: CPT

## 2021-12-10 PROCEDURE — 93010 ELECTROCARDIOGRAM REPORT: CPT | Performed by: INTERNAL MEDICINE

## 2021-12-10 PROCEDURE — 3E0T3BZ INTRODUCTION OF ANESTHETIC AGENT INTO PERIPHERAL NERVES AND PLEXI, PERCUTANEOUS APPROACH: ICD-10-PCS | Performed by: ORTHOPAEDIC SURGERY

## 2021-12-10 PROCEDURE — 2709999900 HC NON-CHARGEABLE SUPPLY: Performed by: ORTHOPAEDIC SURGERY

## 2021-12-10 PROCEDURE — 6360000002 HC RX W HCPCS: Performed by: STUDENT IN AN ORGANIZED HEALTH CARE EDUCATION/TRAINING PROGRAM

## 2021-12-10 PROCEDURE — 6360000002 HC RX W HCPCS: Performed by: NURSE PRACTITIONER

## 2021-12-10 PROCEDURE — 1210000000 HC MED SURG R&B

## 2021-12-10 PROCEDURE — 6370000000 HC RX 637 (ALT 250 FOR IP): Performed by: INTERNAL MEDICINE

## 2021-12-10 PROCEDURE — C9113 INJ PANTOPRAZOLE SODIUM, VIA: HCPCS | Performed by: NURSE PRACTITIONER

## 2021-12-10 PROCEDURE — 7100000001 HC PACU RECOVERY - ADDTL 15 MIN: Performed by: ORTHOPAEDIC SURGERY

## 2021-12-10 PROCEDURE — 3209999900 FLUORO FOR SURGICAL PROCEDURES

## 2021-12-10 PROCEDURE — 3600000004 HC SURGERY LEVEL 4 BASE: Performed by: ORTHOPAEDIC SURGERY

## 2021-12-10 PROCEDURE — 6370000000 HC RX 637 (ALT 250 FOR IP): Performed by: ORTHOPAEDIC SURGERY

## 2021-12-10 PROCEDURE — 2580000003 HC RX 258: Performed by: NURSE PRACTITIONER

## 2021-12-10 PROCEDURE — 76942 ECHO GUIDE FOR BIOPSY: CPT | Performed by: STUDENT IN AN ORGANIZED HEALTH CARE EDUCATION/TRAINING PROGRAM

## 2021-12-10 PROCEDURE — 6360000002 HC RX W HCPCS: Performed by: NURSE ANESTHETIST, CERTIFIED REGISTERED

## 2021-12-10 PROCEDURE — 3600000014 HC SURGERY LEVEL 4 ADDTL 15MIN: Performed by: ORTHOPAEDIC SURGERY

## 2021-12-10 PROCEDURE — 3700000000 HC ANESTHESIA ATTENDED CARE: Performed by: ORTHOPAEDIC SURGERY

## 2021-12-10 PROCEDURE — 80048 BASIC METABOLIC PNL TOTAL CA: CPT

## 2021-12-10 PROCEDURE — 0QSB34Z REPOSITION RIGHT LOWER FEMUR WITH INTERNAL FIXATION DEVICE, PERCUTANEOUS APPROACH: ICD-10-PCS | Performed by: ORTHOPAEDIC SURGERY

## 2021-12-10 PROCEDURE — 94150 VITAL CAPACITY TEST: CPT

## 2021-12-10 PROCEDURE — 83735 ASSAY OF MAGNESIUM: CPT

## 2021-12-10 PROCEDURE — 94761 N-INVAS EAR/PLS OXIMETRY MLT: CPT

## 2021-12-10 PROCEDURE — 3700000001 HC ADD 15 MINUTES (ANESTHESIA): Performed by: ORTHOPAEDIC SURGERY

## 2021-12-10 PROCEDURE — 2580000003 HC RX 258: Performed by: NURSE ANESTHETIST, CERTIFIED REGISTERED

## 2021-12-10 PROCEDURE — 2500000003 HC RX 250 WO HCPCS: Performed by: NURSE ANESTHETIST, CERTIFIED REGISTERED

## 2021-12-10 PROCEDURE — 6370000000 HC RX 637 (ALT 250 FOR IP): Performed by: NURSE PRACTITIONER

## 2021-12-10 PROCEDURE — 2580000003 HC RX 258: Performed by: ORTHOPAEDIC SURGERY

## 2021-12-10 PROCEDURE — C1713 ANCHOR/SCREW BN/BN,TIS/BN: HCPCS | Performed by: ORTHOPAEDIC SURGERY

## 2021-12-10 PROCEDURE — C1769 GUIDE WIRE: HCPCS | Performed by: ORTHOPAEDIC SURGERY

## 2021-12-10 DEVICE — SCREW BNE L85MM DIA7.3MM THRD L16MM CORT TI ST SELF DRL: Type: IMPLANTABLE DEVICE | Site: HIP | Status: FUNCTIONAL

## 2021-12-10 DEVICE — IMPLANTABLE DEVICE: Type: IMPLANTABLE DEVICE | Site: HIP | Status: FUNCTIONAL

## 2021-12-10 RX ORDER — EPHEDRINE SULFATE/0.9% NACL/PF 50 MG/5 ML
SYRINGE (ML) INTRAVENOUS PRN
Status: DISCONTINUED | OUTPATIENT
Start: 2021-12-10 | End: 2021-12-10 | Stop reason: SDUPTHER

## 2021-12-10 RX ORDER — ONDANSETRON 2 MG/ML
4 INJECTION INTRAMUSCULAR; INTRAVENOUS
Status: DISCONTINUED | OUTPATIENT
Start: 2021-12-10 | End: 2021-12-10 | Stop reason: HOSPADM

## 2021-12-10 RX ORDER — ASPIRIN 81 MG/1
81 TABLET ORAL 2 TIMES DAILY
Status: DISCONTINUED | OUTPATIENT
Start: 2021-12-10 | End: 2021-12-13 | Stop reason: HOSPADM

## 2021-12-10 RX ORDER — TIZANIDINE 4 MG/1
4 TABLET ORAL EVERY 6 HOURS PRN
Status: DISCONTINUED | OUTPATIENT
Start: 2021-12-10 | End: 2021-12-13 | Stop reason: HOSPADM

## 2021-12-10 RX ORDER — MEPERIDINE HYDROCHLORIDE 25 MG/ML
12.5 INJECTION INTRAMUSCULAR; INTRAVENOUS; SUBCUTANEOUS EVERY 5 MIN PRN
Status: DISCONTINUED | OUTPATIENT
Start: 2021-12-10 | End: 2021-12-10 | Stop reason: HOSPADM

## 2021-12-10 RX ORDER — SODIUM CHLORIDE 9 MG/ML
10 INJECTION INTRAVENOUS DAILY
Status: DISCONTINUED | OUTPATIENT
Start: 2021-12-10 | End: 2021-12-10

## 2021-12-10 RX ORDER — DIPHENHYDRAMINE HYDROCHLORIDE 50 MG/ML
12.5 INJECTION INTRAMUSCULAR; INTRAVENOUS
Status: DISCONTINUED | OUTPATIENT
Start: 2021-12-10 | End: 2021-12-10 | Stop reason: HOSPADM

## 2021-12-10 RX ORDER — METOCLOPRAMIDE HYDROCHLORIDE 5 MG/ML
10 INJECTION INTRAMUSCULAR; INTRAVENOUS
Status: DISCONTINUED | OUTPATIENT
Start: 2021-12-10 | End: 2021-12-10 | Stop reason: HOSPADM

## 2021-12-10 RX ORDER — DEXAMETHASONE SODIUM PHOSPHATE 10 MG/ML
INJECTION INTRAMUSCULAR; INTRAVENOUS PRN
Status: DISCONTINUED | OUTPATIENT
Start: 2021-12-10 | End: 2021-12-10 | Stop reason: SDUPTHER

## 2021-12-10 RX ORDER — LIDOCAINE HYDROCHLORIDE 10 MG/ML
1 INJECTION, SOLUTION EPIDURAL; INFILTRATION; INTRACAUDAL; PERINEURAL
Status: DISCONTINUED | OUTPATIENT
Start: 2021-12-10 | End: 2021-12-10 | Stop reason: HOSPADM

## 2021-12-10 RX ORDER — SODIUM CHLORIDE 9 MG/ML
25 INJECTION, SOLUTION INTRAVENOUS PRN
Status: DISCONTINUED | OUTPATIENT
Start: 2021-12-10 | End: 2021-12-10 | Stop reason: HOSPADM

## 2021-12-10 RX ORDER — CEFAZOLIN SODIUM 1 G/3ML
INJECTION, POWDER, FOR SOLUTION INTRAMUSCULAR; INTRAVENOUS PRN
Status: DISCONTINUED | OUTPATIENT
Start: 2021-12-10 | End: 2021-12-10 | Stop reason: SDUPTHER

## 2021-12-10 RX ORDER — SODIUM CHLORIDE 0.9 % (FLUSH) 0.9 %
10 SYRINGE (ML) INJECTION EVERY 12 HOURS SCHEDULED
Status: DISCONTINUED | OUTPATIENT
Start: 2021-12-10 | End: 2021-12-10 | Stop reason: HOSPADM

## 2021-12-10 RX ORDER — SODIUM CHLORIDE 0.9 % (FLUSH) 0.9 %
10 SYRINGE (ML) INJECTION PRN
Status: DISCONTINUED | OUTPATIENT
Start: 2021-12-10 | End: 2021-12-10 | Stop reason: HOSPADM

## 2021-12-10 RX ORDER — SODIUM CHLORIDE 9 MG/ML
25 INJECTION, SOLUTION INTRAVENOUS PRN
Status: DISCONTINUED | OUTPATIENT
Start: 2021-12-10 | End: 2021-12-13 | Stop reason: HOSPADM

## 2021-12-10 RX ORDER — FENTANYL CITRATE 50 UG/ML
50 INJECTION, SOLUTION INTRAMUSCULAR; INTRAVENOUS EVERY 10 MIN PRN
Status: DISCONTINUED | OUTPATIENT
Start: 2021-12-10 | End: 2021-12-10 | Stop reason: HOSPADM

## 2021-12-10 RX ORDER — ATORVASTATIN CALCIUM 80 MG/1
80 TABLET, FILM COATED ORAL NIGHTLY
Status: DISCONTINUED | OUTPATIENT
Start: 2021-12-10 | End: 2021-12-13 | Stop reason: HOSPADM

## 2021-12-10 RX ORDER — PANTOPRAZOLE SODIUM 40 MG/10ML
20 INJECTION, POWDER, LYOPHILIZED, FOR SOLUTION INTRAVENOUS DAILY
Status: DISCONTINUED | OUTPATIENT
Start: 2021-12-10 | End: 2021-12-10

## 2021-12-10 RX ORDER — SODIUM CHLORIDE 0.9 % (FLUSH) 0.9 %
10 SYRINGE (ML) INJECTION PRN
Status: DISCONTINUED | OUTPATIENT
Start: 2021-12-10 | End: 2021-12-13 | Stop reason: HOSPADM

## 2021-12-10 RX ORDER — LABETALOL HYDROCHLORIDE 5 MG/ML
10 INJECTION, SOLUTION INTRAVENOUS EVERY 4 HOURS PRN
Status: DISCONTINUED | OUTPATIENT
Start: 2021-12-10 | End: 2021-12-13 | Stop reason: HOSPADM

## 2021-12-10 RX ORDER — OXYCODONE HYDROCHLORIDE 5 MG/1
10 TABLET ORAL EVERY 4 HOURS PRN
Status: DISCONTINUED | OUTPATIENT
Start: 2021-12-10 | End: 2021-12-13 | Stop reason: HOSPADM

## 2021-12-10 RX ORDER — ROPIVACAINE HYDROCHLORIDE 5 MG/ML
INJECTION, SOLUTION EPIDURAL; INFILTRATION; PERINEURAL
Status: COMPLETED | OUTPATIENT
Start: 2021-12-10 | End: 2021-12-10

## 2021-12-10 RX ORDER — PROPOFOL 10 MG/ML
INJECTION, EMULSION INTRAVENOUS PRN
Status: DISCONTINUED | OUTPATIENT
Start: 2021-12-10 | End: 2021-12-10 | Stop reason: SDUPTHER

## 2021-12-10 RX ORDER — CARVEDILOL 3.12 MG/1
6.25 TABLET ORAL 2 TIMES DAILY
Status: DISCONTINUED | OUTPATIENT
Start: 2021-12-10 | End: 2021-12-13

## 2021-12-10 RX ORDER — SENNA AND DOCUSATE SODIUM 50; 8.6 MG/1; MG/1
1 TABLET, FILM COATED ORAL 2 TIMES DAILY
Status: DISCONTINUED | OUTPATIENT
Start: 2021-12-10 | End: 2021-12-13 | Stop reason: HOSPADM

## 2021-12-10 RX ORDER — SODIUM CHLORIDE, SODIUM LACTATE, POTASSIUM CHLORIDE, CALCIUM CHLORIDE 600; 310; 30; 20 MG/100ML; MG/100ML; MG/100ML; MG/100ML
INJECTION, SOLUTION INTRAVENOUS CONTINUOUS
Status: DISCONTINUED | OUTPATIENT
Start: 2021-12-10 | End: 2021-12-13 | Stop reason: HOSPADM

## 2021-12-10 RX ORDER — LEVOTHYROXINE SODIUM 0.03 MG/1
25 TABLET ORAL DAILY
Status: DISCONTINUED | OUTPATIENT
Start: 2021-12-11 | End: 2021-12-13 | Stop reason: HOSPADM

## 2021-12-10 RX ORDER — MAGNESIUM HYDROXIDE 1200 MG/15ML
LIQUID ORAL CONTINUOUS PRN
Status: COMPLETED | OUTPATIENT
Start: 2021-12-10 | End: 2021-12-10

## 2021-12-10 RX ORDER — ACETAMINOPHEN 325 MG/1
650 TABLET ORAL EVERY 6 HOURS
Status: DISCONTINUED | OUTPATIENT
Start: 2021-12-10 | End: 2021-12-13 | Stop reason: HOSPADM

## 2021-12-10 RX ORDER — MIDAZOLAM HYDROCHLORIDE 1 MG/ML
INJECTION INTRAMUSCULAR; INTRAVENOUS PRN
Status: DISCONTINUED | OUTPATIENT
Start: 2021-12-10 | End: 2021-12-10 | Stop reason: SDUPTHER

## 2021-12-10 RX ORDER — PANTOPRAZOLE SODIUM 40 MG/1
40 TABLET, DELAYED RELEASE ORAL DAILY
Status: DISCONTINUED | OUTPATIENT
Start: 2021-12-10 | End: 2021-12-13 | Stop reason: HOSPADM

## 2021-12-10 RX ORDER — ONDANSETRON 2 MG/ML
INJECTION INTRAMUSCULAR; INTRAVENOUS PRN
Status: DISCONTINUED | OUTPATIENT
Start: 2021-12-10 | End: 2021-12-10 | Stop reason: SDUPTHER

## 2021-12-10 RX ORDER — SODIUM CHLORIDE 9 MG/ML
INJECTION, SOLUTION INTRAVENOUS CONTINUOUS
Status: DISPENSED | OUTPATIENT
Start: 2021-12-10 | End: 2021-12-10

## 2021-12-10 RX ORDER — SODIUM CHLORIDE, SODIUM LACTATE, POTASSIUM CHLORIDE, CALCIUM CHLORIDE 600; 310; 30; 20 MG/100ML; MG/100ML; MG/100ML; MG/100ML
INJECTION, SOLUTION INTRAVENOUS CONTINUOUS
Status: DISPENSED | OUTPATIENT
Start: 2021-12-10 | End: 2021-12-11

## 2021-12-10 RX ORDER — OXYCODONE HYDROCHLORIDE 5 MG/1
5 TABLET ORAL EVERY 4 HOURS PRN
Status: DISCONTINUED | OUTPATIENT
Start: 2021-12-10 | End: 2021-12-13 | Stop reason: HOSPADM

## 2021-12-10 RX ORDER — SODIUM CHLORIDE, SODIUM LACTATE, POTASSIUM CHLORIDE, CALCIUM CHLORIDE 600; 310; 30; 20 MG/100ML; MG/100ML; MG/100ML; MG/100ML
INJECTION, SOLUTION INTRAVENOUS CONTINUOUS PRN
Status: DISCONTINUED | OUTPATIENT
Start: 2021-12-10 | End: 2021-12-10 | Stop reason: SDUPTHER

## 2021-12-10 RX ORDER — ONDANSETRON 2 MG/ML
4 INJECTION INTRAMUSCULAR; INTRAVENOUS ONCE
Status: COMPLETED | OUTPATIENT
Start: 2021-12-10 | End: 2021-12-10

## 2021-12-10 RX ORDER — SODIUM CHLORIDE 0.9 % (FLUSH) 0.9 %
10 SYRINGE (ML) INJECTION EVERY 12 HOURS SCHEDULED
Status: DISCONTINUED | OUTPATIENT
Start: 2021-12-10 | End: 2021-12-13 | Stop reason: HOSPADM

## 2021-12-10 RX ADMIN — HYDROMORPHONE HYDROCHLORIDE 0.5 MG: 1 INJECTION, SOLUTION INTRAMUSCULAR; INTRAVENOUS; SUBCUTANEOUS at 08:41

## 2021-12-10 RX ADMIN — SODIUM CHLORIDE, POTASSIUM CHLORIDE, SODIUM LACTATE AND CALCIUM CHLORIDE: 600; 310; 30; 20 INJECTION, SOLUTION INTRAVENOUS at 18:22

## 2021-12-10 RX ADMIN — ACETAMINOPHEN 650 MG: 325 TABLET ORAL at 08:39

## 2021-12-10 RX ADMIN — OXYCODONE 5 MG: 5 TABLET ORAL at 18:04

## 2021-12-10 RX ADMIN — FENTANYL CITRATE 50 MCG: 50 INJECTION INTRAMUSCULAR; INTRAVENOUS at 12:09

## 2021-12-10 RX ADMIN — Medication 10 MG: at 10:52

## 2021-12-10 RX ADMIN — SENNOSIDES AND DOCUSATE SODIUM 1 TABLET: 50; 8.6 TABLET ORAL at 21:24

## 2021-12-10 RX ADMIN — ASPIRIN 81 MG: 81 TABLET, COATED ORAL at 21:24

## 2021-12-10 RX ADMIN — SODIUM CHLORIDE, PRESERVATIVE FREE 10 ML: 5 INJECTION INTRAVENOUS at 08:47

## 2021-12-10 RX ADMIN — CEFAZOLIN 2000 MG: 10 INJECTION, POWDER, FOR SOLUTION INTRAVENOUS at 18:23

## 2021-12-10 RX ADMIN — Medication 15 MG: at 10:54

## 2021-12-10 RX ADMIN — PANTOPRAZOLE SODIUM 40 MG: 40 TABLET, DELAYED RELEASE ORAL at 21:24

## 2021-12-10 RX ADMIN — Medication 10 MG: at 10:51

## 2021-12-10 RX ADMIN — PROPOFOL 150 MG: 10 INJECTION, EMULSION INTRAVENOUS at 10:44

## 2021-12-10 RX ADMIN — ACETAMINOPHEN 650 MG: 325 TABLET ORAL at 18:04

## 2021-12-10 RX ADMIN — MIDAZOLAM HYDROCHLORIDE 2 MG: 2 INJECTION, SOLUTION INTRAMUSCULAR; INTRAVENOUS at 10:31

## 2021-12-10 RX ADMIN — DEXAMETHASONE SODIUM PHOSPHATE 10 MG: 10 INJECTION INTRAMUSCULAR; INTRAVENOUS at 11:00

## 2021-12-10 RX ADMIN — ATORVASTATIN CALCIUM 80 MG: 80 TABLET, FILM COATED ORAL at 21:24

## 2021-12-10 RX ADMIN — ONDANSETRON 4 MG: 2 INJECTION INTRAMUSCULAR; INTRAVENOUS at 11:00

## 2021-12-10 RX ADMIN — HYDROMORPHONE HYDROCHLORIDE 0.5 MG: 1 INJECTION, SOLUTION INTRAMUSCULAR; INTRAVENOUS; SUBCUTANEOUS at 04:25

## 2021-12-10 RX ADMIN — ONDANSETRON 4 MG: 2 INJECTION INTRAMUSCULAR; INTRAVENOUS at 04:24

## 2021-12-10 RX ADMIN — PANTOPRAZOLE SODIUM 20 MG: 40 INJECTION, POWDER, FOR SOLUTION INTRAVENOUS at 08:38

## 2021-12-10 RX ADMIN — SODIUM CHLORIDE, POTASSIUM CHLORIDE, SODIUM LACTATE AND CALCIUM CHLORIDE: 600; 310; 30; 20 INJECTION, SOLUTION INTRAVENOUS at 10:39

## 2021-12-10 RX ADMIN — FENTANYL CITRATE 50 MCG: 50 INJECTION INTRAMUSCULAR; INTRAVENOUS at 12:29

## 2021-12-10 RX ADMIN — CEFAZOLIN 2000 MG: 1 INJECTION, POWDER, FOR SOLUTION INTRAMUSCULAR; INTRAVENOUS at 10:46

## 2021-12-10 RX ADMIN — CARVEDILOL 6.25 MG: 3.12 TABLET, FILM COATED ORAL at 21:24

## 2021-12-10 RX ADMIN — ROPIVACAINE HYDROCHLORIDE 20 ML: 5 INJECTION, SOLUTION EPIDURAL; INFILTRATION; PERINEURAL at 10:37

## 2021-12-10 RX ADMIN — SODIUM CHLORIDE: 9 INJECTION, SOLUTION INTRAVENOUS at 04:24

## 2021-12-10 ASSESSMENT — PAIN - FUNCTIONAL ASSESSMENT: PAIN_FUNCTIONAL_ASSESSMENT: 0-10

## 2021-12-10 ASSESSMENT — PULMONARY FUNCTION TESTS
PIF_VALUE: 3
PIF_VALUE: 4
PIF_VALUE: 12
PIF_VALUE: 4
PIF_VALUE: 4
PIF_VALUE: 3
PIF_VALUE: 3
PIF_VALUE: 4
PIF_VALUE: 0
PIF_VALUE: 3
PIF_VALUE: 0
PIF_VALUE: 11
PIF_VALUE: 11
PIF_VALUE: 4
PIF_VALUE: 22
PIF_VALUE: 5
PIF_VALUE: 11
PIF_VALUE: 5
PIF_VALUE: 11
PIF_VALUE: 14
PIF_VALUE: 4
PIF_VALUE: 11
PIF_VALUE: 17
PIF_VALUE: 11
PIF_VALUE: 11
PIF_VALUE: 4
PIF_VALUE: 11
PIF_VALUE: 4
PIF_VALUE: 12
PIF_VALUE: 3
PIF_VALUE: 0
PIF_VALUE: 10
PIF_VALUE: 3
PIF_VALUE: 11
PIF_VALUE: 11
PIF_VALUE: 12
PIF_VALUE: 10
PIF_VALUE: 3
PIF_VALUE: 11
PIF_VALUE: 4
PIF_VALUE: 11
PIF_VALUE: 11
PIF_VALUE: 0
PIF_VALUE: 11
PIF_VALUE: 12
PIF_VALUE: 0
PIF_VALUE: 11
PIF_VALUE: 3
PIF_VALUE: 11
PIF_VALUE: 3
PIF_VALUE: 3
PIF_VALUE: 11
PIF_VALUE: 11
PIF_VALUE: 1
PIF_VALUE: 11
PIF_VALUE: 4
PIF_VALUE: 4
PIF_VALUE: 11
PIF_VALUE: 3
PIF_VALUE: 3
PIF_VALUE: 4
PIF_VALUE: 0
PIF_VALUE: 4
PIF_VALUE: 5
PIF_VALUE: 11
PIF_VALUE: 4
PIF_VALUE: 10

## 2021-12-10 ASSESSMENT — ENCOUNTER SYMPTOMS
NAUSEA: 0
ALLERGIC/IMMUNOLOGIC NEGATIVE: 1
EYES NEGATIVE: 1
SHORTNESS OF BREATH: 0
BACK PAIN: 0
VOMITING: 0
RHINORRHEA: 0
PHOTOPHOBIA: 0
ABDOMINAL PAIN: 0
WHEEZING: 0
SORE THROAT: 0

## 2021-12-10 ASSESSMENT — PAIN SCALES - GENERAL
PAINLEVEL_OUTOF10: 0
PAINLEVEL_OUTOF10: 3
PAINLEVEL_OUTOF10: 7
PAINLEVEL_OUTOF10: 3
PAINLEVEL_OUTOF10: 3
PAINLEVEL_OUTOF10: 9
PAINLEVEL_OUTOF10: 9
PAINLEVEL_OUTOF10: 6

## 2021-12-10 ASSESSMENT — PAIN DESCRIPTION - ORIENTATION: ORIENTATION: RIGHT

## 2021-12-10 ASSESSMENT — PAIN DESCRIPTION - DESCRIPTORS: DESCRIPTORS: ACHING

## 2021-12-10 ASSESSMENT — PAIN DESCRIPTION - LOCATION: LOCATION: BACK

## 2021-12-10 ASSESSMENT — PAIN DESCRIPTION - PAIN TYPE: TYPE: ACUTE PAIN

## 2021-12-10 ASSESSMENT — PAIN DESCRIPTION - FREQUENCY: FREQUENCY: CONTINUOUS

## 2021-12-10 NOTE — CONSULTS
Consult Note    Reason for Consult: Medical management and medical clearance for surgery    Requesting Physician:  Donis Diaz DO    HISTORY OF PRESENT ILLNESS:       Patient admitted for right femur fracture due to mechanical fall at home. Planned surgery to be managed by orthopedics. Patient is a pleasant, alert and oriented x3,  female, 55-year-old. Patient reports that she got up turned when walking and stumbled resulting in falling on her hip. Patient states that she has generalized pain is worse in the hip. Patient typically is ambulatory without any assistive devices. Patient denies any chest pain prior to fall or currently. Patient denies any recent illness including fever, cough, shortness of breath, abdominal pain, nausea, or vomiting. Patient has a past medical history of CAD with CABG, hypertension, hyperlipidemia, GERD, and CKD 3A. Patient reports complete compliance with her medication at home. Patient reports she has had multiple surgeries without any complications and has handled sedation without any issue.       Past Medical History:   Diagnosis Date    CAD (coronary artery disease) 2005    3 vessel cardiac bypass    Environmental and seasonal allergies     GERD (gastroesophageal reflux disease)     GI bleed 2017    with [facility] / blood transfusion    History of blood transfusion 2017    post op LTKR    History of blood transfusion 2005    post op cardiac bypass    Hyperlipidemia     meds > 15 yrs    Hypertension     meds > 20 yrs    Myocardial infarct [facility]) 2005    followed by 3 vessal cardiac bypass    Pityriasis rosea     Prolonged emergence from general anesthesia        Past Surgical History:   Procedure Laterality Date    ARTERIAL BYPASS SURGRY      triple / cardiac    BREAST SURGERY Bilateral     reduction and cysts biopsy / has had several biopsies    CARDIAC SURGERY  2005    3 vessel bypass    CATARACT REMOVAL WITH IMPLANT      CHOLECYSTECTOMY      COLONOSCOPY      CYSTOURETHROSCOPY  04/18/2017    FLEXIBLE / due to UTI    ENDOSCOPY, COLON, DIAGNOSTIC      EYE SURGERY      [place] with IOL OU    HAND SURGERY Right     plate fracture wrist / hardware still remains    HYSTERECTOMY      JOINT REPLACEMENT  2017    LTKR    OTHER SURGICAL HISTORY      head fatty tumors removed    CO COLORECTAL SCRN; HI RISK IND N/A 6/11/2018    COLONOSCOPY performed by Nata Morales MD at . [address] ESOPHAGOGASTRODUODENOSCOPY TRANSORAL DIAGNOSTIC N/A 6/11/2018    EGD ESOPHAGOGASTRODUODENOSCOPY WITH DILATION performed by Nata Morales MD at . [address] N/A 1/31/2019    WITH S.S. L. S performed by Jammie Maharaj MD at [address] [place] [address] Rd Left 08/2016    VAGINA SURGERY N/A 1/31/2019    RECTOCELE REPAIR WITH ENTEROCELE REPAIR performed by Jammie Maharaj MD at [facility]       Prior to Admission medications    Medication Sig Start Date End Date Taking?  Authorizing Provider   carvedilol (COREG) 6.25 MG tablet Take 1 tablet by mouth 2 times daily 5/6/19  Yes Rito Quinn MD   acetaminophen (APAP EXTRA STRENGTH) 500 MG tablet Take 2 tablets by mouth every 6 hours as needed for Pain 2/1/19  Yes Jammie Maharaj MD   pantoprazole (PROTONIX) 40 MG tablet Take 40 mg by mouth daily   Yes Historical Provider, MD   atorvastatin (LIPITOR) 80 MG tablet Take 80 mg by mouth nightly  10/8/16  Yes Historical Provider, MD   levothyroxine (SYNTHROID) 25 MCG tablet Take 25 mcg by mouth Daily    Historical Provider, MD   polyethyl glycol-propyl glycol 0.4-0.3 % (SYSTANE) 0.4-0.3 % ophthalmic solution Place 1 drop into both eyes as needed for Dry Eyes    Historical Provider, MD   albuterol (PROVENTIL) (2.5 MG/3ML) 0.083% nebulizer solution use 1 ampule with nebulizer every 4 hours as needed 6/23/18   Historical Provider, MD   LORATADINE PO Take 10 mg by mouth daily     Historical Provider, MD   cetirizine (ZYRTEC) 10 MG tablet Take 1 tablet by mouth daily 10/26/16   Historical Provider, MD   nitroGLYCERIN (NITROSTAT) 0.4 MG SL tablet Place 0.4 mg under the tongue     Historical Provider, MD       Scheduled Meds:   sodium chloride flush  5-40 mL IntraVENous 2 times per day     Continuous Infusions:   sodium chloride      sodium chloride       PRN Meds:sodium chloride flush, sodium chloride, acetaminophen, ondansetron **OR** ondansetron    Allergies   Allergen Reactions    Codeine Other (See Comments)     Makes her very sleepy    Morphine Rash       Social History     Socioeconomic History    Marital status:      Spouse name: Not on file    Number of children: Not on file    Years of education: Not on file    Highest education level: Not on file   Occupational History    Not on file   Tobacco Use    Smoking status: Never Smoker    Smokeless tobacco: Never Used   Vaping Use    Vaping Use: Never used   Substance and Sexual Activity    Alcohol use: Yes     Alcohol/week: 0.0 standard drinks     Comment: rare social    Drug use: No    Sexual activity: Not on file   Other Topics Concern    Not on file   Social History Narrative    Not on file     Social Determinants of Health     Financial Resource Strain:     Difficulty of Paying Living Expenses: Not on file   Food Insecurity:     Worried About Running Out of Food in the Last Year: Not on file    Judy of Food in the Last Year: Not on file   Transportation Needs:     Lack of Transportation (Medical): Not on file    Lack of Transportation (Non-Medical):  Not on file   Physical Activity:     Days of Exercise per Week: Not on file    Minutes of Exercise per Session: Not on file   Stress:     Feeling of Stress : Not on file   Social Connections:     Frequency of Communication with Friends and Family: Not on file    Frequency of Social Gatherings with Friends and Family: Not on file    Attends Latter day Services: Not on file   [employer] of Clubs or Organizations: Not on file    Attends Club or Organization Meetings: Not on file    Marital Status: Not on file   Intimate Partner Violence:     Fear of Current or Ex-Partner: Not on file    Emotionally Abused: Not on file    Physically Abused: Not on file    Sexually Abused: Not on file   Housing Stability:     Unable to Pay for Housing in the Last Year: Not on file    Number of [place] in the Last Year: Not on file    Unstable Housing in the Last Year: Not on file       Family History   Problem Relation Age of Onset    Colon Cancer Father     Other Father         polio    Heart Disease Mother         pacemaker    Cancer Mother         stomach cancer    High Blood Pressure Mother     High Cholesterol Mother     Other Brother          at age 27 / incident in care facility / pt was mentally challenged    Heart Disease Daughter     Other Son         blood dyscrasia       Review Of Systems:   Review of Systems   Constitutional: Negative for appetite change, fatigue, fever and unexpected weight change. HENT: Negative for congestion, rhinorrhea and sore throat. Eyes: Negative. Negative for photophobia and visual disturbance. Respiratory: Negative for shortness of breath and wheezing. Cardiovascular: Negative for chest pain. Gastrointestinal: Negative for abdominal pain, nausea and vomiting. Endocrine: Negative. Negative for polydipsia, polyphagia and polyuria. Genitourinary: Negative for difficulty urinating, dysuria and pelvic pain. Musculoskeletal: Positive for arthralgias and myalgias. Negative for back pain. Skin: Negative. Negative for rash. Allergic/Immunologic: Negative. Neurological: Negative. Negative for dizziness, speech difficulty and weakness. Hematological: Negative. Psychiatric/Behavioral: Negative. Negative for behavioral problems and confusion. ROS as noted in HPI, 12 point ROS reviewed and otherwise negative.   Physical Exam:  Vitals: 12/09/21 1912 12/09/21 1915 12/09/21 1930 12/09/21 2350   BP: (!) 139/95  (!) 179/77 (!) 153/88   Pulse: 60 64 68 80   Resp: 18 21 26 18   Temp: 97.9 °F (36.6 °C)   98.6 °F (37 °C)   TempSrc: Oral   Oral   SpO2: 98% 96% 95% 95%   Weight:       Height:           Physical Exam  Vitals and nursing note reviewed. Constitutional:       General: She is not in acute distress. Appearance: Normal appearance. She is not ill-appearing. HENT:      Head: Normocephalic and atraumatic. Mouth/Throat:      Mouth: Mucous membranes are moist.   Eyes:      Pupils: Pupils are equal, round, and reactive to light. Cardiovascular:      Rate and Rhythm: Normal rate and regular rhythm. Pulses: Normal pulses. Heart sounds: Normal heart sounds. Pulmonary:      Effort: Pulmonary effort is normal. No respiratory distress. Breath sounds: Normal breath sounds. No wheezing, rhonchi or rales. Abdominal:      General: Abdomen is flat. Bowel sounds are normal.      Tenderness: There is no abdominal tenderness. Musculoskeletal:         General: Tenderness ( Right hip) present. Skin:     General: Skin is warm and dry. Capillary Refill: Capillary refill takes less than 2 seconds. Neurological:      General: No focal deficit present. Mental Status: She is alert and oriented to person, place, and time.    Psychiatric:         Mood and Affect: Mood normal.          Labs:  Recent Results (from the past 72 hour(s))   CBC Auto Differential    Collection Time: 12/09/21  6:45 PM   Result Value Ref Range    WBC 12.5 (H) 4.8 - 10.8 K/uL    RBC 4.95 4.20 - 5.40 M/uL    Hemoglobin 14.3 12.0 - 16.0 g/dL    Hematocrit 44.2 37.0 - 47.0 %    MCV 89.2 82.0 - 100.0 fL    MCH 28.9 27.0 - 31.3 pg    MCHC 32.4 (L) 33.0 - 37.0 %    RDW 14.2 11.5 - 14.5 %    Platelets 058 314 - 668 K/uL    Neutrophils % 82.2 %    Lymphocytes % 10.2 %    Monocytes % 6.9 %    Eosinophils % 0.5 %    Basophils % 0.2 %    Neutrophils Absolute 10.3 (H) 1.4 - 6.5 K/uL    Lymphocytes Absolute 1.3 1.0 - 4.8 K/uL    Monocytes Absolute 0.9 (H) 0.2 - 0.8 K/uL    Eosinophils Absolute 0.1 0.0 - 0.7 K/uL    Basophils Absolute 0.0 0.0 - 0.2 K/uL   Basic Metabolic Panel    Collection Time: 12/09/21  6:45 PM   Result Value Ref Range    Sodium 137 135 - 144 mEq/L    Potassium 5.3 (H) 3.4 - 4.9 mEq/L    Chloride 101 95 - 107 mEq/L    CO2 24 20 - 31 mEq/L    Anion Gap 12 9 - 15 mEq/L    Glucose 98 70 - 99 mg/dL    BUN 16 8 - 23 mg/dL    CREATININE 1.38 (H) 0.50 - 0.90 mg/dL    GFR Non-African American 37.2 (L) >60    GFR  45.0 (L) >60    Calcium 9.6 8.5 - 9.9 mg/dL   APTT    Collection Time: 12/09/21  6:45 PM   Result Value Ref Range    aPTT 28.0 24.4 - 36.8 sec   Protime-INR    Collection Time: 12/09/21  6:45 PM   Result Value Ref Range    Protime 12.8 12.3 - 14.9 sec    INR 1.0    Urine Reflex to Culture    Collection Time: 12/09/21  8:15 PM    Specimen: Urine, clean catch   Result Value Ref Range    Color, UA Yellow Straw/Yellow    Clarity, UA Clear Clear    Glucose, Ur Negative Negative mg/dL    Bilirubin Urine Negative Negative    Ketones, Urine Negative Negative mg/dL    Specific Gravity, UA 1.018 1.005 - 1.030    Blood, Urine Negative Negative    pH, UA 7.0 5.0 - 9.0    Protein, UA Negative Negative mg/dL    Urobilinogen, Urine 0.2 <2.0 E.U./dL    Nitrite, Urine Negative Negative    Leukocyte Esterase, Urine Negative Negative    Urine Reflex to Culture Not Indicated    Potassium    Collection Time: 12/09/21 10:00 PM   Result Value Ref Range    Potassium 4.7 3.4 - 4.9 mEq/L   TYPE AND SCREEN    Collection Time: 12/09/21 10:00 PM   Result Value Ref Range    ABO/Rh A NEG     Antibody Screen NEG    EKG 12 Lead - Chest Pain    Collection Time: 12/09/21 10:09 PM   Result Value Ref Range    Ventricular Rate 70 BPM    Atrial Rate 70 BPM    P-R Interval 164 ms    QRS Duration 118 ms    Q-T Interval 430 ms    QTc Calculation (Bazett) 464 ms    P Axis 39 degrees R Axis -16 degrees    T Axis 35 degrees       Data:    No results found. Assessment/Plan:    Principal Problem:  Closed displaced fracture of right femoral neck: Planned surgery and to be managed by orthopedic surgeons. Patient presents as a moderate risk for surgery with a history of CAD and CABG, hypertension, hyperlipidemia, hypothyroid, and GERD. Patient typically ambulatory without assistive devices. No current or recent chest pain. We will get EKG in the morning    Active Problems:  HTN: Patient on home meds to control. We will resume home meds with normal diet. We will cover BP with IV medications for SBP greater than 160. HLD: Patient on statin. We will resume home meds with normal diet. GERD: History of acid reflux on PPI  We will administer PPI IV and then home meds with regular diet. Hypothyroid: Patient on Synthroid home  We will resume home meds with normal diet    Electronically signed by CLOVER Eddy CNP on 12/10/21 at 2:59 AM ROSE Negrete MD - supervising physician

## 2021-12-10 NOTE — ANESTHESIA PROCEDURE NOTES
Peripheral Block    Patient location during procedure: pre-op  Start time: 12/10/2021 10:31 AM  End time: 12/10/2021 10:37 AM  Staffing  Performed: anesthesiologist   Anesthesiologist: Michael Villeda DO  Preanesthetic Checklist  Completed: patient identified, IV checked, site marked, risks and benefits discussed, surgical consent, monitors and equipment checked, pre-op evaluation, timeout performed, anesthesia consent given, oxygen available and patient being monitored  Peripheral Block  Patient position: supine  Prep: ChloraPrep  Patient monitoring: cardiac monitor, continuous pulse ox, frequent blood pressure checks and IV access  Block type: PENG  Laterality: right  Injection technique: single-shot  Guidance: ultrasound guided  Local infiltration: ropivacaine  Infiltration strength: 0.5 %  Dose: 20 mL  Provider prep: mask and sterile gloves (Sterile probe cover)  Local infiltration: ropivacaine  Needle  Needle type: combined needle/nerve stimulator   Needle gauge: 22 G  Needle length: 10 cm  Needle localization: anatomical landmarks and ultrasound guidance  Assessment  Injection assessment: negative aspiration for heme, no paresthesia on injection and local visualized surrounding nerve on ultrasound  Paresthesia pain: immediately resolved  Slow fractionated injection: yes  Hemodynamics: stable  Additional Notes  Ultrasound image printed and saved in patient chart.     Sterile probe cover used    Medications Administered  Ropivacaine (NAROPIN) injection 0.5%, 20 mL  Reason for block: post-op pain management and at surgeon's request

## 2021-12-10 NOTE — H&P
Chief complaint: Right hip pain    History of present illness: Patient fell yesterday. Was admitted to the ER. Has pain localized to her right hip. No prior history of right hip pain. Could not bear weight. Activity makes it worse. Rest makes it better. No history of right hip surgery. No numbness tingling fevers chills. Pain is localized to the groin lateral hip and buttock. No knee pain. No head pain neck pain or loss of consciousness. No other significant past surgical history. Past medical history: Hypertension    Past surgical history: No past surgical history of the right hip. Review of systems: Patient denies fevers blurred vision sore throat chest pains or shortness of breath nausea and neck pain rashes headaches mental status change excessive fatigue excessive bleeding excessive sneezing. Social history: Denies tobacco alcohol or substance abuse    Family history: There is no significant orthopedic family history    Medications: See electronic medical record for details    Allergies: See electronic medical record for details    Physical exam: Right hip is not short or externally rotated. Is tender in the groin. Any range of motion gives groin and anterior proximal thigh pain. Knee and ankle on that side have good range of motion strength stability nontender. Comparison examination of the contralateral side is normal with regards to palpation, range of motion, strength and stability. Well-nourished, well kept. Good perfusion to the lower extremities bilaterally. Dorsalis pedis pulses 2+. Capillary refill to the digits brisk. No distal edema. No lymphangitis or lymphadenopathy in the examined extremities. No redness, abrasions, or lesions on the lower extremities bilaterally. Gross sensation intact to the lower extremity is bilaterally. Affect normal.  HEENT clear, heart regular, abdomin soft, chest clear.     Imaging/special tests: X-rays show a valgus impacted minimally displaced right femoral neck fracture. Assessment: Right femoral neck fracture. Had a long discussion with the patient and her family. We talked about surgical versus nonsurgical options. All of the risks, benefits, and potential complications for operative and nonoperative treatment were discussed at length with the patient. Risks of operative intervention include, but are not limited to: Anesthesia complications, extensive blood loss, infection, damaged uninjured structures, blood clots, and lack of improvement or worsening of symptoms. These complications could result in death, permanent disability, or need for reoperation. The patient and her family completely understood these risks and wished to proceed with operative intervention. Plan: Closed reduction percutaneous pinning right femoral neck fracture      Bud Bowser M.D. Cell: [phone]    This dictation was created using voice recognition software. If there are any questions about inaccuracies please do not hesitate to contact me.

## 2021-12-10 NOTE — ANESTHESIA PRE PROCEDURE
Department of Anesthesiology  Preprocedure Note       Name:  Baltazar Mcdonald   Age:  77 y.o.  :  1945                                          MRN:  [id number]         Date:  12/10/2021      Surgeon: Milady Lucio):  Leonidas Mcdaniel MD    Procedure: Procedure(s):  RIGHT HIP  PERCUTANEOUS SCREW FIXATION ROOM 288 7.3 CANNULATED SCREWS    Medications prior to admission:   Prior to Admission medications    Medication Sig Start Date End Date Taking?  Authorizing Provider   carvedilol (COREG) 6.25 MG tablet Take 1 tablet by mouth 2 times daily 19  Yes Loyda Solis MD   acetaminophen (APAP EXTRA STRENGTH) 500 MG tablet Take 2 tablets by mouth every 6 hours as needed for Pain 19  Yes Jose F Butts MD   pantoprazole (PROTONIX) 40 MG tablet Take 40 mg by mouth daily   Yes Historical Provider, MD   atorvastatin (LIPITOR) 80 MG tablet Take 80 mg by mouth nightly  10/8/16  Yes Historical Provider, MD   levothyroxine (SYNTHROID) 25 MCG tablet Take 25 mcg by mouth Daily    Historical Provider, MD   polyethyl glycol-propyl glycol 0.4-0.3 % (SYSTANE) 0.4-0.3 % ophthalmic solution Place 1 drop into both eyes as needed for Dry Eyes    Historical Provider, MD   albuterol (PROVENTIL) (2.5 MG/3ML) 0.083% nebulizer solution use 1 ampule with nebulizer every 4 hours as needed 18   Historical Provider, MD   LORATADINE PO Take 10 mg by mouth daily     Historical Provider, MD   cetirizine (ZYRTEC) 10 MG tablet Take 1 tablet by mouth daily 10/26/16   Historical Provider, MD   nitroGLYCERIN (NITROSTAT) 0.4 MG SL tablet Place 0.4 mg under the tongue     Historical Provider, MD       Current medications:    Current Facility-Administered Medications   Medication Dose Route Frequency Provider Last Rate Last Admin    0.9 % sodium chloride infusion   IntraVENous Continuous Nicoletto Bolzan-Roche, APRN - CNP 75 mL/hr at 12/10/21 0424 New Bag at 12/10/21 0424    pantoprazole (PROTONIX) injection 20 mg  20 mg IntraVENous  HTN I10    HLD E78.5    GERD K21.9       Past Medical History:        Diagnosis Date    CAD (coronary artery disease) 2005    3 vessel cardiac bypass    Environmental and seasonal allergies     GERD (gastroesophageal reflux disease)     GI bleed 2017    with LTKR / blood transfusion    History of blood transfusion 2017    post op LTKR    History of blood transfusion 2005    post op cardiac bypass    Hyperlipidemia     meds > 15 yrs    Hypertension     meds > 20 yrs    Myocardial infarct ([address] 2005    followed by 3 vessal cardiac bypass    Pityriasis rosea     Prolonged emergence from general anesthesia        Past Surgical History:        Procedure Laterality Date    ARTERIAL BYPASS SURGRY      triple / cardiac    BREAST SURGERY Bilateral     reduction and cysts biopsy / has had several biopsies    CARDIAC SURGERY  2005    3 vessel bypass    CATARACT REMOVAL WITH IMPLANT      CHOLECYSTECTOMY      COLONOSCOPY      CYSTOURETHROSCOPY  04/18/2017    FLEXIBLE / due to UTI    ENDOSCOPY, COLON, DIAGNOSTIC      EYE SURGERY      Phaco with IOL OU    HAND SURGERY Right     plate fracture wrist / hardware still remains    HYSTERECTOMY      JOINT REPLACEMENT  2017    LTKR    OTHER SURGICAL HISTORY      head fatty tumors removed    NH COLORECTAL SCRN; HI RISK IND N/A 6/11/2018    COLONOSCOPY performed by Alyssa Brooks MD at . [address] ESOPHAGOGASTRODUODENOSCOPY TRANSORAL DIAGNOSTIC N/A 6/11/2018    EGD ESOPHAGOGASTRODUODENOSCOPY WITH DILATION performed by Alyssa Brooks MD at . [address] N/A 1/31/2019    WITH S.S. L. S performed by Felix Dial MD at [address] [place] [address] Rd Left 08/2016    VAGINA SURGERY N/A 1/31/2019    RECTOCELE REPAIR WITH ENTEROCELE REPAIR performed by Felix Dial MD at [facility] History:    Social History     Tobacco Use    Smoking status: Never Smoker    Smokeless tobacco: Never Used Substance Use Topics    Alcohol use: Yes     Alcohol/week: 0.0 standard drinks     Comment: rare social                                Counseling given: Not Answered      Vital Signs (Current):   Vitals:    12/09/21 1930 12/09/21 2350 12/10/21 0830 12/10/21 1018   BP: (!) 179/77 (!) 153/88 (!) 155/79 129/63   Pulse: 68 80 68 72   Resp: 26 18 20 18   Temp:  98.6 °F (37 °C) 98.3 °F (36.8 °C)    TempSrc:  Oral Oral    SpO2: 95% 95% 93% 92%   Weight:       Height:                                                  BP Readings from Last 3 Encounters:   12/10/21 129/63   12/17/20 (!) 148/69   06/24/19 114/71       NPO Status: Time of last liquid consumption: 0000                        Time of last solid consumption: 2100                        Date of last liquid consumption: 12/10/21                        Date of last solid food consumption: 12/09/21    BMI:   Wt Readings from Last 3 Encounters:   12/09/21 220 lb (99.8 kg)   12/17/20 216 lb (98 kg)   06/24/19 216 lb (98 kg)     Body mass index is 37.76 kg/m². CBC:   Lab Results   Component Value Date    WBC 9.3 12/10/2021    RBC 4.54 12/10/2021    RBC 3.82 01/09/2012    HGB 13.3 12/10/2021    HCT 40.1 12/10/2021    MCV 88.2 12/10/2021    RDW 14.2 12/10/2021     12/10/2021       CMP:   Lab Results   Component Value Date     12/10/2021    K 4.3 12/10/2021     12/10/2021    CO2 22 12/10/2021    BUN 13 12/10/2021    CREATININE 1.27 12/10/2021    GFRAA 49.5 12/10/2021    LABGLOM 40.9 12/10/2021    GLUCOSE 122 12/10/2021    GLUCOSE 88 01/09/2012    PROT 7.0 10/01/2021    CALCIUM 8.9 12/10/2021    BILITOT 0.5 10/01/2021    ALKPHOS 86 10/01/2021    AST 16 10/01/2021    ALT 16 10/01/2021       POC Tests: No results for input(s): POCGLU, POCNA, POCK, POCCL, POCBUN, POCHEMO, POCHCT in the last 72 hours.     Coags:   Lab Results   Component Value Date    PROTIME 12.8 12/09/2021    PROTIME 10.2 01/07/2012    INR 1.0 12/09/2021    APTT 28.0 12/09/2021       HCG (If Applicable): No results found for: PREGTESTUR, PREGSERUM, HCG, HCGQUANT     ABGs: No results found for: PHART, PO2ART, MBW3LXS, JET4VLU, BEART, A6PHCZAA     Type & Screen (If Applicable):  No results found for: LABABO, LABRH    Drug/Infectious Status (If Applicable):  No results found for: HIV, HEPCAB    COVID-19 Screening (If Applicable): No results found for: COVID19        Anesthesia Evaluation  Patient summary reviewed and Nursing notes reviewed no history of anesthetic complications:   Airway: Mallampati: II  TM distance: >3 FB   Neck ROM: full  Mouth opening: > = 3 FB Dental: normal exam         Pulmonary:Negative Pulmonary ROS and normal exam                               Cardiovascular:  Exercise tolerance: good (>4 METS),   (+) hypertension:, past MI:, CAD:, CABG/stent:, hyperlipidemia      ECG reviewed               Beta Blocker:  Dose within 24 Hrs      ROS comment: Normal sinus rhythm  Right bundle branch block  Abnormal ECG  When compared with ECG of 09-DEC-2021 22:09,  No significant change was found     2-D color flow Doppler and pulse wave Doppler echocardiography were   performed with adequate images obtained. Measurements are noted. The   overall left ventricular size was normal. Left ventricular contractility   appeared to be normal without focal regional wall motion abnormalities. The LV ejection fraction was 65%. Moderate left ventricular hypertrophy   with diastolic dysfunction by mitral flow. There was no obvious   intracavitary outflow obstruction. The left and right atria were enlarged. The right ventricle was normal size with normal right ventricular   contractility. There were no intracavitary masses or shunts identified. The aortic, mitral, tricuspid and pulmonic valves appeared to be normal   without stenosis or significant regurgitation. Mild AI, MR, TR and PI   only. There is no mitral valve prolapse. There were no obvious valvular   vegetations noted.  The pericardium appeared to be normal without effusion   or tamponade physiology. RVSP 34. Impression:   1. Normal LV systolic function. 2. LVEF 65%. 3. Bi atrial enlargement. 4. Moderate LV hypertrophy with diastolic dysfunction. 5. Mild AI, MR, TR and PI only. 6. Normal pericardium. Neuro/Psych:   Negative Neuro/Psych ROS              GI/Hepatic/Renal:   (+) hiatal hernia, GERD:, morbid obesity         ROS comment: BMI 38. Endo/Other: Negative Endo/Other ROS             Pt had PAT visit. Abdominal:             Vascular: negative vascular ROS. Other Findings:             Anesthesia Plan      general and regional     ASA 3     (LMA  PENG)  Induction: intravenous. MIPS: Postoperative opioids intended and Prophylactic antiemetics administered. Anesthetic plan and risks discussed with patient. Plan discussed with CRNA.     Attending anesthesiologist reviewed and agrees with Nitin Truong DO   12/10/2021

## 2021-12-10 NOTE — CONSULTS
near-syncope, or syncope. She denies any fever, chills, or cough. She denies any hemoptysis, hematemesis, melena, or hematochezia. Allergies: Allergies   Allergen Reactions    Codeine Other (See Comments)     Makes her very sleepy    Morphine Rash       Past Medical History:  Past Medical History:   Diagnosis Date    CAD (coronary artery disease) 2005    3 vessel cardiac bypass    Environmental and seasonal allergies     GERD (gastroesophageal reflux disease)     GI bleed 2017    with LTKR / blood transfusion    History of blood transfusion 2017    post op LTKR    History of blood transfusion 2005    post op cardiac bypass    Hyperlipidemia     meds > 15 yrs    Hypertension     meds > 20 yrs    Myocardial infarct [facility]) 2005    followed by 3 vessal cardiac bypass    Pityriasis rosea     Prolonged emergence from general anesthesia        Past Surgical History:  Past Surgical History:   Procedure Laterality Date    ARTERIAL BYPASS SURGRY      triple / cardiac    BREAST SURGERY Bilateral     reduction and cysts biopsy / has had several biopsies    CARDIAC SURGERY  2005    3 vessel bypass    CATARACT REMOVAL WITH IMPLANT      CHOLECYSTECTOMY      COLONOSCOPY      CYSTOURETHROSCOPY  04/18/2017    FLEXIBLE / due to UTI    ENDOSCOPY, COLON, DIAGNOSTIC      EYE SURGERY      Phaco with IOL OU    HAND SURGERY Right     plate fracture wrist / hardware still remains    HYSTERECTOMY      JOINT REPLACEMENT  2017    LTKR    OTHER SURGICAL HISTORY      head fatty tumors removed    VT COLORECTAL SCRN; HI RISK IND N/A 6/11/2018    COLONOSCOPY performed by Saman Williamson MD at . [address] ESOPHAGOGASTRODUODENOSCOPY TRANSORAL DIAGNOSTIC N/A 6/11/2018    EGD ESOPHAGOGASTRODUODENOSCOPY WITH DILATION performed by Saman Williamson MD at . [address] N/A 1/31/2019    WITH S.S. L. S performed by Dima Huggins MD at [address] [place] [address] 2016    VAGINA SURGERY N/A 2019    RECTOCELE REPAIR WITH ENTEROCELE REPAIR performed by Jackson Cotto MD at [facility]       Family History:       Problem Relation Age of Onset    Colon Cancer Father     Other Father         polio    Heart Disease Mother         pacemaker    Cancer Mother         stomach cancer    High Blood Pressure Mother     High Cholesterol Mother     Other Brother          at age 27 / incident in care facility / pt was mentally challenged    Heart Disease Daughter     Other Son         blood dyscrasia       Social  History:     Social History     Tobacco Use    Smoking status: Never Smoker    Smokeless tobacco: Never Used   Substance Use Topics    Alcohol use: Yes     Alcohol/week: 0.0 standard drinks     Comment: rare social       Home Medications:    Prior to Admission medications    Medication Sig Start Date End Date Taking?  Authorizing Provider   carvedilol (COREG) 6.25 MG tablet Take 1 tablet by mouth 2 times daily 19  Yes Davidson De La Torre MD   acetaminophen (APAP EXTRA STRENGTH) 500 MG tablet Take 2 tablets by mouth every 6 hours as needed for Pain 19  Yes Jackson Cotto MD   pantoprazole (PROTONIX) 40 MG tablet Take 40 mg by mouth daily   Yes Historical Provider, MD   atorvastatin (LIPITOR) 80 MG tablet Take 80 mg by mouth nightly  10/8/16  Yes Historical Provider, MD   levothyroxine (SYNTHROID) 25 MCG tablet Take 25 mcg by mouth Daily    Historical Provider, MD   polyethyl glycol-propyl glycol 0.4-0.3 % (SYSTANE) 0.4-0.3 % ophthalmic solution Place 1 drop into both eyes as needed for Dry Eyes    Historical Provider, MD   albuterol (PROVENTIL) (2.5 MG/3ML) 0.083% nebulizer solution use 1 ampule with nebulizer every 4 hours as needed 18   Historical Provider, MD   LORATADINE PO Take 10 mg by mouth daily     Historical Provider, MD   cetirizine (ZYRTEC) 10 MG tablet Take 1 tablet by mouth daily 10/26/16   Historical Provider, MD   nitroGLYCERIN Weight:       Height:         Patient Vitals for the past 96 hrs (Last 3 readings):   Weight   12/09/21 1818 220 lb (99.8 kg)       Physical Examination:  GENERAL APPEARANCE: Well developed, well nourished, in no acute distress. CHEST: Symmetric and non-tender. Well-healed sternotomy. INTEGUMENT: Skin warm and dry, without gross excoriationis or lesions. HEENT: No gross abnormalities of conjunctiva, teeth, gums, oral mucosa  NECK: Supple, no JVD, no bruit. Thyroid not palpable. Carotid upstrokes normal.  NEURO/PSHCY: Alert and oriented x3; appropriate behavior and responses and responses, grossly normal cerebellar function with normal balance and coordination  LUNGS: Clear to auscultation bilaterally; normal respiratory effort. HEART: Rate and rhythm regular with 1 out of 6 to 2 out of 6 systolic ejection murmur left renal border and apex; no gallop appreciated. There are no rubs, clicks or heaves. PMI nondisplaced. ABDOMEN: Soft, nontender, no palpable hepatosplenomegaly, no mases, no bruits. Abdominal aorta not noted to be enlarged. MUSCULOSKELETAL: Ambulatory with normal tandem gait. EXTREMITIES: Warm with good color, no clubbing or cyanois. There is no edema noted. Right hip is shortened and externally rotated. PERIPHERAL VASCULAR: Pulses present and equally palpable; 2+ throughout. No femoral bruits. Diagnostics:    EKG:  Normal sinus rhythm  Right bundle branch block  NSSTW changes  Abnormal ECG  When compared with ECG of 17-DEC-2020 16:59,  No significant change was found    Telemetry: normal sinus . Lab Data:  BMP:  Recent Labs     12/09/21  1845 12/09/21  1845 12/09/21  2200 12/10/21  0623     --   --  141   K 5.3*  --  4.7 4.3     --   --  106   CO2 24  --   --  22   BUN 16  --   --  13   CREATININE 1.38*  --   --  1.27*   LABGLOM 37.2*   < >  --  40.9*    < > = values in this interval not displayed.      CBC:  Recent Labs     12/09/21  1845 12/10/21  0623   WBC 12.5* 9.3 RBC 4.95 4.54   HGB 14.3 13.3   HCT 44.2 40.1   MCV 89.2 88.2   MCH 28.9 29.4   MCHC 32.4* 33.3   RDW 14.2 14.2    185     Magnesium:  Recent Labs     12/10/21  0623   MG 2.0     Pro-BNP:  Lab Results   Component Value Date    PROBNP 330 06/16/2017    PROBNP 636 05/24/2014     INR:  Recent Labs     12/09/21  1845   PROTIME 12.8   INR 1.0     TSH:  Lab Results   Component Value Date    TSH 2.480 03/16/2021     Lipid Profile:  Lab Results   Component Value Date    TRIG 90 03/16/2021    HDL 48 03/16/2021    LDLCALC 68 03/16/2021     HgbA1C:  Lab Results   Component Value Date    LABA1C 5.9 03/16/2021     CMP:  Recent Labs     12/09/21  1845 12/09/21  2200 12/10/21  0623     --  141   K 5.3* 4.7 4.3     --  106   CO2 24  --  22   BUN 16  --  13   CREATININE 1.38*  --  1.27*   GLUCOSE 98  --  122*   CALCIUM 9.6  --  8.9       Radiology:   XR HIP 2-3 VW W PELVIS RIGHT    (Results Pending)   XR FEMUR RIGHT (MIN 2 VIEWS)    (Results Pending)   XR CHEST (SINGLE VIEW FRONTAL)    (Results Pending)   XR SHOULDER RIGHT (MIN 2 VIEWS)    (Results Pending)   CT ABDOMEN PELVIS WO CONTRAST Additional Contrast? None    (Results Pending)   XR ANKLE RIGHT (MIN 3 VIEWS)    (Results Pending)   FLUORO FOR SURGICAL PROCEDURES    (Results Pending)       Impression:     1. Accidental fall with subsequent closed fracture of the right femoral neck. 2.  Atherosclerotic heart disease with previous coronary artery bypass grafting surgery in 2009 (LIMA to the LAD, saphenous vein graft to the obtuse marginal, and saphenous vein graft to the distal right coronary artery). Patient is currently stable without any anginal or CHF symptoms. Her blood pressures are reasonably well controlled. No active cardiac dysrhythmias. Her functional capacity is approximately 4 METS. Documented normal LV systolic function. 3.  Primary hypertension. 4.  Hyperlipidemia. 5.  Gastroesophageal reflux disease.     6.  Stage IIIa chronic kidney disease. Recommendations:    Patient is moderate but reasonably low risk for this much-needed surgery. Long-term morbidity mortality will be very high without surgical repair. Continue carvedilol, aspirin, and atorvastatin. Will follow postoperatively. Thank you for the opportunity to participate in the care of your patient. Do not hesitate to call if you have any questions.     Electronically signed by Danny Orlando MD, [facility] on 12/10/2021 at 9:11 AM

## 2021-12-10 NOTE — OP NOTE
and sustained a femoral neck hip fracture. The patient was scheduled electively for a hip pinning. All of the risks, benefits, and potential complications for operative and nonoperative treatment were discussed at length with the patient. Risks of operative intervention include, but are not limited to: Anesthesia complications, extensive blood loss, infection, damaged uninjured structures, blood clots, and lack of improvement or worsening of symptoms. These complications could result in death, permanent disability, or need for reoperation. The patient patient completely understood these risks and wished to proceed with operative intervention. Operative implants: Synthes Synthes 7.3 mm cannulated screws. Operative procedure: The patient was wheeled from the preanesthesia care unit to the theater. The patient was placed in supine position and all bony prominences were well-padded. For the entire procedure anesthesia maintainined control of the patient's head neck. Anesthesia was induced. The patient was placed on the fracture table. The fractured leg was placed in the fracture leg arrington. The contralateral leg was placed in a flexed flexed position and well-padded. Fracture table was then used to reduce the fracture. AP lateral and fluoroscopic x-rays were taken to confirm appropriate reduction. At this point appropriate fracture reduction was obtained. The operative hip was then prepped and draped in normal sterile fashion. Timeout was performed, site verification marking was verified, and appropriate antibiotic and stretch was confirmed. An incision distal to the lesser trochanter was performed. This was taken through exuberant amount of adipose tissue and through the iliotibial band. 3 guide wires were placed in appropriate position. Measure the guide wire lengths was performed. 7.3 mm short threaded cannulated screws were then placed over the wires. Guidewires were removed.   Final AP lateral x-rays the construct confirmed appropriate reduction of the femoral neck fracture as well as positioning of all 3 screws. The wound was irrigated with copious amounts of normal saline the deep fascia was closed with 0 Vicryl. The fatty layer was closed with 0 Vicryl. The skin was closed with 2-0 Vicryl and staples. Sterile dressing was applied. At the  conclusion of  the case the patient's toes were pink and warm with brisk cap refill. The patient tolerated the procedure well. This dictation was created using voice recognition software. If there are any questions about inaccuracies please do not hesitate to contact me.     Electronically signed by Emile Kwon MD on 12/10/2021 at 11:38 AM

## 2021-12-10 NOTE — FLOWSHEET NOTE
1154- Pt arrived to PACU via bed, VSS, Aquacel dressing present to Right hip clean, dry, and intact, pedal pulse palpable-KGW  1210- Simple mask removed at this time-KGW  1215- Nasal cannula placed on pt at 4 Liters, O2 saturation at 96% on 4 Liters-KGW  1242- Report called to Veronique on [address]-KGW  Electronically signed by Luis Fernando Davidson RN on 12/10/2021

## 2021-12-10 NOTE — ANESTHESIA POSTPROCEDURE EVALUATION
Department of Anesthesiology  Postprocedure Note    Patient: Miryam Hernández  MRN: [id number]  YOB: 1945  Date of evaluation: 12/10/2021  Time:  11:59 AM     Procedure Summary     Date: 12/10/21 Room / Location: [address] [place] [address]    Anesthesia Start: 2812 Anesthesia Stop: 7249    Procedure: RIGHT HIP  PERCUTANEOUS SCREW FIXATION (Right Hip) Diagnosis: (hip pain)    Surgeons: Latisha Marley MD Responsible Provider: Deya Morelos DO    Anesthesia Type: general, regional ASA Status: 3          Anesthesia Type: general, regional    Gregg Phase I:      Gregg Phase II:      Last vitals: Reviewed and per EMR flowsheets.        Anesthesia Post Evaluation    Patient location during evaluation: PACU  Patient participation: complete - patient cannot participate  Level of consciousness: responsive to light touch  Pain score: 0  Airway patency: patent  Nausea & Vomiting: no nausea and no vomiting  Complications: no  Cardiovascular status: blood pressure returned to baseline and hemodynamically stable  Respiratory status: acceptable, spontaneous ventilation, nonlabored ventilation and face mask  Hydration status: euvolemic

## 2021-12-11 LAB
ANION GAP SERPL CALCULATED.3IONS-SCNC: 12 MEQ/L (ref 9–15)
BASOPHILS ABSOLUTE: 0 K/UL (ref 0–0.2)
BASOPHILS RELATIVE PERCENT: 0.1 %
BUN BLDV-MCNC: 18 MG/DL (ref 8–23)
CALCIUM SERPL-MCNC: 8.6 MG/DL (ref 8.5–9.9)
CHLORIDE BLD-SCNC: 102 MEQ/L (ref 95–107)
CO2: 21 MEQ/L (ref 20–31)
CREAT SERPL-MCNC: 1.13 MG/DL (ref 0.5–0.9)
EOSINOPHILS ABSOLUTE: 0 K/UL (ref 0–0.7)
EOSINOPHILS RELATIVE PERCENT: 0 %
GFR AFRICAN AMERICAN: 56.7
GFR NON-AFRICAN AMERICAN: 46.8
GLUCOSE BLD-MCNC: 125 MG/DL (ref 70–99)
HCT VFR BLD CALC: 36.2 % (ref 37–47)
HEMOGLOBIN: 12 G/DL (ref 12–16)
LYMPHOCYTES ABSOLUTE: 0.5 K/UL (ref 1–4.8)
LYMPHOCYTES RELATIVE PERCENT: 4.4 %
MAGNESIUM: 2 MG/DL (ref 1.7–2.4)
MCH RBC QN AUTO: 29.4 PG (ref 27–31.3)
MCHC RBC AUTO-ENTMCNC: 33.1 % (ref 33–37)
MCV RBC AUTO: 88.9 FL (ref 82–100)
MONOCYTES ABSOLUTE: 0.6 K/UL (ref 0.2–0.8)
MONOCYTES RELATIVE PERCENT: 5.9 %
NEUTROPHILS ABSOLUTE: 9.7 K/UL (ref 1.4–6.5)
NEUTROPHILS RELATIVE PERCENT: 89.6 %
PDW BLD-RTO: 14.4 % (ref 11.5–14.5)
PLATELET # BLD: 162 K/UL (ref 130–400)
POTASSIUM REFLEX MAGNESIUM: 4.7 MEQ/L (ref 3.4–4.9)
POTASSIUM SERPL-SCNC: 4.7 MEQ/L (ref 3.4–4.9)
RBC # BLD: 4.07 M/UL (ref 4.2–5.4)
SODIUM BLD-SCNC: 135 MEQ/L (ref 135–144)
WBC # BLD: 10.8 K/UL (ref 4.8–10.8)

## 2021-12-11 PROCEDURE — 6370000000 HC RX 637 (ALT 250 FOR IP): Performed by: INTERNAL MEDICINE

## 2021-12-11 PROCEDURE — 85025 COMPLETE CBC W/AUTO DIFF WBC: CPT

## 2021-12-11 PROCEDURE — 80048 BASIC METABOLIC PNL TOTAL CA: CPT

## 2021-12-11 PROCEDURE — 6370000000 HC RX 637 (ALT 250 FOR IP): Performed by: ORTHOPAEDIC SURGERY

## 2021-12-11 PROCEDURE — 97166 OT EVAL MOD COMPLEX 45 MIN: CPT

## 2021-12-11 PROCEDURE — 2580000003 HC RX 258: Performed by: NURSE PRACTITIONER

## 2021-12-11 PROCEDURE — 83735 ASSAY OF MAGNESIUM: CPT

## 2021-12-11 PROCEDURE — 2580000003 HC RX 258: Performed by: ORTHOPAEDIC SURGERY

## 2021-12-11 PROCEDURE — 2700000000 HC OXYGEN THERAPY PER DAY

## 2021-12-11 PROCEDURE — 6360000002 HC RX W HCPCS: Performed by: NURSE PRACTITIONER

## 2021-12-11 PROCEDURE — 97161 PT EVAL LOW COMPLEX 20 MIN: CPT

## 2021-12-11 PROCEDURE — 6370000000 HC RX 637 (ALT 250 FOR IP): Performed by: NURSE PRACTITIONER

## 2021-12-11 PROCEDURE — 1210000000 HC MED SURG R&B

## 2021-12-11 PROCEDURE — 36415 COLL VENOUS BLD VENIPUNCTURE: CPT

## 2021-12-11 RX ORDER — LIDOCAINE HYDROCHLORIDE 10 MG/ML
10 INJECTION, SOLUTION EPIDURAL; INFILTRATION; INTRACAUDAL; PERINEURAL ONCE
Status: COMPLETED | OUTPATIENT
Start: 2021-12-11 | End: 2021-12-12

## 2021-12-11 RX ORDER — BETAMETHASONE SODIUM PHOSPHATE AND BETAMETHASONE ACETATE 3; 3 MG/ML; MG/ML
12 INJECTION, SUSPENSION INTRA-ARTICULAR; INTRALESIONAL; INTRAMUSCULAR; SOFT TISSUE ONCE
Status: COMPLETED | OUTPATIENT
Start: 2021-12-11 | End: 2021-12-12

## 2021-12-11 RX ADMIN — ASPIRIN 81 MG: 81 TABLET, COATED ORAL at 09:41

## 2021-12-11 RX ADMIN — CARVEDILOL 6.25 MG: 3.12 TABLET, FILM COATED ORAL at 17:02

## 2021-12-11 RX ADMIN — SENNOSIDES AND DOCUSATE SODIUM 1 TABLET: 50; 8.6 TABLET ORAL at 22:03

## 2021-12-11 RX ADMIN — ACETAMINOPHEN 650 MG: 325 TABLET ORAL at 22:03

## 2021-12-11 RX ADMIN — LEVOTHYROXINE SODIUM 25 MCG: 0.03 TABLET ORAL at 06:54

## 2021-12-11 RX ADMIN — SODIUM CHLORIDE, PRESERVATIVE FREE 10 ML: 5 INJECTION INTRAVENOUS at 22:04

## 2021-12-11 RX ADMIN — ACETAMINOPHEN 650 MG: 325 TABLET ORAL at 02:05

## 2021-12-11 RX ADMIN — ATORVASTATIN CALCIUM 80 MG: 80 TABLET, FILM COATED ORAL at 22:03

## 2021-12-11 RX ADMIN — CEFAZOLIN 2000 MG: 10 INJECTION, POWDER, FOR SOLUTION INTRAVENOUS at 02:05

## 2021-12-11 RX ADMIN — OXYCODONE 10 MG: 5 TABLET ORAL at 02:05

## 2021-12-11 RX ADMIN — SODIUM CHLORIDE, PRESERVATIVE FREE 10 ML: 5 INJECTION INTRAVENOUS at 09:44

## 2021-12-11 RX ADMIN — ONDANSETRON 4 MG: 4 TABLET, ORALLY DISINTEGRATING ORAL at 09:41

## 2021-12-11 RX ADMIN — SENNOSIDES AND DOCUSATE SODIUM 1 TABLET: 50; 8.6 TABLET ORAL at 09:41

## 2021-12-11 RX ADMIN — ACETAMINOPHEN 650 MG: 325 TABLET ORAL at 17:02

## 2021-12-11 RX ADMIN — PANTOPRAZOLE SODIUM 40 MG: 40 TABLET, DELAYED RELEASE ORAL at 09:41

## 2021-12-11 RX ADMIN — TIZANIDINE 4 MG: 4 TABLET ORAL at 12:14

## 2021-12-11 RX ADMIN — ACETAMINOPHEN 650 MG: 325 TABLET ORAL at 09:40

## 2021-12-11 RX ADMIN — CARVEDILOL 6.25 MG: 3.12 TABLET, FILM COATED ORAL at 09:40

## 2021-12-11 RX ADMIN — ASPIRIN 81 MG: 81 TABLET, COATED ORAL at 22:03

## 2021-12-11 ASSESSMENT — PAIN SCALES - GENERAL
PAINLEVEL_OUTOF10: 10
PAINLEVEL_OUTOF10: 7
PAINLEVEL_OUTOF10: 1
PAINLEVEL_OUTOF10: 6
PAINLEVEL_OUTOF10: 0

## 2021-12-11 ASSESSMENT — PAIN DESCRIPTION - PAIN TYPE: TYPE: ACUTE PAIN

## 2021-12-11 NOTE — PROGRESS NOTES
[facility] OCCUPATIONAL THERAPY EVALUATION - ACUTE     NAME: Rossana Vazquez  : 1945 (77 y.o.)  MRN: [id number]  CODE STATUS: Full Code  Room: A9S798-98    Date of Service: 2021    Patient Diagnosis(es): Closed fracture of neck of right femur, initial encounter ([facility] [S72.001A]  Closed displaced fracture of right femoral neck [facility]) [S72.001A]   Chief Complaint   Patient presents with   [address] [place] [address] Street from standing right hip, groin pain     Patient Active Problem List    Diagnosis Date Noted    HTN 12/10/2021    HLD 12/10/2021    GERD 12/10/2021    Closed displaced fracture of right femoral neck ([facility] 2021    Urge incontinence of urine 2019    Dizziness 2019    Ataxia 2019    Acute cystitis without hematuria 2019    Rectocele 2019    Vaginal vault prolapse     Earling-Walker grade 2 rectocele 2019    OAB (overactive bladder) 2018    Vaginal irritation 2018    Vaginal enterocele 2018    Gastric polyp     Dysphagia     Congenital malformation of esophagus     Hiatal hernia     Benign neoplasm of sigmoid colon     Diverticulosis of large intestine without diverticulitis     Family history of colon cancer         Past Medical History:   Diagnosis Date    CAD (coronary artery disease)     3 vessel cardiac bypass    Environmental and seasonal allergies     GERD (gastroesophageal reflux disease)     GI bleed 2017    with LTKR / blood transfusion    History of blood transfusion 2017    post op LTKR    History of blood transfusion     post op cardiac bypass    Hyperlipidemia     meds > 15 yrs    Hypertension     meds > 20 yrs    Myocardial infarct ([facility]     followed by 3 vessal cardiac bypass    Pityrlora nicholea     Prolonged emergence from general anesthesia      Past Surgical History:   Procedure Laterality Date    ARTERIAL BYPASS SURGRY      triple / cardiac    BREAST SURGERY Bilateral reduction and cysts biopsy / has had several biopsies    CARDIAC SURGERY  2005    3 vessel bypass    CATARACT REMOVAL WITH IMPLANT      CHOLECYSTECTOMY      COLONOSCOPY      CYSTOURETHROSCOPY  04/18/2017    FLEXIBLE / due to UTI    ENDOSCOPY, COLON, DIAGNOSTIC      EYE SURGERY      [place] with IOL OU    HAND SURGERY Right     plate fracture wrist / hardware still remains    HYSTERECTOMY      JOINT REPLACEMENT  2017    LTKR    OTHER SURGICAL HISTORY      head fatty tumors removed    WI COLORECTAL SCRN; HI RISK IND N/A 6/11/2018    COLONOSCOPY performed by Jose Tran MD at . [facility] ESOPHAGOGASTRODUODENOSCOPY TRANSORAL DIAGNOSTIC N/A 6/11/2018    EGD ESOPHAGOGASTRODUODENOSCOPY WITH DILATION performed by Jose Tran MD at [place] [address] 1/31/2019    WITH S.S. L. S performed by Nancy Slade MD at [address] [place] [address] 08/2016    VAGINA SURGERY N/A 1/31/2019    RECTOCELE REPAIR WITH ENTEROCELE REPAIR performed by Nancy Slade MD at [facility]        Restrictions  Restrictions/Precautions: Weight Bearing, Fall Risk  Lower Extremity Weight Bearing Restrictions  Right Lower Extremity Weight Bearing: Weight Bearing As Tolerated     Safety Devices: Safety Devices  Safety Devices in place: Not Applicable (pt seated in chair, no alarm, per RN pt able to sit up without alarm pad)        Subjective       Pain Reassessment:   Pain Assessment  Patient Currently in Pain: Denies       Prior Level of Function:  Social/Functional History  Lives With:  (granddaughter lives upstairs)  Type of Home: House  Home Layout: Multi-level, Able to Live on Main level with bedroom/bathroom  Bathroom Shower/Tub: Walk-in shower  Bathroom Equipment: Grab bars in shower, Built-in shower seat  Home Equipment: Rolling walker, Cane  ADL Assistance: Independent  Homemaking Assistance: Independent  Ambulation Assistance: Independent  Transfer Assistance: Independent  Active : Yes  Occupation: Retired    OBJECTIVE:     Orientation Status:  Orientation  Overall Orientation Status: Within Functional Limits    Observation:  Observation/Palpation  Posture: Fair (rounded shoulders)  Observation: aquacell bandage, R lateral thigh  Edema: mild edema throughout emely LEs    Cognition Status:  Cognition  Cognition Comment: pt follows simple commands consistently    Perception Status:  Perception  Overall Perceptual Status: WFL    Sensation Status:  Sensation  Overall Sensation Status: WFL    Vision and Hearing Status:  Vision  Vision: Impaired  Vision Exceptions: Wears glasses for reading  Hearing  Hearing: Exceptions to [facility]  Hearing Exceptions: Hard of hearing/hearing concerns, No hearing aid     ROM:   LUE AROM (degrees)  LUE AROM : WFL  Left Hand AROM (degrees)  Left Hand AROM: WFL  RUE AROM (degrees)  RUE AROM : WFL  Right Hand AROM (degrees)  Right Hand AROM: WFL  Right Hand General AROM: pt stated has plate in wrist    Strength:  LUE Strength  L Hand General: 4/5  LUE Strength Comment: 4/5  RUE Strength  R Hand General: 4/5  RUE Strength Comment: 4/5    Coordination, Tone, Quality of Movement:    Tone RUE  RUE Tone: Normotonic  Tone LUE  LUE Tone: Normotonic  Coordination  Movements Are Fluid And Coordinated: Yes    Hand Dominance:  Hand Dominance  Hand Dominance: Right    ADL Status:  ADL  Feeding: Independent  Grooming: Supervision  UE Bathing: Supervision  LE Bathing: Stand by assistance  UE Dressing: Supervision  LE Dressing: Stand by assistance  Toileting: Stand by assistance  Additional Comments: pt used toilet, doff/don socks with increase time and effort, other ADL's simulated  Toilet Transfers  Toilet - Technique: Ambulating  Equipment Used: Grab bars  Toilet Transfer: Stand by assistance       Therapy key for assistance levels    Independent = Pt. is able to perform task with no assistance but may require a device   Stand by assistance = Pt. does not perform task at an independent level but does not need physical assistance, requires verbal cues  Minimal, Moderate, Maximal Assistance = Pt. requires physical assistance (25%, 50%, 75% assist from helper) for task but is able to actively participate in task   Dependent = Pt. requires total assistance with task and is not able to actively participate with task completion     Functional Mobility:  Functional Mobility  Functional - Mobility Device: Rolling Walker  Activity: To/from bathroom  Assist Level: Stand by assistance  Transfers  Sit to stand: Stand by assistance  Stand to sit: Stand by assistance    Bed Mobility  Bed mobility  Supine to Sit: Stand by assistance  Sit to Supine: Unable to assess (pt to chair)    Seated and Standing Balance:  Balance  Sitting Balance: Supervision  Standing Balance: Stand by assistance    Functional Endurance:  Activity Tolerance  Activity Tolerance: Patient Tolerated treatment well    D/C Recommendations:  OT D/C RECOMMENDATIONS  REQUIRES OT FOLLOW UP: Yes    Equipment Recommendations:  OT Equipment Recommendations  Other: continue to assess    OT Education:   OT Education  OT Education: OT Role, Plan of Care  Barriers to Learning: none    OT Follow Up:  OT D/C RECOMMENDATIONS  REQUIRES OT FOLLOW UP: Yes       Assessment/Discharge Disposition:  Assessment: Pt is a 77 y.o.  s/p R hip surgery with above mentioned deficits. Pt may benefit from OT services to address deficits and maximize safety and function during I/ADL's.   Performance deficits / Impairments: Decreased functional mobility , Decreased ADL status, Decreased high-level IADLs  Prognosis: Good  Discharge Recommendations: Continue to assess pending progress  Decision Making: Medium Complexity  History: Multi comorb  Exam: 3 perf imp  Assistance / Modification: SBA    Six Click Score   How much help for putting on and taking off regular lower body clothing?: A Little  How much help for Bathing?: A Little  How much help for

## 2021-12-11 NOTE — PROGRESS NOTES
Hospitalist Progress Note      Date of Admission: 12/9/2021  Chief Complaint:    Chief Complaint   Patient presents with   Elzie Homans from standing right hip, groin pain     Subjective:  No new complaints.   No nausea, vomiting, chest pain, or headache      Medications:    Infusion Medications    lactated ringers Stopped (12/10/21 1534)    lactated ringers 50 mL/hr at 12/10/21 1822    sodium chloride      sodium chloride       Scheduled Medications    betamethasone acetate-betamethasone sodium phosphate  12 mg Intra-artICUlar Once    lidocaine PF  10 mL Intra-artICUlar Once    sodium chloride flush  10 mL IntraVENous 2 times per day    acetaminophen  650 mg Oral Q6H    sennosides-docusate sodium  1 tablet Oral BID    aspirin  81 mg Oral BID    atorvastatin  80 mg Oral Nightly    carvedilol  6.25 mg Oral BID    levothyroxine  25 mcg Oral Daily    pantoprazole  40 mg Oral Daily    sodium chloride flush  5-40 mL IntraVENous 2 times per day     PRN Meds: labetalol, HYDROmorphone, sodium chloride flush, sodium chloride, HYDROmorphone **OR** HYDROmorphone, oxyCODONE **OR** oxyCODONE, tiZANidine, sodium chloride flush, sodium chloride, acetaminophen, ondansetron **OR** ondansetron    Intake/Output Summary (Last 24 hours) at 12/11/2021 1336  Last data filed at 12/11/2021 1036  Gross per 24 hour   Intake 480 ml   Output 250 ml   Net 230 ml     Exam:  /66   Pulse 50   Temp 97.3 °F (36.3 °C) (Oral)   Resp 16   Ht 5' 4\" (1.626 m)   Wt 220 lb (99.8 kg)   SpO2 99%   BMI 37.76 kg/m²   Head: Normocephalic, atraumatic  Sclera clear  Neck JVD flat  Lungs: normal effort of breathing    Labs:   Recent Labs     12/09/21  1845 12/10/21  0623 12/11/21  0640   WBC 12.5* 9.3 10.8   HGB 14.3 13.3 12.0   HCT 44.2 40.1 36.2*    185 162     Recent Labs     12/09/21  1845 12/09/21  2200 12/10/21  0623 12/11/21  0640     --  141 135   K 5.3*   < > 4.3 4.7  4.7     --  106 102   CO2 24  --  22 21

## 2021-12-11 NOTE — PLAN OF CARE
Problem: Falls - Risk of:  Goal: Will remain free from falls  Outcome: Ongoing  Goal: Absence of physical injury  Outcome: Ongoing     Problem: Pain:  Goal: Pain level will decrease  Outcome: Ongoing  Goal: Control of acute pain  Outcome: Ongoing  Goal: Control of chronic pain  Outcome: Ongoing     Problem: Mobility - Impaired:  Goal: Mobility will improve  12/11/2021 1038 by Isaura Wyman RN  Outcome: Ongoing  12/11/2021 0930 by Gregory Ruelas PT  Outcome: Ongoing

## 2021-12-11 NOTE — CARE COORDINATION
[facility] Case Management   Initial Discharge Assessment    Met with patient at bedside to discuss discharge plan. PCP: Cruz Frye, DO                                  Date of Last Visit: November 2021  If no PCP, list provided? N/A    Discharge Planning    Living Arrangements: Patient lives independently at home in a [address], had bed/bath set-up on a single floor. Who do you live with? Granddaughter. Who helps you with your care: Patient was independent at baseline. If lives at home: Do you have any barriers navigating in your home? No barriers at  Baseline, will have mobility impairments s/p (R) fem neck fracture / repair. Patient can perform ADL? Yes, at baseline. Current Services (outpatient and in home): None    Dialysis: No    Is transportation available to get to your appointments? Yes - Patient drives at baseline, has family that will assist with transportation if needed after discharge. DME Equipment: Rollator, cane, raised toilet, grab bars in bathroom. Respiratory equipment: None    Respiratory provider: DALTON     Pharmacy: [facility] in [address] [place] [address] with Medication Assistance Program?  No      Patient agreeable to [address]? Yes, [place]. Patient agreeable to SNF/Rehab? Declined    Other discharge needs identified? Other - TBD. Patient is WBAT and reports that the doctor told her she would be here [facility] couple more days. \"    Does Patient Have a High-Risk for Readmission Diagnosis (CHF, PN, MI, COPD)? No    Initial Discharge Plan? (Note: please see concurrent daily documentation for any updates after initial note). Home with family + [facility].     Readmission Risk              Risk of Unplanned Readmission:  11         Electronically signed by Clarita Claros RN on 12/11/2021 at 1:01 PM

## 2021-12-11 NOTE — PROGRESS NOTES
Patient returned to the room via bed from surgery, alert and oriented x 4 but drowsy, denies any pain, o2 on at 4L nasal cannula, right hip dressing intact, scant amount of drainage in the middle of the dressing noted, strong pedal pulses, good movement and sensation

## 2021-12-11 NOTE — PROGRESS NOTES
[facility] Heart Progress Note               Rounding Cardiologist:  Edmundo Joseph MD, MD   Primary Cardiologist: Dr. Larry Jara    Date:  12/11/2021  Patient:  Sanjuanita Fontana  YOB: 1945  MRN:  [id number]   Admit Date:  12/9/2021      SUBJECTIVE    Sanjuanita Fontana was seen and examined today at bedside. States she is feeling very nauseated. Also has a headache. Denies chest pain or shortness of breath.     ----------------------------------------------------------------------------------------------    Consult HPI:   Sanjuanita Fontana is a 77 y.o.  female patient who is being at the request of CLOVER Berkowitz for inpatient consultation of pre-op cardiac assessment. She was admitted on 12/9/2021. Previous [address] [place] [address] and [facility] records have been reviewed in detail. She has a history of atherosclerotic heart disease with previous coronary artery bypass grafting surgery in 2009. She presented at that time with a myocardial infarction. She has a history of hyperlipidemia and essential hypertension and has been treated for diastolic congestive heart failure. Myocardial perfusion study in August 2015 was negative for ischemia or infarction. Calculated LV ejection fraction 67%. An echocardiogram July 2016 showed an LV ejection fraction of 60% with 1-2+ mitral vegetation and 2+ tricuspid regurgitation. Her resting EKG shows normal sinus rhythm with chronic complete right bundle branch block. The patient is followed on a regular basis by Dr. Jenny Gramajo and clinically has been doing well. She presented to the emergency department with complaints of right hip/leg pain. She notes that she was getting up from her chair and her ankle twisted on a small rug that she had put out for the holidays. She fell forward and landed on her right side. She currently is resting comfortably. She states that she otherwise has been doing very well.   She generally is active and does all of her own housework. She denies any chest pain or shortness of breath. She denies any orthopnea, PND, or peripheral edema. She denies any palpitations, lightheadedness, near-syncope, or syncope. She denies any fever, chills, or cough. She denies any hemoptysis, hematemesis, melena, or hematochezia. VITALS     Vitals:    12/10/21 1354 12/10/21 1542 12/10/21 1944 12/11/21 0736   BP: (!) 133/93  124/71 123/66   Pulse: 65  65 50   Resp: 16 16 17 16   Temp: 97.7 °F (36.5 °C)  98.1 °F (36.7 °C) 97.3 °F (36.3 °C)   TempSrc: Oral  Oral Oral   SpO2: 97% 99% 96% 99%   Weight:       Height:           Intake/Output Summary (Last 24 hours) at 12/11/2021 0857  Last data filed at 12/10/2021 1828  Gross per 24 hour   Intake 1000 ml   Output 660 ml   Net 340 ml       Patient Vitals for the past 96 hrs (Last 3 readings):   Weight   12/09/21 1818 220 lb (99.8 kg)       PHYSICAL EXAM   PHYSICAL EXAMINATION:  GENERAL:  Well developed, well nourished, in no acute distress. CHEST:  Symmetric and nontender. NEURO/PSYCH:  Alert and oriented times three with approppriate behavior and responses. NECK:  Supple, no JVD, no bruit. LUNGS:  Clear to auscultation bilaterally, normal respiratory effort. HEART:  Rate and rhythm regular with no evident murmur, no gallop appreciated. There are no rubs, clicks or heaves. EXTREMITIES:  Warm with good color, no clubbing or cyanosis. There is no edema noted. PERIPHERAL VASCULAR:  Pulses present and equally palpable; 2+ throughout. DIAGNOSTIC RESULTS   EKG:  Normal sinus rhythm  Right bundle branch block  NSSTW changes  Abnormal ECG  When compared with ECG of 17-DEC-2020 16:59,  No significant change was found     Telemetry: normal sinus.       LAB DATA   BMP:  Recent Labs     12/09/21  1845 12/09/21  2200 12/10/21  0623 12/10/21  0623 12/11/21  0640     --  141  --  135   K 5.3*   < > 4.3  --  4.7  4.7     --  106  --  102   CO2 24  --  22 --  21   BUN 16  --  13  --  18   CREATININE 1.38*  --  1.27*  --  1.13*   LABGLOM 37.2*   < > 40.9*   < > 46.8*    < > = values in this interval not displayed. CBC:   Lab Results   Component Value Date    WBC 10.8 12/11/2021    RBC 4.07 12/11/2021    RBC 3.82 01/09/2012    HGB 12.0 12/11/2021    HCT 36.2 12/11/2021     12/11/2021     CMP:    Lab Results   Component Value Date     12/11/2021    K 4.7 12/11/2021    K 4.7 12/11/2021     12/11/2021    CO2 21 12/11/2021    BUN 18 12/11/2021    CREATININE 1.13 12/11/2021    GFRAA 56.7 12/11/2021    LABGLOM 46.8 12/11/2021    GLUCOSE 125 12/11/2021    GLUCOSE 88 01/09/2012    CALCIUM 8.6 12/11/2021     Hepatic Function Panel:    Lab Results   Component Value Date    ALKPHOS 86 10/01/2021    ALT 16 10/01/2021    AST 16 10/01/2021    PROT 7.0 10/01/2021    BILITOT 0.5 10/01/2021    BILIDIR 0.2 08/10/2017    IBILI 0.8 08/10/2017    LABALBU 4.1 10/01/2021    LABALBU 4.3 01/07/2012     Magnesium:    Lab Results   Component Value Date    MG 2.0 12/11/2021    MG 2.0 09/11/2011     PT/INR:    Lab Results   Component Value Date    PROTIME 12.8 12/09/2021    PROTIME 10.2 01/07/2012    INR 1.0 12/09/2021     Recent Labs     12/09/21  1845   INR 1.0      HgBA1c:    Lab Results   Component Value Date    LABA1C 5.9 03/16/2021     Lipid Profile:    Lab Results   Component Value Date    TRIG 90 03/16/2021    HDL 48 03/16/2021    LDLCALC 68 03/16/2021     TSH:    Lab Results   Component Value Date    TSH 2.480 03/16/2021     PRO-BNP:   Lab Results   Component Value Date    PROBNP 330 06/16/2017    PROBNP 636 05/24/2014        RADIOLOGY     FLUORO FOR SURGICAL PROCEDURES   Final Result   Intraprocedural fluoroscopic support has been provided. Please refer to the procedure report. XR ANKLE RIGHT (MIN 3 VIEWS)   Final Result      There are no acute osseous injuries.                      CT ABDOMEN PELVIS WO CONTRAST Additional Contrast? None   Final Result   NO ACUTE PROCESS IN THE ABDOMEN OR PELVIS NOTED WITH NOTE MADE OF HIATAL HERNIA AND ATELECTATIC CHANGES IN THE INFRARENAL ABDOMINAL AORTA. COMPACTED SUBCAPITAL FEMORAL NECK FRACTURE ON THE RIGHT SIDE. XR HIP 2-3 VW W PELVIS RIGHT   Final Result   There are no acute osseous abnormalities. Arthrosis of the bilateral hips. XR FEMUR RIGHT (MIN 2 VIEWS)   Final Result   There are no acute osseous abnormalities. Arthrosis of the bilateral hips. XR CHEST (SINGLE VIEW FRONTAL)   Final Result   No acute cardiopulmonary process. XR SHOULDER RIGHT (MIN 2 VIEWS)   Final Result   There are no acute osseous abnormalities. CURRENT MEDICATIONS       betamethasone acetate-betamethasone sodium phosphate  12 mg Intra-artICUlar Once    lidocaine PF  10 mL Intra-artICUlar Once    sodium chloride flush  10 mL IntraVENous 2 times per day    acetaminophen  650 mg Oral Q6H    sennosides-docusate sodium  1 tablet Oral BID    aspirin  81 mg Oral BID    atorvastatin  80 mg Oral Nightly    carvedilol  6.25 mg Oral BID    levothyroxine  25 mcg Oral Daily    pantoprazole  40 mg Oral Daily    sodium chloride flush  5-40 mL IntraVENous 2 times per day      lactated ringers Stopped (12/10/21 1534)    lactated ringers 50 mL/hr at 12/10/21 1822    sodium chloride      sodium chloride         ASSESSMENT       1. Accidental fall with subsequent closed fracture of the right femoral neck. Status post surgical repair. 2.  Atherosclerotic heart disease with previous coronary artery bypass grafting surgery in 2009 (LIMA to the LAD, saphenous vein graft to the obtuse marginal, and saphenous vein graft to the distal right coronary artery). Patient is currently stable without any anginal or CHF symptoms. Her blood pressures are reasonably well controlled. No active cardiac dysrhythmias. Her functional capacity is approximately 4 METS. Documented normal LV systolic function. 3.  Primary hypertension. 4.  Hyperlipidemia. 5.  Gastroesophageal reflux disease. 6.  Stage IIIa chronic kidney disease. PLAN     Continue carvedilol, aspirin, and atorvastatin. No other new cardiac recs. Please do not hesitate to call with questions.   Electronically signed by Loretta Zabala MD, [facility] on 12/11/2021 at 8:57 AM

## 2021-12-11 NOTE — PROGRESS NOTES
Physical Therapy Med Surg Initial Assessment  Facility/Department: [address] [place] [address] [address]  Room: [place] [id number]       NAME: Kody Orlando  : 1945 (77 y.o.)  MRN: [id number]  CODE STATUS: Full Code    Date of Service: 2021    Patient Diagnosis(es): Closed fracture of neck of right femur, initial encounter ([facility] [S72.001A]  Closed displaced fracture of right femoral neck [facility]) [S72.001A]   Chief Complaint   Patient presents with   Elmira Rape from standing right hip, groin pain     Patient Active Problem List    Diagnosis Date Noted    HTN 12/10/2021    HLD 12/10/2021    GERD 12/10/2021    Closed displaced fracture of right femoral neck ([facility] 2021    Urge incontinence of urine 2019    Dizziness 2019    Ataxia 2019    Acute cystitis without hematuria 2019    Rectocele 2019    Vaginal vault prolapse     Vaughn-Walker grade 2 rectocele 2019    OAB (overactive bladder) 2018    Vaginal irritation 2018    Vaginal enterocele 2018    Gastric polyp     Dysphagia     Congenital malformation of esophagus     Hiatal hernia     Benign neoplasm of sigmoid colon     Diverticulosis of large intestine without diverticulitis     Family history of colon cancer         Past Medical History:   Diagnosis Date    CAD (coronary artery disease)     3 vessel cardiac bypass    Environmental and seasonal allergies     GERD (gastroesophageal reflux disease)     GI bleed 2017    with LTKR / blood transfusion    History of blood transfusion 2017    post op LTKR    History of blood transfusion     post op cardiac bypass    Hyperlipidemia     meds > 15 yrs    Hypertension     meds > 20 yrs    Myocardial infarct ([facility]     followed by 3 vessal cardiac bypass    Pityrlora rosea     Prolonged emergence from general anesthesia      Past Surgical History:   Procedure Laterality Date    ARTERIAL BYPASS SURGRY      triple / cardiac    bedroom/bathroom  Home Equipment: Rolling walker, Cane  ADL Assistance: Independent  Homemaking Assistance: Independent  Ambulation Assistance: Independent  Transfer Assistance: Independent  Active : Yes  Occupation: Retired    OBJECTIVE:   Vision: Within Functional Limits  Hearing: Within functional limits    Cognition:  Follows Commands: Within Functional Limits    Observation/Palpation  Observation: aquacell bandage, R lateral thigh  Edema: mild edema throughout emely LEs    ROM:  RLE PROM: WFL  RLE AROM: WFL  RLE General AROM: intermit c/o R knee pain  LLE AROM : WFL    Strength:  Strength RLE  Strength RLE: WFL  Comment: grossly 3- to 3+/5 R hip, knee: at least 3+/5  Strength LLE  Strength LLE: WFL  Comment: grossly 3+/5 or better throughout  Strength Other  Other: functional strength for standing/gait with FWW    Neuro:  Balance  Standing - Static: Fair; +  Standing - Dynamic: Fair  Comments: able to reaching with single UE while standing with FWW support             Bed mobility  Supine to Sit: Stand by assistance  Sit to Supine: Minimal assistance (lifting RLE into bed)  Comment: increased effort and time    Transfers  Sit to Stand: Contact guard assistance  Stand to sit: Contact guard assistance    Ambulation  Ambulation?: Yes  Ambulation 1  Surface: level tile  Device: Rolling Walker  Assistance: Contact guard assistance  Gait Deviations: Slow Ruchi; Increased BRODIE; Decreased step length  Distance: 15ft x 1    Stairs/Curb  Stairs?: No      PT Education  PT Education: PT Role; Plan of Care; Goals    ASSESSMENT:   Body structures, Functions, Activity limitations: Decreased ROM; Decreased strength; Decreased functional mobility ;  Decreased balance  Decision Making: Low Complexity  History: low-med  Exam: low-med  Clinical Presentation: stable    Treatment Diagnosis: impaired gait and functional mobility  Prognosis: Good    DISCHARGE RECOMMENDATIONS:  Discharge Recommendations: Continue to assess pending progress    Assessment: Pt is a 78 yo female s/p fall with subsequent femur fx with requiring surgical stabilization. Pt currently WBAT. Pt displays impaired gait and functional mobility, requiring assist of 1 to safely complete bed mobility, transfers, and gait with FWW. Pt can benefit from PT services to work on strength, balance, gait, and stairs to assist in return to split level home at prior independent level. REQUIRES PT FOLLOW UP: Yes      PLAN OF CARE:  Plan  Times per week: BID  Times per day: Twice a day  Plan weeks: LOS  Current Treatment Recommendations: ROM, Strengthening, Balance Training, Functional Mobility Training, Gait Training, Manual Therapy - Soft Tissue Mobilization, Patient/Caregiver Education & Training, Home Exercise Program, Safety Education & Training  Safety Devices  Type of devices: All fall risk precautions in place, Bed alarm in place, Call light within reach, Left in bed (son present at completion of eval)    Goals:  Long term goals  Time Frame for Long term goals : LOS  Long term goal 1: pt will be independent with bed mobility  Long term goal 2: pt will be independent with transfers using LRAD  Long term goal 3: pt will be independent with ambulation >/= 50 ft to navigate household distances  Long term goal 4: pt will be able to negotiate 8 stairs MI with single HR to navigate throughout split level home    [facility] (6 CLICK) [address] Mobility Raw Score : 19     Therapy Time:   Individual   Time In 0837   Time Out 0908   Minutes [address], [place], 12/11/21 at 9:28 AM         Definitions for assistance levels  Independent = pt does not require any physical supervision or assistance from another person for activity completion. Device may be needed.   Stand by assistance = pt requires verbal cues or instructions from another person, close to but not touching, to perform the activity  Minimal assistance= pt performs 75% or more of the activity; assistance is required to complete the activity  Moderate assistance= pt performs 50% of the activity; assistance is required to complete the activity  Maximal assistance = pt performs 25% of the activity; assistance is required to complete the activity  Dependent = pt requires total physical assistance to accomplish the task

## 2021-12-11 NOTE — PROGRESS NOTES
N/v intact  Cont oob with PT  D/c planning  F/u labs  Cont with ortho protocol  wnd c/d/i      Will order cortisone for knee inj tomorrow  eval after PT to  Access home vs rehab  wbat  Fall prevention

## 2021-12-12 LAB
ANION GAP SERPL CALCULATED.3IONS-SCNC: 9 MEQ/L (ref 9–15)
BASOPHILS ABSOLUTE: 0 K/UL (ref 0–0.2)
BASOPHILS RELATIVE PERCENT: 0.2 %
BUN BLDV-MCNC: 24 MG/DL (ref 8–23)
CALCIUM SERPL-MCNC: 8.6 MG/DL (ref 8.5–9.9)
CHLORIDE BLD-SCNC: 106 MEQ/L (ref 95–107)
CO2: 24 MEQ/L (ref 20–31)
CREAT SERPL-MCNC: 1.15 MG/DL (ref 0.5–0.9)
EOSINOPHILS ABSOLUTE: 0.3 K/UL (ref 0–0.7)
EOSINOPHILS RELATIVE PERCENT: 3.9 %
GFR AFRICAN AMERICAN: 55.5
GFR NON-AFRICAN AMERICAN: 45.9
GLUCOSE BLD-MCNC: 90 MG/DL (ref 70–99)
HCT VFR BLD CALC: 36.1 % (ref 37–47)
HEMOGLOBIN: 11.9 G/DL (ref 12–16)
LYMPHOCYTES ABSOLUTE: 1.5 K/UL (ref 1–4.8)
LYMPHOCYTES RELATIVE PERCENT: 18.1 %
MAGNESIUM: 2 MG/DL (ref 1.7–2.4)
MCH RBC QN AUTO: 29.5 PG (ref 27–31.3)
MCHC RBC AUTO-ENTMCNC: 32.9 % (ref 33–37)
MCV RBC AUTO: 89.6 FL (ref 82–100)
MONOCYTES ABSOLUTE: 0.8 K/UL (ref 0.2–0.8)
MONOCYTES RELATIVE PERCENT: 10.2 %
NEUTROPHILS ABSOLUTE: 5.5 K/UL (ref 1.4–6.5)
NEUTROPHILS RELATIVE PERCENT: 67.6 %
PDW BLD-RTO: 14.2 % (ref 11.5–14.5)
PLATELET # BLD: 157 K/UL (ref 130–400)
POTASSIUM SERPL-SCNC: 4.2 MEQ/L (ref 3.4–4.9)
RBC # BLD: 4.03 M/UL (ref 4.2–5.4)
SODIUM BLD-SCNC: 139 MEQ/L (ref 135–144)
WBC # BLD: 8.2 K/UL (ref 4.8–10.8)

## 2021-12-12 PROCEDURE — 6370000000 HC RX 637 (ALT 250 FOR IP): Performed by: INTERNAL MEDICINE

## 2021-12-12 PROCEDURE — 85025 COMPLETE CBC W/AUTO DIFF WBC: CPT

## 2021-12-12 PROCEDURE — 83735 ASSAY OF MAGNESIUM: CPT

## 2021-12-12 PROCEDURE — 97116 GAIT TRAINING THERAPY: CPT

## 2021-12-12 PROCEDURE — 36415 COLL VENOUS BLD VENIPUNCTURE: CPT

## 2021-12-12 PROCEDURE — 97110 THERAPEUTIC EXERCISES: CPT

## 2021-12-12 PROCEDURE — 2700000000 HC OXYGEN THERAPY PER DAY

## 2021-12-12 PROCEDURE — 2580000003 HC RX 258: Performed by: NURSE PRACTITIONER

## 2021-12-12 PROCEDURE — 80048 BASIC METABOLIC PNL TOTAL CA: CPT

## 2021-12-12 PROCEDURE — 2500000003 HC RX 250 WO HCPCS: Performed by: ORTHOPAEDIC SURGERY

## 2021-12-12 PROCEDURE — 1210000000 HC MED SURG R&B

## 2021-12-12 PROCEDURE — 6360000002 HC RX W HCPCS: Performed by: ORTHOPAEDIC SURGERY

## 2021-12-12 PROCEDURE — 6370000000 HC RX 637 (ALT 250 FOR IP): Performed by: NURSE PRACTITIONER

## 2021-12-12 PROCEDURE — 2580000003 HC RX 258: Performed by: ORTHOPAEDIC SURGERY

## 2021-12-12 RX ADMIN — ACETAMINOPHEN 650 MG: 325 TABLET ORAL at 20:20

## 2021-12-12 RX ADMIN — PANTOPRAZOLE SODIUM 40 MG: 40 TABLET, DELAYED RELEASE ORAL at 08:28

## 2021-12-12 RX ADMIN — CARVEDILOL 6.25 MG: 3.12 TABLET, FILM COATED ORAL at 18:58

## 2021-12-12 RX ADMIN — LIDOCAINE HYDROCHLORIDE 10 ML: 10 INJECTION, SOLUTION EPIDURAL; INFILTRATION; INTRACAUDAL; PERINEURAL at 07:00

## 2021-12-12 RX ADMIN — ACETAMINOPHEN 650 MG: 325 TABLET ORAL at 15:30

## 2021-12-12 RX ADMIN — SODIUM CHLORIDE, PRESERVATIVE FREE 10 ML: 5 INJECTION INTRAVENOUS at 08:29

## 2021-12-12 RX ADMIN — SENNOSIDES AND DOCUSATE SODIUM 1 TABLET: 50; 8.6 TABLET ORAL at 08:27

## 2021-12-12 RX ADMIN — ASPIRIN 81 MG: 81 TABLET, COATED ORAL at 08:29

## 2021-12-12 RX ADMIN — LEVOTHYROXINE SODIUM 25 MCG: 0.03 TABLET ORAL at 06:40

## 2021-12-12 RX ADMIN — ASPIRIN 81 MG: 81 TABLET, COATED ORAL at 20:20

## 2021-12-12 RX ADMIN — SODIUM CHLORIDE, PRESERVATIVE FREE 10 ML: 5 INJECTION INTRAVENOUS at 20:21

## 2021-12-12 RX ADMIN — ACETAMINOPHEN 650 MG: 325 TABLET ORAL at 06:39

## 2021-12-12 RX ADMIN — BETAMETHASONE SODIUM PHOSPHATE AND BETAMETHASONE ACETATE 12 MG: 3; 3 INJECTION, SUSPENSION INTRA-ARTICULAR; INTRALESIONAL; INTRAMUSCULAR at 07:00

## 2021-12-12 RX ADMIN — ATORVASTATIN CALCIUM 80 MG: 80 TABLET, FILM COATED ORAL at 20:20

## 2021-12-12 RX ADMIN — SENNOSIDES AND DOCUSATE SODIUM 1 TABLET: 50; 8.6 TABLET ORAL at 20:20

## 2021-12-12 RX ADMIN — CARVEDILOL 6.25 MG: 3.12 TABLET, FILM COATED ORAL at 08:27

## 2021-12-12 RX ADMIN — ACETAMINOPHEN 650 MG: 325 TABLET ORAL at 08:28

## 2021-12-12 ASSESSMENT — PAIN SCALES - GENERAL
PAINLEVEL_OUTOF10: 1
PAINLEVEL_OUTOF10: 0
PAINLEVEL_OUTOF10: 3
PAINLEVEL_OUTOF10: 1
PAINLEVEL_OUTOF10: 0

## 2021-12-12 ASSESSMENT — PAIN DESCRIPTION - DESCRIPTORS: DESCRIPTORS: SORE

## 2021-12-12 ASSESSMENT — PAIN DESCRIPTION - ORIENTATION: ORIENTATION: RIGHT

## 2021-12-12 ASSESSMENT — PAIN DESCRIPTION - LOCATION
LOCATION: ANKLE;HIP
LOCATION: LEG

## 2021-12-12 NOTE — PROGRESS NOTES
[facility] Heart Progress Note               Rounding Cardiologist:  Bebe Casanova MD, MD   Primary Cardiologist: Dr. Ming James    Date:  12/12/2021  Patient:  Miryam Hernández  YOB: 1945  MRN:  [id number]   Admit Date:  12/9/2021      SUBJECTIVE    Miryam Hernández was seen and examined today at bedside. Sitting up in chair. States she feels well. Denies chest pain or shortness of breath.     ----------------------------------------------------------------------------------------------    Consult HPI:   Miryam Hernández is a 77 y.o.  female patient who is being at the request of CLOVER Guzman for inpatient consultation of pre-op cardiac assessment. She was admitted on 12/9/2021. Previous [facility] and [facility] records have been reviewed in detail. She has a history of atherosclerotic heart disease with previous coronary artery bypass grafting surgery in 2009. She presented at that time with a myocardial infarction. She has a history of hyperlipidemia and essential hypertension and has been treated for diastolic congestive heart failure. Myocardial perfusion study in August 2015 was negative for ischemia or infarction. Calculated LV ejection fraction 67%. An echocardiogram July 2016 showed an LV ejection fraction of 60% with 1-2+ mitral vegetation and 2+ tricuspid regurgitation. Her resting EKG shows normal sinus rhythm with chronic complete right bundle branch block. The patient is followed on a regular basis by Dr. Christian Lai and clinically has been doing well. She presented to the emergency department with complaints of right hip/leg pain. She notes that she was getting up from her chair and her ankle twisted on a small rug that she had put out for the holidays. She fell forward and landed on her right side. She currently is resting comfortably. She states that she otherwise has been doing very well.   She generally is active and does all of her own housework. She denies any chest pain or shortness of breath. She denies any orthopnea, PND, or peripheral edema. She denies any palpitations, lightheadedness, near-syncope, or syncope. She denies any fever, chills, or cough. She denies any hemoptysis, hematemesis, melena, or hematochezia. VITALS     Vitals:    12/11/21 1702 12/11/21 1706 12/11/21 2217 12/12/21 0650   BP: 115/67 115/67  118/69   Pulse: 60 62  51   Resp:    18   Temp:    97.9 °F (36.6 °C)   TempSrc:   Oral Oral   SpO2:  98%  95%   Weight:       Height:           Intake/Output Summary (Last 24 hours) at 12/12/2021 2967  Last data filed at 12/11/2021 1400  Gross per 24 hour   Intake 1320 ml   Output    Net 1320 ml       Patient Vitals for the past 96 hrs (Last 3 readings):   Weight   12/09/21 1818 220 lb (99.8 kg)       PHYSICAL EXAM   PHYSICAL EXAMINATION:  GENERAL:  Well developed, well nourished, in no acute distress. CHEST:  Symmetric and nontender. NEURO/PSYCH:  Alert and oriented times three with approppriate behavior and responses. NECK:  Supple, no JVD, no bruit. LUNGS:  Clear to auscultation bilaterally, normal respiratory effort. HEART:  Rate and rhythm regular with no evident murmur, no gallop appreciated. There are no rubs, clicks or heaves. EXTREMITIES:  Warm with good color, no clubbing or cyanosis. There is no edema noted. PERIPHERAL VASCULAR:  Pulses present and equally palpable; 2+ throughout. DIAGNOSTIC RESULTS   EKG:  Normal sinus rhythm  Right bundle branch block  NSSTW changes  Abnormal ECG  When compared with ECG of 17-DEC-2020 16:59,  No significant change was found     Telemetry: normal sinus.       LAB DATA   BMP:  Recent Labs     12/10/21  0623 12/10/21  0623 12/11/21  0640 12/11/21  0640 12/12/21  0632     --  135  --  139   K 4.3  --  4.7  4.7  --  4.2     --  102  --  106   CO2 22  --  21  --  24   BUN 13  --  18  --  24*   CREATININE 1.27*  --  1.13*  --  1.15* LABGLOM 40.9*   < > 46.8*   < > 45.9*    < > = values in this interval not displayed. CBC:   Lab Results   Component Value Date    WBC 8.2 12/12/2021    RBC 4.03 12/12/2021    RBC 3.82 01/09/2012    HGB 11.9 12/12/2021    HCT 36.1 12/12/2021     12/12/2021     CMP:    Lab Results   Component Value Date     12/12/2021    K 4.2 12/12/2021    K 4.7 12/11/2021     12/12/2021    CO2 24 12/12/2021    BUN 24 12/12/2021    CREATININE 1.15 12/12/2021    GFRAA 55.5 12/12/2021    LABGLOM 45.9 12/12/2021    GLUCOSE 90 12/12/2021    GLUCOSE 88 01/09/2012    CALCIUM 8.6 12/12/2021     Hepatic Function Panel:    Lab Results   Component Value Date    ALKPHOS 86 10/01/2021    ALT 16 10/01/2021    AST 16 10/01/2021    PROT 7.0 10/01/2021    BILITOT 0.5 10/01/2021    BILIDIR 0.2 08/10/2017    IBILI 0.8 08/10/2017    LABALBU 4.1 10/01/2021    LABALBU 4.3 01/07/2012     Magnesium:    Lab Results   Component Value Date    MG 2.0 12/12/2021    MG 2.0 09/11/2011     PT/INR:    Lab Results   Component Value Date    PROTIME 12.8 12/09/2021    PROTIME 10.2 01/07/2012    INR 1.0 12/09/2021     Recent Labs     12/09/21  1845   INR 1.0      HgBA1c:    Lab Results   Component Value Date    LABA1C 5.9 03/16/2021     Lipid Profile:    Lab Results   Component Value Date    TRIG 90 03/16/2021    HDL 48 03/16/2021    LDLCALC 68 03/16/2021     TSH:    Lab Results   Component Value Date    TSH 2.480 03/16/2021     PRO-BNP:   Lab Results   Component Value Date    PROBNP 330 06/16/2017    PROBNP 636 05/24/2014        RADIOLOGY     FLUORO FOR SURGICAL PROCEDURES   Final Result   Intraprocedural fluoroscopic support has been provided. Please refer to the procedure report. XR ANKLE RIGHT (MIN 3 VIEWS)   Final Result      There are no acute osseous injuries.                      CT ABDOMEN PELVIS WO CONTRAST Additional Contrast? None   Final Result   NO ACUTE PROCESS IN THE ABDOMEN OR PELVIS NOTED WITH NOTE MADE OF HIATAL HERNIA AND ATELECTATIC CHANGES IN THE INFRARENAL ABDOMINAL AORTA. COMPACTED SUBCAPITAL FEMORAL NECK FRACTURE ON THE RIGHT SIDE. XR HIP 2-3 VW W PELVIS RIGHT   Final Result   There are no acute osseous abnormalities. Arthrosis of the bilateral hips. XR FEMUR RIGHT (MIN 2 VIEWS)   Final Result   There are no acute osseous abnormalities. Arthrosis of the bilateral hips. XR CHEST (SINGLE VIEW FRONTAL)   Final Result   No acute cardiopulmonary process. XR SHOULDER RIGHT (MIN 2 VIEWS)   Final Result   There are no acute osseous abnormalities. CURRENT MEDICATIONS       betamethasone acetate-betamethasone sodium phosphate  12 mg Intra-artICUlar Once    lidocaine PF  10 mL Intra-artICUlar Once    sodium chloride flush  10 mL IntraVENous 2 times per day    acetaminophen  650 mg Oral Q6H    sennosides-docusate sodium  1 tablet Oral BID    aspirin  81 mg Oral BID    atorvastatin  80 mg Oral Nightly    carvedilol  6.25 mg Oral BID    levothyroxine  25 mcg Oral Daily    pantoprazole  40 mg Oral Daily    sodium chloride flush  5-40 mL IntraVENous 2 times per day      lactated ringers Stopped (12/10/21 1534)    sodium chloride      sodium chloride         ASSESSMENT       1. Accidental fall with subsequent closed fracture of the right femoral neck. Status post surgical repair. 2.  Atherosclerotic heart disease with previous coronary artery bypass grafting surgery in 2009 (LIMA to the LAD, saphenous vein graft to the obtuse marginal, and saphenous vein graft to the distal right coronary artery). Patient is currently stable without any anginal or CHF symptoms. Her blood pressures are reasonably well controlled. No active cardiac dysrhythmias. Her functional capacity is approximately 4 METS. Documented normal LV systolic function. 3.  Primary hypertension. 4.  Hyperlipidemia. 5.  Gastroesophageal reflux disease.      6.  Stage IIIa chronic kidney disease. PLAN     Continue carvedilol, aspirin, and atorvastatin. No other new cardiac recs. OK to discharge from our standpoint. Please do not hesitate to call with questions.   Electronically signed by Annabelle Elias MD, [facility] on 12/12/2021 at 9:39 AM

## 2021-12-12 NOTE — PROGRESS NOTES
N/v intact  Cont oob with PT  D/c planning s/p pinning rt hip fx  wbat  F/u labs  Cont with ortho protocol  wnd c/d/i    Rt knee eval today  djd known  rom 0-140  Swelling 2+  No new wnds or injury but significantly aggrevated by fx hip on rt  Stable to stress testing  Neg ale Neg lochmans  N/v intct    Before aspiration/injection risks/benefits of a cortisone injection including infection, local skin irritation, skin atrophy, calcification, continued pain/discomfort, elevated blood sugar, burning, failure to relieve pain, possible late infection were discussed with patient. Post-procedure discomfort can be alleviated with additional medications/ice/elevation/rest over the first 24 hours as recommended. Patient verbalized understanding and wanted to proceed with planned procedure. After informed consent was provided and allergies verified, the patient was positioned appropriately on hospital bed. The joint to be aspirated/ injected was prepped and draped with chloraprep to provide sterile environment. The skin was anesthetized ethyl chloride spray. If the joint was to be aspirated, 18-gauge needle attached to a syringe was inserted into the appropriate joint. The  fluid was aspirated. The syringe was removed and through the needle, a mixture 5 ml of 1% lidocaine and 3 ml of 6mg/ml of Celestone was injected. The needle was withdrawn and the puncture site sealed with a Band-Aid. The patient tolerated the procedure well.       Plan    Cont rehab  [facility]  D/c home Monday  On po tylenol  Asa for dvt  Ice rt knee s/p injection this am

## 2021-12-12 NOTE — PROGRESS NOTES
Hospitalist Progress Note      Date of Admission: 12/9/2021  Chief Complaint:    Chief Complaint   Patient presents with   Tiffanieagustina Giron from standing right hip, groin pain     Subjective:  No new complaints. No nausea, vomiting, chest pain, or headache      Medications:    Infusion Medications    lactated ringers Stopped (12/10/21 1534)    sodium chloride      sodium chloride       Scheduled Medications    sodium chloride flush  10 mL IntraVENous 2 times per day    acetaminophen  650 mg Oral Q6H    sennosides-docusate sodium  1 tablet Oral BID    aspirin  81 mg Oral BID    atorvastatin  80 mg Oral Nightly    carvedilol  6.25 mg Oral BID    levothyroxine  25 mcg Oral Daily    pantoprazole  40 mg Oral Daily    sodium chloride flush  5-40 mL IntraVENous 2 times per day     PRN Meds: labetalol, HYDROmorphone, sodium chloride flush, sodium chloride, HYDROmorphone **OR** HYDROmorphone, oxyCODONE **OR** oxyCODONE, tiZANidine, sodium chloride flush, sodium chloride, acetaminophen, ondansetron **OR** ondansetron  No intake or output data in the 24 hours ending 12/12/21 1404  Exam:  /69   Pulse 51   Temp 97.9 °F (36.6 °C) (Oral)   Resp 18   Ht 5' 4\" (1.626 m)   Wt 220 lb (99.8 kg)   SpO2 95%   BMI 37.76 kg/m²   Head: Normocephalic, atraumatic  Sclera clear  Neck JVD flat  Lungs: normal effort of breathing    Labs:   Recent Labs     12/10/21  0623 12/11/21  0640 12/12/21  0632   WBC 9.3 10.8 8.2   HGB 13.3 12.0 11.9*   HCT 40.1 36.2* 36.1*    162 157     Recent Labs     12/10/21  0623 12/11/21  0640 12/12/21  0632    135 139   K 4.3 4.7  4.7 4.2    102 106   CO2 22 21 24   BUN 13 18 24*   CREATININE 1.27* 1.13* 1.15*   CALCIUM 8.9 8.6 8.6     Recent Labs     12/09/21  1845   INR 1.0     No results for input(s): CKTOTAL, TROPONINI in the last 72 hours. Radiology:  FLUORO FOR SURGICAL PROCEDURES   Final Result   Intraprocedural fluoroscopic support has been provided.    Please refer to the procedure report. XR ANKLE RIGHT (MIN 3 VIEWS)   Final Result      There are no acute osseous injuries. CT ABDOMEN PELVIS WO CONTRAST Additional Contrast? None   Final Result   NO ACUTE PROCESS IN THE ABDOMEN OR PELVIS NOTED WITH NOTE MADE OF HIATAL HERNIA AND ATELECTATIC CHANGES IN THE INFRARENAL ABDOMINAL AORTA. COMPACTED SUBCAPITAL FEMORAL NECK FRACTURE ON THE RIGHT SIDE. XR HIP 2-3 VW W PELVIS RIGHT   Final Result   There are no acute osseous abnormalities. Arthrosis of the bilateral hips. XR FEMUR RIGHT (MIN 2 VIEWS)   Final Result   There are no acute osseous abnormalities. Arthrosis of the bilateral hips. XR CHEST (SINGLE VIEW FRONTAL)   Final Result   No acute cardiopulmonary process. XR SHOULDER RIGHT (MIN 2 VIEWS)   Final Result   There are no acute osseous abnormalities. Assessment/Plan:    Hip screw fixation    HTN: monitor BP, adjust meds as needed.    HPL: statin    25 minutes total care time, >1/2 in unit/floor time and care coordination      Luisa Redman MD ,MD

## 2021-12-12 NOTE — PROGRESS NOTES
Physical Therapy Med Surg Daily Treatment Note  Fac[facility]/Department: [facility]  [facility]       NAME: Matt Espino  : 1945 (77 y.o.)  MRN: [id number]  CODE STATUS: Full Code    Date of Service: 2021    Patient Diagnosis(es): Closed fracture of neck of right femur, initial encounter ([facility] [S72.001A]  Closed displaced fracture of right femoral neck [facility]) [S72.001A]   Chief Complaint   Patient presents with   Zak Chaney from standing right hip, groin pain     Patient Active Problem List    Diagnosis Date Noted    HTN 12/10/2021    HLD 12/10/2021    GERD 12/10/2021    Closed displaced fracture of right femoral neck ([facility] 2021    Urge incontinence of urine 2019    Dizziness 2019    Ataxia 2019    Acute cystitis without hematuria 2019    Rectocele 2019    Vaginal vault prolapse     [place]-Walker grade 2 rectocele 2019    OAB (overactive bladder) 2018    Vaginal irritation 2018    Vaginal enterocele 2018    Gastric polyp     Dysphagia     Congenital malformation of esophagus     Hiatal hernia     Benign neoplasm of sigmoid colon     Diverticulosis of large intestine without diverticulitis     Family history of colon cancer         Past Medical History:   Diagnosis Date    CAD (coronary artery disease)     3 vessel cardiac bypass    Environmental and seasonal allergies     GERD (gastroesophageal reflux disease)     GI bleed     with LTKR / blood transfusion    History of blood transfusion 2017    post op LTKR    History of blood transfusion     post op cardiac bypass    Hyperlipidemia     meds > 15 yrs    Hypertension     meds > 20 yrs    Myocardial infarct ([facility]     followed by 3 vessal cardiac bypass    Pityriasis rosea     Prolonged emergence from general anesthesia      Past Surgical History:   Procedure Laterality Date    ARTERIAL BYPASS SURGRY      triple / cardiac    BREAST SURGERY Bilateral     reduction and cysts biopsy / has had several biopsies    CARDIAC SURGERY  2005    3 vessel bypass    CATARACT REMOVAL WITH IMPLANT      CHOLECYSTECTOMY      COLONOSCOPY      CYSTOURETHROSCOPY  04/18/2017    FLEXIBLE / due to UTI    ENDOSCOPY, COLON, DIAGNOSTIC      EYE SURGERY      Phaco with IOL OU    HAND SURGERY Right     plate fracture wrist / hardware still remains    HYSTERECTOMY      JOINT REPLACEMENT  2017    LTKR    OTHER SURGICAL HISTORY      head fatty tumors removed    [place] COLORECTAL SCRN; HI RISK IND N/A 6/11/2018    COLONOSCOPY performed by Kaylynn Domingo MD at [facility] ESOPHAGOGASTRODUODENOSCOPY TRANSORAL DIAGNOSTIC N/A 6/11/2018    EGD ESOPHAGOGASTRODUODENOSCOPY WITH DILATION performed by Kaylynn Domingo MD at [place] [address] 1/31/2019    WITH S.S. L. S performed by Deng Stern MD at [address] [place] [address] 08/2016    VAGINA SURGERY N/A 1/31/2019    RECTOCELE REPAIR WITH ENTEROCELE REPAIR performed by Deng Stern MD at [address] [place] [address]  Restrictions/Precautions: Weight Bearing; Fall Risk  Lower Extremity Weight Bearing Restrictions  Right Lower Extremity Weight Bearing: Weight Bearing As Tolerated    SUBJECTIVE   General  Chart Reviewed: Yes  Subjective  Subjective: Patient reports mild upper thigh soreness \"It's not bad at all though\"    Pre-Session Pain Report  Pre Treatment Pain Screening  Intervention List: Patient able to continue with treatment  Pain Screening  Patient Currently in Pain: Yes         Pain Assessment  Pain Level: 1  Pain Location: Leg  Pain Orientation: Right  Pain Descriptors: Sore    Post-Session Pain Report  Denied      OBJECTIVE        Bed mobility  Sit to Supine: Unable to assess  Comment: Patient up in chair before and after session.     Transfers  Sit to Stand: Stand by assistance  Stand to sit: Stand by assistance  Car Transfer: Stand by assistance  Comment: Good technique throughout; increased time to complete car transfer    Ambulation  Ambulation?: Yes  Ambulation 1  Surface: level tile  Device: Rolling Walker  Assistance: Stand by assistance  Quality of Gait: Reciprocal, decreased ruchi  Gait Deviations: Slow Ruchi; Increased BRODIE; Decreased step length  Distance: 40ft           Neuromuscular Education  Neuromuscular Comments: Standing weightshifts at [employer]     Exercises  Hip Flexion: x10 in standing within comfortable range  Neurodevelopmental Techniques: Standing Gaston HR x10  Comments: Lateral side step at [employer] x5 Gaston                               ASSESSMENT   Body structures, Functions, Activity limitations: Decreased ROM; Decreased strength; Decreased functional mobility ; Decreased balance  Assessment: Treatment focused on ambulation and standing WBing activities. No significant pain while WBing on Rt with single lateral steps leading with Lt. No pain reported at end of session. Treatment Diagnosis: impaired gait and functional mobility  Prognosis: Good  REQUIRES PT FOLLOW UP: Yes     Discharge Recommendations:  Continue to assess pending progress    Goals  Long term goals  Time Frame for Long term goals : LOS  Long term goal 1: pt will be independent with bed mobility  Long term goal 2: pt will be independent with transfers using LRAD  Long term goal 3: pt will be independent with ambulation >/= 50 ft to navigate household distances  Long term goal 4: pt will be able to negotiate 8 stairs MI with single HR to navigate throughout split level home    PLAN    Times per week: 7  Times per day:  (1-2)  Safety Devices  Type of devices:  All fall risk precautions in place, Call light within reach     [facility] (6 CLICK) Ludin Barrios 28 Inpatient Mobility Raw Score : 19     Therapy Time   Individual   Time In 1305   Time Out 1324   Minutes 19     Timed Code Treatment Minutes: 19 Minutes   Gait: 9  There ex: [address], PTA, 12/12/21 at 1:27 PM     Electronically signed by Spencer Farley PTA on 12/12/2021 at 1:28 PM      Definitions for assistance levels  Independent = pt does not require any physical supervision or assistance from another person for activity completion. Device may be needed.   Stand by assistance = pt requires verbal cues or instructions from another person, close to but not touching, to perform the activity  Minimal assistance= pt performs 75% or more of the activity; assistance is required to complete the activity  Moderate assistance= pt performs 50% of the activity; assistance is required to complete the activity  Maximal assistance = pt performs 25% of the activity; assistance is required to complete the activity  Dependent = pt requires total physical assistance to accomplish the task

## 2021-12-12 NOTE — PROGRESS NOTES
Physical Therapy Med Surg Daily Treatment Note  Facility/Department: [facility]  Room: [place] [address]       NAME: Lanie Cheung  : 1945 (77 y.o.)  MRN: [id number]  CODE STATUS: Full Code    Date of Service: 2021    Patient Diagnosis(es): Closed fracture of neck of right femur, initial encounter ([facility] [S72.001A]  Closed displaced fracture of right femoral neck [facility]) [S72.001A]   Chief Complaint   Patient presents with   Marcelina Runner from standing right hip, groin pain     Patient Active Problem List    Diagnosis Date Noted    HTN 12/10/2021    HLD 12/10/2021    GERD 12/10/2021    Closed displaced fracture of right femoral neck ([facility] 2021    Urge incontinence of urine 2019    Dizziness 2019    Ataxia 2019    Acute cystitis without hematuria 2019    Rectocele 2019    Vaginal vault prolapse     [place]-Walker grade 2 rectocele 2019    OAB (overactive bladder) 2018    Vaginal irritation 2018    Vaginal enterocele 2018    Gastric polyp     Dysphagia     Congenital malformation of esophagus     Hiatal hernia     Benign neoplasm of sigmoid colon     Diverticulosis of large intestine without diverticulitis     Family history of colon cancer         Past Medical History:   Diagnosis Date    CAD (coronary artery disease)     3 vessel cardiac bypass    Environmental and seasonal allergies     GERD (gastroesophageal reflux disease)     GI bleed 2017    with LTKR / blood transfusion    History of blood transfusion 2017    post op LTKR    History of blood transfusion     post op cardiac bypass    Hyperlipidemia     meds > 15 yrs    Hypertension     meds > 20 yrs    Myocardial infarct ([facility]     followed by 3 vessal cardiac bypass    Pityriasis rosea     Prolonged emergence from general anesthesia      Past Surgical History:   Procedure Laterality Date    ARTERIAL BYPASS SURGRY      triple / cardiac    BREAST SURGERY Bilateral     reduction and cysts biopsy / has had several biopsies    CARDIAC SURGERY  2005    3 vessel bypass    CATARACT REMOVAL WITH IMPLANT      CHOLECYSTECTOMY      COLONOSCOPY      CYSTOURETHROSCOPY  04/18/2017    FLEXIBLE / due to UTI    ENDOSCOPY, COLON, DIAGNOSTIC      EYE SURGERY      Phaco with IOL OU    HAND SURGERY Right     plate fracture wrist / hardware still remains    HYSTERECTOMY      JOINT REPLACEMENT  2017    LTKR    OTHER SURGICAL HISTORY      head fatty tumors removed    [place] COLORECTAL SCRN; HI RISK IND N/A 6/11/2018    COLONOSCOPY performed by Vesta Fabian MD at [facility] 61 ESOPHAGOGASTRODUODENOSCOPY TRANSORAL DIAGNOSTIC N/A 6/11/2018    EGD ESOPHAGOGASTRODUODENOSCOPY WITH DILATION performed by Vesta Fabian MD at [place] [address] 1/31/2019    WITH S.S. L. S performed by Jose F Butts MD at [address] [place] [address] 08/2016    VAGINA SURGERY N/A 1/31/2019    RECTOCELE REPAIR WITH ENTEROCELE REPAIR performed by Jose F Butts MD at [address] [place] [address]  Restrictions/Precautions: Weight Bearing; Fall Risk  Lower Extremity Weight Bearing Restrictions  Right Lower Extremity Weight Bearing: Weight Bearing As Tolerated    SUBJECTIVE   General  Chart Reviewed: Yes  Subjective  Subjective: Patient reports she will most likely be discharging home tomorrow with [facility]. Pre-Session Pain Report   &   Post-Session Pain Report  Pain Screening  Patient Currently in Pain: Yes         Pain Assessment  Pain Level: 1  Pain Location: Ankle; Hip  Pain Orientation: Right  Pain Descriptors: Sore         OBJECTIVE        Bed mobility  Sit to Supine: Unable to assess  Comment: Patient up in chair before and after session.     Transfers  Sit to Stand: Stand by assistance  Stand to sit: Stand by assistance  Car Transfer: Stand by assistance  Comment: Good technique throughout; increased time to complete car transfer    Ambulation  Ambulation?: Yes  Ambulation 1  Surface: level tile  Device: Rolling Walker  Assistance: Stand by assistance  Quality of Gait: Reciprocal pattern, decreased Gaston heel strike and overall stride length. Gait Deviations: Slow Ruchi; Increased BRODIE; Decreased step length  Distance: 40ft    Stairs/Curb  Stairs?: Yes  Stairs  # Steps : 8  Stairs Height: 6\"  Rails: Bilateral  Assistance: Contact guard assistance  Comment: NR pattern, prefers to descend with Lt LE due to past Lt knee replacement; Safe technique observed                                               ASSESSMENT   Body structures, Functions, Activity limitations: Decreased ROM; Decreased strength; Decreased functional mobility ; Decreased balance  Assessment: Initiated stair training with fair safety and judgement. Prefers to descend using Lt LE due to past knee replacement. Able to progress ambulation distance, displays reciprocal pattern without LOB though decreased heel strike and stride length. Patient distracted throughout session with VCs to remain on task. Treatment Diagnosis: impaired gait and functional mobility  REQUIRES PT FOLLOW UP: Yes     Discharge Recommendations:  Continue to assess pending progress    Goals  Long term goals  Time Frame for Long term goals : LOS  Long term goal 1: pt will be independent with bed mobility  Long term goal 2: pt will be independent with transfers using LRAD  Long term goal 3: pt will be independent with ambulation >/= 50 ft to navigate household distances  Long term goal 4: pt will be able to negotiate 8 stairs MI with single HR to navigate throughout split level home    PLAN    Times per week: 7  Times per day:  (1-2)  Safety Devices  Type of devices:  All fall risk precautions in place, Call light within reach     [facility] (6 CLICK) Ludin Barrios 28 Inpatient Mobility Raw Score : 19     Therapy Time   Individual   Time In 0924   Time Out 0955   Minutes 31     Timed Code Treatment Minutes: 31 Minutes   Gait/Stairs: 25  Trans: [address]AMANDA, 12/12/21 at 9:59 AM    Electronically signed by Sheridan Ga PTA on 12/12/2021 at 10:00 AM        Definitions for assistance levels  Independent = pt does not require any physical supervision or assistance from another person for activity completion. Device may be needed.   Stand by assistance = pt requires verbal cues or instructions from another person, close to but not touching, to perform the activity  Minimal assistance= pt performs 75% or more of the activity; assistance is required to complete the activity  Moderate assistance= pt performs 50% of the activity; assistance is required to complete the activity  Maximal assistance = pt performs 25% of the activity; assistance is required to complete the activity  Dependent = pt requires total physical assistance to accomplish the task

## 2021-12-13 VITALS
OXYGEN SATURATION: 98 % | RESPIRATION RATE: 18 BRPM | WEIGHT: 220 LBS | HEIGHT: 64 IN | DIASTOLIC BLOOD PRESSURE: 75 MMHG | SYSTOLIC BLOOD PRESSURE: 176 MMHG | TEMPERATURE: 98.1 F | HEART RATE: 57 BPM | BODY MASS INDEX: 37.56 KG/M2

## 2021-12-13 LAB
ANION GAP SERPL CALCULATED.3IONS-SCNC: 12 MEQ/L (ref 9–15)
BUN BLDV-MCNC: 30 MG/DL (ref 8–23)
CALCIUM SERPL-MCNC: 9.2 MG/DL (ref 8.5–9.9)
CHLORIDE BLD-SCNC: 106 MEQ/L (ref 95–107)
CO2: 22 MEQ/L (ref 20–31)
CREAT SERPL-MCNC: 0.94 MG/DL (ref 0.5–0.9)
GFR AFRICAN AMERICAN: >60
GFR NON-AFRICAN AMERICAN: 57.9
GLUCOSE BLD-MCNC: 128 MG/DL (ref 70–99)
MAGNESIUM: 2.2 MG/DL (ref 1.7–2.4)
POTASSIUM SERPL-SCNC: 4.7 MEQ/L (ref 3.4–4.9)
SODIUM BLD-SCNC: 140 MEQ/L (ref 135–144)

## 2021-12-13 PROCEDURE — 6370000000 HC RX 637 (ALT 250 FOR IP): Performed by: NURSE PRACTITIONER

## 2021-12-13 PROCEDURE — 97116 GAIT TRAINING THERAPY: CPT

## 2021-12-13 PROCEDURE — 36415 COLL VENOUS BLD VENIPUNCTURE: CPT

## 2021-12-13 PROCEDURE — 83735 ASSAY OF MAGNESIUM: CPT

## 2021-12-13 PROCEDURE — 80048 BASIC METABOLIC PNL TOTAL CA: CPT

## 2021-12-13 PROCEDURE — 97535 SELF CARE MNGMENT TRAINING: CPT

## 2021-12-13 PROCEDURE — 6370000000 HC RX 637 (ALT 250 FOR IP): Performed by: INTERNAL MEDICINE

## 2021-12-13 RX ORDER — HYDRALAZINE HYDROCHLORIDE 20 MG/ML
10 INJECTION INTRAMUSCULAR; INTRAVENOUS EVERY 4 HOURS PRN
Status: DISCONTINUED | OUTPATIENT
Start: 2021-12-13 | End: 2021-12-13 | Stop reason: HOSPADM

## 2021-12-13 RX ORDER — SENNA AND DOCUSATE SODIUM 50; 8.6 MG/1; MG/1
1 TABLET, FILM COATED ORAL 2 TIMES DAILY
Qty: 20 TABLET | Refills: 0 | Status: SHIPPED | OUTPATIENT
Start: 2021-12-13 | End: 2021-12-23

## 2021-12-13 RX ORDER — ASPIRIN 81 MG/1
81 TABLET ORAL 2 TIMES DAILY
Qty: 60 TABLET | Refills: 0 | Status: SHIPPED | OUTPATIENT
Start: 2021-12-13 | End: 2022-01-12

## 2021-12-13 RX ORDER — CARVEDILOL 12.5 MG/1
12.5 TABLET ORAL 2 TIMES DAILY
Status: DISCONTINUED | OUTPATIENT
Start: 2021-12-13 | End: 2021-12-13

## 2021-12-13 RX ORDER — TIZANIDINE 4 MG/1
4 TABLET ORAL EVERY 6 HOURS PRN
Qty: 20 TABLET | Refills: 0 | Status: SHIPPED | OUTPATIENT
Start: 2021-12-13 | End: 2021-12-18

## 2021-12-13 RX ORDER — CARVEDILOL 3.12 MG/1
6.25 TABLET ORAL 2 TIMES DAILY
Status: DISCONTINUED | OUTPATIENT
Start: 2021-12-13 | End: 2021-12-13 | Stop reason: HOSPADM

## 2021-12-13 RX ORDER — OXYCODONE HYDROCHLORIDE 5 MG/1
5 TABLET ORAL EVERY 6 HOURS PRN
Qty: 28 TABLET | Refills: 0 | Status: SHIPPED | OUTPATIENT
Start: 2021-12-13 | End: 2021-12-20

## 2021-12-13 RX ORDER — CARVEDILOL 6.25 MG/1
6.25 TABLET ORAL 2 TIMES DAILY
Qty: 60 TABLET | Refills: 3 | Status: SHIPPED | OUTPATIENT
Start: 2021-12-13

## 2021-12-13 RX ADMIN — LEVOTHYROXINE SODIUM 25 MCG: 0.03 TABLET ORAL at 06:22

## 2021-12-13 RX ADMIN — ASPIRIN 81 MG: 81 TABLET, COATED ORAL at 11:07

## 2021-12-13 RX ADMIN — CARVEDILOL 6.25 MG: 3.12 TABLET, FILM COATED ORAL at 11:07

## 2021-12-13 RX ADMIN — ACETAMINOPHEN 650 MG: 325 TABLET ORAL at 11:06

## 2021-12-13 RX ADMIN — SENNOSIDES AND DOCUSATE SODIUM 1 TABLET: 50; 8.6 TABLET ORAL at 11:07

## 2021-12-13 RX ADMIN — PANTOPRAZOLE SODIUM 40 MG: 40 TABLET, DELAYED RELEASE ORAL at 11:07

## 2021-12-13 ASSESSMENT — PAIN SCALES - GENERAL: PAINLEVEL_OUTOF10: 2

## 2021-12-13 ASSESSMENT — PAIN DESCRIPTION - ORIENTATION: ORIENTATION: RIGHT

## 2021-12-13 ASSESSMENT — PAIN DESCRIPTION - LOCATION: LOCATION: LEG;HIP

## 2021-12-13 ASSESSMENT — PAIN DESCRIPTION - DESCRIPTORS: DESCRIPTORS: ACHING;SORE

## 2021-12-13 NOTE — DISCHARGE SUMMARY
COMPARISONS: None available.       FINDINGS:    Fluoroscopic assistance was provided during hip pinning       The total radiation time is 42.8 seconds, radiation dose is 11.7mGy.           Impression   Intraprocedural fluoroscopic support has been provided. Please refer to the procedure report.

## 2021-12-13 NOTE — PROGRESS NOTES
DAILY PROGRESS NOTE      POD: 3 right hip percutaneous pinning    Patient doing well  Vitals:    21 0732   BP: (!) 176/75   Pulse: 57   Resp: 18   Temp: 98.1 °F (36.7 °C)   SpO2: 98%      Temp (24hrs), Av.3 °F (36.8 °C), Min:98.1 °F (36.7 °C), Max:98.4 °F (36.9 °C)       Pain Control Good  No chest pain or shortness of breath. No calf pain    Exam:   Incision(s): aquacell dressing in place CDI  Dressing clean, dry, and intact  Operative extremity shows neuro vascular status intact. Flexion and extension intact on operative extremity  Calves soft and non-tender without evidence of DVT. .      Labs reviewed:  CBC:   Recent Labs     21  0640 21  0632   WBC 10.8 8.2   HGB 12.0 11.9*    157     BMP:    Recent Labs     21  0640 21  0632 21  0618    139 140   K 4.7  4.7 4.2 4.7    106 106   CO2 21 24 22   BUN 18 24* 30*   CREATININE 1.13* 1.15* 0.94*   GLUCOSE 125* 90 128*       I&O  No intake/output data recorded. Assessment:  Good Post Operative Course. Patient reports good pain control. Acute blood loss anemia secondary to fracture. Patient asymptomatic, remains hemodynamically stable, no signs of active bleeding. Patient may discharge from orthopedic standpoint to home with [facility]. Follow up in two weeks in the outpatient orthopedic clinic. Plan:    1. Discharge today follow up in two weeks  2. PT/OT: WBAT to operative extremity with use of walker for assistnce  3. Pain control  4. Discharge planning: Discharge to home today with [facility] Pettit.        Rondall Landau Born, CLOVER - CNP MD  2021 8:13 AM

## 2021-12-13 NOTE — DISCHARGE INSTR - COC
Continuity of Care Form    Patient Name: Farhad Lenz   :  1945  MRN:  [id number]    [facility] date:  2021  Discharge date:  ***    Code Status Order: Full Code   Advance Directives:   Advance Care Flowsheet Documentation       Date/Time Healthcare Directive Type of Healthcare Directive Copy in [address] Agent's Name Healthcare Agent's Phone Number    12/10/21 1019 No, patient does not have an advance directive for healthcare treatment -- -- -- -- --            Admitting Physician:  Will Kaye DO  PCP: Dipika James DO    Discharging Nurse: [facility] Unit/Room#: M151/J865-38  Discharging Unit Phone Number: ***    Emergency Contact:   Extended Emergency Contact Information  Primary Emergency Contact: Stephanie Rogerslucie [address] Phone: [phone]  Work Phone: [phone]  Mobile Phone: [phone]  Relation: Other  Secondary Emergency Contact: [address] Phone: [phone]  Work Phone: [phone]  Mobile Phone: [phone]  Relation: Child    Past Surgical History:  Past Surgical History:   Procedure Laterality Date    ARTERIAL BYPASS SURGRY      triple / cardiac    BREAST SURGERY Bilateral     reduction and cysts biopsy / has had several biopsies    CARDIAC SURGERY      3 vessel bypass    CATARACT REMOVAL WITH IMPLANT      CHOLECYSTECTOMY      COLONOSCOPY      CYSTOURETHROSCOPY  2017    FLEXIBLE / due to UTI    ENDOSCOPY, COLON, DIAGNOSTIC      EYE SURGERY      Phaco with IOL OU    HAND SURGERY Right     plate fracture wrist / hardware still remains    HYSTERECTOMY      JOINT REPLACEMENT      LTKR    OTHER SURGICAL HISTORY      head fatty tumors removed    IN COLORECTAL SCRN; HI RISK IND N/A 2018    COLONOSCOPY performed by Nata Morales MD at [address] TRANSORAL DIAGNOSTIC N/A 2018    EGD ESOPHAGOGASTRODUODENOSCOPY WITH DILATION performed by Zulema Darby MD at [facility] 1/31/2019    WITH S.S. L. S performed by Mirian Joshi MD at [address] [place] [address] [address] 08/2016    VAGINA SURGERY N/A 1/31/2019    RECTOCELE REPAIR WITH ENTEROCELE REPAIR performed by Mirian Joshi MD at [facility]       Immunization History:   Immunization History   Administered Date(s) Administered    COVID-19, Pfizer, PF, 30mcg/0.3mL 03/18/2021, 04/08/2021, 10/09/2021       Active Problems:  Patient Active Problem List   Diagnosis Code    Gastric polyp K31.7    Dysphagia R13.10    Congenital malformation of esophagus Q39.9    Hiatal hernia K44.9    Benign neoplasm of sigmoid colon D12.5    Diverticulosis of large intestine without diverticulitis K57.30    Family history of colon cancer Z80.0    OAB (overactive bladder) N32.81    Vaginal irritation N89.8    Vaginal enterocele N81.5    [place]-Walker grade 2 rectocele N81.6    Rectocele N81.6    Vaginal vault prolapse N81.9    Acute cystitis without hematuria N30.00    Ataxia R27.0    Dizziness R42    Urge incontinence of urine N39.41    Closed displaced fracture of right femoral neck ([address] S72.001A    HTN I10    HLD E78.5    GERD K21.9       Isolation/Infection:   Isolation            No Isolation          Patient Infection Status       None to display            Nurse Assessment:  Last Vital Signs: BP (!) 176/75   Pulse 57   Temp 98.1 °F (36.7 °C) (Oral)   Resp 18   Ht 5' 4\" (1.626 m)   Wt 220 lb (99.8 kg)   SpO2 98%   BMI 37.76 kg/m²     Last documented pain score (0-10 scale): Pain Level: 0  Last Weight:   Wt Readings from Last 1 Encounters:   12/09/21 220 lb (99.8 kg)     Mental Status:  {IP PT MENTAL STATUS:20030}    IV Access:  { FABRIZIO IV ACCESS:303935011}    Nursing Mobility/ADLs:  Walking   {P DME JTIO:341293506}  Transfer  {P DME VHHU:467380653}  Bathing  {P DME HQRV:579013475}  Dressing  {P DME EVML:287625066}  Toileting  {CHP DME ZROD:383734575}  Feeding  {CHP DME HZYH:644974330}  Med Admin  {CHP DME TFKO:234895550}  Med Delivery   { FABRIZIO MED Delivery:846773664}    Wound Care Documentation and Therapy:        Elimination:  Continence: Bowel: {YES / VZ:41560}  Bladder: {YES / ES:23259}  Urinary Catheter: {Urinary Catheter:774419147}   Colostomy/Ileostomy/Ileal Conduit: {YES / AM:10783}       Date of Last BM: ***  No intake or output data in the 24 hours ending 21 0818  No intake/output data recorded.     Safety Concerns:     [employer] Safety Concerns:976401783}    Impairments/Disabilities:      [address] [employer] Impairments/Disabilities:990579894}    Nutrition Therapy:  Current Nutrition Therapy:   [employer] Diet List:028353640}    Routes of Feeding: {[facility] DME Other Feedings:892213880}  Liquids: {Slp liquid thickness:76224}  Daily Fluid Restriction: {CHP DME Yes amt example:180528963}  Last Modified Barium Swallow with Video (Video Swallowing Test): {Done Not Done NZTB:387176321}    Treatments at the Time of Hospital Discharge:   Respiratory Treatments: ***  Oxygen Therapy:  {Therapy; copd oxygen:76320}  Ventilator:    { CC Vent ETZQ:402807239}    Rehab Therapies: {THERAPEUTIC INTERVENTION:7968539074}  Weight Bearing Status/Restrictions: [employer]  Weight Bearin}  Other Medical Equipment (for information only, NOT a DME order):  {EQUIPMENT:561191051}  Other Treatments: ***    Patient's personal belongings (please select all that are sent with patient):  {[facility] DME Belongings:388060579}    RN SIGNATURE:  {Esignature:604902866}    CASE MANAGEMENT/SOCIAL WORK SECTION    Inpatient Status Date: ***    Readmission Risk Assessment Score:  Readmission Risk              Risk of Unplanned Readmission:  13           Discharging to Facility/ Agency   Name:   Address:  Phone:  Fax:    Dialysis Facility (if applicable)   Name:  Address:  Dialysis Schedule:  Phone:  Fax:    / signature: {Esignature:180800196}    PHYSICIAN SECTION    Prognosis: Good    Condition at Discharge: Stable    Rehab Potential (if transferring to Rehab): Good    Recommended Labs or Other Treatments After Discharge:   WBAT to operative extremity   Aquacell dressing to be removed pod7\  ASA 81 mg BID for 30 days and then can resume asa 81 mg daily     Physician Certification: I certify the above information and transfer of Blank Mi  is necessary for the continuing treatment of the diagnosis listed and that she requires Home Care for less 30 days.      Update Admission H&P: No change in H&P    PHYSICIAN SIGNATURE:  Electronically signed by Tera Krueger MD on 12/13/21 at 8:19 AM EST

## 2021-12-13 NOTE — CARE COORDINATION
Patient to be discharged home this morning with [facility]. Call placed to notify of orders and referral. They have accepted the patient. Nursing aware of [place] [address] arrangements. CM to follow for any other d/c needs.

## 2021-12-13 NOTE — PROGRESS NOTES
[facility] OF THE [facility] [place] Note      Patient: Jose Shaffer    Unit/Bed: O195/B862-29  YOB: 1945  MRN: [id number]  [facility] Date:  12/9/2021  Hospital Day: 4    Rounding Date: 12/13/2021    Rounding Cardiologist:  RAFAEL Linton MD    PRIMARY Cardiologist: John Linton    Subjective Complaint:   Denies any chest pain with exertion or at rest, palpitations, syncope, or edema. .     Physical Examination:     BP (!) 176/75   Pulse 57   Temp 98.1 °F (36.7 °C) (Oral)   Resp 18   Ht 5' 4\" (1.626 m)   Wt 220 lb (99.8 kg)   SpO2 98%   BMI 37.76 kg/m²     No intake or output data in the 24 hours ending 12/13/21 2316 Nitish Reeves examined at bedside in in no apparent distress, cooperative and improved. Focused exam reveals:     Cardiac: Heart regular rate and rhythm     Lungs:  clear to auscultation bilaterally- no wheezes, rales or rhonchi, normal air movement, no respiratory distress    Extremities:   Trace edema    Telemetry:      Not on telemetry         LABS:   CBC: Recent Labs     12/11/21  0640 12/12/21  0632   WBC 10.8 8.2   HGB 12.0 11.9*    157      BMP:    Recent Labs     12/11/21  0640 12/12/21  0632 12/13/21  0618    139 140   K 4.7  4.7 4.2 4.7    106 106   CO2 21 24 22   BUN 18 24* 30*   CREATININE 1.13* 1.15* 0.94*   GLUCOSE 125* 90 128*              Troponin: No results for input(s): TROPONINT in the last 72 hours. BNP: No results for input(s): PROBNP in the last 72 hours. INR: No results for input(s): INR in the last 72 hours. Mg:   Recent Labs     12/13/21  0618   MG 2.2       Cardiac Testing:    none    Assessment:  Status post hip surgery  No cardiac complaints  History of coronary artery disease-status post CABG  Hypertension  Plan:  1. Increase Coreg to 12.5 mg twice daily  2. As needed hydralazine  3. Okay to discharge when okay with other services  4.  See orders  Electronically signed by Erick Naranjo MD on 12/13/2021 at 11:12 AM

## 2021-12-13 NOTE — PROGRESS NOTES
BREAST SURGERY Bilateral     reduction and cysts biopsy / has had several biopsies    CARDIAC SURGERY  2005    3 vessel bypass    CATARACT REMOVAL WITH IMPLANT      CHOLECYSTECTOMY      COLONOSCOPY      CYSTOURETHROSCOPY  04/18/2017    FLEXIBLE / due to UTI    ENDOSCOPY, COLON, DIAGNOSTIC      EYE SURGERY      [place] with IOL OU    HAND SURGERY Right     plate fracture wrist / hardware still remains    HYSTERECTOMY      JOINT REPLACEMENT  2017    LTKR    OTHER SURGICAL HISTORY      head fatty tumors removed    DE COLORECTAL SCRN; HI RISK IND N/A 6/11/2018    COLONOSCOPY performed by Danny Gallegos MD at . [facility] ESOPHAGOGASTRODUODENOSCOPY TRANSORAL DIAGNOSTIC N/A 6/11/2018    EGD ESOPHAGOGASTRODUODENOSCOPY WITH DILATION performed by Danny Gallegos MD at [place] [address] 1/31/2019    WITH S.S. L. S performed by Kain Esparza MD at [address] [place] [address] Left 08/2016    VAGINA SURGERY N/A 1/31/2019    RECTOCELE REPAIR WITH ENTEROCELE REPAIR performed by Kain Esparza MD at [address] [place] [address]  Restrictions/Precautions: Weight Bearing; Fall Risk  Lower Extremity Weight Bearing Restrictions  Right Lower Extremity Weight Bearing: Weight Bearing As Tolerated    SUBJECTIVE   General  Chart Reviewed: Yes  Family / Caregiver Present: No  Subjective  Subjective: \"I think I'm supposed to be going home soon. \"    Pre-Session Pain Report  Pre Treatment Pain Screening  Intervention List: Patient able to continue with treatment; Patient declined any intervention  Comments / Details: Pt reports receiving tylenol  Pain Screening  Patient Currently in Pain: Yes       Post-Session Pain Report  Pain Assessment  Pain Assessment: 0-10  Pain Level: 2  Pain Location: Leg; Hip  Pain Orientation: Right  Pain Descriptors: Aching;  Sore         OBJECTIVE        Bed mobility  Supine to Sit: Unable to assess  Sit to Supine: Unable to assess  Comment: Pt up in chair pre/post session    Transfers  Sit to Stand: Supervision  Stand to sit: Stand by assistance  Car Transfer: Stand by assistance (Min vc's for improving technique)  Comment: Decreased eccentric control. VC's for utilizing UE's during stand > sit and manuevering ww with approach to chair. good follow through. Ambulation  Ambulation?: Yes  More Ambulation?: No  Ambulation 1  Surface: level tile  Device: Rolling Walker  Assistance: Stand by assistance  Quality of Gait: recirpocal, R toe in, shortened step length, NBOS  Gait Deviations: Decreased step length; Decreased step height; Slow Ruchi  Distance: 50' x 3, multiple short distances throughout room    Stairs/Curb  Stairs?: Yes  Stairs  # Steps : 8  Rails: Bilateral  Curbs: 6\"  Assistance: Stand by assistance  Comment: Non recirpocal, no LOB    Neuromuscular Education  Neuromuscular Comments: Manuevering around room, in tight spaces, around objects, fwd reaching OOBOS at various heights. PT Education  Patient Education: Pt educated on improving safety when returning home with stand > sit trsfs. Visual demonstration provided with proper hand placement throughout trsf and proper use of WW. Education also provided to pt on importance of slowing down and taking time to process movements while healing. Pt can become impulsive when in a rush or distracted. ASSESSMENT   Assessment: Increased time needed this session secondary to education provided on tasks performed and also visual demonstrations of safe technique. Pt exhibits an improvement with strength and endurance secondary to increasing overall ambulatory distance. Good prorgression toward goals achieveed this session. All pt's questions answered with anticipated d/c. Patient Education: Pt educated on improving safety when returning home with stand > sit trsfs.  Visual demonstration provided with proper hand placement throughout trsf and proper use of WW. Education also provided to pt on importance of slowing down and taking time to process movements while healing. Pt can become impulsive when in a rush or distracted. Discharge Recommendations:  Continue to assess pending progress    Goals  Long term goals  Time Frame for Long term goals : LOS  Long term goal 1: pt will be independent with bed mobility  Long term goal 2: pt will be independent with transfers using LRAD  Long term goal 3: pt will be independent with ambulation >/= 50 ft to navigate household distances  Long term goal 4: pt will be able to negotiate 8 stairs MI with single HR to navigate throughout split level home    PLAN    Times per week: 7  Times per day:  (1-2)  Plan Comment: Cont. POC  Safety Devices  Type of devices: All fall risk precautions in place, Call light within reach, Chair alarm in place, Left in chair     [facility] (6 CLICK) Hilary 95 Raw Score : 19     Therapy Time   Individual   Time In 0935   Time Out 1020   Minutes 45      BM/Trsf: 15  Gait: 23  NMR: 7       [facility] ([place], \A [facility]ology [facility] [place] [facility]\"", 12/13/21 at 10:35 AM         Definitions for assistance levels  Independent = pt does not require any physical supervision or assistance from another person for activity completion. Device may be needed.   Stand by assistance = pt requires verbal cues or instructions from another person, close to but not touching, to perform the activity  Minimal assistance= pt performs 75% or more of the activity; assistance is required to complete the activity  Moderate assistance= pt performs 50% of the activity; assistance is required to complete the activity  Maximal assistance = pt performs 25% of the activity; assistance is required to complete the activity  Dependent = pt requires total physical assistance to accomplish the task

## 2022-04-02 LAB
ANION GAP SERPL CALCULATED.3IONS-SCNC: 14 MEQ/L (ref 9–15)
BASOPHILS ABSOLUTE: 0 K/UL (ref 0–0.2)
BASOPHILS RELATIVE PERCENT: 0.8 %
BUN BLDV-MCNC: 18 MG/DL (ref 8–23)
CHLORIDE BLD-SCNC: 108 MEQ/L (ref 95–107)
CO2: 19 MEQ/L (ref 20–31)
CREAT SERPL-MCNC: 1.1 MG/DL (ref 0.5–0.9)
EOSINOPHILS ABSOLUTE: 0.1 K/UL (ref 0–0.7)
EOSINOPHILS RELATIVE PERCENT: 1.9 %
GFR AFRICAN AMERICAN: 58.4
GFR NON-AFRICAN AMERICAN: 48.3
HCT VFR BLD CALC: 40.5 % (ref 37–47)
HEMOGLOBIN: 13.3 G/DL (ref 12–16)
LYMPHOCYTES ABSOLUTE: 1.1 K/UL (ref 1–4.8)
LYMPHOCYTES RELATIVE PERCENT: 20.6 %
MAGNESIUM: 2.2 MG/DL (ref 1.7–2.4)
MCH RBC QN AUTO: 29.1 PG (ref 27–31.3)
MCHC RBC AUTO-ENTMCNC: 32.7 % (ref 33–37)
MCV RBC AUTO: 89.1 FL (ref 82–100)
MONOCYTES ABSOLUTE: 0.5 K/UL (ref 0.2–0.8)
MONOCYTES RELATIVE PERCENT: 9.9 %
NEUTROPHILS ABSOLUTE: 3.6 K/UL (ref 1.4–6.5)
NEUTROPHILS RELATIVE PERCENT: 66.8 %
PDW BLD-RTO: 14.2 % (ref 11.5–14.5)
PLATELET # BLD: 215 K/UL (ref 130–400)
POTASSIUM SERPL-SCNC: 3.8 MEQ/L (ref 3.4–4.9)
RBC # BLD: 4.55 M/UL (ref 4.2–5.4)
SODIUM BLD-SCNC: 141 MEQ/L (ref 135–144)
TOTAL CK: 52 U/L (ref 0–170)
WBC # BLD: 5.4 K/UL (ref 4.8–10.8)

## 2022-08-22 LAB
SARS-COV-2, PCR: NOT DETECTED
SPECIMEN SOURCE: NORMAL

## 2022-08-23 LAB
CLARITY FLUID: NORMAL
COLOR FLUID: NORMAL
GRAM STAIN RESULT: NORMAL
RED BLOOD CELLS, BODY FLUID: NORMAL /UL
SPECIMEN SOURCE: NORMAL
SPECIMEN SOURCE: NORMAL
WHITE BLOOD CELLS, BODY FLUID: 5185 /UL

## 2022-08-26 LAB — BETA-2 TRANSFERRIN FLUID: NOT DETECTED

## 2022-10-16 ENCOUNTER — HOSPITAL ENCOUNTER (EMERGENCY)
Age: 77
Discharge: HOME | End: 2022-10-16
Attending: EMERGENCY MEDICINE
Payer: MEDICARE

## 2022-10-16 ENCOUNTER — APPOINTMENT (OUTPATIENT)
Dept: GENERAL RADIOLOGY | Age: 77
End: 2022-10-16
Payer: MEDICARE

## 2022-10-16 VITALS
HEIGHT: 66 IN | OXYGEN SATURATION: 97 % | DIASTOLIC BLOOD PRESSURE: 84 MMHG | TEMPERATURE: 98 F | WEIGHT: 220 LBS | BODY MASS INDEX: 35.36 KG/M2 | SYSTOLIC BLOOD PRESSURE: 161 MMHG | HEART RATE: 78 BPM | RESPIRATION RATE: 18 BRPM

## 2022-10-16 DIAGNOSIS — U07.1 COVID: Primary | ICD-10-CM

## 2022-10-16 DIAGNOSIS — N30.01 ACUTE CYSTITIS WITH HEMATURIA: ICD-10-CM

## 2022-10-16 LAB
BACTERIA: NEGATIVE /HPF
BILIRUBIN URINE: NEGATIVE
BLOOD, URINE: ABNORMAL
CLARITY: ABNORMAL
COLOR: YELLOW
EPITHELIAL CELLS, UA: >100 /HPF (ref 0–5)
GLUCOSE URINE: NEGATIVE MG/DL
HYALINE CASTS: ABNORMAL /LPF (ref 0–5)
KETONES, URINE: ABNORMAL MG/DL
LEUKOCYTE ESTERASE, URINE: ABNORMAL
NITRITE, URINE: NEGATIVE
PH UA: 5.5 (ref 5–9)
PROTEIN UA: 30 MG/DL
RBC UA: ABNORMAL /HPF (ref 0–5)
SARS-COV-2, NAAT: DETECTED
SPECIFIC GRAVITY UA: 1.02 (ref 1–1.03)
URINE REFLEX TO CULTURE: YES
UROBILINOGEN, URINE: 1 E.U./DL
WBC UA: ABNORMAL /HPF (ref 0–5)

## 2022-10-16 PROCEDURE — 87186 SC STD MICRODIL/AGAR DIL: CPT

## 2022-10-16 PROCEDURE — 87635 SARS-COV-2 COVID-19 AMP PRB: CPT

## 2022-10-16 PROCEDURE — 71046 X-RAY EXAM CHEST 2 VIEWS: CPT

## 2022-10-16 PROCEDURE — 81001 URINALYSIS AUTO W/SCOPE: CPT

## 2022-10-16 PROCEDURE — 87077 CULTURE AEROBIC IDENTIFY: CPT

## 2022-10-16 PROCEDURE — 87086 URINE CULTURE/COLONY COUNT: CPT

## 2022-10-16 PROCEDURE — 99284 EMERGENCY DEPT VISIT MOD MDM: CPT

## 2022-10-16 RX ORDER — NITROFURANTOIN 25; 75 MG/1; MG/1
100 CAPSULE ORAL 2 TIMES DAILY
Qty: 14 CAPSULE | Refills: 0 | Status: SHIPPED | OUTPATIENT
Start: 2022-10-16 | End: 2022-10-23

## 2022-10-16 ASSESSMENT — PAIN DESCRIPTION - LOCATION: LOCATION: GENERALIZED

## 2022-10-16 ASSESSMENT — PAIN DESCRIPTION - FREQUENCY: FREQUENCY: CONTINUOUS

## 2022-10-16 ASSESSMENT — PAIN DESCRIPTION - PAIN TYPE: TYPE: ACUTE PAIN

## 2022-10-16 ASSESSMENT — PAIN - FUNCTIONAL ASSESSMENT: PAIN_FUNCTIONAL_ASSESSMENT: 0-10

## 2022-10-16 ASSESSMENT — PAIN SCALES - GENERAL: PAINLEVEL_OUTOF10: 7

## 2022-10-16 ASSESSMENT — LIFESTYLE VARIABLES: HOW OFTEN DO YOU HAVE A DRINK CONTAINING ALCOHOL: NEVER

## 2022-10-16 NOTE — ED TRIAGE NOTES
Pt states she would like a covid test she has been sick since Friday. Pt has co fever , chills, body aches and head ache. Pt states her son tested + for covid last week. Pt is aox4 pwd.

## 2022-10-18 LAB
ORGANISM: ABNORMAL
URINE CULTURE, ROUTINE: ABNORMAL
URINE CULTURE, ROUTINE: ABNORMAL

## 2022-10-25 NOTE — ED PROVIDER NOTES
[facility] ED  eMERGENCY dEPARTMENT eNCOUnter      Pt Name: Josh Urias  MRN: [id number]  Jilliangfambrosio 1945  Date of evaluation: 10/16/2022  Provider: Juan Luis Caruso MD    CHIEF COMPLAINT       Chief Complaint   Patient presents with    Illness     Times two days   Son test + for covid           HISTORY OF PRESENT ILLNESS   (Location/Symptom, Timing/Onset,Context/Setting, Quality, Duration, Modifying Factors, Severity)  Note limiting factors. Josh Urias is a 69 y.o. female who presents to the emergency department with complaint that she just not feeling that well over the last couple of days. Patient admits that he seems to have body aches and generalized illness. Patient mitts she is not feeling well. Patient admits her son was recently diagnosed as positive for COVID. This kind patient admits fever and chills. Patient admits body aches and headache. This patient denies lorenzo shortness of breath or lorenzo chest pain. Patient admits there may be some cough. Patient admits decreased appetite, however no significant nausea vomiting or diarrhea. HPI    NursingNotes were reviewed. REVIEW OF SYSTEMS    (2-9 systems for level 4, 10 or more for level 5)     Review of Systems   Constitutional:  Positive for activity change, appetite change, chills and fever. HENT:  Positive for congestion. Negative for ear pain, rhinorrhea and trouble swallowing. Eyes: Negative. Respiratory:  Positive for cough. Negative for shortness of breath, wheezing and stridor. Cardiovascular:  Negative for chest pain and leg swelling. Gastrointestinal:  Negative for abdominal pain, nausea and vomiting. Endocrine: Negative. Genitourinary:  Positive for dysuria and hematuria. Musculoskeletal:  Positive for arthralgias and myalgias. Negative for gait problem and neck pain. Skin: Negative. Allergic/Immunologic: Negative.     Neurological:  Negative for seizures and light-headedness. Hematological: Negative. Psychiatric/Behavioral: Negative. Except as noted above the remainder of the review of systems was reviewed and negative.        PAST MEDICAL HISTORY     Past Medical History:   Diagnosis Date    CAD (coronary artery disease) 2005    3 vessel cardiac bypass    Environmental and seasonal allergies     GERD (gastroesophageal reflux disease)     GI bleed 2017    with LTKR / blood transfusion    History of blood transfusion 2017    post op LTKR    History of blood transfusion 2005    post op cardiac bypass    Hyperlipidemia     meds > 15 yrs    Hypertension     meds > 20 yrs    Myocardial infarct ([facility] [address] 2005    followed by 3 vessal cardiac bypass    Pityriasis rosea     Prolonged emergence from general anesthesia          SURGICALHISTORY       Past Surgical History:   Procedure Laterality Date    ARTERIAL BYPASS SURGRY      triple / cardiac    BREAST SURGERY Bilateral     reduction and cysts biopsy / has had several biopsies    CARDIAC SURGERY  2005    3 vessel bypass    CATARACT REMOVAL WITH IMPLANT      CHOLECYSTECTOMY      COLONOSCOPY      CT ABSCESS DRAIN SUBCUTANEOUS  8/23/2022    CT ABSCESS DRAIN SUBCUTANEOUS    CYSTOURETHROSCOPY  04/18/2017    FLEXIBLE / due to UTI    ENDOSCOPY, COLON, DIAGNOSTIC      EYE SURGERY      [place] with IOL OU    HAND SURGERY Right     plate fracture wrist / hardware still remains    HIP SURGERY Right 12/10/2021    RIGHT HIP  PERCUTANEOUS SCREW FIXATION performed by Shawnee Claudio MD at [address] ([facility])      JOINT REPLACEMENT  2017    LTKR    OTHER SURGICAL HISTORY      head fatty tumors removed    DC COLORECTAL SCRN; HI RISK IND N/A 6/11/2018    COLONOSCOPY performed by Isaiah Ford MD at [address] ESOPHAGOGASTRODUODENOSCOPY TRANSORAL DIAGNOSTIC N/A 6/11/2018    EGD ESOPHAGOGASTRODUODENOSCOPY WITH DILATION [facility] at [facility] Cholesterol Mother     Other Brother          at age 27 / incident in care facility / pt was mentally challenged    Heart Disease Daughter     Other Son         blood dyscrasia          SOCIAL HISTORY       Social History     Socioeconomic History    Marital status:    Tobacco Use    Smoking status: Never    Smokeless tobacco: Never   Vaping Use    Vaping Use: Never used   Substance and Sexual Activity    Alcohol use: Yes     Alcohol/week: 0.0 standard drinks     Comment: rare social    Drug use: No       SCREENINGS    [place] Coma Scale  Eye Opening: Spontaneous  Best Verbal Response: Oriented  Best Motor Response: Obeys commands  Chema Coma Scale Score: 15        PHYSICAL EXAM    (up to 7 for level 4, 8 or more for level 5)     ED Triage Vitals [10/16/22 0734]   BP Temp Temp Source Heart Rate Resp SpO2 Height Weight   (!) 161/84 98 °F (36.7 °C) Temporal 78 18 97 % 5' 6\" (1.676 m) 220 lb (99.8 kg)       Physical Exam  Constitutional:       General: She is not in acute distress. Appearance: Normal appearance. She is well-developed. Comments: This patient appears generally under the weather, however in no acute cardio or pulmonary distress. HENT:      Head: Normocephalic and atraumatic. Nose: Congestion present. Eyes:      General:         Right eye: No discharge. Left eye: No discharge. Conjunctiva/sclera: Conjunctivae normal.      Pupils: Pupils are equal, round, and reactive to light. Neck:      Trachea: Trachea normal.   Cardiovascular:      Rate and Rhythm: Normal rate and regular rhythm. Pulses: Normal pulses. Heart sounds: Normal heart sounds. Pulmonary:      Effort: Pulmonary effort is normal. No respiratory distress. Breath sounds: Normal breath sounds. Abdominal:      General: Bowel sounds are normal. There is no distension. Palpations: Abdomen is soft. Musculoskeletal:         General: No swelling. Normal range of motion.       Cervical back: Full passive range of motion without pain, normal range of motion and neck supple. Skin:     General: Skin is warm and dry. Capillary Refill: Capillary refill takes less than 2 seconds. Neurological:      Mental Status: She is alert and oriented to person, place, and time. Psychiatric:         Speech: Speech normal.         Behavior: Behavior normal.         Thought Content: Thought content normal.         Judgment: Judgment normal.       DIAGNOSTIC RESULTS     EKG: All EKG's are interpreted by the Emergency Department Physician who either signs or Co-signsthis chart in the absence of a cardiologist.    -    RADIOLOGY:   [facility] such as CT, Ultrasound and MRI are read by the radiologist. Soheila Ramus radiographic images are visualized and preliminarily interpreted by the emergency physician with the below findings:    Chest x-ray demonstrates unremarkable mediastinum. There is no evidence of pulmonary infiltrative process. Unremarkable bony structure. Interpretation per the Radiologist below, if available at the time ofthis note:    XR CHEST (2 VW)   Final Result   No acute process.                ED BEDSIDE ULTRASOUND:   Performed by ED Physician - none    LABS:  Labs Reviewed   COVID-19, RAPID - Abnormal; Notable for the following components:       Result Value    SARS-CoV-2, NAAT DETECTED (*)     All other components within normal limits   CULTURE, URINE - Abnormal; Notable for the following components:    Urine Culture, Routine   (*)     Value: Performed at [facility], [address] [place] [address]  [phone]      Organism Escherichia coli (*)     All other components within normal limits    Narrative:     ORDER#: [id number]                          ORDERED BY: EMRE WYATT  SOURCE: Urine Clean Catch                  COLLECTED:  10/16/22 10:04  ANTIBIOTICS AT [facility]ILIA.:                      RECEIVED :  10/16/22 10:04   URINALYSIS WITH REFLEX TO CULTURE - Abnormal; Notable for the following components:    Clarity, UA CLOUDY (*)     Ketones, Urine TRACE (*)     Blood, Urine TRACE (*)     Protein, UA 30 (*)     Leukocyte Esterase, Urine TRACE (*)     All other components within normal limits   MICROSCOPIC URINALYSIS - Abnormal; Notable for the following components:    WBC, UA 10-20 (*)     RBC, UA 6-10 (*)     Epithelial Cells, UA >100 (*)     All other components within normal limits       All other labs were within normal range or not returned as of this dictation. EMERGENCY DEPARTMENT COURSE and DIFFERENTIAL DIAGNOSIS/MDM:   Vitals:    Vitals:    10/16/22 0734   BP: (!) 161/84   Pulse: 78   Resp: 18   Temp: 98 °F (36.7 °C)   TempSrc: Temporal   SpO2: 97%   Weight: 220 lb (99.8 kg)   Height: 5' 6\" (1.676 m)       Advised this kind patient on current findings and need for close outpatient follow-up. Advised on diagnosis. Patient was very pleased with current plan of care and treatment. Patient return should condition worsen in any capacity. MDM      CRITICAL CARE TIME   Total Critical Care time was - minutes, excluding separately reportableprocedures. There was a high probability of clinicallysignificant/life threatening deterioration in the patient's condition which required my urgent intervention.  -    CONSULTS:  None    PROCEDURES:  Unless otherwise noted below, none     Procedures    FINAL IMPRESSION      1. COVID    2.  Acute cystitis with hematuria        DISPOSITION/PLAN   DISPOSITION Decision To Discharge 10/16/2022 09:13:20 AM      PATIENT REFERRED TO:  Lico Garcia DO  [address]  [phone]    Call today  for follow up Emergency Department visit      DISCHARGE MEDICATIONS:  Discharge Medication List as of 10/16/2022  9:18 AM        START taking these medications    Details   nirmatrelvir/ritonavir (PAXLOVID) 20 x 150 MG & 10 x 100MG TBPK Take 3 tablets (two 150 mg nirmatrelvir and one 100 mg ritonavir tablets) by mouth every 12 hours for 5 days. , Disp-30 tablet, R-0Normal      nitrofurantoin, macrocrystal-monohydrate, (MACROBID) 100 MG capsule Take 1 capsule by mouth 2 times daily for 7 days, Disp-14 capsule, R-0Normal                (Please note that portions of this note were completed with a voice recognitionprogram.  Efforts were made to edit the dictations but occasionally words are mis-transcribed.)    Balbina Cline MD (electronically signed)  Attending Emergency Physician         Balbina Cline MD  10/27/22 0402

## 2022-10-27 ASSESSMENT — ENCOUNTER SYMPTOMS
NAUSEA: 0
RHINORRHEA: 0
SHORTNESS OF BREATH: 0
VOMITING: 0
EYES NEGATIVE: 1
ALLERGIC/IMMUNOLOGIC NEGATIVE: 1
WHEEZING: 0
STRIDOR: 0
TROUBLE SWALLOWING: 0
ABDOMINAL PAIN: 0
COUGH: 1

## 2022-12-04 ENCOUNTER — APPOINTMENT (OUTPATIENT)
Dept: CT IMAGING | Age: 77
End: 2022-12-04
Payer: MEDICARE

## 2022-12-04 ENCOUNTER — HOSPITAL ENCOUNTER (OUTPATIENT)
Age: 77
Setting detail: OBSERVATION
Discharge: HOME | End: 2022-12-05
Attending: STUDENT IN AN ORGANIZED HEALTH CARE EDUCATION/TRAINING PROGRAM | Admitting: STUDENT IN AN ORGANIZED HEALTH CARE EDUCATION/TRAINING PROGRAM
Payer: MEDICARE

## 2022-12-04 DIAGNOSIS — R07.9 CHEST PAIN, UNSPECIFIED TYPE: Primary | ICD-10-CM

## 2022-12-04 LAB
ALBUMIN SERPL-MCNC: 3.9 G/DL (ref 3.5–4.6)
ALP BLD-CCNC: 70 U/L (ref 40–130)
ALT SERPL-CCNC: 13 U/L (ref 0–33)
ANION GAP SERPL CALCULATED.3IONS-SCNC: 11 MEQ/L (ref 9–15)
AST SERPL-CCNC: 16 U/L (ref 0–35)
BASOPHILS ABSOLUTE: 0.1 K/UL (ref 0–0.2)
BASOPHILS RELATIVE PERCENT: 1 %
BILIRUB SERPL-MCNC: 0.5 MG/DL (ref 0.2–0.7)
BUN BLDV-MCNC: 16 MG/DL (ref 8–23)
CALCIUM SERPL-MCNC: 9 MG/DL (ref 8.5–9.9)
CHLORIDE BLD-SCNC: 102 MEQ/L (ref 95–107)
CO2: 22 MEQ/L (ref 20–31)
CREAT SERPL-MCNC: 1.05 MG/DL (ref 0.5–0.9)
EOSINOPHILS ABSOLUTE: 0.1 K/UL (ref 0–0.7)
EOSINOPHILS RELATIVE PERCENT: 1.9 %
GFR SERPL CREATININE-BSD FRML MDRD: 52 ML/MIN/{1.73_M2}
GFR SERPL CREATININE-BSD FRML MDRD: 54.8 ML/MIN/{1.73_M2}
GLOBULIN: 3 G/DL (ref 2.3–3.5)
GLUCOSE BLD-MCNC: 96 MG/DL (ref 70–99)
HCT VFR BLD CALC: 36.1 % (ref 37–47)
HEMOGLOBIN: 11.8 G/DL (ref 12–16)
LACTIC ACID: 0.9 MMOL/L (ref 0.5–2.2)
LYMPHOCYTES ABSOLUTE: 1.3 K/UL (ref 1–4.8)
LYMPHOCYTES RELATIVE PERCENT: 21.6 %
MAGNESIUM: 2 MG/DL (ref 1.7–2.4)
MCH RBC QN AUTO: 28 PG (ref 27–31.3)
MCHC RBC AUTO-ENTMCNC: 32.6 % (ref 33–37)
MCV RBC AUTO: 86.1 FL (ref 79.4–94.8)
MONOCYTES ABSOLUTE: 0.7 K/UL (ref 0.2–0.8)
MONOCYTES RELATIVE PERCENT: 10.8 %
NEUTROPHILS ABSOLUTE: 4 K/UL (ref 1.4–6.5)
NEUTROPHILS RELATIVE PERCENT: 64.7 %
PDW BLD-RTO: 15.4 % (ref 11.5–14.5)
PERFORMED ON: ABNORMAL
PLATELET # BLD: 192 K/UL (ref 130–400)
POC CREATININE WHOLE BLOOD: 1.1
POC CREATININE: 1.1 MG/DL (ref 0.6–1.2)
POC SAMPLE TYPE: ABNORMAL
POTASSIUM SERPL-SCNC: 4 MEQ/L (ref 3.4–4.9)
PRO-BNP: 466 PG/ML
PRO-BNP: 522 PG/ML
RBC # BLD: 4.19 M/UL (ref 4.2–5.4)
SODIUM BLD-SCNC: 135 MEQ/L (ref 135–144)
TOTAL PROTEIN: 6.9 G/DL (ref 6.3–8)
TROPONIN: <0.01 NG/ML (ref 0–0.01)
WBC # BLD: 6.1 K/UL (ref 4.8–10.8)

## 2022-12-04 PROCEDURE — 93005 ELECTROCARDIOGRAM TRACING: CPT | Performed by: STUDENT IN AN ORGANIZED HEALTH CARE EDUCATION/TRAINING PROGRAM

## 2022-12-04 PROCEDURE — 83605 ASSAY OF LACTIC ACID: CPT

## 2022-12-04 PROCEDURE — 6370000000 HC RX 637 (ALT 250 FOR IP): Performed by: PHYSICIAN ASSISTANT

## 2022-12-04 PROCEDURE — 36415 COLL VENOUS BLD VENIPUNCTURE: CPT

## 2022-12-04 PROCEDURE — 85025 COMPLETE CBC W/AUTO DIFF WBC: CPT

## 2022-12-04 PROCEDURE — 6360000004 HC RX CONTRAST MEDICATION: Performed by: PHYSICIAN ASSISTANT

## 2022-12-04 PROCEDURE — 80053 COMPREHEN METABOLIC PANEL: CPT

## 2022-12-04 PROCEDURE — G0378 HOSPITAL OBSERVATION PER HR: HCPCS

## 2022-12-04 PROCEDURE — 83880 ASSAY OF NATRIURETIC PEPTIDE: CPT

## 2022-12-04 PROCEDURE — 83735 ASSAY OF MAGNESIUM: CPT

## 2022-12-04 PROCEDURE — 96372 THER/PROPH/DIAG INJ SC/IM: CPT

## 2022-12-04 PROCEDURE — 6370000000 HC RX 637 (ALT 250 FOR IP): Performed by: STUDENT IN AN ORGANIZED HEALTH CARE EDUCATION/TRAINING PROGRAM

## 2022-12-04 PROCEDURE — 71275 CT ANGIOGRAPHY CHEST: CPT

## 2022-12-04 PROCEDURE — 2580000003 HC RX 258: Performed by: STUDENT IN AN ORGANIZED HEALTH CARE EDUCATION/TRAINING PROGRAM

## 2022-12-04 PROCEDURE — 99285 EMERGENCY DEPT VISIT HI MDM: CPT

## 2022-12-04 PROCEDURE — 6360000002 HC RX W HCPCS: Performed by: STUDENT IN AN ORGANIZED HEALTH CARE EDUCATION/TRAINING PROGRAM

## 2022-12-04 PROCEDURE — 84484 ASSAY OF TROPONIN QUANT: CPT

## 2022-12-04 RX ORDER — AMLODIPINE BESYLATE 5 MG/1
5 TABLET ORAL DAILY
Status: DISCONTINUED | OUTPATIENT
Start: 2022-12-05 | End: 2022-12-05 | Stop reason: HOSPADM

## 2022-12-04 RX ORDER — AMLODIPINE BESYLATE AND ATORVASTATIN CALCIUM 10; 20 MG/1; MG/1
1 TABLET, FILM COATED ORAL DAILY
Status: ON HOLD | COMMUNITY
End: 2022-12-05 | Stop reason: HOSPADM

## 2022-12-04 RX ORDER — NITROGLYCERIN 0.4 MG/1
0.4 TABLET SUBLINGUAL EVERY 5 MIN PRN
Status: DISCONTINUED | OUTPATIENT
Start: 2022-12-04 | End: 2022-12-05 | Stop reason: HOSPADM

## 2022-12-04 RX ORDER — ENOXAPARIN SODIUM 100 MG/ML
40 INJECTION SUBCUTANEOUS NIGHTLY
Status: DISCONTINUED | OUTPATIENT
Start: 2022-12-04 | End: 2022-12-05 | Stop reason: HOSPADM

## 2022-12-04 RX ORDER — SODIUM CHLORIDE 0.9 % (FLUSH) 0.9 %
5-40 SYRINGE (ML) INJECTION EVERY 12 HOURS SCHEDULED
Status: DISCONTINUED | OUTPATIENT
Start: 2022-12-04 | End: 2022-12-05 | Stop reason: HOSPADM

## 2022-12-04 RX ORDER — ONDANSETRON 2 MG/ML
4 INJECTION INTRAMUSCULAR; INTRAVENOUS EVERY 6 HOURS PRN
Status: DISCONTINUED | OUTPATIENT
Start: 2022-12-04 | End: 2022-12-05 | Stop reason: HOSPADM

## 2022-12-04 RX ORDER — LEVOTHYROXINE SODIUM 0.03 MG/1
25 TABLET ORAL DAILY
Status: DISCONTINUED | OUTPATIENT
Start: 2022-12-05 | End: 2022-12-05 | Stop reason: HOSPADM

## 2022-12-04 RX ORDER — ATORVASTATIN CALCIUM 80 MG/1
80 TABLET, FILM COATED ORAL NIGHTLY
Status: DISCONTINUED | OUTPATIENT
Start: 2022-12-04 | End: 2022-12-05 | Stop reason: HOSPADM

## 2022-12-04 RX ORDER — POLYETHYLENE GLYCOL 3350 17 G/17G
17 POWDER, FOR SOLUTION ORAL DAILY PRN
Status: DISCONTINUED | OUTPATIENT
Start: 2022-12-04 | End: 2022-12-05 | Stop reason: HOSPADM

## 2022-12-04 RX ORDER — ATORVASTATIN CALCIUM 80 MG/1
80 TABLET, FILM COATED ORAL NIGHTLY
Status: DISCONTINUED | OUTPATIENT
Start: 2022-12-04 | End: 2022-12-04

## 2022-12-04 RX ORDER — SODIUM CHLORIDE 0.9 % (FLUSH) 0.9 %
5-40 SYRINGE (ML) INJECTION PRN
Status: DISCONTINUED | OUTPATIENT
Start: 2022-12-04 | End: 2022-12-05 | Stop reason: HOSPADM

## 2022-12-04 RX ORDER — SODIUM CHLORIDE 9 MG/ML
INJECTION, SOLUTION INTRAVENOUS PRN
Status: DISCONTINUED | OUTPATIENT
Start: 2022-12-04 | End: 2022-12-05 | Stop reason: HOSPADM

## 2022-12-04 RX ORDER — ACETAMINOPHEN 325 MG/1
650 TABLET ORAL EVERY 6 HOURS PRN
Status: DISCONTINUED | OUTPATIENT
Start: 2022-12-04 | End: 2022-12-05 | Stop reason: HOSPADM

## 2022-12-04 RX ORDER — ASPIRIN 81 MG/1
243 TABLET, CHEWABLE ORAL ONCE
Status: COMPLETED | OUTPATIENT
Start: 2022-12-04 | End: 2022-12-04

## 2022-12-04 RX ORDER — ACETAMINOPHEN 650 MG/1
650 SUPPOSITORY RECTAL EVERY 6 HOURS PRN
Status: DISCONTINUED | OUTPATIENT
Start: 2022-12-04 | End: 2022-12-05 | Stop reason: HOSPADM

## 2022-12-04 RX ORDER — PANTOPRAZOLE SODIUM 40 MG/1
40 TABLET, DELAYED RELEASE ORAL DAILY
Status: DISCONTINUED | OUTPATIENT
Start: 2022-12-05 | End: 2022-12-05 | Stop reason: HOSPADM

## 2022-12-04 RX ORDER — ONDANSETRON 4 MG/1
4 TABLET, ORALLY DISINTEGRATING ORAL EVERY 8 HOURS PRN
Status: DISCONTINUED | OUTPATIENT
Start: 2022-12-04 | End: 2022-12-05 | Stop reason: HOSPADM

## 2022-12-04 RX ORDER — ASPIRIN 81 MG/1
81 TABLET, CHEWABLE ORAL DAILY
Status: DISCONTINUED | OUTPATIENT
Start: 2022-12-05 | End: 2022-12-05 | Stop reason: HOSPADM

## 2022-12-04 RX ADMIN — ENOXAPARIN SODIUM 40 MG: 100 INJECTION SUBCUTANEOUS at 22:12

## 2022-12-04 RX ADMIN — ATORVASTATIN CALCIUM 80 MG: 80 TABLET, FILM COATED ORAL at 22:11

## 2022-12-04 RX ADMIN — IOPAMIDOL 75 ML: 612 INJECTION, SOLUTION INTRAVENOUS at 14:55

## 2022-12-04 RX ADMIN — Medication 10 ML: at 22:15

## 2022-12-04 RX ADMIN — ASPIRIN 243 MG: 81 TABLET, CHEWABLE ORAL at 14:17

## 2022-12-04 ASSESSMENT — PAIN DESCRIPTION - DESCRIPTORS: DESCRIPTORS: ACHING

## 2022-12-04 ASSESSMENT — ENCOUNTER SYMPTOMS
SHORTNESS OF BREATH: 0
VOMITING: 0
NAUSEA: 0
SORE THROAT: 0
ABDOMINAL PAIN: 0
COUGH: 0
EYE PAIN: 0
DIARRHEA: 0
RHINORRHEA: 0
BACK PAIN: 1
PHOTOPHOBIA: 0

## 2022-12-04 ASSESSMENT — PAIN DESCRIPTION - ORIENTATION: ORIENTATION: RIGHT;UPPER

## 2022-12-04 ASSESSMENT — PAIN SCALES - GENERAL
PAINLEVEL_OUTOF10: 2
PAINLEVEL_OUTOF10: 3
PAINLEVEL_OUTOF10: 0

## 2022-12-04 ASSESSMENT — PAIN DESCRIPTION - LOCATION
LOCATION: BACK
LOCATION: BACK

## 2022-12-04 ASSESSMENT — PAIN DESCRIPTION - FREQUENCY: FREQUENCY: INTERMITTENT

## 2022-12-04 ASSESSMENT — PAIN - FUNCTIONAL ASSESSMENT: PAIN_FUNCTIONAL_ASSESSMENT: 0-10

## 2022-12-04 NOTE — ED TRIAGE NOTES
Patient c/o right upper back and arm pain. Pt states she had a heart attack 15 years ago and had similar pain. Symptoms started yesterday. Respirations equal and unlabored. Pt took 1 nitro PTA. Skin pink, warm and dry.

## 2022-12-04 NOTE — ED PROVIDER NOTES
[address] [facility] ED  eMERGENCY dEPARTMENTeNCOUnter      Pt Name: Abigail Jara  MRN: [id number]  Jilliangfambrosio 1945  Date ofevaluation: 12/4/2022  Provider: Bobbi Hall PA-C    CHIEF COMPLAINT       Chief Complaint   Patient presents with    Back Pain     Right upper back pain         HISTORY OF PRESENT ILLNESS   (Location/Symptom, Timing/Onset,Context/Setting, Quality, Duration, Modifying Factors, Severity)  Note limiting factors. Abigail Jara is a 69 y.o. female who presents to the emergency department with sided back pain that she feels into her right arm. Patient had no associated nausea vomiting weakness of arm but does report that this is the same feeling she had when she had a MI and needed a three-vessel bypass. She reports she has not had a cath in about 15 or 16 years she does follow regularly with Dr. Valentin Gan. Her pain was about an 8 out of 10 and she took 1 nitro and it went pretty much gone. She does not want any more nitro as it does give her a headache and she does not like it. She is not on any blood thinners does take baby aspirin took 1 this morning but of her routine meds. HPI    NursingNotes were reviewed. REVIEW OF SYSTEMS    (2-9 systems for level 4, 10 or more for level 5)     Review of Systems   Constitutional:  Negative for chills, diaphoresis, fatigue and fever. HENT:  Negative for congestion, rhinorrhea and sore throat. Eyes:  Negative for photophobia and pain. Respiratory:  Negative for cough and shortness of breath. Cardiovascular:  Negative for chest pain and palpitations. Gastrointestinal:  Negative for abdominal pain, diarrhea, nausea and vomiting. Genitourinary:  Negative for dysuria and flank pain. Musculoskeletal:  Positive for back pain. Skin:  Negative for rash. Neurological:  Negative for dizziness, light-headedness and headaches. Psychiatric/Behavioral: Negative. All other systems reviewed and are negative.     Except as noted above the remainder of the review of systems was reviewed and negative. PAST MEDICAL HISTORY     Past Medical History:   Diagnosis Date    CAD (coronary artery disease) 2005    3 vessel cardiac bypass    Environmental and seasonal allergies     GERD (gastroesophageal reflux disease)     GI bleed 2017    with LTKR / blood transfusion    History of blood transfusion 2017    post op LTKR    History of blood transfusion 2005    post op cardiac bypass    Hyperlipidemia     meds > 15 yrs    Hypertension     meds > 20 yrs    Myocardial infarct ([address] 2005    followed by 3 vessal cardiac bypass    Pityriasis rosea     Prolonged emergence from general anesthesia          SURGICALHISTORY       Past Surgical History:   Procedure Laterality Date    ARTERIAL BYPASS SURGRY      triple / cardiac    BREAST SURGERY Bilateral     reduction and cysts biopsy / has had several biopsies    CARDIAC SURGERY  2005    3 vessel bypass    CATARACT REMOVAL WITH IMPLANT      CHOLECYSTECTOMY      COLONOSCOPY      CT ABSCESS DRAIN SUBCUTANEOUS  8/23/2022    CT ABSCESS DRAIN SUBCUTANEOUS    CYSTOURETHROSCOPY  04/18/2017    FLEXIBLE / due to UTI    ENDOSCOPY, COLON, DIAGNOSTIC      EYE SURGERY      Phaco with IOL OU    HAND SURGERY Right     plate fracture wrist / hardware still remains    HIP SURGERY Right 12/10/2021    RIGHT HIP  PERCUTANEOUS SCREW FIXATION performed by Ron Velazquez MD at [address] [place] [address] ([address] [place] [address])      JOINT REPLACEMENT  2017    LTKR    OTHER SURGICAL HISTORY      head fatty tumors removed    [place] COLORECTAL SCRN; HI RISK IND N/A 6/11/2018    COLONOSCOPY performed by Prince Norma MD at [address] ESOPHAGOGASTRODUODENOSCOPY TRANSORAL DIAGNOSTIC N/A 6/11/2018    EGD ESOPHAGOGASTRODUODENOSCOPY WITH DILATION performed by Prince Norma MD at [facility] 1/31/2019    WITH S.S. L. S performed by Darius Adiran MD at [address] ARTHROPLASTY Left 2016    VAGINA SURGERY N/A 2019    RECTOCELE REPAIR WITH ENTEROCELE REPAIR performed by Kelly Toledo MD at [facility] [address]       Previous Medications    ACETAMINOPHEN (APAP EXTRA STRENGTH) 500 MG TABLET    Take 2 tablets by mouth every 6 hours as needed for Pain    ALBUTEROL (PROVENTIL) (2.5 MG/3ML) 0.083% NEBULIZER SOLUTION    use 1 ampule with nebulizer every 4 hours as needed    ASPIRIN 81 MG EC TABLET    Take 1 tablet by mouth 2 times daily    ATORVASTATIN (LIPITOR) 80 MG TABLET    Take 80 mg by mouth nightly     CARVEDILOL (COREG) 6.25 MG TABLET    Take 1 tablet by mouth 2 times daily    CARVEDILOL (COREG) 6.25 MG TABLET    Take 1 tablet by mouth 2 times daily    CETIRIZINE (ZYRTEC) 10 MG TABLET    Take 1 tablet by mouth daily    LEVOTHYROXINE (SYNTHROID) 25 MCG TABLET    Take 25 mcg by mouth Daily    LORATADINE PO    Take 10 mg by mouth daily     NITROGLYCERIN (NITROSTAT) 0.4 MG SL TABLET    Place 0.4 mg under the tongue     PANTOPRAZOLE (PROTONIX) 40 MG TABLET    Take 40 mg by mouth daily    POLYETHYL GLYCOL-PROPYL GLYCOL 0.4-0.3 % (SYSTANE) 0.4-0.3 % OPHTHALMIC SOLUTION    Place 1 drop into both eyes as needed for Dry Eyes       ALLERGIES     Morphine    FAMILY HISTORY       Family History   Problem Relation Age of Onset    Colon Cancer Father     Other Father         polio    Heart Disease Mother         pacemaker    Cancer Mother         stomach cancer    High Blood Pressure Mother     High Cholesterol Mother     Other Brother          at age 27 / incident in care facility / pt was mentally challenged    Heart Disease Daughter     Other Son         blood dyscrasia          SOCIAL HISTORY       Social History     Socioeconomic History    Marital status:       Spouse name: None    Number of children: None    Years of education: None    Highest education level: None   Tobacco Use    Smoking status: Never    Smokeless tobacco: Never   Vaping Use Vaping Use: Never used   Substance and Sexual Activity    Alcohol use: Yes     Alcohol/week: 0.0 standard drinks     Comment: rare social    Drug use: No       SCREENINGS    Shorter Coma Scale  Eye Opening: Spontaneous  Best Verbal Response: Oriented  Best Motor Response: Obeys commands  Shorter Coma Scale Score: 15 @FLOW([id number])@      PHYSICAL EXAM    (up to 7 for level 4, 8 or more for level 5)     ED Triage Vitals [12/04/22 1325]   BP Temp Temp Source Heart Rate Resp SpO2 Height Weight   131/74 98.5 °F (36.9 °C) Oral 50 18 99 % 5' 5\" (1.651 m) 211 lb (95.7 kg)       Physical Exam  Vitals and nursing note reviewed. Constitutional:       General: She is not in acute distress. Appearance: Normal appearance. She is well-developed. She is not diaphoretic. HENT:      Head: Normocephalic and atraumatic. Nose: Nose normal.      Mouth/Throat:      Mouth: Mucous membranes are moist.   Eyes:      General: Lids are normal.      Conjunctiva/sclera: Conjunctivae normal.   Cardiovascular:      Rate and Rhythm: Normal rate and regular rhythm. Pulses: Normal pulses. Heart sounds: Normal heart sounds. Pulmonary:      Effort: Pulmonary effort is normal.      Breath sounds: Normal breath sounds. Abdominal:      General: Bowel sounds are normal.      Palpations: Abdomen is soft. Tenderness: There is no abdominal tenderness. Musculoskeletal:         General: Normal range of motion. Cervical back: Normal range of motion and neck supple. Lymphadenopathy:      Cervical: No cervical adenopathy. Skin:     General: Skin is warm and dry. Capillary Refill: Capillary refill takes less than 2 seconds. Findings: No rash. Neurological:      Mental Status: She is alert and oriented to person, place, and time. Psychiatric:         Thought Content:  Thought content normal.         Judgment: Judgment normal.       DIAGNOSTIC RESULTS     EKG: All EKG's are interpreted by the Emergency Department Physician who either signs or Co-signsthis chart in the absence of a cardiologist.    Sinus george 54bpm rbbb     RADIOLOGY:   Non-plain filmimages such as CT, Ultrasound and MRI are read by the radiologist. Plain radiographic images are visualized and preliminarily interpreted by the emergency physician with the below findings:        Interpretation per the Radiologist below, if available at the time ofthis note:    CTA CHEST W WO CONTRAST PE Eval   Final Result   No pulmonary embolism. No thoracic aortic aneurysm or dissection. Distal mild mucous plugging in the bilateral lower lobes. No pulmonary   consolidation currently. ED BEDSIDE ULTRASOUND:   Performed by ED Physician - none    LABS:  Labs Reviewed   CBC WITH AUTO DIFFERENTIAL - Abnormal; Notable for the following components:       Result Value    RBC 4.19 (*)     Hemoglobin 11.8 (*)     Hematocrit 36.1 (*)     MCHC 32.6 (*)     RDW 15.4 (*)     All other components within normal limits   COMPREHENSIVE METABOLIC PANEL - Abnormal; Notable for the following components:    Creatinine 1.05 (*)     Est, Glom Filt Rate 54.8 (*)     All other components within normal limits   POCT CREATININE - Normal   LACTIC ACID   MAGNESIUM   TROPONIN   BRAIN NATRIURETIC PEPTIDE       All other labs were within normal range or not returned as of this dictation. EMERGENCY DEPARTMENT COURSE and DIFFERENTIAL DIAGNOSIS/MDM:   Vitals:    Vitals:    12/04/22 1530 12/04/22 1600 12/04/22 1614 12/04/22 1700   BP: 113/70 126/60  137/71   Pulse: (!) 48 (!) 45 51 (!) 49   Resp:    17   Temp:       TempSrc:       SpO2: 97% 100%  100%   Weight:       Height:               MDM    Patient presents to the emergency department with back pain that she feels an ache into her shoulder. Last time she had this was about 15 years ago when she had a triple bypass. She is on daily aspirin last dose this morning. She is given the rest of this.   She had 1 nitro prior to arrival and her pain pretty much resolved and she is feeling better. Troponin is negative EKG x1 negative due to risk factors will observe for cardiac rule out. Spoke to Dr. Bety Merida  excepted the patient transition orders were placed in consult to Dr. Nghia Hernandez time was  minutes, excluding separatelyreportable procedures. There was a high probability ofclinically significant/life threatening deterioration in the patient's condition which required my urgent intervention. CONSULTS:  None    PROCEDURES:  Unless otherwise noted below, none     Procedures    FINAL IMPRESSION      1. Chest pain, unspecified type          DISPOSITION/PLAN   DISPOSITION Admitted 12/04/2022 05:15:45 PM      PATIENT REFERREDTO:  No follow-up provider specified.     DISCHARGEMEDICATIONS:  New Prescriptions    No medications on file          (Please note that portions of this note were completed with a voice recognition program.  Efforts were made to edit the dictations but occasionally words are mis-transcribed.)    Arely Escoto PA-C (electronically signed)  Attending Emergency Physician         Arely Escoto PA-C  12/04/22 2110

## 2022-12-05 VITALS
BODY MASS INDEX: 35.52 KG/M2 | HEART RATE: 50 BPM | SYSTOLIC BLOOD PRESSURE: 146 MMHG | WEIGHT: 213.2 LBS | OXYGEN SATURATION: 97 % | HEIGHT: 65 IN | TEMPERATURE: 97.3 F | DIASTOLIC BLOOD PRESSURE: 69 MMHG | RESPIRATION RATE: 18 BRPM

## 2022-12-05 LAB
CHOLESTEROL, TOTAL: 138 MG/DL (ref 0–199)
EKG ATRIAL RATE: 54 BPM
EKG ATRIAL RATE: 56 BPM
EKG P AXIS: 50 DEGREES
EKG P AXIS: 51 DEGREES
EKG P-R INTERVAL: 168 MS
EKG P-R INTERVAL: 170 MS
EKG Q-T INTERVAL: 476 MS
EKG Q-T INTERVAL: 482 MS
EKG QRS DURATION: 120 MS
EKG QRS DURATION: 136 MS
EKG QTC CALCULATION (BAZETT): 451 MS
EKG QTC CALCULATION (BAZETT): 465 MS
EKG R AXIS: -10 DEGREES
EKG R AXIS: -13 DEGREES
EKG T AXIS: 22 DEGREES
EKG T AXIS: 36 DEGREES
EKG VENTRICULAR RATE: 54 BPM
EKG VENTRICULAR RATE: 56 BPM
HCT VFR BLD CALC: 34.5 % (ref 37–47)
HDLC SERPL-MCNC: 47 MG/DL (ref 40–59)
HEMOGLOBIN: 11.7 G/DL (ref 12–16)
LDL CHOLESTEROL CALCULATED: 70 MG/DL (ref 0–129)
MAGNESIUM: 2.2 MG/DL (ref 1.7–2.4)
MCH RBC QN AUTO: 28.8 PG (ref 27–31.3)
MCHC RBC AUTO-ENTMCNC: 33.9 % (ref 33–37)
MCV RBC AUTO: 85.1 FL (ref 79.4–94.8)
PDW BLD-RTO: 15.4 % (ref 11.5–14.5)
PLATELET # BLD: 188 K/UL (ref 130–400)
RBC # BLD: 4.05 M/UL (ref 4.2–5.4)
TRIGL SERPL-MCNC: 105 MG/DL (ref 0–150)
WBC # BLD: 5.3 K/UL (ref 4.8–10.8)

## 2022-12-05 PROCEDURE — 36415 COLL VENOUS BLD VENIPUNCTURE: CPT

## 2022-12-05 PROCEDURE — 2580000003 HC RX 258: Performed by: STUDENT IN AN ORGANIZED HEALTH CARE EDUCATION/TRAINING PROGRAM

## 2022-12-05 PROCEDURE — 93005 ELECTROCARDIOGRAM TRACING: CPT | Performed by: STUDENT IN AN ORGANIZED HEALTH CARE EDUCATION/TRAINING PROGRAM

## 2022-12-05 PROCEDURE — 80061 LIPID PANEL: CPT

## 2022-12-05 PROCEDURE — 85027 COMPLETE CBC AUTOMATED: CPT

## 2022-12-05 PROCEDURE — G0378 HOSPITAL OBSERVATION PER HR: HCPCS

## 2022-12-05 PROCEDURE — 6370000000 HC RX 637 (ALT 250 FOR IP): Performed by: STUDENT IN AN ORGANIZED HEALTH CARE EDUCATION/TRAINING PROGRAM

## 2022-12-05 PROCEDURE — 6370000000 HC RX 637 (ALT 250 FOR IP): Performed by: NURSE PRACTITIONER

## 2022-12-05 PROCEDURE — 83735 ASSAY OF MAGNESIUM: CPT

## 2022-12-05 RX ORDER — AMLODIPINE BESYLATE 5 MG/1
5 TABLET ORAL DAILY
Qty: 30 TABLET | Refills: 3 | Status: SHIPPED | OUTPATIENT
Start: 2022-12-05

## 2022-12-05 RX ORDER — CARVEDILOL 6.25 MG/1
6.25 TABLET ORAL 2 TIMES DAILY
Status: DISCONTINUED | OUTPATIENT
Start: 2022-12-05 | End: 2022-12-05 | Stop reason: HOSPADM

## 2022-12-05 RX ADMIN — ASPIRIN 81 MG: 81 TABLET, CHEWABLE ORAL at 08:28

## 2022-12-05 RX ADMIN — Medication 10 ML: at 08:29

## 2022-12-05 RX ADMIN — LEVOTHYROXINE SODIUM 25 MCG: 0.03 TABLET ORAL at 07:01

## 2022-12-05 RX ADMIN — PANTOPRAZOLE SODIUM 40 MG: 40 TABLET, DELAYED RELEASE ORAL at 08:28

## 2022-12-05 RX ADMIN — CARVEDILOL 6.25 MG: 6.25 TABLET, FILM COATED ORAL at 11:36

## 2022-12-05 NOTE — PROGRESS NOTES
Pt given discharge instructions voiced understanding and no further questions or concerns voiced at this time.

## 2022-12-05 NOTE — H&P
Department of Internal Medicine  Nephrology  [facility]. Nephrology  Attending History and Physical      CHIEF COMPLAINT:  back, RT arm pain    Reason for Admission:  chest pain    History Obtained From:  patient, electronic medical record    HISTORY OF PRESENT ILLNESS:    69 y.o. female with history s/f CAD s/p CABG, GERD, HTN, HLD who presented for RT sided back pin w/ radiation to arm. Felt similar to when she needed triple vessel bypass. Sees Dr. Marlo Salinas. Improved w/ Nitro.  Labs wnl    Past Medical History:        Diagnosis Date    CAD (coronary artery disease) 2005    3 vessel cardiac bypass    Environmental and seasonal allergies     GERD (gastroesophageal reflux disease)     GI bleed 2017    with LTKR / blood transfusion    History of blood transfusion 2017    post op LTKR    History of blood transfusion 2005    post op cardiac bypass    Hyperlipidemia     meds > 15 yrs    Hypertension     meds > 20 yrs    [address]ocardial infar[address] [address] 2005    followed by 3 vessal cardiac bypass    Pityriasis rosea     Prolonged emergence from general anesthesia        Past Surgical History:        Procedure Laterality Date    ARTERIAL BYPASS SURGRY      triple / cardiac    BREAST SURGERY Bilateral     reduction and cysts biopsy / has had several biopsies    CARDIAC SURGERY  2005    3 vessel bypass    CATARACT REMOVAL WITH IMPLANT      CHOLECYSTECTOMY      COLONOSCOPY      CT ABSCESS DRAIN SUBCUTANEOUS  8/23/2022    CT ABSCESS DRAIN SUBCUTANEOUS    CYSTOURETHROSCOPY  04/18/2017    FLEXIBLE / due to UTI    ENDOSCOPY, COLON, DIAGNOSTIC      EYE SURGERY      Phaco with IOL OU    HAND SURGERY Right     plate fracture wrist / hardware still remains    HIP SURGERY Right 12/10/2021    RIGHT HIP  PERCUTANEOUS SCREW FIXATION performed by Sosa Dawson MD at [address] [facility] ([address] [place] [address])      JOINT REPLACEMENT  2017    LTKR    OTHER SURGICAL HISTORY      head fatty tumors removed    [place] COLORECTAL SCRN; HI RISK IND N/A 6/11/2018    COLONOSCOPY performed by Lee Ann Dooley MD at [address] ESOPHAGOGASTRODUODENOSCOPY TRANSORAL DIAGNOSTIC N/A 6/11/2018    EGD ESOPHAGOGASTRODUODENOSCOPY WITH DILATION performed by Lee Ann Dooley MD at [facility] 1/31/2019    WITH S.S. L. S performed by Chay Bermeo MD at [address] [facility] [address] Left 08/2016    VAGINA SURGERY N/A 1/31/2019    RECTOCELE REPAIR WITH ENTEROCELE REPAIR performed by Chay Bermeo MD at [facility]       Medications Prior to Admission:    Medications Prior to Admission: amLODIPine-atorvastatatin (CADUET) 10-20 MG per tablet, Take 1 tablet by mouth daily At night  aspirin 81 MG EC tablet, Take 1 tablet by mouth 2 times daily  carvedilol (COREG) 6.25 MG tablet, Take 1 tablet by mouth 2 times daily  [DISCONTINUED] carvedilol (COREG) 6.25 MG tablet, Take 1 tablet by mouth 2 times daily  levothyroxine (SYNTHROID) 25 MCG tablet, Take 25 mcg by mouth Daily  acetaminophen (APAP EXTRA STRENGTH) 500 MG tablet, Take 2 tablets by mouth every 6 hours as needed for Pain  pantoprazole (PROTONIX) 40 MG tablet, Take 40 mg by mouth daily  polyethyl glycol-propyl glycol 0.4-0.3 % (SYSTANE) 0.4-0.3 % ophthalmic solution, Place 1 drop into both eyes as needed for Dry Eyes  albuterol (PROVENTIL) (2.5 MG/3ML) 0.083% nebulizer solution, use 1 ampule with nebulizer every 4 hours as needed  LORATADINE PO, Take 10 mg by mouth daily  (Patient not taking: Reported on 12/4/2022)  cetirizine (ZYRTEC) 10 MG tablet, Take 1 tablet by mouth daily (Patient not taking: Reported on 12/4/2022)  atorvastatin (LIPITOR) 80 MG tablet, Take 80 mg by mouth nightly   nitroGLYCERIN (NITROSTAT) 0.4 MG SL tablet, Place 0.4 mg under the tongue     Allergies:  Aripiprazole    Social History:   Social Connections: Not on file         Family History:       Problem Relation Age of Onset    Colon Cancer Father     Other Father         polio    Heart Disease Mother         pacemaker    Cancer Mother         stomach cancer    High Blood Pressure Mother     High Cholesterol Mother     Other Brother          at age 27 / incident in care facility / pt was mentally challenged    Heart Disease Daughter     Other Son         blood dyscrasia       REVIEW OF SYSTEMS:  Positives in bold  Constitutional: fever, chills, fatigue, malaise   HENT:  rhinorrhea, sinus pain, sore throat, epistaxis    Eyes:  photophobia, visual disturbance, eye redness  Respiratory: shortness of breath, cough, hemoptysis    Cardiovascular: chest pain, palpitations, orthopnea, leg swelling   Gastrointestinal: abdominal pain, nausea, vomiting, diarrhea, constipation   Endocrine: polydipsia, polyphagia, cold intolerance, heat intolerance  Genitourinary: dysuria, flank pain, frequency, urgency   Hematologic: easy bruising, easy bleeding  Musculoskeletal: back pain, neck pain, myalgias, arthalgias  Neurological: syncope, lightheadedness, dizziness, seizures, tremors, weakness  Psychiatric/Behavioral: anxiety, depression, hallucinations  Skin: rash, itching    PHYSICAL EXAM:  Vitals:  BP (!) 160/75   Pulse 56   Temp 98.1 °F (36.7 °C) (Oral)   Resp 18   Ht 5' 5\" (1.651 m)   Wt 211 lb (95.7 kg)   SpO2 100%   BMI 35.11 kg/m²     General: alert, in no apparent distress  HEENT: normocephalic, atraumatic, anicteric  Neck: supple, no mass  Lungs: non-labored respirations, clear to auscultation bilaterally  Heart: regular rate and rhythm, no murmurs or rubs  Abdomen: soft, non-tender, non-distended  MSK: no joint swelling or tenderness  Ext: no cyanosis, no peripheral edema  Neuro: alert and oriented, no gross abnormalities  Psych: normal mood and affect  Skin: no rash    DATA:  CBC with Differential:    Lab Results   Component Value Date/Time    WBC 6.1 2022 01:30 PM    RBC 4.19 2022 01:30 PM    RBC 3.82 2012 05:15 AM    HGB 11.8 2022 01:30 PM    HCT 36.1 2022 01:30 PM    PLT 192 12/04/2022 01:30 PM    MCV 86.1 12/04/2022 01:30 PM    MCH 28.0 12/04/2022 01:30 PM    MCHC 32.6 12/04/2022 01:30 PM    RDW 15.4 12/04/2022 01:30 PM    LYMPHOPCT 21.6 12/04/2022 01:30 PM    MONOPCT 10.8 12/04/2022 01:30 PM    EOSPCT 1.4 01/07/2012 08:52 PM    BASOPCT 1.0 12/04/2022 01:30 PM    MONOSABS 0.7 12/04/2022 01:30 PM    LYMPHSABS 1.3 12/04/2022 01:30 PM    EOSABS 0.1 12/04/2022 01:30 PM    BASOSABS 0.1 12/04/2022 01:30 PM     CMP:    Lab Results   Component Value Date/Time     12/04/2022 01:30 PM    K 4.0 12/04/2022 01:30 PM    K 4.7 12/11/2021 06:40 AM     12/04/2022 01:30 PM    CO2 22 12/04/2022 01:30 PM    BUN 16 12/04/2022 01:30 PM    CREATININE 1.1 12/04/2022 02:15 PM    CREATININE 1.05 12/04/2022 01:30 PM    GFRAA 57.8 04/02/2022 08:19 AM    LABGLOM 52 12/04/2022 02:15 PM    GLUCOSE 96 12/04/2022 01:30 PM    GLUCOSE 94 09/07/2022 11:37 AM    PROT 6.9 12/04/2022 01:30 PM    LABALBU 3.9 12/04/2022 01:30 PM    LABALBU 3.7 09/07/2022 11:37 AM    CALCIUM 9.0 12/04/2022 01:30 PM    BILITOT 0.5 12/04/2022 01:30 PM    ALKPHOS 70 12/04/2022 01:30 PM    AST 16 12/04/2022 01:30 PM    ALT 13 12/04/2022 01:30 PM     Last 3 Troponin:    Lab Results   Component Value Date/Time    TROPONINI <0.010 12/04/2022 06:58 PM    TROPONINI <0.010 12/04/2022 01:30 PM    TROPONINI <0.010 12/17/2020 05:00 PM       ASSESSMENT AND PLAN:    69 y.o. female with history s/f CAD s/p CABG, GERD, HTN, HLD who presented for RT sided back pin w/ radiation to arm.     Chest pain: has h/o CAD s/p CABG, troponin wnl, ECG showing RBBB and abnormal   Bradycardia: on coreg 6.25 mg BID  Hypothyroidism    - trending trops  - if remains nml may be able to follow up as outpatient, pt adamant to go tomorrow     Yudy Ernst MD, MD

## 2022-12-05 NOTE — CARE COORDINATION
[facility] EMERGENCY [facility] CENTER AT [place] Case Management Initial Discharge Assessment    Met with Patient to discuss discharge plan. PCP: Fabby Krause DO                                Date of Last Visit:     [facility] Patient: No        [facility] Notified: no    If no PCP, list provided? N/A    Discharge Planning    Living Arrangements: independently at home    Who do you live with? Alone (grandchildren stay at home at times)    Who helps you with your care:  self    If lives at home:     Do you have any barriers navigating in your home? yes - four sets of stairs    Patient can perform ADL? Yes    Current Services (outpatient and in home) :  None    Dialysis: No    Is transportation available to get to your appointments? Yes, pt drives     DME Equipment:  yes - has a walker on each level, BSC, WC    Respiratory equipment: None    Respiratory provider:  no     Pharmacy:  yes - D[facility]    Consult with Medication Assistance Program?  No      Patient agreeable to [address]? Declined    Patient agreeable to SNF/Rehab? Declined    Other discharge needs identified? N/A    Does Patient Have a High-Risk for Readmission Diagnosis (CHF, PN, MI, COPD)? No      Initial Discharge Plan? (Note: please see concurrent daily documentation for any updates after initial note). Met with pt at bedside to discuss discharge plans. Pt plans to return home and denies needs. Pt states she was going to outpatient therapy and would go back to them if needed. Pt declines d/c needs.      Readmission Risk              Risk of Unplanned Readmission:  0         Electronically signed by Colton Dakin, RN on 12/5/2022 at 8:30 AM

## 2022-12-05 NOTE — CONSULTS
[facility] Consult Note        Date of Consult:   2022    Patient:    Rock Beyer    :    1945  CSN:    [id number]    Consulting Cardiologist:  Dr. Ann Parra APRN-CNP    Primary Cardiologist:  Dr. Kelvin Turner MD     Requesting Physician:  Zuri Rangel MD      Reason for Consult:  Chest pain      Assessment:    Right sided back pain with radiation to right arm:  Resolved. Negative troponin x 3. Bradycardia:  HR 40's - 50's, on Coreg at home  Hypertension  Hyperlipidemia  CAD with CABG   Negative Stress Myoview . Fatigue  Dyspnea on Exertion, chronic  History of COVID   DJD with recent back surgery  Chronic lower extremity weakness  Otherwise as below and prior. Plan:    Cardiac Supportive Care  Monitor on telemetry  Resume home medications. May need to adjust chronotropic medications as outpatient. Outpatient Holter. OK to DC home form CV point with Follow up with Dr Araceli Don as noted. Further Recommendations to follow after outpatient review. See orders. HISTORY OF PRESENT ILLNESS:      Rock Beyer is a pleasant 69 y.o. female with  a PMH of HTN, HLD, CAD with remote CABG, LBBB, RBBB, SB, GIL, elevated D-dimer, fatigue, COVID recent back surgery who presented to [facility] ER 2022 with CCO right sided back pain with radiation in to right are. She stated that the feeling was the same as when she had a MI in the past.  She reported pain of 8/10 on presentation that was relieved with 1 nitro. EKG showed SB RBBB HR 54 bpm. CT chest negative for pulmonary embolism, no thoracic aortic aneurysm or dissection. Distal mild mucous plugging in the bilateral lower lobes. Abnormal labs: creatining 1.05, Hgb/Hct 11.8/36. 1. Troponin negative x 3.      She states that she had right sided back  pain that radiated down her right arm that was similar to the pain she had when she had her MI in the past.  She called her insurance company RN who recommended she take a SL nitro and go to the ER. She states she took the nitro which relieved her back pain but made her lightheaded, and was very reluctant to come to the ER. She denies any reoccurrence of back pain, states that she never had chest pain, shortness of breath, palpitations, lightheadedness or dizziness. She states that she is ready to go home and is upset that she is missing her physical therapy this morning. Patient Follows with Dr. Carlos Biggs . Patient History and Records, EMR reviewed. Patient Interviewed and examined. Denies CP, SOB, LH, Dizziness, TIA or CVA Symptoms. No Orthopnea, Edema or CHF symptoms. No Palpitations. No Syncope. No Fever, Chills or Cold symptoms. No GI,  or Bleeding complaints. Cardiac and general ROS otherwise negative. [address] otherwise negative other than noted. Cardiac History/Testin CAB Normal Lexiscan, LVEF 75%   ECHO: LVEF 65%, Bi atrial enlargement, moderate LVH with diastolic dysfunction, Mild AI, MR, TR and PI.   2022 Holter: SR with rare PVC's occasional PAC's, 4 short burst of atrial tachycardia, no atrial fibrillation. LBBB  Left anterior hemiblock.      Past Medical History:   Diagnosis Date    CAD (coronary artery disease)     3 vessel cardiac bypass    Environmental and seasonal allergies     GERD (gastroesophageal reflux disease)     GI bleed     with LTKR / blood transfusion    History of blood transfusion 2017    post op LTKR    History of blood transfusion     post op cardiac bypass    Hyperlipidemia     meds > 15 yrs    Hypertension     meds > 20 yrs    Myocardial infarct ([facility] [address]     followed by 3 vessal cardiac bypass    Pityriasis rosea     Prolonged emergence from general anesthesia        Past Surgical History:   Procedure Laterality Date    ARTERIAL BYPASS SURGRY      triple / cardiac    BREAST SURGERY Bilateral     reduction and cysts biopsy / has had several biopsies    CARDIAC SURGERY  2005    3 vessel bypass    CATARACT REMOVAL WITH IMPLANT      CHOLECYSTECTOMY      COLONOSCOPY      CT ABSCESS DRAIN SUBCUTANEOUS  8/23/2022    CT ABSCESS DRAIN SUBCUTANEOUS    CYSTOURETHROSCOPY  04/18/2017    FLEXIBLE / due to UTI    ENDOSCOPY, COLON, DIAGNOSTIC      EYE SURGERY      Phaco with IOL OU    HAND SURGERY Right     plate fracture wrist / hardware still remains    HIP SURGERY Right 12/10/2021    RIGHT HIP  PERCUTANEOUS SCREW FIXATION performed by Arleen Brunner MD at [facility] ([address] [place] [address])      JOINT REPLACEMENT  2017    LTKR    OTHER SURGICAL HISTORY      head fatty tumors removed    FL COLORECTAL SCRN; HI RISK IND N/A 6/11/2018    COLONOSCOPY performed by Marcel Lynne MD at [address] [place] [address] ESOPHAGOGASTRODUODENOSCOPY TRANSORAL DIAGNOSTIC N/A 6/11/2018    EGD ESOPHAGOGASTRODUODENOSCOPY WITH DILATION performed by Marcel Lynne MD at [facility] 1/31/2019    WITH S.S. L. S performed by Alejandro Sarah MD at [facility] 08/2016    VAGINA SURGERY N/A 1/31/2019    RECTOCELE REPAIR WITH ENTEROCELE REPAIR performed by Alejandro Sarah MD at [facility]       Prior to Admission medications    Medication Sig Start Date End Date Taking?  Authorizing Provider   amLODIPine-atorvastatatin (CADUET) 10-20 MG per tablet Take 1 tablet by mouth daily At night   Yes Historical Provider, MD   aspirin 81 MG EC tablet Take 1 tablet by mouth 2 times daily 12/13/21 1/12/22  Kenna Venegas, APRN - CNP   carvedilol (COREG) 6.25 MG tablet Take 1 tablet by mouth 2 times daily 12/13/21   Laila Nichole MD   levothyroxine (SYNTHROID) 25 MCG tablet Take 25 mcg by mouth Daily    Historical Provider, MD   acetaminophen (APAP EXTRA STRENGTH) 500 MG tablet Take 2 tablets by mouth every 6 hours as needed for Pain 2/1/19   Alejandro Sarah MD   pantoprazole (PROTONIX) 40 MG tablet Take 40 mg by mouth daily    Historical Provider, MD   polyethyl glycol-propyl glycol 0.4-0.3 % (SYSTANE) 0.4-0.3 % ophthalmic solution Place 1 drop into both eyes as needed for Dry Eyes    Historical Provider, MD   albuterol (PROVENTIL) (2.5 MG/3ML) 0.083% nebulizer solution use 1 ampule with nebulizer every 4 hours as needed 6/23/18   Historical Provider, MD   LORATADINE PO Take 10 mg by mouth daily   Patient not taking: Reported on 12/4/2022    Historical Provider, MD   cetirizine (ZYRTEC) 10 MG tablet Take 1 tablet by mouth daily  Patient not taking: Reported on 12/4/2022 10/26/16   Historical Provider, MD   atorvastatin (LIPITOR) 80 MG tablet Take 80 mg by mouth nightly  10/8/16   Historical Provider, MD   nitroGLYCERIN (NITROSTAT) 0.4 MG SL tablet Place 0.4 mg under the tongue     Historical Provider, MD       Scheduled Meds:   sodium chloride flush  5-40 mL IntraVENous 2 times per day    aspirin  81 mg Oral Daily    enoxaparin  40 mg SubCUTAneous Nightly    amLODIPine  5 mg Oral Daily    atorvastatin  80 mg Oral Nightly    levothyroxine  25 mcg Oral Daily    pantoprazole  40 mg Oral Daily     Continuous Infusions:   sodium chloride       PRN Meds:sodium chloride flush, sodium chloride, ondansetron **OR** ondansetron, acetaminophen **OR** acetaminophen, polyethylene glycol, nitroGLYCERIN    Allergies   Allergen Reactions    Morphine Rash       Social History     Socioeconomic History    Marital status:      Spouse name: Not on file    Number of children: Not on file    Years of education: Not on file    Highest education level: Not on file   Occupational History    Not on file   Tobacco Use    Smoking status: Never    Smokeless tobacco: Never   Vaping Use    Vaping Use: Never used   Substance and Sexual Activity    Alcohol use:  Yes     Alcohol/week: 0.0 standard drinks     Comment: rare social    Drug use: No    Sexual activity: Not on file   Other Topics Concern    Not on file   Social History Narrative    Not on file Social Determinants of Health     Financial Resource Strain: Not on file   Food Insecurity: Not on file   Transportation Needs: Not on file   Physical Activity: Not on file   Stress: Not on file   Social Connections: Not on file   Intimate Partner Violence: Not on file   Housing Stability: Not on file       Family History   Problem Relation Age of Onset    Colon Cancer Father     Other Father         polio    Heart Disease Mother         pacemaker    Cancer Mother         stomach cancer    High Blood Pressure Mother     High Cholesterol Mother     Other Brother          at age 27 / incident in care facility / pt was mentally challenged    Heart Disease Daughter     Other Son         blood dyscrasia       Review Of Systems:    14 point ROS negative other than mentioned. Physical Exam:    CURRENT VITALS: BP (!) 146/69   Pulse 50   Temp 97.3 °F (36.3 °C) (Oral)   Resp 18   Ht 5' 5\" (1.651 m)   Wt 213 lb 3.2 oz (96.7 kg)   SpO2 97%   BMI 35.48 kg/m²     CONSTITUTIONAL:  awake, alert, cooperative, no apparent distress,   ENT:  Normocephalic, without obvious abnormality, atraumatic, sinuses nontender on palpation, external ears without lesions,  NECK:  Supple, symmetrical, trachea midline, no adenopathy, thyroid symmetric, not enlarged and no tenderness, skin normal, No bruits. LUNGS:  No increased work of breathing, good air exchange, clear to auscultation bilaterally, no crackles, no wheezing  CARDIOVASCULAR:  Normal apical impulse, regular rate and rhythm, normal S1 and S2,  1/6 Systolic murmur noted. ABDOMEN:  Obese, normal bowel sounds, soft, non-distended, non-tender, EXTREMETIES: No edema, Pulses Strong Thruout. No ulcers. NEUROLOGIC:  Awake, alert, oriented to name, place and time. Following all commands and moving all extremties.   SKIN:  no  bleeding, normal skin color, texture, turgor and no rashes    Labs:  Recent Results (from the past 24 hour(s))   EKG 12 Lead    Collection Time: 12/04/22  1:19 PM   Result Value Ref Range    Ventricular Rate 54 BPM    Atrial Rate 54 BPM    P-R Interval 168 ms    QRS Duration 120 ms    Q-T Interval 476 ms    QTc Calculation (Bazett) 451 ms    P Axis 50 degrees    R Axis -10 degrees    T Axis 36 degrees   CBC with Auto Differential    Collection Time: 12/04/22  1:30 PM   Result Value Ref Range    WBC 6.1 4.8 - 10.8 K/uL    RBC 4.19 (L) 4.20 - 5.40 M/uL    Hemoglobin 11.8 (L) 12.0 - 16.0 g/dL    Hematocrit 36.1 (L) 37.0 - 47.0 %    MCV 86.1 79.4 - 94.8 fL    MCH 28.0 27.0 - 31.3 pg    MCHC 32.6 (L) 33.0 - 37.0 %    RDW 15.4 (H) 11.5 - 14.5 %    Platelets 993 186 - 316 K/uL    Neutrophils % 64.7 %    Lymphocytes % 21.6 %    Monocytes % 10.8 %    Eosinophils % 1.9 %    Basophils % 1.0 %    Neutrophils Absolute 4.0 1.4 - 6.5 K/uL    Lymphocytes Absolute 1.3 1.0 - 4.8 K/uL    Monocytes Absolute 0.7 0.2 - 0.8 K/uL    Eosinophils Absolute 0.1 0.0 - 0.7 K/uL    Basophils Absolute 0.1 0.0 - 0.2 K/uL   CMP    Collection Time: 12/04/22  1:30 PM   Result Value Ref Range    Sodium 135 135 - 144 mEq/L    Potassium 4.0 3.4 - 4.9 mEq/L    Chloride 102 95 - 107 mEq/L    CO2 22 20 - 31 mEq/L    Anion Gap 11 9 - 15 mEq/L    Glucose 96 70 - 99 mg/dL    BUN 16 8 - 23 mg/dL    Creatinine 1.05 (H) 0.50 - 0.90 mg/dL    Est, Glom Filt Rate 54.8 (L) >60    Calcium 9.0 8.5 - 9.9 mg/dL    Total Protein 6.9 6.3 - 8.0 g/dL    Albumin 3.9 3.5 - 4.6 g/dL    Total Bilirubin 0.5 0.2 - 0.7 mg/dL    Alkaline Phosphatase 70 40 - 130 U/L    ALT 13 0 - 33 U/L    AST 16 0 - 35 U/L    Globulin 3.0 2.3 - 3.5 g/dL   Lactic Acid    Collection Time: 12/04/22  1:30 PM   Result Value Ref Range    Lactic Acid 0.9 0.5 - 2.2 mmol/L   Magnesium    Collection Time: 12/04/22  1:30 PM   Result Value Ref Range    Magnesium 2.0 1.7 - 2.4 mg/dL   Troponin    Collection Time: 12/04/22  1:30 PM   Result Value Ref Range    Troponin <0.010 0.000 - 0.010 ng/mL   Brain Natriuretic Peptide    Collection Time: 12/04/22 1:30 PM   Result Value Ref Range    Pro- pg/mL   POCT Venous    Collection Time: 22  2:15 PM   Result Value Ref Range    POC Creatinine 1.1 0.6 - 1.2 mg/dL    EstChyna Filt Rate 52 (A) >60    Sample Type TEJAL     Performed on SEE BELOW    POCT Creatinine    Collection Time: 22  2:16 PM   Result Value Ref Range    POC CREATININE WHOLE BLOOD 1.1    Troponin    Collection Time: 22  6:58 PM   Result Value Ref Range    Troponin <0.010 0.000 - 0.010 ng/mL   Brain Natriuretic Peptide    Collection Time: 22  6:58 PM   Result Value Ref Range    Pro- pg/mL   Troponin    Collection Time: 22  9:00 PM   Result Value Ref Range    Troponin <0.010 0.000 - 0.010 ng/mL   EKG 12 lead    Collection Time: 22  2:09 AM   Result Value Ref Range    Ventricular Rate 56 BPM    Atrial Rate 56 BPM    P-R Interval 170 ms    QRS Duration 136 ms    Q-T Interval 482 ms    QTc Calculation (Bazett) 465 ms    P Axis 51 degrees    R Axis -13 degrees    T Axis 22 degrees   Magnesium    Collection Time: 22  4:48 AM   Result Value Ref Range    Magnesium 2.2 1.7 - 2.4 mg/dL   Lipid Panel    Collection Time: 22  4:48 AM   Result Value Ref Range    Cholesterol, Total 138 0 - 199 mg/dL    Triglycerides 105 0 - 150 mg/dL    HDL 47 40 - 59 mg/dL    LDL Calculated 70 0 - 129 mg/dL   CBC    Collection Time: 22  4:48 AM   Result Value Ref Range    WBC 5.3 4.8 - 10.8 K/uL    RBC 4.05 (L) 4.20 - 5.40 M/uL    Hemoglobin 11.7 (L) 12.0 - 16.0 g/dL    Hematocrit 34.5 (L) 37.0 - 47.0 %    MCV 85.1 79.4 - 94.8 fL    MCH 28.8 27.0 - 31.3 pg    MCHC 33.9 33.0 - 37.0 %    RDW 15.4 (H) 11.5 - 14.5 %    Platelets 286 994 - 377 K/uL       EC2022   Sinus bradycardia   HR 56 bpm   Right bundle branch block       [facility] Medication List 2022  Medication Name Instruction   Acetic Acid 2 % Otic Solution use 4 drops as directed 3 times daily   Albuterol Sulfate (2.5 MG/3ML) 0.083% Inhalation Nebulization Solution USE 1 UNIT DOSE EVERY 4-6 HOURS AS NEEDED FOR WHEEZING . Alendronate Sodium 70 MG Oral Tablet TAKE 1 TABLET ONCE WEEKLY.   amLODIPine Besylate 5 MG Oral Tablet TAKE 1 TABLET DAILY. Benefiber Oral Powder 1 teaspoon once per day. May increase to 1 to 3 caplets or 2 teaspoons three times a day   Carvedilol 6.25 MG Oral Tablet TAKE 1 TABLET TWICE DAILY WITH MEALS. Docusate Sodium 100 MG Oral Capsule Take one capsule once daily   Fluticasone Propionate 50 MCG/ACT Nasal Suspension USE 2 SPRAYS IN EACH NOSTRIL ONCE DAILY   Levothyroxine Sodium 25 MCG Oral Tablet Take 1 tablet daily   Loratadine 10 MG Oral Tablet Take 1 tablet daily   Nitroglycerin 0.4 MG Sublingual Tablet Sublingual DISSOLVE 1 TABLET UNDER THE TONGUE AS NEEDED FOR CHEST PAIN. Pantoprazole Sodium 40 MG Oral Tablet Delayed Release TAKE 1 TABLET DAILY. Pramipexole Dihydrochloride 0.125 MG Oral Tablet take 1 tablet by mouth at bedtime   Rosuvastatin Calcium 40 MG Oral Tablet TAKE 1 TABLET DAILY.          CLOVER Gonzales - CNP  [facility]      Electronically signed on 12/5/22 at 8:25 AM EST      -----

## 2022-12-06 NOTE — DISCHARGE SUMMARY
Physician Discharge Summary     Patient ID:  Itz Sigala  [id number]  69 y.o.  1945    Admit date: 12/4/2022    Discharge date and time: 12/5/2022  3:09 PM     Admitting Physician: Ciara Hernandez MD     Discharge Physician: Ciara Hernandez MD    Admission Diagnoses: Chest pain [R07.9]  Chest pain, unspecified type [R07.9]    Discharge Diagnoses: chest pain, bradycardia    Admission Condition: fair    Discharged Condition: good    Indication for Admission: chest pain    Hospital Course:   Admitted to chest pain r/o. Pt w/ RT sided back pain that also radiated to her arm. Pt of Dr. Lea Watson. Neg ECG and troponins here. Was noted to be bradycardic. Seen by Dr. Ambrose Meza w/ plan for holter monitor as outpatient and adjustment of coreg. To f/u w/ cards as outpatient    Consults: cardiology    Significant Diagnostic Studies: neg ECG and trops    Treatments: none    Discharge Exam:  General: alert, in no apparent distress  HEENT: normocephalic, atraumatic, anicteric  Neck: supple, no mass  Lungs: non-labored respirations, clear to auscultation bilaterally  Heart: bradycardic, no murmurs or rubs  Abdomen: soft, non-tender, non-distended  MSK: no joint swelling or tenderness  Ext: no cyanosis, no peripheral edema  Neuro: alert and oriented, no gross abnormalities  Psych: normal mood and affect  Skin: no rash      Disposition: home    In process/preliminary results:  Outstanding Order Results       No orders found from 11/5/2022 to 12/5/2022.             Patient Instructions:   Discharge Medication List as of 12/5/2022  2:34 PM        START taking these medications    Details   amLODIPine (NORVASC) 5 MG tablet Take 1 tablet by mouth daily, Disp-30 tablet, R-3Normal           CONTINUE these medications which have NOT CHANGED    Details   aspirin 81 MG EC tablet Take 1 tablet by mouth 2 times daily, Disp-60 tablet, R-0Normal      carvedilol (COREG) 6.25 MG tablet Take 1 tablet by mouth 2 times daily, Disp-60 tablet, R-3Normal      levothyroxine (SYNTHROID) 25 MCG tablet Take 25 mcg by mouth DailyHistorical Med      acetaminophen (APAP EXTRA STRENGTH) 500 MG tablet Take 2 tablets by mouth every 6 hours as needed for Pain, Disp-60 tablet, R-1Normal      pantoprazole (PROTONIX) 40 MG tablet Take 40 mg by mouth dailyHistorical Med      polyethyl glycol-propyl glycol 0.4-0.3 % (SYSTANE) 0.4-0.3 % ophthalmic solution Place 1 drop into both eyes as needed for Dry EyesHistorical Med      albuterol (PROVENTIL) (2.5 MG/3ML) 0.083% nebulizer solution use 1 ampule with nebulizer every 4 hours as needed, R-3Historical Med      atorvastatin (LIPITOR) 80 MG tablet Take 80 mg by mouth nightly Historical Med      nitroGLYCERIN (NITROSTAT) 0.4 MG SL tablet Place 0.4 mg under the tongue Historical Med           STOP taking these medications       amLODIPine-atorvastatatin (CADUET) 10-20 MG per tablet Comments:   Reason for Stopping:         LORATADINE PO Comments:   Reason for Stopping:         cetirizine (ZYRTEC) 10 MG tablet Comments:   Reason for Stopping:             Activity: activity as tolerated  Diet: cardiac diet    Signed:  Adrián Donaldson MD    12/6/2022  1:06 PM

## 2022-12-14 ENCOUNTER — APPOINTMENT (OUTPATIENT)
Dept: GENERAL RADIOLOGY | Age: 77
End: 2022-12-14
Payer: MEDICARE

## 2022-12-14 ENCOUNTER — HOSPITAL ENCOUNTER (EMERGENCY)
Age: 77
Discharge: HOME | End: 2022-12-14
Payer: MEDICARE

## 2022-12-14 VITALS
BODY MASS INDEX: 35.49 KG/M2 | WEIGHT: 213 LBS | TEMPERATURE: 98.8 F | SYSTOLIC BLOOD PRESSURE: 105 MMHG | DIASTOLIC BLOOD PRESSURE: 70 MMHG | HEART RATE: 74 BPM | OXYGEN SATURATION: 98 % | HEIGHT: 65 IN | RESPIRATION RATE: 20 BRPM

## 2022-12-14 DIAGNOSIS — J10.1 INFLUENZA A: Primary | ICD-10-CM

## 2022-12-14 LAB
ALBUMIN SERPL-MCNC: 4 G/DL (ref 3.5–4.6)
ALP BLD-CCNC: 62 U/L (ref 40–130)
ALT SERPL-CCNC: 13 U/L (ref 0–33)
ANION GAP SERPL CALCULATED.3IONS-SCNC: 15 MEQ/L (ref 9–15)
AST SERPL-CCNC: 20 U/L (ref 0–35)
BASOPHILS ABSOLUTE: 0 K/UL (ref 0–0.2)
BASOPHILS RELATIVE PERCENT: 0.5 %
BILIRUB SERPL-MCNC: 0.4 MG/DL (ref 0.2–0.7)
BUN BLDV-MCNC: 14 MG/DL (ref 8–23)
CALCIUM SERPL-MCNC: 8.7 MG/DL (ref 8.5–9.9)
CHLORIDE BLD-SCNC: 101 MEQ/L (ref 95–107)
CO2: 20 MEQ/L (ref 20–31)
CREAT SERPL-MCNC: 1.18 MG/DL (ref 0.5–0.9)
EOSINOPHILS ABSOLUTE: 0 K/UL (ref 0–0.7)
EOSINOPHILS RELATIVE PERCENT: 0.2 %
GFR SERPL CREATININE-BSD FRML MDRD: 47.6 ML/MIN/{1.73_M2}
GLOBULIN: 3.1 G/DL (ref 2.3–3.5)
GLUCOSE BLD-MCNC: 106 MG/DL (ref 70–99)
HCT VFR BLD CALC: 36.9 % (ref 37–47)
HEMOGLOBIN: 12.6 G/DL (ref 12–16)
INFLUENZA A BY PCR: POSITIVE
INFLUENZA B BY PCR: NEGATIVE
LYMPHOCYTES ABSOLUTE: 0.9 K/UL (ref 1–4.8)
LYMPHOCYTES RELATIVE PERCENT: 18.6 %
MCH RBC QN AUTO: 28.9 PG (ref 27–31.3)
MCHC RBC AUTO-ENTMCNC: 34 % (ref 33–37)
MCV RBC AUTO: 84.9 FL (ref 79.4–94.8)
MONOCYTES ABSOLUTE: 0.8 K/UL (ref 0.2–0.8)
MONOCYTES RELATIVE PERCENT: 16.9 %
NEUTROPHILS ABSOLUTE: 3.1 K/UL (ref 1.4–6.5)
NEUTROPHILS RELATIVE PERCENT: 63.8 %
PDW BLD-RTO: 15.6 % (ref 11.5–14.5)
PLATELET # BLD: 155 K/UL (ref 130–400)
POTASSIUM SERPL-SCNC: 3.6 MEQ/L (ref 3.4–4.9)
PROCALCITONIN: 0.12 NG/ML (ref 0–0.15)
RBC # BLD: 4.34 M/UL (ref 4.2–5.4)
SARS-COV-2, NAAT: NOT DETECTED
SODIUM BLD-SCNC: 136 MEQ/L (ref 135–144)
TOTAL PROTEIN: 7.1 G/DL (ref 6.3–8)
TROPONIN: <0.01 NG/ML (ref 0–0.01)
WBC # BLD: 4.8 K/UL (ref 4.8–10.8)

## 2022-12-14 PROCEDURE — 87635 SARS-COV-2 COVID-19 AMP PRB: CPT

## 2022-12-14 PROCEDURE — 84145 PROCALCITONIN (PCT): CPT

## 2022-12-14 PROCEDURE — 80053 COMPREHEN METABOLIC PANEL: CPT

## 2022-12-14 PROCEDURE — 36415 COLL VENOUS BLD VENIPUNCTURE: CPT

## 2022-12-14 PROCEDURE — 99285 EMERGENCY DEPT VISIT HI MDM: CPT

## 2022-12-14 PROCEDURE — 85025 COMPLETE CBC W/AUTO DIFF WBC: CPT

## 2022-12-14 PROCEDURE — 84484 ASSAY OF TROPONIN QUANT: CPT

## 2022-12-14 PROCEDURE — 71046 X-RAY EXAM CHEST 2 VIEWS: CPT

## 2022-12-14 PROCEDURE — 87502 INFLUENZA DNA AMP PROBE: CPT

## 2022-12-14 PROCEDURE — 93005 ELECTROCARDIOGRAM TRACING: CPT | Performed by: PHYSICIAN ASSISTANT

## 2022-12-14 RX ORDER — OSELTAMIVIR PHOSPHATE 75 MG/1
75 CAPSULE ORAL 2 TIMES DAILY
Qty: 10 CAPSULE | Refills: 0 | Status: SHIPPED | OUTPATIENT
Start: 2022-12-14 | End: 2022-12-19

## 2022-12-14 RX ORDER — BENZONATATE 100 MG/1
100 CAPSULE ORAL 3 TIMES DAILY PRN
Qty: 20 CAPSULE | Refills: 0 | Status: SHIPPED | OUTPATIENT
Start: 2022-12-14

## 2022-12-14 ASSESSMENT — ENCOUNTER SYMPTOMS
EYE DISCHARGE: 0
ABDOMINAL DISTENTION: 0
SHORTNESS OF BREATH: 1
CONSTIPATION: 0
VOMITING: 0
COLOR CHANGE: 0
SORE THROAT: 0
RHINORRHEA: 0
COUGH: 1
ABDOMINAL PAIN: 0
NAUSEA: 0

## 2022-12-14 ASSESSMENT — PAIN - FUNCTIONAL ASSESSMENT: PAIN_FUNCTIONAL_ASSESSMENT: NONE - DENIES PAIN

## 2022-12-14 NOTE — ED NOTES
Pt arrived to ED with c/o of Sob. Pt states she has hx of pneumonia and feels the same. Pt states she has been coughing x3 days. Pt is afebrile. Pt is a&ox4 Pt breathing is unlabored and even.      Cha Yang RN  12/14/22 1945

## 2022-12-14 NOTE — ED PROVIDER NOTES
[address] [facility] ED  eMERGENCY dEPARTMENT eNCOUnter      Pt Name: Rock Beyer  MRN: [id number]  [place] 1945  Date of evaluation: 12/14/2022  Provider: Adalberto Nickerson PA-C    CHIEF COMPLAINT       Chief Complaint   Patient presents with    Shortness of Breath     Pt c/o SOB, productive cough x3 days; reports hx of PNA and reports that she feels same         HISTORY OF PRESENT ILLNESS   (Location/Symptom, Timing/Onset,Context/Setting, Quality, Duration, Modifying Factors, Severity)  Note limiting factors. Rock Beyer is a 69 y.o. female who presents to the emergency department complaint of cough, which patient states productive for thick sputum and shortness of breath, which she states is been present for the last 3 to 4 days, she states this feels similar to when she has had pneumonia before in the past, she denies any chest pain, she states she has had fevers and chills at home, but does not know what her temperature has been. States recent mission to the hospital for coronary disease. He has no pain at this time, 0 out of 10. Has medical history significant for MI, gastric reflux, hyperlipidemia, coronary disease. GI bleed, hypertension. HPI    NursingNotes were reviewed. REVIEW OF SYSTEMS    (2-9 systems for level 4, 10 or more for level 5)     Review of Systems   Constitutional:  Positive for chills and fever. Negative for activity change, appetite change and fatigue. HENT:  Negative for congestion, ear discharge, ear pain, nosebleeds, rhinorrhea and sore throat. Eyes:  Negative for discharge. Respiratory:  Positive for cough and shortness of breath. Cardiovascular:  Negative for chest pain, palpitations and leg swelling. Gastrointestinal:  Negative for abdominal distention, abdominal pain, constipation, nausea and vomiting. Genitourinary:  Negative for difficulty urinating and dysuria. Musculoskeletal:  Negative for arthralgias and myalgias.    Skin: Negative for color change, pallor, rash and wound. Neurological:  Negative for dizziness, syncope, numbness and headaches. Psychiatric/Behavioral:  Negative for agitation and confusion. Except as noted above the remainder of the review of systems was reviewed and negative.        PAST MEDICAL HISTORY     Past Medical History:   Diagnosis Date    CAD (coronary artery disease) 2005    3 vessel cardiac bypass    Environmental and seasonal allergies     GERD (gastroesophageal reflux disease)     GI bleed 2017    with LTKR / blood transfusion    History of blood transfusion 2017    post op LTKR    History of blood transfusion 2005    post op cardiac bypass    Hyperlipidemia     meds > 15 yrs    Hypertension     meds > 20 yrs    Myocardial infarct ([facility] [address] 2005    followed by 3 vessal cardiac bypass    Pityriasis rosea     Prolonged emergence from general anesthesia          SURGICALHISTORY       Past Surgical History:   Procedure Laterality Date    ARTERIAL BYPASS SURGRY      triple / cardiac    BREAST SURGERY Bilateral     reduction and cysts biopsy / has had several biopsies    CARDIAC SURGERY  2005    3 vessel bypass    CATARACT REMOVAL WITH IMPLANT      CHOLECYSTECTOMY      COLONOSCOPY      CT ABSCESS DRAIN SUBCUTANEOUS  8/23/2022    CT ABSCESS DRAIN SUBCUTANEOUS    CYSTOURETHROSCOPY  04/18/2017    FLEXIBLE / due to UTI    ENDOSCOPY, COLON, DIAGNOSTIC      EYE SURGERY      Phaco with IOL OU    HAND SURGERY Right     plate fracture wrist / hardware still remains    HIP SURGERY Right 12/10/2021    RIGHT HIP  PERCUTANEOUS SCREW FIXATION performed by Vinicius Barajas MD at [address] ([address])      JOINT REPLACEMENT  2017    LTKR    OTHER SURGICAL HISTORY      head fatty tumors removed    CO COLORECTAL SCRN; HI RISK IND N/A 6/11/2018    COLONOSCOPY performed by Hermilo Kelly MD at [address] TRANSORAL DIAGNOSTIC N/A 6/11/2018    EGD ESOPHAGOGASTRODUODENOSCOPY WITH DILATION performed by Lata Moncada MD at [facility] 2019    WITH S.S. L. S performed by Chauncey Perez MD at [address] Left 2016    VAGINA SURGERY N/A 2019    RECTOCELE REPAIR WITH ENTEROCELE REPAIR performed by Chauncey Perez MD at [address]       Previous Medications    ACETAMINOPHEN (APAP EXTRA STRENGTH) 500 MG TABLET    Take 2 tablets by mouth every 6 hours as needed for Pain    ALBUTEROL (PROVENTIL) (2.5 MG/3ML) 0.083% NEBULIZER SOLUTION    use 1 ampule with nebulizer every 4 hours as needed    AMLODIPINE (NORVASC) 5 MG TABLET    Take 1 tablet by mouth daily    ASPIRIN 81 MG EC TABLET    Take 1 tablet by mouth 2 times daily    ATORVASTATIN (LIPITOR) 80 MG TABLET    Take 80 mg by mouth nightly     CARVEDILOL (COREG) 6.25 MG TABLET    Take 1 tablet by mouth 2 times daily    LEVOTHYROXINE (SYNTHROID) 25 MCG TABLET    Take 25 mcg by mouth Daily    NITROGLYCERIN (NITROSTAT) 0.4 MG SL TABLET    Place 0.4 mg under the tongue     PANTOPRAZOLE (PROTONIX) 40 MG TABLET    Take 40 mg by mouth daily    POLYETHYL GLYCOL-PROPYL GLYCOL 0.4-0.3 % (SYSTANE) 0.4-0.3 % OPHTHALMIC SOLUTION    Place 1 drop into both eyes as needed for Dry Eyes       ALLERGIES     Morphine    FAMILY HISTORY       Family History   Problem Relation Age of Onset    Colon Cancer Father     Other Father         polio    Heart Disease Mother         pacemaker    Cancer Mother         stomach cancer    High Blood Pressure Mother     High Cholesterol Mother     Other Brother          at age 27 / incident in care facility / pt was mentally challenged    Heart Disease Daughter     Other Son         blood dyscrasia          SOCIAL HISTORY       Social History     Socioeconomic History    Marital status:       Spouse name: None    Number of children: None    Years of education: None    Highest education level: None Tobacco Use    Smoking status: Never    Smokeless tobacco: Never   Vaping Use    Vaping Use: Never used   Substance and Sexual Activity    Alcohol use: Yes     Alcohol/week: 0.0 standard drinks     Comment: rare social    Drug use: No       SCREENINGS    Chema Coma Scale  Eye Opening: Spontaneous  Best Verbal Response: Oriented  Best Motor Response: Obeys commands  Chema Coma Scale Score: 15 @FLOW([id number])@      PHYSICAL EXAM    (up to 7 for level 4, 8 or more for level 5)     ED Triage Vitals [12/14/22 1021]   BP Temp Temp Source Heart Rate Resp SpO2 Height Weight   105/70 98.8 °F (37.1 °C) Oral 74 20 98 % 5' 5\" (1.651 m) 213 lb (96.6 kg)       Physical Exam  Vitals and nursing note reviewed. Constitutional:       General: She is not in acute distress. Appearance: She is well-developed. She is not ill-appearing, toxic-appearing or diaphoretic. HENT:      Head: Normocephalic. Nose: No congestion. Mouth/Throat:      Mouth: Mucous membranes are moist.      Pharynx: No oropharyngeal exudate or posterior oropharyngeal erythema. Eyes:      Extraocular Movements: Extraocular movements intact. Conjunctiva/sclera: Conjunctivae normal.      Pupils: Pupils are equal, round, and reactive to light. Neck:      Vascular: No JVD. Trachea: No tracheal deviation. Cardiovascular:      Rate and Rhythm: Normal rate. Pulses: Normal pulses. Heart sounds: Normal heart sounds. No murmur heard. No friction rub. No gallop. Pulmonary:      Effort: Pulmonary effort is normal. No tachypnea, accessory muscle usage, respiratory distress or retractions. Breath sounds: Normal breath sounds. No stridor. No decreased breath sounds, wheezing, rhonchi or rales. Comments: Lungs are clear in all fields, there is no wheezes rales or rhonchi, no accessory muscle use, tractions, room air saturations are 98%.   Patient is able speak in full sentences without acute distress or difficulty  Chest: Chest wall: No tenderness. Abdominal:      General: Abdomen is flat. Bowel sounds are normal. There is no distension or abdominal bruit. Palpations: There is no shifting dullness, fluid wave, hepatomegaly, splenomegaly, mass or pulsatile mass. Tenderness: There is no abdominal tenderness. There is no right CVA tenderness, left CVA tenderness, guarding or rebound. Negative signs include Lerma's sign, Rovsing's sign and McBurney's sign. Musculoskeletal:         General: No deformity. Cervical back: Normal range of motion and neck supple. No rigidity. Right lower leg: No edema. Left lower leg: No edema. Skin:     General: Skin is warm and dry. Capillary Refill: Capillary refill takes less than 2 seconds. Coloration: Skin is not jaundiced. Neurological:      General: No focal deficit present. Mental Status: She is alert and oriented to person, place, and time. Mental status is at baseline. Cranial Nerves: No cranial nerve deficit. Sensory: No sensory deficit. Motor: No weakness. Coordination: Coordination normal.   Psychiatric:         Mood and Affect: Mood normal.       DIAGNOSTIC RESULTS     EKG: All EKG's are interpreted by the Emergency Department Physician who either signs or Co-signsthis chart in the absence of a cardiologist.        RADIOLOGY:   Justin Román such as CT, Ultrasound and MRI are read by the radiologist. Plain radiographic images are visualized and preliminarily interpreted by the emergency physician with the below findings:    Interpretation per the Radiologist below, if available at the time ofthis note:    XR CHEST (2 VW)   Final Result   1. No acute cardiopulmonary disease. 2. Hiatal hernia.                ED BEDSIDE ULTRASOUND:   Performed by ED Physician - none    LABS:  Labs Reviewed   RAPID INFLUENZA A/B ANTIGENS - Abnormal; Notable for the following components:       Result Value    Influenza A by PCR POSITIVE (*)     All other components within normal limits   CBC WITH AUTO DIFFERENTIAL - Abnormal; Notable for the following components:    Hematocrit 36.9 (*)     RDW 15.6 (*)     Lymphocytes Absolute 0.9 (*)     All other components within normal limits   COMPREHENSIVE METABOLIC PANEL - Abnormal; Notable for the following components:    Glucose 106 (*)     Creatinine 1.18 (*)     Est, Glom Filt Rate 47.6 (*)     All other components within normal limits   COVID-19, RAPID   TROPONIN   PROCALCITONIN       All other labs were within normal range or not returned as of this dictation. EMERGENCY DEPARTMENT COURSE and DIFFERENTIAL DIAGNOSIS/MDM:   Vitals:    Vitals:    12/14/22 1021   BP: 105/70   Pulse: 74   Resp: 20   Temp: 98.8 °F (37.1 °C)   TempSrc: Oral   SpO2: 98%   Weight: 213 lb (96.6 kg)   Height: 5' 5\" (1.651 m)            MDM  Number of Diagnoses or Management Options  Influenza A  Diagnosis management comments: Presented to the ED with complaint of cough, and shortness of breath, which she states been present for last 3 to 4 days, she does not present with any fevers to the ED, she is not tachycardic, room air saturations are 98% she does not display any respiratory distress. Chest x-ray shows no acute pulmonary process, she is negative for COVID-19, but positive for influenza A. Troponin 0.010 procalcitonin 0.12 white count is 4.8 thousand. Patient was given a prescription for Tamiflu, as well as Tessalon Perles for her cough, she was advised Tylenol Motrin as needed for aches and pains any fevers that may be present, increase her oral fluid intake, rest, and follow-up with her pediatrician next 2 to 3 days, should she have any worsening or change symptoms, she was advised to return to the ED. CRITICAL CARE TIME   Total Critical Care time was 0 minutes, excluding separately reportableprocedures.   There was a high probability of clinicallysignificant/life threatening deterioration in the patient's condition which required my urgent intervention. CONSULTS:  None    PROCEDURES:  Unless otherwise noted below, none     Procedures    FINAL IMPRESSION      1.  Influenza A          DISPOSITION/PLAN   DISPOSITION Decision To Discharge 12/14/2022 11:53:15 AM      PATIENT REFERRED TO:  Marlena Arredondo MD  [address]  THE [facility] [phone]    In 3 days      DISCHARGE MEDICATIONS:  New Prescriptions    BENZONATATE (TESSALON PERLES) 100 MG CAPSULE    Take 1 capsule by mouth 3 times daily as needed for Cough    OSELTAMIVIR (TAMIFLU) 75 MG CAPSULE    Take 1 capsule by mouth 2 times daily for 5 days          (Please note that portions of this note were completed with a voice recognition program.  Efforts were made to edit the dictations but occasionally words are mis-transcribed.)    Genevieve Hernández PA-C (electronically signed)  Attending Emergency Physician         Genevieve Hernández PA-C  12/14/22 [id number]

## 2022-12-15 LAB
EKG ATRIAL RATE: 59 BPM
EKG P AXIS: 51 DEGREES
EKG P-R INTERVAL: 172 MS
EKG Q-T INTERVAL: 450 MS
EKG QRS DURATION: 124 MS
EKG QTC CALCULATION (BAZETT): 445 MS
EKG R AXIS: -16 DEGREES
EKG T AXIS: 10 DEGREES
EKG VENTRICULAR RATE: 59 BPM

## 2022-12-15 PROCEDURE — 93010 ELECTROCARDIOGRAM REPORT: CPT | Performed by: INTERNAL MEDICINE

## 2023-01-12 LAB
ANION GAP SERPL CALCULATED.3IONS-SCNC: 14 MEQ/L (ref 9–15)
BUN BLDV-MCNC: 14 MG/DL (ref 8–23)
CALCIUM SERPL-MCNC: 9.4 MG/DL (ref 8.5–9.9)
CHLORIDE BLD-SCNC: 105 MEQ/L (ref 95–107)
CHOLESTEROL, TOTAL: 158 MG/DL (ref 0–199)
CO2: 23 MEQ/L (ref 20–31)
CREAT SERPL-MCNC: 1.08 MG/DL (ref 0.5–0.9)
GFR SERPL CREATININE-BSD FRML MDRD: 52.9 ML/MIN/{1.73_M2}
GLUCOSE BLD-MCNC: 97 MG/DL (ref 70–99)
HDLC SERPL-MCNC: 47 MG/DL (ref 40–59)
LDL CHOLESTEROL CALCULATED: 66 MG/DL (ref 0–129)
MAGNESIUM: 2.1 MG/DL (ref 1.7–2.4)
POTASSIUM SERPL-SCNC: 4.7 MEQ/L (ref 3.4–4.9)
SODIUM BLD-SCNC: 142 MEQ/L (ref 135–144)
TOTAL CK: 84 U/L (ref 0–170)
TRIGL SERPL-MCNC: 225 MG/DL (ref 0–150)

## 2023-03-08 LAB
ALANINE AMINOTRANSFERASE (SGPT) (U/L) IN SER/PLAS: 12 U/L (ref 7–45)
ALBUMIN (G/DL) IN SER/PLAS: 3.9 G/DL (ref 3.4–5)
ALKALINE PHOSPHATASE (U/L) IN SER/PLAS: 50 U/L (ref 33–136)
ANION GAP IN SER/PLAS: 14 MMOL/L (ref 10–20)
ASPARTATE AMINOTRANSFERASE (SGOT) (U/L) IN SER/PLAS: 16 U/L (ref 9–39)
BASOPHILS (10*3/UL) IN BLOOD BY AUTOMATED COUNT: 0.06 X10E9/L (ref 0–0.1)
BASOPHILS/100 LEUKOCYTES IN BLOOD BY AUTOMATED COUNT: 1.2 % (ref 0–2)
BILIRUBIN TOTAL (MG/DL) IN SER/PLAS: 0.8 MG/DL (ref 0–1.2)
CALCIUM (MG/DL) IN SER/PLAS: 9.1 MG/DL (ref 8.6–10.3)
CARBON DIOXIDE, TOTAL (MMOL/L) IN SER/PLAS: 24 MMOL/L (ref 21–32)
CHLORIDE (MMOL/L) IN SER/PLAS: 106 MMOL/L (ref 98–107)
CHOLESTEROL (MG/DL) IN SER/PLAS: 133 MG/DL (ref 0–199)
CHOLESTEROL IN HDL (MG/DL) IN SER/PLAS: 47.9 MG/DL
CHOLESTEROL/HDL RATIO: 2.8
CREATININE (MG/DL) IN SER/PLAS: 1.25 MG/DL (ref 0.5–1.05)
EOSINOPHILS (10*3/UL) IN BLOOD BY AUTOMATED COUNT: 0.08 X10E9/L (ref 0–0.4)
EOSINOPHILS/100 LEUKOCYTES IN BLOOD BY AUTOMATED COUNT: 1.7 % (ref 0–6)
ERYTHROCYTE DISTRIBUTION WIDTH (RATIO) BY AUTOMATED COUNT: 14.7 % (ref 11.5–14.5)
ERYTHROCYTE MEAN CORPUSCULAR HEMOGLOBIN CONCENTRATION (G/DL) BY AUTOMATED: 32.3 G/DL (ref 32–36)
ERYTHROCYTE MEAN CORPUSCULAR VOLUME (FL) BY AUTOMATED COUNT: 91 FL (ref 80–100)
ERYTHROCYTES (10*6/UL) IN BLOOD BY AUTOMATED COUNT: 4.24 X10E12/L (ref 4–5.2)
GFR FEMALE: 44 ML/MIN/1.73M2
GLUCOSE (MG/DL) IN SER/PLAS: 89 MG/DL (ref 74–99)
HEMATOCRIT (%) IN BLOOD BY AUTOMATED COUNT: 38.4 % (ref 36–46)
HEMOGLOBIN (G/DL) IN BLOOD: 12.4 G/DL (ref 12–16)
IMMATURE GRANULOCYTES/100 LEUKOCYTES IN BLOOD BY AUTOMATED COUNT: 0.4 % (ref 0–0.9)
IRON (UG/DL) IN SER/PLAS: 57 UG/DL (ref 35–150)
IRON BINDING CAPACITY (UG/DL) IN SER/PLAS: 321 UG/DL (ref 240–445)
IRON SATURATION (%) IN SER/PLAS: 18 % (ref 25–45)
LDL: 66 MG/DL (ref 0–99)
LEUKOCYTES (10*3/UL) IN BLOOD BY AUTOMATED COUNT: 4.8 X10E9/L (ref 4.4–11.3)
LYMPHOCYTES (10*3/UL) IN BLOOD BY AUTOMATED COUNT: 1.06 X10E9/L (ref 0.8–3)
LYMPHOCYTES/100 LEUKOCYTES IN BLOOD BY AUTOMATED COUNT: 22 % (ref 13–44)
MAGNESIUM (MG/DL) IN SER/PLAS: 1.86 MG/DL (ref 1.6–2.4)
MONOCYTES (10*3/UL) IN BLOOD BY AUTOMATED COUNT: 0.49 X10E9/L (ref 0.05–0.8)
MONOCYTES/100 LEUKOCYTES IN BLOOD BY AUTOMATED COUNT: 10.2 % (ref 2–10)
NEUTROPHILS (10*3/UL) IN BLOOD BY AUTOMATED COUNT: 3.11 X10E9/L (ref 1.6–5.5)
NEUTROPHILS/100 LEUKOCYTES IN BLOOD BY AUTOMATED COUNT: 64.5 % (ref 40–80)
PLATELETS (10*3/UL) IN BLOOD AUTOMATED COUNT: 213 X10E9/L (ref 150–450)
POTASSIUM (MMOL/L) IN SER/PLAS: 4.4 MMOL/L (ref 3.5–5.3)
PROTEIN TOTAL: 6.9 G/DL (ref 6.4–8.2)
SODIUM (MMOL/L) IN SER/PLAS: 140 MMOL/L (ref 136–145)
THYROTROPIN (MIU/L) IN SER/PLAS BY DETECTION LIMIT <= 0.05 MIU/L: 1.52 MIU/L (ref 0.44–3.98)
TRIGLYCERIDE (MG/DL) IN SER/PLAS: 96 MG/DL (ref 0–149)
UREA NITROGEN (MG/DL) IN SER/PLAS: 17 MG/DL (ref 6–23)
VLDL: 19 MG/DL (ref 0–40)

## 2023-05-22 ENCOUNTER — OFFICE VISIT (OUTPATIENT)
Dept: PRIMARY CARE | Facility: CLINIC | Age: 78
End: 2023-05-22
Payer: MEDICARE

## 2023-05-22 VITALS
OXYGEN SATURATION: 97 % | BODY MASS INDEX: 33.84 KG/M2 | WEIGHT: 198.2 LBS | HEART RATE: 68 BPM | DIASTOLIC BLOOD PRESSURE: 64 MMHG | TEMPERATURE: 97 F | SYSTOLIC BLOOD PRESSURE: 110 MMHG | RESPIRATION RATE: 18 BRPM | HEIGHT: 64 IN

## 2023-05-22 DIAGNOSIS — I10 ESSENTIAL HYPERTENSION: ICD-10-CM

## 2023-05-22 DIAGNOSIS — E78.49 OTHER HYPERLIPIDEMIA: ICD-10-CM

## 2023-05-22 DIAGNOSIS — M85.822 OSTEOPENIA OF LEFT UPPER ARM: ICD-10-CM

## 2023-05-22 DIAGNOSIS — E55.9 VITAMIN D DEFICIENCY: ICD-10-CM

## 2023-05-22 DIAGNOSIS — K21.9 GASTROESOPHAGEAL REFLUX DISEASE, UNSPECIFIED WHETHER ESOPHAGITIS PRESENT: Primary | ICD-10-CM

## 2023-05-22 PROBLEM — Z91.018 FOOD ALLERGY: Status: ACTIVE | Noted: 2023-05-22

## 2023-05-22 PROBLEM — L85.3 DRY SKIN: Status: ACTIVE | Noted: 2023-05-22

## 2023-05-22 PROBLEM — S33.5XXA LUMBAR SPRAIN: Status: ACTIVE | Noted: 2023-05-22

## 2023-05-22 PROBLEM — M81.0 MENOPAUSAL OSTEOPOROSIS: Status: ACTIVE | Noted: 2023-05-22

## 2023-05-22 PROBLEM — J10.1 INFLUENZA A: Status: ACTIVE | Noted: 2023-05-22

## 2023-05-22 PROBLEM — R07.81 RIB PAIN ON LEFT SIDE: Status: ACTIVE | Noted: 2023-05-22

## 2023-05-22 PROBLEM — H61.21 IMPACTED CERUMEN, RIGHT EAR: Status: ACTIVE | Noted: 2023-05-22

## 2023-05-22 PROBLEM — H66.92 OTITIS MEDIA, LEFT: Status: ACTIVE | Noted: 2023-05-22

## 2023-05-22 PROBLEM — S72.001D FRACTURE OF UNSPECIFIED PART OF NECK OF RIGHT FEMUR, SUBSEQUENT ENCOUNTER FOR CLOSED FRACTURE WITH ROUTINE HEALING: Status: ACTIVE | Noted: 2023-05-22

## 2023-05-22 PROBLEM — N89.8 VAGINAL IRRITATION: Status: ACTIVE | Noted: 2018-12-18

## 2023-05-22 PROBLEM — R42 VERTIGO: Status: ACTIVE | Noted: 2023-05-22

## 2023-05-22 PROBLEM — R27.0 ATAXIA: Status: ACTIVE | Noted: 2019-05-04

## 2023-05-22 PROBLEM — M25.819 SHOULDER IMPINGEMENT: Status: ACTIVE | Noted: 2023-05-22

## 2023-05-22 PROBLEM — J20.9 ACUTE BRONCHITIS: Status: ACTIVE | Noted: 2023-05-22

## 2023-05-22 PROBLEM — H60.509 ACUTE OTITIS EXTERNA: Status: ACTIVE | Noted: 2023-05-22

## 2023-05-22 PROBLEM — U07.1 COVID-19: Status: ACTIVE | Noted: 2023-05-22

## 2023-05-22 PROBLEM — M25.562 LEFT KNEE PAIN: Status: ACTIVE | Noted: 2023-05-22

## 2023-05-22 PROBLEM — D12.5 BENIGN NEOPLASM OF SIGMOID COLON: Status: ACTIVE | Noted: 2023-05-22

## 2023-05-22 PROBLEM — M70.20 OLECRANON BURSITIS: Status: ACTIVE | Noted: 2023-05-22

## 2023-05-22 PROBLEM — K59.00 CONSTIPATION: Status: ACTIVE | Noted: 2023-05-22

## 2023-05-22 PROBLEM — E78.5 HYPERLIPIDEMIA: Status: ACTIVE | Noted: 2023-05-22

## 2023-05-22 PROBLEM — R06.09 DYSPNEA ON EXERTION: Status: ACTIVE | Noted: 2023-05-22

## 2023-05-22 PROBLEM — M19.90 ARTHRITIS: Status: ACTIVE | Noted: 2023-05-22

## 2023-05-22 PROBLEM — Z95.1 HISTORY OF CORONARY ARTERY BYPASS SURGERY: Status: ACTIVE | Noted: 2023-05-22

## 2023-05-22 PROBLEM — J30.9 ALLERGIC RHINITIS: Status: ACTIVE | Noted: 2023-05-22

## 2023-05-22 PROBLEM — R92.8 ABNORMAL MAMMOGRAM: Status: ACTIVE | Noted: 2023-05-22

## 2023-05-22 PROBLEM — N39.41 URGE INCONTINENCE OF URINE: Status: ACTIVE | Noted: 2019-05-14

## 2023-05-22 PROBLEM — I45.10 RIGHT BUNDLE BRANCH BLOCK (RBBB): Status: ACTIVE | Noted: 2023-05-22

## 2023-05-22 PROBLEM — M54.50 LOW BACK PAIN: Status: ACTIVE | Noted: 2023-05-22

## 2023-05-22 PROBLEM — R60.0 LEG EDEMA, LEFT: Status: ACTIVE | Noted: 2023-05-22

## 2023-05-22 PROBLEM — N32.81 OAB (OVERACTIVE BLADDER): Status: ACTIVE | Noted: 2018-12-18

## 2023-05-22 PROBLEM — R53.83 FATIGUE: Status: ACTIVE | Noted: 2023-05-22

## 2023-05-22 PROBLEM — J32.9 SINOBRONCHITIS: Status: ACTIVE | Noted: 2023-05-22

## 2023-05-22 PROBLEM — T78.3XXA ANGIOEDEMA: Status: ACTIVE | Noted: 2023-05-22

## 2023-05-22 PROBLEM — J40 SINOBRONCHITIS: Status: ACTIVE | Noted: 2023-05-22

## 2023-05-22 PROBLEM — M70.21 OLECRANON BURSITIS OF RIGHT ELBOW: Status: ACTIVE | Noted: 2023-05-22

## 2023-05-22 PROBLEM — T84.038A ASEPTIC LOOSENING OF PROSTHETIC KNEE (CMS-HCC): Status: ACTIVE | Noted: 2023-05-22

## 2023-05-22 PROBLEM — N81.5 VAGINAL ENTEROCELE: Status: ACTIVE | Noted: 2018-12-18

## 2023-05-22 PROBLEM — M79.673 FOOT PAIN: Status: ACTIVE | Noted: 2023-05-22

## 2023-05-22 PROBLEM — M70.61 TROCHANTERIC BURSITIS OF RIGHT HIP: Status: ACTIVE | Noted: 2023-05-22

## 2023-05-22 PROBLEM — S93.609A FOOT SPRAIN: Status: ACTIVE | Noted: 2023-05-22

## 2023-05-22 PROBLEM — R10.9 ABDOMINAL PAIN: Status: ACTIVE | Noted: 2023-05-22

## 2023-05-22 PROBLEM — I34.0 MITRAL REGURGITATION: Status: ACTIVE | Noted: 2023-05-22

## 2023-05-22 PROBLEM — M70.72 HIP BURSITIS, LEFT: Status: ACTIVE | Noted: 2023-05-22

## 2023-05-22 PROBLEM — R60.9 EDEMA: Status: ACTIVE | Noted: 2023-05-22

## 2023-05-22 PROBLEM — H60.90 OTITIS EXTERNA: Status: ACTIVE | Noted: 2023-05-22

## 2023-05-22 PROBLEM — M25.579 ANKLE PAIN: Status: ACTIVE | Noted: 2023-05-22

## 2023-05-22 PROBLEM — E11.9 DIABETES MELLITUS WITHOUT COMPLICATION (MULTI): Status: ACTIVE | Noted: 2023-05-22

## 2023-05-22 PROBLEM — D64.9 ANEMIA: Status: ACTIVE | Noted: 2023-05-22

## 2023-05-22 PROBLEM — L91.0 KELOID: Status: ACTIVE | Noted: 2023-05-22

## 2023-05-22 PROBLEM — R82.4 URINE KETONES: Status: ACTIVE | Noted: 2023-05-22

## 2023-05-22 PROBLEM — M76.829: Status: ACTIVE | Noted: 2023-05-22

## 2023-05-22 PROBLEM — M79.609 LIMB PAIN: Status: ACTIVE | Noted: 2023-05-22

## 2023-05-22 PROBLEM — R31.9 HEMATURIA: Status: ACTIVE | Noted: 2023-05-22

## 2023-05-22 PROBLEM — H92.09 EARACHE: Status: ACTIVE | Noted: 2023-05-22

## 2023-05-22 PROBLEM — M85.80 OSTEOPENIA: Status: ACTIVE | Noted: 2023-05-22

## 2023-05-22 PROBLEM — R91.1 LUNG NODULE: Status: ACTIVE | Noted: 2023-05-22

## 2023-05-22 PROBLEM — M17.9 OSTEOARTHRITIS OF KNEE: Status: ACTIVE | Noted: 2023-05-22

## 2023-05-22 PROBLEM — L90.5 SCAR: Status: ACTIVE | Noted: 2023-05-22

## 2023-05-22 PROBLEM — E03.9 HYPOTHYROIDISM: Status: ACTIVE | Noted: 2023-05-22

## 2023-05-22 PROBLEM — M25.551 RIGHT HIP PAIN: Status: ACTIVE | Noted: 2023-05-22

## 2023-05-22 PROBLEM — Z96.659 ASEPTIC LOOSENING OF PROSTHETIC KNEE (CMS-HCC): Status: ACTIVE | Noted: 2023-05-22

## 2023-05-22 PROBLEM — R32 URINARY INCONTINENCE: Status: ACTIVE | Noted: 2023-05-22

## 2023-05-22 PROBLEM — R79.89 ELEVATED D-DIMER: Status: ACTIVE | Noted: 2023-05-22

## 2023-05-22 PROBLEM — S46.919A: Status: ACTIVE | Noted: 2023-05-22

## 2023-05-22 PROBLEM — N28.9 RENAL INSUFFICIENCY: Status: ACTIVE | Noted: 2023-05-22

## 2023-05-22 PROBLEM — L08.1 ERYTHRASMA: Status: ACTIVE | Noted: 2023-05-22

## 2023-05-22 PROBLEM — R01.1 CARDIAC MURMUR: Status: ACTIVE | Noted: 2023-05-22

## 2023-05-22 PROBLEM — L71.9 ROSACEA: Status: ACTIVE | Noted: 2023-05-22

## 2023-05-22 PROBLEM — S52.509A: Status: ACTIVE | Noted: 2023-05-22

## 2023-05-22 PROBLEM — K64.4 EXTERNAL HEMORRHOID: Status: ACTIVE | Noted: 2023-05-22

## 2023-05-22 PROBLEM — N81.6 BADEN-WALKER GRADE 2 RECTOCELE: Status: ACTIVE | Noted: 2019-01-07

## 2023-05-22 PROBLEM — S72.001A HIP FRACTURE, RIGHT (MULTI): Status: ACTIVE | Noted: 2023-05-22

## 2023-05-22 PROBLEM — I25.10 CAD (CORONARY ARTERY DISEASE): Status: ACTIVE | Noted: 2023-05-22

## 2023-05-22 PROBLEM — R13.10 DYSPHAGIA: Status: ACTIVE | Noted: 2023-05-22

## 2023-05-22 PROBLEM — R82.998 LEUKOCYTES IN URINE: Status: ACTIVE | Noted: 2023-05-22

## 2023-05-22 PROBLEM — N39.0 URINARY TRACT INFECTION: Status: ACTIVE | Noted: 2023-05-22

## 2023-05-22 PROBLEM — R42 DIZZINESS: Status: ACTIVE | Noted: 2023-05-22

## 2023-05-22 PROBLEM — M62.838 MUSCLE SPASMS OF LOWER EXTREMITY: Status: ACTIVE | Noted: 2023-05-22

## 2023-05-22 PROBLEM — K31.7 GASTRIC POLYP: Status: ACTIVE | Noted: 2023-05-22

## 2023-05-22 PROBLEM — R68.89 FLU-LIKE SYMPTOMS: Status: ACTIVE | Noted: 2023-05-22

## 2023-05-22 PROBLEM — M76.31 ILIOTIBIAL BAND SYNDROME OF RIGHT SIDE: Status: ACTIVE | Noted: 2023-05-22

## 2023-05-22 PROBLEM — M16.51 POST-TRAUMATIC OSTEOARTHRITIS OF RIGHT HIP: Status: ACTIVE | Noted: 2023-05-22

## 2023-05-22 PROBLEM — J40 BRONCHITIS: Status: ACTIVE | Noted: 2023-05-22

## 2023-05-22 PROBLEM — R09.89 CAROTID BRUIT: Status: ACTIVE | Noted: 2023-05-22

## 2023-05-22 PROBLEM — M71.50 TRAUMATIC BURSITIS: Status: ACTIVE | Noted: 2023-05-22

## 2023-05-22 PROBLEM — S96.919A ANKLE STRAIN: Status: ACTIVE | Noted: 2023-05-22

## 2023-05-22 PROBLEM — R07.9 CHEST PAIN: Status: ACTIVE | Noted: 2023-05-22

## 2023-05-22 PROBLEM — J44.1 COPD WITH ACUTE EXACERBATION (MULTI): Status: ACTIVE | Noted: 2023-05-22

## 2023-05-22 PROBLEM — L65.9 HAIR LOSS: Status: ACTIVE | Noted: 2023-05-22

## 2023-05-22 PROBLEM — G25.81 RESTLESS LEGS SYNDROME: Status: ACTIVE | Noted: 2023-05-22

## 2023-05-22 PROBLEM — N81.9 VAGINAL VAULT PROLAPSE: Status: ACTIVE | Noted: 2023-05-22

## 2023-05-22 PROBLEM — R23.3 EASY BRUISING: Status: ACTIVE | Noted: 2023-05-22

## 2023-05-22 PROBLEM — R26.89 IMPAIRED GAIT AND MOBILITY: Status: ACTIVE | Noted: 2023-05-22

## 2023-05-22 PROBLEM — G89.18 POST-OP PAIN: Status: ACTIVE | Noted: 2023-05-22

## 2023-05-22 PROBLEM — J32.9 SINUSITIS: Status: ACTIVE | Noted: 2023-05-22

## 2023-05-22 PROBLEM — M54.16 LUMBAR RADICULAR PAIN: Status: ACTIVE | Noted: 2023-05-22

## 2023-05-22 PROBLEM — K57.30 DIVERTICULOSIS OF LARGE INTESTINE WITHOUT DIVERTICULITIS: Status: ACTIVE | Noted: 2023-05-22

## 2023-05-22 PROBLEM — R00.1 SINUS BRADYCARDIA: Status: ACTIVE | Noted: 2023-05-22

## 2023-05-22 PROCEDURE — 1159F MED LIST DOCD IN RCRD: CPT | Performed by: FAMILY MEDICINE

## 2023-05-22 PROCEDURE — 3074F SYST BP LT 130 MM HG: CPT | Performed by: FAMILY MEDICINE

## 2023-05-22 PROCEDURE — 3078F DIAST BP <80 MM HG: CPT | Performed by: FAMILY MEDICINE

## 2023-05-22 PROCEDURE — 1160F RVW MEDS BY RX/DR IN RCRD: CPT | Performed by: FAMILY MEDICINE

## 2023-05-22 PROCEDURE — 99213 OFFICE O/P EST LOW 20 MIN: CPT | Performed by: FAMILY MEDICINE

## 2023-05-22 PROCEDURE — 1036F TOBACCO NON-USER: CPT | Performed by: FAMILY MEDICINE

## 2023-05-22 RX ORDER — CARVEDILOL 6.25 MG/1
6.25 TABLET ORAL
Qty: 180 TABLET | Refills: 3 | Status: SHIPPED | OUTPATIENT
Start: 2023-05-22 | End: 2024-04-22

## 2023-05-22 RX ORDER — DOCUSATE SODIUM 100 MG/1
100 CAPSULE, LIQUID FILLED ORAL DAILY
Qty: 90 CAPSULE | Refills: 3 | Status: SHIPPED | OUTPATIENT
Start: 2023-05-22 | End: 2023-10-21 | Stop reason: HOSPADM

## 2023-05-22 RX ORDER — NITROGLYCERIN 0.4 MG/1
TABLET SUBLINGUAL
COMMUNITY
End: 2023-10-05 | Stop reason: ENTERED-IN-ERROR

## 2023-05-22 RX ORDER — DOCUSATE SODIUM 100 MG/1
1 CAPSULE, LIQUID FILLED ORAL DAILY
COMMUNITY
Start: 2022-09-14 | End: 2023-05-22 | Stop reason: SDUPTHER

## 2023-05-22 RX ORDER — ALBUTEROL SULFATE 0.83 MG/ML
SOLUTION RESPIRATORY (INHALATION)
COMMUNITY
Start: 2018-06-23 | End: 2023-10-04 | Stop reason: ALTCHOICE

## 2023-05-22 RX ORDER — CARVEDILOL 6.25 MG/1
TABLET ORAL EVERY 12 HOURS
COMMUNITY
Start: 2021-12-13 | End: 2023-05-22 | Stop reason: SDUPTHER

## 2023-05-22 RX ORDER — ROSUVASTATIN CALCIUM 40 MG/1
TABLET, COATED ORAL EVERY 24 HOURS
COMMUNITY
Start: 2020-01-28 | End: 2023-05-22 | Stop reason: SDUPTHER

## 2023-05-22 RX ORDER — LEVOTHYROXINE SODIUM 25 UG/1
25 TABLET ORAL
COMMUNITY
Start: 2021-12-15 | End: 2023-12-18

## 2023-05-22 RX ORDER — PANTOPRAZOLE SODIUM 40 MG/1
40 TABLET, DELAYED RELEASE ORAL
COMMUNITY
End: 2023-10-16

## 2023-05-22 RX ORDER — ROSUVASTATIN CALCIUM 40 MG/1
40 TABLET, COATED ORAL EVERY 24 HOURS
Qty: 90 TABLET | Refills: 3 | Status: SHIPPED | OUTPATIENT
Start: 2023-05-22 | End: 2024-04-22

## 2023-05-22 NOTE — PROGRESS NOTES
"Subjective   Patient ID: Rosalba Salcedo is a 78 y.o. female who presents for Follow-up.    HPI   Stopped amlodipine and was getting headache .. and thought blood pressure was elevated.. .was monitoring an to much with carvedilol... bp at home 110 -120...   here  for f/u7  of blood pressure    The reflux esophagitis he has been occurring in a recurrent pattern for years. The course has been without change. The reflux is described as mild to moderate. The patient remains compliant on meds.. The patient takes medications in a  daily stepdown fashion. Denies dysphagia hoarseness or odynophagia...   Review of Systems  cardiovascular:  no  palpitations or chest  pain  respiratory: no  shortness  of  breath  endocrine:  no polydipsia,  no polyuria  musculoskeletal:  no  myalgia.. no arthralgia   Objective   /64   Pulse 68   Temp 36.1 °C (97 °F)   Resp 18   Ht 1.626 m (5' 4\")   Wt 89.9 kg (198 lb 3.2 oz)   SpO2 97%   BMI 34.02 kg/m²     Physical Exam  general: alert oriented x three  HEENT hearing normal to voice  Neck supple  Lungs respirations non-labored.  Cardiovascular: no peripheral edema  Skin: warm and dry without rash  Psych: judgement and insight normal  Musculoskeletal:  ambulation normal,    lymph:negative cervical  LYMPADENOPATHY  thyroid: non palpable enlargement   Assessment/Plan   Problem List Items Addressed This Visit          Circulatory    Essential hypertension     Stopped amlodipine  and was getting headache  .. and thought blood    pressure was elevated..   .was monitoring  an to  much with carvedilol... bp at home   110 -120...stable and continue meds          Relevant Medications    carvedilol (Coreg) 6.25 mg tablet       Digestive    GERD (gastroesophageal reflux disease) - Primary      The reflux esophagitis he has been occurring in a recurrent pattern for years. The course has been without change. The reflux is described as mild to moderate. The patient remains compliant on " meds.. The patient takes medications in a  daily stepdown fashion. Denies dysphagia hoarseness or odynophagia...          Relevant Medications    docusate sodium (Colace) 100 mg capsule       Musculoskeletal    Osteopenia       Endocrine/Metabolic    Vitamin D deficiency       Other    Hyperlipidemia    Relevant Medications    rosuvastatin (Crestor) 40 mg tablet   Monitor  .. rechk  2-3  mnths   .discssed med

## 2023-05-22 NOTE — ASSESSMENT & PLAN NOTE
Stopped amlodipine  and was getting headache  .. and thought blood    pressure was elevated..   .was monitoring  an to  much with carvedilol... bp at home   110 -120...stable and continue meds

## 2023-05-22 NOTE — ASSESSMENT & PLAN NOTE
The reflux esophagitis he has been occurring in a recurrent pattern for years. The course has been without change. The reflux is described as mild to moderate. The patient remains compliant on meds.. The patient takes medications in a  daily stepdown fashion. Denies dysphagia hoarseness or odynophagia...

## 2023-05-22 NOTE — PATIENT INSTRUCTIONS

## 2023-06-22 ENCOUNTER — APPOINTMENT (OUTPATIENT)
Dept: LAB | Facility: LAB | Age: 78
End: 2023-06-22
Payer: MEDICARE

## 2023-06-22 LAB
ALANINE AMINOTRANSFERASE (SGPT) (U/L) IN SER/PLAS: 11 U/L (ref 7–45)
ALBUMIN (G/DL) IN SER/PLAS: 3.9 G/DL (ref 3.4–5)
ALKALINE PHOSPHATASE (U/L) IN SER/PLAS: 54 U/L (ref 33–136)
ANION GAP IN SER/PLAS: 12 MMOL/L (ref 10–20)
APPEARANCE, URINE: ABNORMAL
ASPARTATE AMINOTRANSFERASE (SGOT) (U/L) IN SER/PLAS: 14 U/L (ref 9–39)
BASOPHILS (10*3/UL) IN BLOOD BY AUTOMATED COUNT: 0.04 X10E9/L (ref 0–0.1)
BASOPHILS/100 LEUKOCYTES IN BLOOD BY AUTOMATED COUNT: 0.5 % (ref 0–2)
BILIRUBIN TOTAL (MG/DL) IN SER/PLAS: 0.6 MG/DL (ref 0–1.2)
BILIRUBIN, URINE: NEGATIVE
BLOOD, URINE: NEGATIVE
C REACTIVE PROTEIN (MG/L) IN SER/PLAS: 0.49 MG/DL
CALCIUM (MG/DL) IN SER/PLAS: 9 MG/DL (ref 8.6–10.3)
CARBON DIOXIDE, TOTAL (MMOL/L) IN SER/PLAS: 26 MMOL/L (ref 21–32)
CHLORIDE (MMOL/L) IN SER/PLAS: 109 MMOL/L (ref 98–107)
COLOR, URINE: YELLOW
CREATININE (MG/DL) IN SER/PLAS: 1.23 MG/DL (ref 0.5–1.05)
EOSINOPHILS (10*3/UL) IN BLOOD BY AUTOMATED COUNT: 0.08 X10E9/L (ref 0–0.4)
EOSINOPHILS/100 LEUKOCYTES IN BLOOD BY AUTOMATED COUNT: 1 % (ref 0–6)
ERYTHROCYTE DISTRIBUTION WIDTH (RATIO) BY AUTOMATED COUNT: 13.8 % (ref 11.5–14.5)
ERYTHROCYTE MEAN CORPUSCULAR HEMOGLOBIN CONCENTRATION (G/DL) BY AUTOMATED: 32.3 G/DL (ref 32–36)
ERYTHROCYTE MEAN CORPUSCULAR VOLUME (FL) BY AUTOMATED COUNT: 90 FL (ref 80–100)
ERYTHROCYTES (10*6/UL) IN BLOOD BY AUTOMATED COUNT: 4.31 X10E12/L (ref 4–5.2)
GFR FEMALE: 45 ML/MIN/1.73M2
GLUCOSE (MG/DL) IN SER/PLAS: 101 MG/DL (ref 74–99)
GLUCOSE, URINE: NEGATIVE MG/DL
HEMATOCRIT (%) IN BLOOD BY AUTOMATED COUNT: 39 % (ref 36–46)
HEMOGLOBIN (G/DL) IN BLOOD: 12.6 G/DL (ref 12–16)
IMMATURE GRANULOCYTES/100 LEUKOCYTES IN BLOOD BY AUTOMATED COUNT: 0.8 % (ref 0–0.9)
IRON (UG/DL) IN SER/PLAS: 47 UG/DL (ref 35–150)
IRON BINDING CAPACITY (UG/DL) IN SER/PLAS: 329 UG/DL (ref 240–445)
IRON SATURATION (%) IN SER/PLAS: 14 % (ref 25–45)
KETONES, URINE: NEGATIVE MG/DL
LEUKOCYTE ESTERASE, URINE: ABNORMAL
LEUKOCYTES (10*3/UL) IN BLOOD BY AUTOMATED COUNT: 7.9 X10E9/L (ref 4.4–11.3)
LYMPHOCYTES (10*3/UL) IN BLOOD BY AUTOMATED COUNT: 1.69 X10E9/L (ref 0.8–3)
LYMPHOCYTES/100 LEUKOCYTES IN BLOOD BY AUTOMATED COUNT: 21.4 % (ref 13–44)
MONOCYTES (10*3/UL) IN BLOOD BY AUTOMATED COUNT: 0.71 X10E9/L (ref 0.05–0.8)
MONOCYTES/100 LEUKOCYTES IN BLOOD BY AUTOMATED COUNT: 9 % (ref 2–10)
MUCUS, URINE: NORMAL /LPF
NEUTROPHILS (10*3/UL) IN BLOOD BY AUTOMATED COUNT: 5.32 X10E9/L (ref 1.6–5.5)
NEUTROPHILS/100 LEUKOCYTES IN BLOOD BY AUTOMATED COUNT: 67.3 % (ref 40–80)
NITRITE, URINE: NEGATIVE
PH, URINE: 5 (ref 5–8)
PLATELETS (10*3/UL) IN BLOOD AUTOMATED COUNT: 218 X10E9/L (ref 150–450)
POTASSIUM (MMOL/L) IN SER/PLAS: 4.1 MMOL/L (ref 3.5–5.3)
PROTEIN TOTAL: 6.5 G/DL (ref 6.4–8.2)
PROTEIN, URINE: NEGATIVE MG/DL
RBC, URINE: 1 /HPF (ref 0–5)
SODIUM (MMOL/L) IN SER/PLAS: 143 MMOL/L (ref 136–145)
SPECIFIC GRAVITY, URINE: 1.02 (ref 1–1.03)
SQUAMOUS EPITHELIAL CELLS, URINE: 8 /HPF
UREA NITROGEN (MG/DL) IN SER/PLAS: 16 MG/DL (ref 6–23)
UROBILINOGEN, URINE: <2 MG/DL (ref 0–1.9)
WBC, URINE: 5 /HPF (ref 0–5)

## 2023-07-14 ENCOUNTER — TELEPHONE (OUTPATIENT)
Dept: PRIMARY CARE | Facility: CLINIC | Age: 78
End: 2023-07-14
Payer: MEDICARE

## 2023-07-14 NOTE — TELEPHONE ENCOUNTER
Patient called again today and would like to be added on for a virtual visit for her back. She was informed yesterday that you do not do pain medication and if she was in that much pain she should go to the ER, biut she wants to talk to you.

## 2023-07-19 ENCOUNTER — OFFICE VISIT (OUTPATIENT)
Dept: PRIMARY CARE | Facility: CLINIC | Age: 78
End: 2023-07-19
Payer: MEDICARE

## 2023-07-19 ENCOUNTER — TELEPHONE (OUTPATIENT)
Dept: PRIMARY CARE | Facility: CLINIC | Age: 78
End: 2023-07-19

## 2023-07-19 VITALS
SYSTOLIC BLOOD PRESSURE: 152 MMHG | OXYGEN SATURATION: 95 % | RESPIRATION RATE: 18 BRPM | TEMPERATURE: 97.4 F | HEART RATE: 74 BPM | WEIGHT: 195 LBS | BODY MASS INDEX: 33.47 KG/M2 | DIASTOLIC BLOOD PRESSURE: 80 MMHG

## 2023-07-19 DIAGNOSIS — I25.10 CORONARY ARTERY DISEASE, UNSPECIFIED VESSEL OR LESION TYPE, UNSPECIFIED WHETHER ANGINA PRESENT, UNSPECIFIED WHETHER NATIVE OR TRANSPLANTED HEART: ICD-10-CM

## 2023-07-19 DIAGNOSIS — S72.001D FRACTURE OF UNSPECIFIED PART OF NECK OF RIGHT FEMUR, SUBSEQUENT ENCOUNTER FOR CLOSED FRACTURE WITH ROUTINE HEALING: ICD-10-CM

## 2023-07-19 DIAGNOSIS — S72.001D CLOSED FRACTURE OF RIGHT HIP WITH ROUTINE HEALING, SUBSEQUENT ENCOUNTER: ICD-10-CM

## 2023-07-19 DIAGNOSIS — Z00.00 ROUTINE GENERAL MEDICAL EXAMINATION AT HEALTH CARE FACILITY: Primary | ICD-10-CM

## 2023-07-19 PROBLEM — Z96.653 PRESENCE OF ARTIFICIAL KNEE JOINT, BILATERAL: Status: ACTIVE | Noted: 2018-01-29

## 2023-07-19 PROBLEM — H83.2X3 VESTIBULAR HYPOFUNCTION, BILATERAL: Status: ACTIVE | Noted: 2019-06-11

## 2023-07-19 PROCEDURE — G0439 PPPS, SUBSEQ VISIT: HCPCS | Performed by: FAMILY MEDICINE

## 2023-07-19 PROCEDURE — 99214 OFFICE O/P EST MOD 30 MIN: CPT | Performed by: FAMILY MEDICINE

## 2023-07-19 PROCEDURE — 1126F AMNT PAIN NOTED NONE PRSNT: CPT | Performed by: FAMILY MEDICINE

## 2023-07-19 PROCEDURE — 3077F SYST BP >= 140 MM HG: CPT | Performed by: FAMILY MEDICINE

## 2023-07-19 PROCEDURE — 90677 PCV20 VACCINE IM: CPT | Performed by: FAMILY MEDICINE

## 2023-07-19 PROCEDURE — 1159F MED LIST DOCD IN RCRD: CPT | Performed by: FAMILY MEDICINE

## 2023-07-19 PROCEDURE — 1160F RVW MEDS BY RX/DR IN RCRD: CPT | Performed by: FAMILY MEDICINE

## 2023-07-19 PROCEDURE — G0009 ADMIN PNEUMOCOCCAL VACCINE: HCPCS | Performed by: FAMILY MEDICINE

## 2023-07-19 PROCEDURE — 1170F FXNL STATUS ASSESSED: CPT | Performed by: FAMILY MEDICINE

## 2023-07-19 PROCEDURE — 3079F DIAST BP 80-89 MM HG: CPT | Performed by: FAMILY MEDICINE

## 2023-07-19 PROCEDURE — 1036F TOBACCO NON-USER: CPT | Performed by: FAMILY MEDICINE

## 2023-07-19 RX ORDER — FAMOTIDINE 20 MG/1
TABLET, FILM COATED ORAL EVERY 12 HOURS
COMMUNITY
Start: 2019-10-29 | End: 2023-10-10 | Stop reason: ALTCHOICE

## 2023-07-19 RX ORDER — FLUTICASONE PROPIONATE 50 MCG
1 SPRAY, SUSPENSION (ML) NASAL DAILY PRN
COMMUNITY
End: 2023-12-18 | Stop reason: SDUPTHER

## 2023-07-19 RX ORDER — AMLODIPINE BESYLATE 5 MG/1
5 TABLET ORAL NIGHTLY
COMMUNITY
End: 2024-05-21 | Stop reason: SINTOL

## 2023-07-19 RX ORDER — ALENDRONATE SODIUM 70 MG/1
70 TABLET ORAL
COMMUNITY
End: 2023-10-04 | Stop reason: SDUPTHER

## 2023-07-19 RX ORDER — TRAMADOL HYDROCHLORIDE 50 MG/1
1 TABLET ORAL EVERY 8 HOURS PRN
COMMUNITY
Start: 2023-07-18 | End: 2023-10-05 | Stop reason: ENTERED-IN-ERROR

## 2023-07-19 RX ORDER — ACETAMINOPHEN 500 MG
2 TABLET ORAL EVERY 6 HOURS PRN
COMMUNITY
Start: 2019-02-01 | End: 2023-10-21 | Stop reason: HOSPADM

## 2023-07-19 RX ORDER — ACETAMINOPHEN 500 MG
5000 TABLET ORAL DAILY
COMMUNITY
End: 2023-10-10 | Stop reason: ALTCHOICE

## 2023-07-19 RX ORDER — AA/PROT/LYSINE/METHIO/VIT C/B6 50-12.5 MG
10 TABLET ORAL DAILY
COMMUNITY
End: 2023-10-10 | Stop reason: ALTCHOICE

## 2023-07-19 ASSESSMENT — ENCOUNTER SYMPTOMS
DEPRESSION: 0
LOSS OF SENSATION IN FEET: 1

## 2023-07-19 ASSESSMENT — ACTIVITIES OF DAILY LIVING (ADL)
DRESSING: INDEPENDENT
BATHING: INDEPENDENT

## 2023-07-19 ASSESSMENT — PATIENT HEALTH QUESTIONNAIRE - PHQ9
SUM OF ALL RESPONSES TO PHQ9 QUESTIONS 1 AND 2: 0
1. LITTLE INTEREST OR PLEASURE IN DOING THINGS: NOT AT ALL
2. FEELING DOWN, DEPRESSED OR HOPELESS: NOT AT ALL

## 2023-07-19 NOTE — PROGRESS NOTES
Subjective   Patient ID: Rosalba Salcedo is a 78 y.o. female who presents for Pre-op Exam and Medicare Annual Wellness Visit Subsequent.    HPI patient here for annual wellness exam along with preoperative clearance.  HPI   patient is being seen for a preoperative visit.  The procedure is a right  pin removal       from previous femur fracture t scheduled  for 7/26/23  with Dr. Ireland         .  The indication for surgery is due to  recurrent   pain  in right  hip    Surgical risk assessment:  Prior anesthesia: he /she had prior anesthesia. No  prior adverse reaction to epidural anesthesia and no prior adverse reaction to general anesthesia.    Pertinent past medical history: Pulmonary embolism, DVT, diabetes, neck osteoarthritis, wearing denture, seizure disorder, CVA, asthma, COPD, obstructive sleep apnea, and renal disease denies..h/o  MI ... History of open heart surgery in 2009 with normal stress test 2020 echo showed normal LV function with Holter monitor 2022 with minimal arrhythmias.  Exercise capacity: limited    Lifestyle factors: Denies tobacco use, denies heavy alcohol consumption, and denies illegal drug use.    Review of systems: No frequent nosebleeds.  Patient denies dyspnea, denies symptoms of respiratory infection, denies chest pain,    Living situation: No.  Concerns with his living situation and living independently with 3 cats .              Review of orthopedic notes dated 6/13/2023.  Patient was approximately 10 months out from L3-S1 laminectomy.  She saw orthopedics with complaints of hip pain which was felt to be possibly secondary to hip pinning on the right.  Bursal injections and intra-articular injections only gave her minimal improvement.  MRI of the spine was ordered.  Patient was status post L3-S1 midline laminectomy with L3-L4 TLIF.  She was having right upper buttock SI joint pain and bilateral hip pain with the pain sometimes radiating down her right leg.  X-rays had showed no  evidence of hardware failure.  MRI with gadolinium was ordered.    Recheck with orthopedics showed L2-L3 given her bilateral neurogenic claudication type symptoms and L4-L5 right facet cyst giving her some of the right leg radicular symptoms with pain coming from screws associated with hip pinning.  Thus patient is advised to have pins removed surgically and see if this helps with if improvement result being avoiding a larger operation.  She is here today for surgical clearance for removal of screws in her right hip.  If she fails to improve with the surgery then midline operation L2-L3 laminectomy above the prior midline laminectomy.  Review of Systems  Review of systems  Constitutional: yes  feeling tired and no fever  Eyes: No eyesight problems, no redness and no pain.  ENT: No ear pain, no swollen lymph nodes, no sore throat, no nasal discharge and nasal congestion.  Cardiovascular: No chest pain, no palpitations and no lower extremity edema.  Respiratory: No cough dyspnea with exertion, no dyspnea at rest and no wheezing.  Gastrointestinal: No abdominal pain, yes  constipation, no diarrhea, no heartburn had no nausea.  Genitourinary: No dysuria, no hesitancy, normal urinary frequency.  Musculoskeletal: Arthralgias   yes  myalgias and yes   joint swelling   right  ankle   Integumentary: No skin lesions and no rashes.  Neurologic: Dizziness but no headache no numbness or tingling, no seizures, no limb weakness, no memory changes and had no speech difficulty  Psychiatric: No mood changes, yes  sleep disturbances, no substance use and no feelings of anxiety.  Endocrine: No weight change and no temperature intolerance.  Hematologic/lymphatic:  easy bruising and no swollen glands  Objective   /80   Pulse 74   Temp 36.3 °C (97.4 °F)   Resp 18   Wt 88.5 kg (195 lb)   SpO2 95%   BMI 33.47 kg/m²     Physical Exam  Vitals: I have reviewed the vitals  General: Well-developed.  In no acute distress.  Eyes:    sclera nonicteric.  Conjunctiva not injected.  No discharge.   HEAD: Normocephalic, atraumatic.  HEENT   Mucous membranes moist.  Posterior oropharynx nonerythematous, no tonsillar exudates.      No cervical lymphadenopathy.  Cardio: Regular rate and rhythm.  No murmur, rub or gallop.  Pulmonary: Lungs clear to auscultation in all fields.  No accessory muscle use.  GI/: Normal active bowel sounds.  Soft, nontender.  No masses or organomegaly appreciated.  Musculoskeletal: With walker and palpable tenderness of lumbar spine with right hip with pain over greater trochanter and diminish range of motion right   hip   secondary to  pain and lower extremity..  Neuro: Alert, age-appropriate.  Normal muscle tone.  Moving all extremities.  Skin: No rash, bruises or lesions.   CBC with differential dated 6/22/2023 showed hemoglobin 12.6 hematocrit 39.0 WBC 7.9 platelets 218,000 iron testing 47 iron sat 14 CMP with normal electrolytes GFR slightly decreased at 45 liver tests within normal limits  Magnesium 1.86 dated 3/23    Lipids 1/23 showed cholesterol 158 triglycerides 225 HDL 47 LDL 66  Assessment/Plan   Problem List Items Addressed This Visit       CAD (coronary artery disease)     Reviewed testing and asymptomatic at this timeShe does not have ongoing angina or arrhythmias status post open heart surgery 2009 with normal stress test 2020 echo with normal LV function blood pressure controlled low risk for intended surgery that is pin removal         Relevant Medications    amLODIPine (Norvasc) 5 mg tablet    Fracture of unspecified part of neck of right femur, subsequent encounter for closed fracture with routine healing     Low risk for intended procedure         Hip fracture, right (CMS/[facility])     Reviewed testing and asymptomatic at this time from cardiac standpoint she does not have ongoing angina or arrhythmias status post open heart surgery 2009 with normal stress test 2020 echo with normal LV function blood  pressure controlled     low risk for intended surgery that is pin removal          Other Visit Diagnoses       Routine general medical examination at health care facility    -  Primary        MEDICARE SUMMARY  Cervical cancer: Screening --women aged 21 to  65.. In light of your history of positive pap please see the gynecologist recommended  /. It is no longer advisable in light of your history of normal Paps Not indicated   HIV infection: Screening:  Those adults at risk H 15-65 years   Not indicated   High blood pressure: Screening  and home monitoring... Adults with history and at risk  BRCA 1/2- related cancer, screening, and counseling--- women with a personal or family history of breast, ovarian, tubal, or peritoneal cancer or and he has history associated with BRCA 1/2 gene mutation mammogram reviewed 3../22 and normal  Breast cancer: Preventative medications--women at increased risk for breast cancer  Breast cancer: Screening with   mammography..--Women aged 50-74 years see above..  Of  Diabetes mellitus (type II (and abnormal blood glucose: Screening--adults H 40-70 years who are overweight or obese..Hemoglobin A1c checked 8/22 and 5.5  Falls prevention in community swelling older adults:.. Interventions--- adults 65 or older  Healthful diet and physical activity for C VD disease prevention: Counseling--adults with CVD risk factors  Hepatitis C virus infection: Screening--adults at high risk and adults born between 1945 and 1965 ,  Not indicated   Lung cancer: Screening --adults ages 55-80 with a 30 pack year smoking history and currently smoke or have quit within the past 15 yearsNot indicated   Osteoporosis to prevent fractures: Screening close post menopausal women  younger than 65 years at increased risk of osteoporosis..  Previously screened 2/22..  Patient with osteopenia/osteoporosis on medication  Osteoporosis to prevent fractures: Screening women 65 and older done 2/22 see above  Statin use for the  primary prevention of CVD: Preventative medicine--adults  40-75 years with no history of CVD, one or more CVD risk factors, and a calculated 10 year CVD event risk of 10% or greater.. on medication  Immunization  updates... P20..  Given today     Low risk for intended procedure

## 2023-07-19 NOTE — ASSESSMENT & PLAN NOTE
Reviewed testing and asymptomatic at this timeShe does not have ongoing angina or arrhythmias status post open heart surgery 2009 with normal stress test 2020 echo with normal LV function blood pressure controlled low risk for intended surgery that is pin removal

## 2023-07-19 NOTE — PATIENT INSTRUCTIONS
Please consider exercise program involving walking or some other form of aerobic activity 5 days weekly for 30 minutes... Let's also consider strengthening of large muscle groups like the abdominal muscles or shoulder muscles... Twice weekly with reps of 5/10/15 exercises and gradually increase strength.. This is not heavy strength training but light weight training... Sit ups or back exercise routine.. Please ask for handout if uncertain how to do..This  will help to strengthen your muscles which in turn will help you to lose weight.... You might ask what is the best diet available.. I would strongly encourage you to consider  Weight Watchers.. And as  your  fellow on  Weight Watchers physician attempting to  live this  LIFE  style  choice with you....  I will be glad to give you recommendations on what to eat.. Consider buying Yajaira bread.., alexandro bagle thin bread.. oikos yogurt... eggs  to eat as hard-boiled... Halo top ice cream for snack... All these are delicious foods which.. when eaten and  being compliant eating three  meals daily  breakfast lunch and dinner, drinking  64 ounces of water daily we will all win together !!!!!!!. This will be a means for you to lose weight... Consider also the smart phone lianna ... My plate.. Or My  fitness  pal..,  as additional possibilities for weight loss... Good  luckiana Leroy!    Discussed medication side effects.  The  risk benefits and treatment options  discussed with patient.       Please schedule follow-up appointment based upon your improvement/failure to improve/chronic medical conditions and physician recommendations during office appointment at the .       Patient advised to go to er if symptoms worsen or to call answering service, or to return to office for additional evaluation    This note was partially  generated using Dragon voice recognition and there may be incorrect words, wording, spelling, or pronunciation errors that were not  corrected prior to committing the note to the medical record.

## 2023-07-19 NOTE — ASSESSMENT & PLAN NOTE
Reviewed testing and asymptomatic at this time from cardiac standpoint she does not have ongoing angina or arrhythmias status post open heart surgery 2009 with normal stress test 2020 echo with normal LV function blood pressure controlled     low risk for intended surgery that is pin removal

## 2023-07-26 ENCOUNTER — HOSPITAL ENCOUNTER (OUTPATIENT)
Dept: DATA CONVERSION | Facility: HOSPITAL | Age: 78
End: 2023-07-27
Attending: ORTHOPAEDIC SURGERY | Admitting: ORTHOPAEDIC SURGERY
Payer: MEDICARE

## 2023-07-26 DIAGNOSIS — T84.84XA PAIN DUE TO INTERNAL ORTHOPEDIC PROSTHETIC DEVICES, IMPLANTS AND GRAFTS, INITIAL ENCOUNTER (CMS-HCC): ICD-10-CM

## 2023-07-26 DIAGNOSIS — E03.9 HYPOTHYROIDISM, UNSPECIFIED: ICD-10-CM

## 2023-07-26 DIAGNOSIS — I10 ESSENTIAL (PRIMARY) HYPERTENSION: ICD-10-CM

## 2023-07-26 DIAGNOSIS — Z87.81 PERSONAL HISTORY OF (HEALED) TRAUMATIC FRACTURE: ICD-10-CM

## 2023-07-26 DIAGNOSIS — I25.10 ATHEROSCLEROTIC HEART DISEASE OF NATIVE CORONARY ARTERY WITHOUT ANGINA PECTORIS: ICD-10-CM

## 2023-07-26 DIAGNOSIS — M25.551 PAIN IN RIGHT HIP: ICD-10-CM

## 2023-07-26 DIAGNOSIS — M25.559 PAIN IN UNSPECIFIED HIP: ICD-10-CM

## 2023-07-27 ENCOUNTER — TELEPHONE (OUTPATIENT)
Dept: PRIMARY CARE | Facility: CLINIC | Age: 78
End: 2023-07-27
Payer: MEDICARE

## 2023-07-27 LAB
ANION GAP IN SER/PLAS: 12 MMOL/L (ref 10–20)
BASOPHILS (10*3/UL) IN BLOOD BY AUTOMATED COUNT: 0.01 X10E9/L (ref 0–0.1)
BASOPHILS/100 LEUKOCYTES IN BLOOD BY AUTOMATED COUNT: 0.1 % (ref 0–2)
CALCIUM (MG/DL) IN SER/PLAS: 8.8 MG/DL (ref 8.6–10.3)
CARBON DIOXIDE, TOTAL (MMOL/L) IN SER/PLAS: 27 MMOL/L (ref 21–32)
CHLORIDE (MMOL/L) IN SER/PLAS: 104 MMOL/L (ref 98–107)
CREATININE (MG/DL) IN SER/PLAS: 0.98 MG/DL (ref 0.5–1.05)
ERYTHROCYTE DISTRIBUTION WIDTH (RATIO) BY AUTOMATED COUNT: 13.2 % (ref 11.5–14.5)
ERYTHROCYTE MEAN CORPUSCULAR HEMOGLOBIN CONCENTRATION (G/DL) BY AUTOMATED: 33 G/DL (ref 32–36)
ERYTHROCYTE MEAN CORPUSCULAR VOLUME (FL) BY AUTOMATED COUNT: 88 FL (ref 80–100)
ERYTHROCYTES (10*6/UL) IN BLOOD BY AUTOMATED COUNT: 4.01 X10E12/L (ref 4–5.2)
GFR FEMALE: 59 ML/MIN/1.73M2
GLUCOSE (MG/DL) IN SER/PLAS: 121 MG/DL (ref 74–99)
HEMATOCRIT (%) IN BLOOD BY AUTOMATED COUNT: 35.2 % (ref 36–46)
HEMOGLOBIN (G/DL) IN BLOOD: 11.6 G/DL (ref 12–16)
IMMATURE GRANULOCYTES/100 LEUKOCYTES IN BLOOD BY AUTOMATED COUNT: 0.4 % (ref 0–0.9)
LEUKOCYTES (10*3/UL) IN BLOOD BY AUTOMATED COUNT: 9.1 X10E9/L (ref 4.4–11.3)
LYMPHOCYTES (10*3/UL) IN BLOOD BY AUTOMATED COUNT: 0.68 X10E9/L (ref 0.8–3)
LYMPHOCYTES/100 LEUKOCYTES IN BLOOD BY AUTOMATED COUNT: 7.5 % (ref 13–44)
MONOCYTES (10*3/UL) IN BLOOD BY AUTOMATED COUNT: 0.63 X10E9/L (ref 0.05–0.8)
MONOCYTES/100 LEUKOCYTES IN BLOOD BY AUTOMATED COUNT: 7 % (ref 2–10)
NEUTROPHILS (10*3/UL) IN BLOOD BY AUTOMATED COUNT: 7.7 X10E9/L (ref 1.6–5.5)
NEUTROPHILS/100 LEUKOCYTES IN BLOOD BY AUTOMATED COUNT: 85 % (ref 40–80)
PLATELETS (10*3/UL) IN BLOOD AUTOMATED COUNT: 210 X10E9/L (ref 150–450)
POTASSIUM (MMOL/L) IN SER/PLAS: 4.5 MMOL/L (ref 3.5–5.3)
SODIUM (MMOL/L) IN SER/PLAS: 138 MMOL/L (ref 136–145)
UREA NITROGEN (MG/DL) IN SER/PLAS: 16 MG/DL (ref 6–23)

## 2023-07-27 NOTE — TELEPHONE ENCOUNTER
Pt Lm stating she just got back from the hospital, asking for return call. I did attempt to call her with n/a.   If you could please try again to reach out and see what's needed.   Thank you

## 2023-07-28 LAB
ANION GAP IN SER/PLAS: NORMAL
BASOPHILS (10*3/UL) IN BLOOD BY AUTOMATED COUNT: NORMAL
BASOPHILS/100 LEUKOCYTES IN BLOOD BY AUTOMATED COUNT: NORMAL
CALCIUM (MG/DL) IN SER/PLAS: NORMAL
CARBON DIOXIDE, TOTAL (MMOL/L) IN SER/PLAS: NORMAL
CHLORIDE (MMOL/L) IN SER/PLAS: NORMAL
CREATININE (MG/DL) IN SER/PLAS: NORMAL
EOSINOPHILS (10*3/UL) IN BLOOD BY AUTOMATED COUNT: NORMAL
EOSINOPHILS/100 LEUKOCYTES IN BLOOD BY AUTOMATED COUNT: NORMAL
ERYTHROCYTE DISTRIBUTION WIDTH (RATIO) BY AUTOMATED COUNT: NORMAL
ERYTHROCYTE MEAN CORPUSCULAR HEMOGLOBIN CONCENTRATION (G/DL) BY AUTOMATED: NORMAL
ERYTHROCYTE MEAN CORPUSCULAR VOLUME (FL) BY AUTOMATED COUNT: NORMAL
ERYTHROCYTES (10*6/UL) IN BLOOD BY AUTOMATED COUNT: NORMAL
GFR FEMALE: NORMAL
GFR MALE: NORMAL
GLUCOSE (MG/DL) IN SER/PLAS: NORMAL
HEMATOCRIT (%) IN BLOOD BY AUTOMATED COUNT: NORMAL
HEMOGLOBIN (G/DL) IN BLOOD: NORMAL
IMMATURE GRANULOCYTES/100 LEUKOCYTES IN BLOOD BY AUTOMATED COUNT: NORMAL
LEUKOCYTES (10*3/UL) IN BLOOD BY AUTOMATED COUNT: NORMAL
LYMPHOCYTES (10*3/UL) IN BLOOD BY AUTOMATED COUNT: NORMAL
LYMPHOCYTES/100 LEUKOCYTES IN BLOOD BY AUTOMATED COUNT: NORMAL
MANUAL DIFFERENTIAL Y/N: NORMAL
MONOCYTES (10*3/UL) IN BLOOD BY AUTOMATED COUNT: NORMAL
MONOCYTES/100 LEUKOCYTES IN BLOOD BY AUTOMATED COUNT: NORMAL
NEUTROPHILS (10*3/UL) IN BLOOD BY AUTOMATED COUNT: NORMAL
NEUTROPHILS/100 LEUKOCYTES IN BLOOD BY AUTOMATED COUNT: NORMAL
NRBC (PER 100 WBCS) BY AUTOMATED COUNT: NORMAL
PLATELETS (10*3/UL) IN BLOOD AUTOMATED COUNT: NORMAL
POTASSIUM (MMOL/L) IN SER/PLAS: NORMAL
SODIUM (MMOL/L) IN SER/PLAS: NORMAL
UREA NITROGEN (MG/DL) IN SER/PLAS: NORMAL

## 2023-08-23 ENCOUNTER — APPOINTMENT (OUTPATIENT)
Dept: PRIMARY CARE | Facility: CLINIC | Age: 78
End: 2023-08-23
Payer: MEDICARE

## 2023-09-05 PROBLEM — Z95.1 S/P CABG (CORONARY ARTERY BYPASS GRAFT): Status: ACTIVE | Noted: 2023-09-05

## 2023-09-05 PROBLEM — M54.9 BACK PAIN: Status: ACTIVE | Noted: 2023-09-05

## 2023-09-05 PROBLEM — M25.569 PAIN IN JOINT, LOWER LEG: Status: ACTIVE | Noted: 2023-09-05

## 2023-09-05 PROBLEM — Z87.81 HISTORY OF TRAUMATIC FRACTURE: Status: ACTIVE | Noted: 2023-09-05

## 2023-09-05 PROBLEM — G89.29 CHRONIC NONINTRACTABLE HEADACHE: Status: ACTIVE | Noted: 2019-06-11

## 2023-09-05 PROBLEM — Z87.39 HX OF DEGENERATIVE DISC DISEASE: Status: ACTIVE | Noted: 2023-09-05

## 2023-09-05 PROBLEM — R51.9 CHRONIC NONINTRACTABLE HEADACHE: Status: ACTIVE | Noted: 2019-06-11

## 2023-09-05 PROBLEM — M17.9 KNEE OSTEOARTHRITIS: Status: ACTIVE | Noted: 2023-09-05

## 2023-09-05 PROBLEM — R26.0 ATAXIC GAIT: Status: ACTIVE | Noted: 2019-06-11

## 2023-09-05 PROBLEM — Z96.652 STATUS POST TOTAL LEFT KNEE REPLACEMENT: Status: ACTIVE | Noted: 2023-09-05

## 2023-09-05 PROBLEM — E66.812 CLASS 2 OBESITY WITH BODY MASS INDEX (BMI) OF 35.0 TO 35.9 IN ADULT: Status: ACTIVE | Noted: 2023-09-05

## 2023-09-05 PROBLEM — S72.001A CLOSED DISPLACED FRACTURE OF RIGHT FEMORAL NECK (MULTI): Status: ACTIVE | Noted: 2021-12-09

## 2023-09-05 PROBLEM — M54.2 CERVICALGIA: Status: ACTIVE | Noted: 2019-06-11

## 2023-09-05 PROBLEM — M25.552 HIP PAIN, LEFT: Status: ACTIVE | Noted: 2023-09-05

## 2023-09-05 PROBLEM — E66.9 CLASS 2 OBESITY WITH BODY MASS INDEX (BMI) OF 35.0 TO 35.9 IN ADULT: Status: ACTIVE | Noted: 2023-09-05

## 2023-09-05 RX ORDER — ATORVASTATIN CALCIUM 40 MG/1
TABLET, FILM COATED ORAL
COMMUNITY
End: 2023-10-05 | Stop reason: ENTERED-IN-ERROR

## 2023-09-05 RX ORDER — BENZONATATE 100 MG/1
100 CAPSULE ORAL 2 TIMES DAILY PRN
COMMUNITY
End: 2023-10-04 | Stop reason: ALTCHOICE

## 2023-09-05 RX ORDER — NIRMATRELVIR AND RITONAVIR 300-100 MG
KIT ORAL
COMMUNITY
Start: 2022-10-16 | End: 2023-10-05 | Stop reason: ENTERED-IN-ERROR

## 2023-09-05 RX ORDER — CYCLOBENZAPRINE HCL 5 MG
TABLET ORAL
COMMUNITY
Start: 2023-07-27 | End: 2023-10-05 | Stop reason: ENTERED-IN-ERROR

## 2023-09-05 RX ORDER — EZETIMIBE 10 MG/1
TABLET ORAL
COMMUNITY
End: 2023-10-05 | Stop reason: ENTERED-IN-ERROR

## 2023-09-05 RX ORDER — ASPIRIN 81 MG/1
81 TABLET ORAL DAILY
COMMUNITY
End: 2023-10-04 | Stop reason: ALTCHOICE

## 2023-09-05 RX ORDER — OXYCODONE HYDROCHLORIDE 5 MG/1
TABLET ORAL
COMMUNITY
Start: 2023-07-27 | End: 2023-10-04 | Stop reason: ALTCHOICE

## 2023-09-05 RX ORDER — MECLIZINE HYDROCHLORIDE 25 MG/1
1 TABLET ORAL
COMMUNITY
Start: 2019-05-06 | End: 2023-10-05 | Stop reason: ENTERED-IN-ERROR

## 2023-09-18 ENCOUNTER — TELEPHONE (OUTPATIENT)
Dept: PRIMARY CARE | Facility: CLINIC | Age: 78
End: 2023-09-18
Payer: MEDICARE

## 2023-09-18 NOTE — TELEPHONE ENCOUNTER
Pt is wondering if she schedules apt with you should she need refill on her Tramadol would you refill.  She is going for her Back Surgery on 11/12/23  and currently has meds, just doesn't want to not have any because she still struggles with the pain and doesn't want to do Pain Management anymore for just a months time       won't give until after her surgery and she has really rough days and doesn't want to not be prepared   She is trying to be compliant, wants to ask prior so that she can be prepared   Please Advise ASAP

## 2023-09-19 NOTE — TELEPHONE ENCOUNTER
Pt called back and apologized  She said she still wants to come in and see you and she's still going to have the surgery so she will be coming in for her surgery clearance appointment still

## 2023-09-19 NOTE — TELEPHONE ENCOUNTER
Called pt to inform her that you prefer her to see one Doctor to manage her pain and you aren't the one to do it.  She said the doctor she sees now, Dr. Arizmendi, can only prescribe her gabapentin for the pain and she can't take that because of her heart  She doesn't want to go to pain management, she is very set on that  Is there anything you can advise her to do in the meantime as she is still in a lot of pain to help give her some kind of relief.  Please advise, thank you!

## 2023-09-30 NOTE — H&P
History & Physical Reviewed:   I have reviewed the History and Physical dated:  19-Jul-2023   History and Physical reviewed and relevant findings noted. Patient examined to review pertinent physical  findings.: No significant changes   Home Medications Reviewed: no changes noted   Allergies Reviewed: no changes noted     Consent:   COVID-19 Consent:  ·  COVID-19 Risk Consent Surgeon has reviewed key risks related to the risk of espinoza COVID-19 and if they contract COVID-19 what the risks are.       Electronic Signatures:  Jamar Travis)  (Signed 26-Jul-2023 07:27)   Authored: History & Physical Reviewed, Consent, Note  Completion      Last Updated: 26-Jul-2023 07:27 by Jamar Travis)

## 2023-09-30 NOTE — PROGRESS NOTES
Service: Orthopaedics     Subjective Data:   KIRA GARCIA is a 78 year old Female who is Hospital Day # 2 and POD #1 for 1. RIGHT HIP HARDWARE REMOVAL (deep and buried);    Objective Data:     Objective Information:      T   P  R  BP   MAP  SpO2   Value  36.4  50  16  140/67   96  98%  Date/Time 7/27 7:46 7/27 7:46 7/27 7:46 7/27 7:46  7/27 7:46 7/27 7:46  Range  (36C - 36.4C )  (50 - 58 )  (16 - 18 )  (132 - 177 )/ (65 - 85 )  (96 - 122 )  (93% - 98% )      Pain reported at 7/27 8:22: 1 = Mild    ---- Intake and Output  -----  Mn/Dy/Year Time  Intake   Output  Net  Jul 27, 2023 6:00 am  1300   1300  0  Jul 26, 2023 10:00 pm  1701   200  1501 Jul 26, 2023 2:00 pm  800   200  600    The Intake and Output Totals for the last 24 hours are:      Intake   Output  Net      3801   1700  2101      T   P  R  BP   MAP  SpO2   Value  36.4  50  16  140/67   96  98%  Date/Time 7/27 7:46 7/27 7:46 7/27 7:46 7/27 7:46  7/27 7:46 7/27 7:46  Range  (36C - 36.4C )  (50 - 58 )  (16 - 18 )  (132 - 177 )/ (65 - 85 )  (96 - 122 )  (93% - 98% )        Pain reported at 7/27 8:22: 1 = Mild         Weights   7/26 6:24: Weight in kg (Weight (kg))  87.1  7/26 6:24: Weight in lbs ((lbs))  192      ---- Intake and Output  -----  Mn/Dy/Year Time  Intake   Output  Net  Jul 27, 2023 6:00 am  1300   1300  0  Jul 26, 2023 10:00 pm  1701   200  1501 Jul 26, 2023 2:00 pm  800   200  600    The Intake and Output Totals for the last 24 hours are:      Intake   Output  Net      3801   1700  2101    Recent Lab Results:    Results:    CBC: 7/27/2023 05:23              \     Hgb     /                              \     11.6 L    /  WBC  ----------------  Plt               9.1       ----------------    210              /     Hct     \                              /     35.2 L    \            RBC: 4.01     MCV: 88     Neutrophil %: 85.0      BMP: 7/27/2023 05:23  NA+        Cl-     BUN  /                         138    104    16   /  --------------------------------  Glucose                ---------------------------  121 H    K+     HCO3-   Creat \                         4.5  27    0.98  \  Calcium : 8.8     Anion Gap : 12        I have reviewed these laboratory results: Complete Blood Count + Differential [Drawn 27-Jul-2023 05:23:00], Basic Metabolic Panel [Drawn 27-Jul-2023 05:23:00].    Assessment and Plan:        Admitting Dx:   Hip pain: Entered Date: 26-Jul-2023 09:44    Code Status:  ·  Code Status Full Code     Assessment:    ortho    pt is doing ok, she only want to go hoe. she is able to get around while here even with the limited WB. she denies any CP, SOB or dizziness- medical team on for medical management. pt denies any CP, SOB or dizziness  her pain is well controlled  dressing clean and dry   she has been getting up to void  plan d/c later today      Electronic Signatures:  Kary Betts (APRN-CNP)   (Signed 27-Jul-2023 09:37)   Authored: Service, Subjective Data, Objective Data, Assessment and Plan, Note Completion  Rizwan Zayas)   (Signed 27-Jul-2023 13:46)   Co-Signer: Service, Subjective Data, Objective Data, Assessment and Plan, Note Completion    Last Updated: 27-Jul-2023 13:46 by Rizwan Zaysa)

## 2023-10-02 NOTE — OP NOTE
PROCEDURE DETAILS    Preoperative Diagnosis:  Painful hardware right hip  Postoperative Diagnosis:  Painful hardware right hip  Surgeon: Jamar Travis  Resident/Fellow/Other Assistant: Rian Mckoy    Procedure:  1. RIGHT HIP HARDWARE REMOVAL (deep and buried)    Anesthesia: General  Estimated Blood Loss: 50 cc  Findings: Retained hardware right hip  Specimens(s) Collected: no,     Complications: None        Operative Report:   Indications:    This is a 78-year-old female who who underwent a right hip pinning for a nondisplaced femoral neck fracture.  She has had lateral hip pain.  Hardware removal was offered.  The procedure was explained along with the risks, benefits and alternatives being  reviewed.  Potential risk including not limited to infection, neurovascular complication, fracture, continued pain as well as potential need for reoperation were all discussed with the patient she consented to the procedure.    I procedure:    The patient was brought to the operative suite and was placed in the supine position.  A general anesthetic was administered per anesthesia.  The patient was then transferred to the fracture table in the supine position.  All bony prominences were well-padded.   A perineal post was padded and placed into position.  The right foot was padded and placed into the traction boot.  The left leg was placed in a well-leg basket and padded.  A U-Drape was used then to exclude the right hip and lower extremity from the  rest of the body.  The right hip and lower extremity was then sterilely prepped with ChloraPrep and then sterilely draped in the usual manner.    A timeout was performed.  The patient was identified along with the site and laterality being confirmed.    I brought in C arm I checked C arm in the AP and lateral planes to maría my position.  Next I made a skin incision laterally.  Dissection was then carried down carefully through subcutaneous and fatty tissues.  I incised  through the iliotibial band.  Identified  the vastus fascia.  I incised through this.  I then dissected to the lateral femur.  I was able to identify the screw heads.  I debrided around these with a rongeur as well as with the Bovie.  Next I removed the 3 screws without difficulty.  I checked  C arm to confirm and document complete removal of the hardware.    Attention was turned to closure.  The wound was thoroughly and copiously irrigated with normal saline.  The vastus fascia was repaired with Vicryl suture.  The tensor fascia was repaired with #1 Vicryl suture.  The fatty tissue was repaired with 0 Vicryl  subcutaneous closure with 2-0 Vicryl the skin was closed with staples.  An Aquacel dressing was applied.    The patient tolerated the procedure well    At the time of this dictation the patient was being awoken from her anesthetic to be taken the PACU.  She was in stable condition.    The physician assistant was present for the entire case.  Given the nature of the procedure and disease process a skilled surgical assistant was  necessary for the case.  The assistant was necessary for retraction and helped directly facilitate completion of the surgery.  A certified scrub tech was at the back table managing instruments and supplies for the surgical procedure.                        Attestation:   Note Completion:  Attending Attestation I performed the procedure without a resident         Electronic Signatures:  Jamar Travis)  (Signed 26-Jul-2023 08:12)   Authored: Post-Operative Note, Chart Review, Note Completion      Last Updated: 26-Jul-2023 08:12 by Jamar Travis)

## 2023-10-04 ENCOUNTER — OFFICE VISIT (OUTPATIENT)
Dept: PRIMARY CARE | Facility: CLINIC | Age: 78
End: 2023-10-04
Payer: MEDICARE

## 2023-10-04 ENCOUNTER — TELEPHONE (OUTPATIENT)
Dept: PRIMARY CARE | Facility: CLINIC | Age: 78
End: 2023-10-04

## 2023-10-04 VITALS
HEIGHT: 64 IN | TEMPERATURE: 97 F | WEIGHT: 190 LBS | HEART RATE: 73 BPM | RESPIRATION RATE: 18 BRPM | DIASTOLIC BLOOD PRESSURE: 62 MMHG | OXYGEN SATURATION: 98 % | BODY MASS INDEX: 32.44 KG/M2 | SYSTOLIC BLOOD PRESSURE: 109 MMHG

## 2023-10-04 DIAGNOSIS — E78.5 HYPERLIPIDEMIA, UNSPECIFIED HYPERLIPIDEMIA TYPE: ICD-10-CM

## 2023-10-04 DIAGNOSIS — Z95.1 S/P CABG (CORONARY ARTERY BYPASS GRAFT): ICD-10-CM

## 2023-10-04 DIAGNOSIS — M85.80 OSTEOPENIA, UNSPECIFIED LOCATION: ICD-10-CM

## 2023-10-04 DIAGNOSIS — M54.16 LUMBAR RADICULAR PAIN: Primary | ICD-10-CM

## 2023-10-04 DIAGNOSIS — E03.9 HYPOTHYROIDISM, UNSPECIFIED TYPE: ICD-10-CM

## 2023-10-04 DIAGNOSIS — I10 ESSENTIAL HYPERTENSION: ICD-10-CM

## 2023-10-04 PROBLEM — R29.3 ABNORMAL POSTURE: Status: ACTIVE | Noted: 2022-08-26

## 2023-10-04 PROBLEM — F33.9 MAJOR DEPRESSIVE DISORDER, RECURRENT, UNSPECIFIED (CMS-HCC): Status: ACTIVE | Noted: 2022-08-26

## 2023-10-04 PROBLEM — R26.2 DIFFICULTY IN WALKING, NOT ELSEWHERE CLASSIFIED: Status: ACTIVE | Noted: 2022-08-26

## 2023-10-04 PROBLEM — M62.81 MUSCLE WEAKNESS (GENERALIZED): Status: ACTIVE | Noted: 2022-08-26

## 2023-10-04 PROCEDURE — 1126F AMNT PAIN NOTED NONE PRSNT: CPT | Performed by: FAMILY MEDICINE

## 2023-10-04 PROCEDURE — 1160F RVW MEDS BY RX/DR IN RCRD: CPT | Performed by: FAMILY MEDICINE

## 2023-10-04 PROCEDURE — 3074F SYST BP LT 130 MM HG: CPT | Performed by: FAMILY MEDICINE

## 2023-10-04 PROCEDURE — 99214 OFFICE O/P EST MOD 30 MIN: CPT | Performed by: FAMILY MEDICINE

## 2023-10-04 PROCEDURE — 1159F MED LIST DOCD IN RCRD: CPT | Performed by: FAMILY MEDICINE

## 2023-10-04 PROCEDURE — 3078F DIAST BP <80 MM HG: CPT | Performed by: FAMILY MEDICINE

## 2023-10-04 PROCEDURE — 1036F TOBACCO NON-USER: CPT | Performed by: FAMILY MEDICINE

## 2023-10-04 RX ORDER — ALENDRONATE SODIUM 70 MG/1
70 TABLET ORAL
Qty: 30 TABLET | Refills: 1 | Status: SHIPPED | OUTPATIENT
Start: 2023-10-04 | End: 2023-10-21 | Stop reason: HOSPADM

## 2023-10-04 RX ORDER — DICLOFENAC SODIUM 10 MG/G
GEL TOPICAL
COMMUNITY
Start: 2023-09-19

## 2023-10-04 NOTE — ASSESSMENT & PLAN NOTE
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

## 2023-10-04 NOTE — ASSESSMENT & PLAN NOTE
Cholesterol 133 with HDL 47 LDL 66 triglycerides 96 dated 3/2020 and stable and continue medications

## 2023-10-04 NOTE — PROGRESS NOTES
Patient ID: Rosalba Salcedo is a 78 y.o. female who presents for Pre-op Exam      HPI patient here for   preoperative clearance.  HPI   patient is being seen for a preoperative visit.  The procedure is spine: L2-L3 laminectomy space L4-5 instrumentation space posterior interbody fusion, posterior lateral fusion and L4-5 right excision of facet cyst, exploration of spinal fusion L3-L4 date 10/18/2023    with Dr. Adalberto Arizmendi    Surgical risk assessment:  Prior anesthesia: he /she had prior anesthesia. No  prior adverse reaction to epidural anesthesia and no prior adverse reaction to general anesthesia.     Pertinent past medical history:  DENIES Pulmonary embolism, DVT, diabetes, neck osteoarthritis, wearing denture, seizure disorder, CVA, asthma, COPD, obstructive sleep apnea, and renal disease ADMITS  TO .h/o  MI ..... History of open heart surgery in 2009 with normal stress test 2020 echo showed normal LV function with Holter monitor 2022 with minimal arrhythmias.  Exercise capacity: limited..   has cardiology evaluation scheduled for 10/10/2023               Reaction Severity Reaction Type Noted           Allergies        Amlodipine  Swelling Medium Allergy 5/22/2023          Hydrocodone-acetaminophen  Unknown Not Specified Allergy 1/27/2023          Oxycodone-acetaminophen  Unknown Not Specified Allergy 1/27/2023          Penicillins  Other Not Specified Allergy 5/22/2023   Vaginal yeast infection       Adverse Reactions/Drug Intolerances        Oxybutynin  Unknown Not Specified Intolerance 6/6/2019          Morphine  Hives, Itching, Rash, Unknown Low Intolerance 3/29/2016   Drowsiness with Percocet and Vicodin         Lifestyle factors: Denies tobacco use, denies heavy alcohol consumption, and denies illegal drug use.     Review of systems: No frequent nosebleeds.  Patient denies dyspnea, denies symptoms of respiratory infection, denies chest pain,  Constitutional: yes  feeling tired and no  fever  Eyes: No eyesight problems, no redness and no pain.  ENT: No ear pain, no swollen lymph nodes, no sore throat, no nasal discharge and nasal congestion.  Cardiovascular: No chest pain, no palpitations and no lower extremity edema.  Respiratory: No cough dyspnea with exertion, no dyspnea at rest and no wheezing.  Gastrointestinal: No abdominal pain, yes  constipation, no diarrhea, no heartburn had no nausea.  Genitourinary: No dysuria, no hesitancy, normal urinary frequency.  Musculoskeletal: Arthralgias   yes  myalgias and yes   joint PAIN  RADICULAR BOTH  LEGS    Integumentary: No skin lesions and no rashes.  Neurologic: Dizziness but no headache no numbness or tingling, no seizures, no limb weakness, no memory changes and had no speech difficulty  Psychiatric: No mood changes, yes  sleep disturbances, no substance use and no feelings of anxiety.  Endocrine: No weight change and no temperature intolerance.  Hematologic/lymphatic:  easy bruising and no swollen glands  Living situation: No.  Concerns with his living situation and living independently with 3 cats .     Past Medical History:   Diagnosis Date    Other bursitis of elbow, left elbow 05/27/2020    Bursitis of left elbow    Other specified anxiety disorders 07/27/2022    Depression with anxiety    Pain in unspecified elbow 08/05/2020    Elbow pain    Personal history of other mental and behavioral disorders 04/02/2022    History of depression      Social History     Socioeconomic History    Marital status:      Spouse name: Not on file    Number of children: Not on file    Years of education: Not on file    Highest education level: Not on file   Occupational History    Not on file   Tobacco Use    Smoking status: Never    Smokeless tobacco: Never   Vaping Use    Vaping Use: Never used   Substance and Sexual Activity    Alcohol use: Never    Drug use: Never    Sexual activity: Defer   Other Topics Concern    Not on file   Social History  Narrative    Not on file     Social Determinants of Health     Financial Resource Strain: Not on file   Food Insecurity: Not on file   Transportation Needs: Not on file   Physical Activity: Not on file   Stress: Not on file   Social Connections: Not on file   Intimate Partner Violence: Not on file   Housing Stability: Not on file            Review of orthopedic notes dated 6/13/2023.  Patient was approximately 10 months out from L3-S1 laminectomy.  She saw orthopedics with complaints of hip pain which was felt to be possibly secondary to hip pinning on the right.  Bursal injections and intra-articular injections only gave her minimal improvement.  MRI of the spine was ordered.  Patient was status post L3-S1 midline laminectomy with L3-L4 TLIF.  She was having right upper buttock SI joint pain and bilateral hip pain with the pain sometimes radiating down her right leg.  X-rays had showed no evidence of hardware failure.  MRI with gadolinium was ordered.     Recheck with orthopedics showed L2-L3 given her bilateral neurogenic claudication type symptoms and L4-L5 right facet cyst giving her some of the right leg radicular symptoms with pain coming from screws associated with hip pinning.  Thus patient is advised to have pins removed surgically and see if this helps with if improvement result being avoiding a larger operation.  She is here today for surgical clearance for surgery as noted above..  She previously had hip pin removal patient had minimal improvement with this surgery and she is now scheduled to have  midline operation L2-L3 laminectomy above the prior midline laminectomy.   general: alert oriented x three  HEENT hearing normal to voice  With hearing aids and cerumen greater right than left  Neck supple  Lungs respirations non-labored.  Cardiovascular: no peripheral edema  Skin: warm and dry without multiple ecchymoses upper extremity noted  Psych: judgement and insight normal  Musculoskeletal:  ambulation  normal,  ..  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 with bilateral facet arthroses and moderate central canal narrowing with crowding of intra thecal nerve roots with complex synovial cyst with right L5 nerve root impingement with chronic pain syndrome creating disability with need for subsequent surgery and low risk for intended surgery    S/P CABG (coronary artery bypass graft)  Agree with cardiology assessment for risk edification prior to surgery    Essential hypertension  Stable and continue medications up to surgery    Hyperlipidemia  Cholesterol 133 with HDL 47 LDL 66 triglycerides 96 dated 3/2020 and stable and continue medications    Hypothyroidism  TSH 1.02 dated 9/7/2022 with repeat..  TSH 1.52 dated 3/8/2023 and stable thyroid function

## 2023-10-04 NOTE — LETTER
Current Outpatient Medications   Medication Sig Dispense Refill    acetaminophen (Tylenol) 500 mg tablet Take 2 tablets (1,000 mg) by mouth every 6 hours if needed.      alendronate (Fosamax) 70 mg tablet Take 1 tablet (70 mg) by mouth 1 (one) time per week.      amLODIPine (Norvasc) 5 mg tablet Take 1 tablet (5 mg) by mouth once daily.      atorvastatin (Lipitor) 40 mg tablet       carvedilol (Coreg) 6.25 mg tablet Take 1 tablet (6.25 mg) by mouth 2 times a day with meals. 180 tablet 3    cholecalciferol (Vitamin D-3) 5,000 Units tablet       coenzyme Q-10 10 mg capsule Take by mouth.      cyclobenzaprine (Flexeril) 5 mg tablet       cyclobenzaprine (Flexeril) 5 mg tablet TAKE 1 TABLET BY MOUTH THREE TIMES A DAY AS NEEDED FOR MUSCLE SPASMS 20 tablet 0    diclofenac sodium (Voltaren) 1 % gel gel APPLY TO AFFECTED FOOT UP TO 3 TIMES DAILY AS NEEDED FOR PAIN      docusate sodium (Colace) 100 mg capsule Take 1 capsule (100 mg) by mouth once daily. 90 capsule 3    ezetimibe (Zetia) 10 mg tablet       famotidine (Pepcid) 20 mg tablet Take by mouth every 12 hours.      fluticasone (Flonase) 50 mcg/actuation nasal spray Administer into affected nostril(s) once every 24 hours.      levothyroxine (Synthroid, Levoxyl) 25 mcg tablet once every 24 hours.      meclizine (Antivert) 25 mg tablet Take 1 tablet (25 mg) by mouth once daily.      naloxone (Narcan) 4 mg/0.1 mL nasal spray INSTILL 1 SPRAY IN ONE NOSTRIL AS NEEDED FOR ACCIDENTAL OPIOID OVERDOSE. REPEAT WITH SECOND DOSE IN 5 MINUTES IF NO RESPONSE 2 each 0    nirmatrelvir-ritonavir (Paxlovid) 300 mg (150 mg x 2)-100 mg tablet therapy pack       Nitrostat 0.4 mg SL tablet as directed      pantoprazole (ProtoNix) 40 mg EC tablet once every 24 hours.      peg 400-propylene glycol 0.4-0.3 % drops 1 drop.      rosuvastatin (Crestor) 40 mg tablet Take 1 tablet (40 mg) by mouth once every 24 hours. 90 tablet 3    traMADol (Ultram) 50 mg tablet Take 1 tablet (50 mg) by mouth  every 8 hours if needed.       No current facility-administered medications for this visit.

## 2023-10-04 NOTE — PROGRESS NOTES
"Subjective   Patient ID: Rosalba Salcedo is a 78 y.o. female who presents for Pre-op Exam.    HPI     Review of Systems    Objective   /62   Pulse 73   Temp 36.1 °C (97 °F)   Resp 18   Ht 1.626 m (5' 4\")   Wt 86.2 kg (190 lb)   SpO2 98%   BMI 32.61 kg/m²     Physical Exam    Assessment/Plan          "

## 2023-10-05 ENCOUNTER — TELEPHONE (OUTPATIENT)
Dept: ORTHOPEDIC SURGERY | Facility: CLINIC | Age: 78
End: 2023-10-05

## 2023-10-05 ENCOUNTER — LAB (OUTPATIENT)
Dept: LAB | Facility: LAB | Age: 78
End: 2023-10-05
Payer: MEDICARE

## 2023-10-05 ENCOUNTER — PRE-ADMISSION TESTING (OUTPATIENT)
Dept: PREADMISSION TESTING | Facility: HOSPITAL | Age: 78
DRG: 455 | End: 2023-10-05
Payer: MEDICARE

## 2023-10-05 VITALS
HEART RATE: 61 BPM | HEIGHT: 64 IN | WEIGHT: 191.14 LBS | OXYGEN SATURATION: 99 % | BODY MASS INDEX: 32.63 KG/M2 | TEMPERATURE: 96.8 F | DIASTOLIC BLOOD PRESSURE: 88 MMHG | RESPIRATION RATE: 15 BRPM | SYSTOLIC BLOOD PRESSURE: 144 MMHG

## 2023-10-05 DIAGNOSIS — Z01.818 PRE-OP TESTING: ICD-10-CM

## 2023-10-05 DIAGNOSIS — M54.16 LUMBAR RADICULOPATHY: ICD-10-CM

## 2023-10-05 DIAGNOSIS — Z01.818 PRE-OP TESTING: Primary | ICD-10-CM

## 2023-10-05 LAB
ABO GROUP (TYPE) IN BLOOD: NORMAL
ANION GAP SERPL CALC-SCNC: 13 MMOL/L (ref 10–20)
ANTIBODY SCREEN: NORMAL
BUN SERPL-MCNC: 13 MG/DL (ref 6–23)
CALCIUM SERPL-MCNC: 9.6 MG/DL (ref 8.6–10.3)
CHLORIDE SERPL-SCNC: 106 MMOL/L (ref 98–107)
CO2 SERPL-SCNC: 27 MMOL/L (ref 21–32)
CREAT SERPL-MCNC: 1.11 MG/DL (ref 0.5–1.05)
ERYTHROCYTE [DISTWIDTH] IN BLOOD BY AUTOMATED COUNT: 13.8 % (ref 11.5–14.5)
GFR SERPL CREATININE-BSD FRML MDRD: 51 ML/MIN/1.73M*2
GLUCOSE SERPL-MCNC: 93 MG/DL (ref 74–99)
HCT VFR BLD AUTO: 42 % (ref 36–46)
HGB BLD-MCNC: 13.3 G/DL (ref 12–16)
MCH RBC QN AUTO: 29.4 PG (ref 26–34)
MCHC RBC AUTO-ENTMCNC: 31.7 G/DL (ref 32–36)
MCV RBC AUTO: 93 FL (ref 80–100)
NRBC BLD-RTO: 0 /100 WBCS (ref 0–0)
PLATELET # BLD AUTO: 243 X10*3/UL (ref 150–450)
PMV BLD AUTO: 10 FL (ref 7.5–11.5)
POTASSIUM SERPL-SCNC: 4.3 MMOL/L (ref 3.5–5.3)
RBC # BLD AUTO: 4.53 X10*6/UL (ref 4–5.2)
RH FACTOR (ANTIGEN D): NORMAL
SODIUM SERPL-SCNC: 142 MMOL/L (ref 136–145)
WBC # BLD AUTO: 7.5 X10*3/UL (ref 4.4–11.3)

## 2023-10-05 PROCEDURE — 36415 COLL VENOUS BLD VENIPUNCTURE: CPT

## 2023-10-05 PROCEDURE — 87081 CULTURE SCREEN ONLY: CPT

## 2023-10-05 PROCEDURE — 80048 BASIC METABOLIC PNL TOTAL CA: CPT

## 2023-10-05 PROCEDURE — 86901 BLOOD TYPING SEROLOGIC RH(D): CPT

## 2023-10-05 PROCEDURE — 93005 ELECTROCARDIOGRAM TRACING: CPT

## 2023-10-05 PROCEDURE — 85027 COMPLETE CBC AUTOMATED: CPT

## 2023-10-05 PROCEDURE — 86850 RBC ANTIBODY SCREEN: CPT

## 2023-10-05 PROCEDURE — 86900 BLOOD TYPING SEROLOGIC ABO: CPT

## 2023-10-05 RX ORDER — CHLORHEXIDINE GLUCONATE ORAL RINSE 1.2 MG/ML
SOLUTION DENTAL
Qty: 473 ML | Refills: 0 | Status: SHIPPED | OUTPATIENT
Start: 2023-10-17 | End: 2023-10-21 | Stop reason: HOSPADM

## 2023-10-05 NOTE — TELEPHONE ENCOUNTER
Pt lvm  need to talk to you about testing today.  Pt went on to tell me about her experience [facility] I apoligized stating as new system/procedures was unsure when [facility] was but orders have been requested just waiting on dr to sign.  Discussed with patient she can go to any  facility and do urine which needs to be done several days in advance of surgery.  Pt plans to go to [facility] tomorrow.  I said sounds like we should be ok on rest trying to not have her go all the way back to  to complete orders.

## 2023-10-05 NOTE — PREPROCEDURE INSTRUCTIONS
Medication List            Accurate as of October 5, 2023 12:04 PM. Always use your most recent med list.                acetaminophen 500 mg tablet  Commonly known as: Tylenol  Medication Adjustments for Surgery: Take morning of surgery with sip of water, no other fluids     alendronate 70 mg tablet  Commonly known as: Fosamax  Take 1 tablet (70 mg) by mouth 1 (one) time per week.     amLODIPine 5 mg tablet  Commonly known as: Norvasc  Medication Adjustments for Surgery: Take morning of surgery with sip of water, no other fluids     carvedilol 6.25 mg tablet  Commonly known as: Coreg  Take 1 tablet (6.25 mg) by mouth 2 times a day with meals.  Medication Adjustments for Surgery: Take morning of surgery with sip of water, no other fluids     chlorhexidine 0.12 % solution  Commonly known as: Peridex  15 milliliter(s) orally once a day for 2 doses;15 ml  the night before surgery and 15 ml morning of surgery; swish for 30 seconds (DO NOT SWALLOW), SPIT OUT Do not start before October 17, 2023.  Start taking on: October 17, 2023     cholecalciferol 5,000 Units tablet  Commonly known as: Vitamin D-3     coenzyme Q-10 10 mg capsule     cyclobenzaprine 5 mg tablet  Commonly known as: Flexeril  TAKE 1 TABLET BY MOUTH THREE TIMES A DAY AS NEEDED FOR MUSCLE SPASMS  Medication Adjustments for Surgery: Take morning of surgery with sip of water, no other fluids     diclofenac sodium 1 % gel gel  Commonly known as: Voltaren  Medication Adjustments for Surgery: Stop 7 days before surgery     docusate sodium 100 mg capsule  Commonly known as: Colace  Take 1 capsule (100 mg) by mouth once daily.  Medication Adjustments for Surgery: Continue until night before surgery     famotidine 20 mg tablet  Commonly known as: Pepcid  Medication Adjustments for Surgery: Take morning of surgery with sip of water, no other fluids     fluticasone 50 mcg/actuation nasal spray  Commonly known as: Flonase     levothyroxine 25 mcg tablet  Commonly  known as: Synthroid, Levoxyl     pantoprazole 40 mg EC tablet  Commonly known as: ProtoNix     rosuvastatin 40 mg tablet  Commonly known as: Crestor  Take 1 tablet (40 mg) by mouth once every 24 hours.  Medication Adjustments for Surgery: Take morning of surgery with sip of water, no other fluids                                          PRE-OPERATIVE INSTRUCTIONS    You will receive notification one business day prior to your surgery to confirm your arrival time and additional information. It is important that you answer your phone and/or check your messages during this time.    Please enter the building through the Outpatient entrance. Take the elevator off the lobby to the 2nd floor and check in at the Outpatient Surgery desk    INSTRUCTIONS:  Talk to your surgeon for instructions if you should stop your aspirin, blood thinner, or diabetes medicines.  DO NOT take any multivitamins or over the counter supplements for 7-10 days before surgery.  If not being admitted, you must have an adult immediately available to drive you home after surgery. We also highly recommend you have someone stay with you for the entire day and night of your surgery.  For children having surgery, a parent or legal guardian must accompany t hem to the surgery center. If this is not possible, please call [phone] to make additional arrangements.  For adults who are unable to consent or make medical decisions for themselves, a legal guardian or Power of  must accompany them to the surgery center. If this is not possible, please call [phone] to make additional arrangements.  Wear comfortable, loose fitting clothing.  All jewelry and piercings must be removed. If you are unable to remove an item or have a dermal piercing, please be sure to tell the nurse when you arrive for surgery.  Nail polish and make-up must be removed.  Avoid smoking or consuming alcohol for 24 hours before surgery.  To help prevent infection, please take  a shower/bath and wash your hair the night before and/or morning of surgery.    Additional instructions about eating and drinking before surgery:  Do not eat any solid foods or drink anything but clear liquids within 6 hours of your arrival time for surgery. Milk, nutritional drinks/supplements, and infant formula are considered solid foods.  You may drink clear liquids up to 2 hours before your arrival time for surgery, unless directed otherwise by your surgeon. Clear liquids include water, non-carbonated sports drinks (Gatorade), black tea or coffee (no creamers) and breast milk.    If you received a blue folder, please review additional information provided inside the folder regarding additional preparation.     If you have any questions or concerns, please call Pre-Admission Testing at [phone].

## 2023-10-06 ENCOUNTER — LAB (OUTPATIENT)
Dept: LAB | Facility: LAB | Age: 78
End: 2023-10-06
Payer: MEDICARE

## 2023-10-06 DIAGNOSIS — M25.69 BACK STIFFNESS: ICD-10-CM

## 2023-10-06 DIAGNOSIS — Z98.890 HX OF LUMBOSACRAL SPINE SURGERY: ICD-10-CM

## 2023-10-06 DIAGNOSIS — M48.061 SPINAL STENOSIS, LUMBAR REGION WITHOUT NEUROGENIC CLAUDICATION: ICD-10-CM

## 2023-10-06 LAB
ALBUMIN SERPL BCP-MCNC: 4.2 G/DL (ref 3.4–5)
ALP SERPL-CCNC: 62 U/L (ref 33–136)
ALT SERPL W P-5'-P-CCNC: 13 U/L (ref 7–45)
ANION GAP SERPL CALC-SCNC: 12 MMOL/L (ref 10–20)
APTT PPP: 31 SECONDS (ref 27–38)
AST SERPL W P-5'-P-CCNC: 17 U/L (ref 9–39)
ATRIAL RATE: 56 BPM
BILIRUB SERPL-MCNC: 0.5 MG/DL (ref 0–1.2)
BUN SERPL-MCNC: 13 MG/DL (ref 6–23)
CALCIUM SERPL-MCNC: 9.4 MG/DL (ref 8.6–10.3)
CHLORIDE SERPL-SCNC: 110 MMOL/L (ref 98–107)
CO2 SERPL-SCNC: 26 MMOL/L (ref 21–32)
CREAT SERPL-MCNC: 1.06 MG/DL (ref 0.5–1.05)
GFR SERPL CREATININE-BSD FRML MDRD: 54 ML/MIN/1.73M*2
GLUCOSE SERPL-MCNC: 100 MG/DL (ref 74–99)
INR PPP: 1 (ref 0.9–1.1)
P AXIS: 102 DEGREES
P OFFSET: 139 MS
P ONSET: 110 MS
POTASSIUM SERPL-SCNC: 4.2 MMOL/L (ref 3.5–5.3)
PR INTERVAL: 174 MS
PROT SERPL-MCNC: 7.6 G/DL (ref 6.4–8.2)
PROTHROMBIN TIME: 10.7 SECONDS (ref 9.8–12.8)
Q ONSET: 197 MS
QRS COUNT: 9 BEATS
QRS DURATION: 118 MS
QT INTERVAL: 454 MS
QTC CALCULATION(BAZETT): 438 MS
QTC FREDERICIA: 444 MS
R AXIS: -18 DEGREES
SODIUM SERPL-SCNC: 144 MMOL/L (ref 136–145)
T AXIS: 2 DEGREES
T OFFSET: 424 MS
VENTRICULAR RATE: 56 BPM

## 2023-10-06 PROCEDURE — 93010 ELECTROCARDIOGRAM REPORT: CPT | Performed by: INTERNAL MEDICINE

## 2023-10-06 PROCEDURE — 36415 COLL VENOUS BLD VENIPUNCTURE: CPT

## 2023-10-06 PROCEDURE — 85610 PROTHROMBIN TIME: CPT

## 2023-10-06 PROCEDURE — 81001 URINALYSIS AUTO W/SCOPE: CPT

## 2023-10-06 PROCEDURE — 85730 THROMBOPLASTIN TIME PARTIAL: CPT

## 2023-10-06 PROCEDURE — 80053 COMPREHEN METABOLIC PANEL: CPT

## 2023-10-07 LAB
APPEARANCE UR: ABNORMAL
BACTERIA #/AREA URNS AUTO: ABNORMAL /HPF
BILIRUB UR STRIP.AUTO-MCNC: NEGATIVE MG/DL
COLOR UR: YELLOW
GLUCOSE UR STRIP.AUTO-MCNC: NEGATIVE MG/DL
HYALINE CASTS #/AREA URNS AUTO: ABNORMAL /LPF
KETONES UR STRIP.AUTO-MCNC: NEGATIVE MG/DL
LEUKOCYTE ESTERASE UR QL STRIP.AUTO: ABNORMAL
MUCOUS THREADS #/AREA URNS AUTO: ABNORMAL /LPF
NITRITE UR QL STRIP.AUTO: NEGATIVE
PH UR STRIP.AUTO: 6 [PH]
PROT UR STRIP.AUTO-MCNC: ABNORMAL MG/DL
RBC # UR STRIP.AUTO: NEGATIVE /UL
RBC #/AREA URNS AUTO: ABNORMAL /HPF
SP GR UR STRIP.AUTO: 1.03
SQUAMOUS #/AREA URNS AUTO: ABNORMAL /HPF
STAPHYLOCOCCUS SPEC CULT: NORMAL
UROBILINOGEN UR STRIP.AUTO-MCNC: <2 MG/DL
WBC #/AREA URNS AUTO: ABNORMAL /HPF

## 2023-10-10 ENCOUNTER — TELEPHONE (OUTPATIENT)
Dept: ORTHOPEDIC SURGERY | Facility: CLINIC | Age: 78
End: 2023-10-10

## 2023-10-10 ENCOUNTER — OFFICE VISIT (OUTPATIENT)
Dept: CARDIOLOGY | Facility: CLINIC | Age: 78
End: 2023-10-10
Payer: MEDICARE

## 2023-10-10 VITALS
WEIGHT: 190 LBS | DIASTOLIC BLOOD PRESSURE: 60 MMHG | BODY MASS INDEX: 32.61 KG/M2 | SYSTOLIC BLOOD PRESSURE: 106 MMHG | HEART RATE: 68 BPM

## 2023-10-10 DIAGNOSIS — Z01.810 PRE-OPERATIVE CARDIOVASCULAR EXAMINATION: ICD-10-CM

## 2023-10-10 DIAGNOSIS — I25.10 CORONARY ARTERY DISEASE INVOLVING NATIVE HEART WITHOUT ANGINA PECTORIS, UNSPECIFIED VESSEL OR LESION TYPE: Primary | ICD-10-CM

## 2023-10-10 DIAGNOSIS — E78.49 OTHER HYPERLIPIDEMIA: ICD-10-CM

## 2023-10-10 DIAGNOSIS — I10 ESSENTIAL HYPERTENSION: ICD-10-CM

## 2023-10-10 PROCEDURE — 1036F TOBACCO NON-USER: CPT | Performed by: INTERNAL MEDICINE

## 2023-10-10 PROCEDURE — 1126F AMNT PAIN NOTED NONE PRSNT: CPT | Performed by: INTERNAL MEDICINE

## 2023-10-10 PROCEDURE — 1160F RVW MEDS BY RX/DR IN RCRD: CPT | Performed by: INTERNAL MEDICINE

## 2023-10-10 PROCEDURE — 1159F MED LIST DOCD IN RCRD: CPT | Performed by: INTERNAL MEDICINE

## 2023-10-10 PROCEDURE — 3074F SYST BP LT 130 MM HG: CPT | Performed by: INTERNAL MEDICINE

## 2023-10-10 PROCEDURE — 99214 OFFICE O/P EST MOD 30 MIN: CPT | Performed by: INTERNAL MEDICINE

## 2023-10-10 PROCEDURE — 3078F DIAST BP <80 MM HG: CPT | Performed by: INTERNAL MEDICINE

## 2023-10-10 RX ORDER — ASPIRIN 81 MG/1
81 TABLET ORAL DAILY
COMMUNITY
End: 2023-10-21 | Stop reason: HOSPADM

## 2023-10-10 RX ORDER — NITROGLYCERIN 0.4 MG/1
0.4 TABLET SUBLINGUAL EVERY 5 MIN PRN
COMMUNITY
End: 2024-01-02 | Stop reason: SDUPTHER

## 2023-10-10 NOTE — PROGRESS NOTES
Patient:  Rosalba Salcedo  YOB: 1945  MRN: [id number]       Impression/Plan:     Diagnoses and all orders for this visit:  Coronary artery disease involving native heart without angina pectoris, unspecified vessel or lesion type  -She is able accomplish 5 METS of exercise limited only by back pain.  She has no associated angina or shortness of breath at this level of activity.  She is on beta-blocker statin aspirin without symptoms to suggest progressive coronary disease would continue same.  -     Follow Up In Cardiology; Future  -     May hold aspirin 1 week prior to surgery resume postoperatively    Essential hypertension-well-controlled  -     Follow Up In Cardiology; Future    Other hyperlipidemia-no adverse effect to rosuvastatin  -     Continue current statin dose  Pre-operative cardiovascular examination        -She does have coronary disease currently without angina or signs of CHF.  She is on beta-blocker and statin therapy chronically both of which reduce perioperative morbidity and mortality.  Perfusion study 3 years ago without inducible ischemia and preserved LV systolic function.  No symptoms or signs to suggest deterioration since then.  And that she has stable coronary disease I think it reasonable to proceed with the proposed surgical intervention with routine monitoring perioperatively.  She is at risk of cardiac morbidity and mortality but would be considered at low to moderate risk given her current stability and medical regimen.  She is stable from a cardiovascular standpoint for the proposed surgical intervention.        Chief Complaint/Active Symptoms:       Rosalba Salcedo is a 78 y.o. female who presents with history of coronary artery disease having had remote coronary bypass graft surgery.  Normal Lexiscan perfusion with 75% ejection fraction 10/5/2020.  Also history hypertension, hyperlipidemia, esophageal reflux and osteoarthritis.     Scheduled for L2-L3 midline  laminectomy above prior surgery extending down to L4-L5 level.  Interbody cage will be implanted at L4-L5.    ECG 10/5/2023 reviewed showing normal sinus rhythm rate 56 right bundle branch block which is chronic no acute ST changes    She denies angina, dyspnea, palpitation, edema, lightheadedness or syncope.  She has had no symptoms of claudication or neurologic deterioration.  There have been no hospitalizations or emergency room visits since last office visit.    Severe back pain but can go up and down stairs with fatigue but no dyspnea or angina.             Review of Systems: Unremarkable except as noted above    Meds     Current Outpatient Medications   Medication Instructions    acetaminophen (Tylenol) 500 mg tablet 2 tablets, oral, Every 6 hours PRN    alendronate (FOSAMAX) 70 mg, oral, Weekly    amLODIPine (NORVASC) 5 mg, oral, Daily    aspirin 81 mg, oral, Daily    carvedilol (COREG) 6.25 mg, oral, 2 times daily with meals    [START ON 10/17/2023] chlorhexidine (Peridex) 0.12 % solution 15 milliliter(s) orally once a day for 2 doses;15 ml  the night before surgery and 15 ml morning of surgery; swish for 30 seconds (DO NOT SWALLOW), SPIT OUT    diclofenac sodium (Voltaren) 1 % gel gel APPLY TO AFFECTED FOOT UP TO 3 TIMES DAILY AS NEEDED FOR PAIN    docusate sodium (COLACE) 100 mg, oral, Daily    fluticasone (Flonase) 50 mcg/actuation nasal spray 1 spray, Each Nostril, Every 24 hours    levothyroxine (SYNTHROID, LEVOXYL) 25 mcg, oral, Daily before breakfast    nitroglycerin (NITROSTAT) 0.4 mg, sublingual, Every 5 min PRN    pantoprazole (PROTONIX) 40 mg, oral, Daily before breakfast    rosuvastatin (CRESTOR) 40 mg, oral, Every 24 hours        Allergies     Allergies   Allergen Reactions    Amlodipine Swelling     Pt tolerates current Amlodipine dose    Hydrocodone-Acetaminophen Drowsiness    Oxybutynin Unknown    Oxycodone-Acetaminophen Drowsiness    Penicillins Other     Vaginal yeast infection    Morphine  Hives, Itching, Rash and Unknown     Drowsiness with Percocet and Vicodin         Annotated Problems     Specialty Problems          Cardiology Problems    Presence of aortocoronary bypass graft    CAD (coronary artery disease)    Cardiac murmur    Carotid bruit    Chest pain    Dyspnea on exertion    Easy bruising    Elevated d-dimer    Essential hypertension    History of coronary artery bypass surgery    Hyperlipidemia    Mitral regurgitation    Right bundle branch block (RBBB)    Sinus bradycardia    S/P CABG (coronary artery bypass graft)        Problem List     Patient Active Problem List    Diagnosis Date Noted    Hx of degenerative disc disease 09/05/2023    Status post total left knee replacement 09/05/2023    Pain in joint, lower leg 09/05/2023    Hip pain, left 09/05/2023    Back pain 09/05/2023    Knee osteoarthritis 09/05/2023    Class 2 obesity with body mass index (BMI) of 35.0 to 35.9 in adult 09/05/2023    S/P CABG (coronary artery bypass graft) 09/05/2023    History of traumatic fracture 09/05/2023    Abnormal mammogram 05/22/2023    Allergic rhinitis 05/22/2023    Anemia 05/22/2023    Angioedema 05/22/2023    Ankle pain 05/22/2023    Foot pain 05/22/2023    Ankle strain 05/22/2023    Acute bronchitis 05/22/2023    Acute otitis externa 05/22/2023    Arthritis 05/22/2023    Bronchitis 05/22/2023    COPD with acute exacerbation (CMS/[facility]) 05/22/2023    Otitis externa 05/22/2023    Aseptic loosening of prosthetic knee (CMS/[facility]) 05/22/2023    Osteoarthritis of knee 05/22/2023    CAD (coronary artery disease) 05/22/2023    Cardiac murmur 05/22/2023    Carotid bruit 05/22/2023    Abdominal pain 05/22/2023    Chest pain 05/22/2023    Constipation 05/22/2023    COVID-19 05/22/2023    Dizziness 05/22/2023    Vertigo 05/22/2023    Dry skin 05/22/2023    Dyspnea on exertion 05/22/2023    Earache 05/22/2023    Limb pain 05/22/2023    Easy bruising 05/22/2023    Edema 05/22/2023    Elevated d-dimer 05/22/2023     Erythrasma 05/22/2023    Essential hypertension 05/22/2023    External hemorrhoid 05/22/2023    Fatigue 05/22/2023    Flu-like symptoms 05/22/2023    Food allergy 05/22/2023    Foot sprain 05/22/2023    Fracture of radius, distal, closed 05/22/2023    Fracture of unspecified part of neck of right femur, subsequent encounter for closed fracture with routine healing 05/22/2023    GERD (gastroesophageal reflux disease) 05/22/2023    Hair loss 05/22/2023    Hematuria 05/22/2023    Hip bursitis, left 05/22/2023    Hip fracture, right (CMS/HCC) 05/22/2023    Post-traumatic osteoarthritis of right hip 05/22/2023    History of coronary artery bypass surgery 05/22/2023    Hyperlipidemia 05/22/2023    Hypothyroidism 05/22/2023    Impacted cerumen, right ear 05/22/2023    Impaired gait and mobility 05/22/2023    Influenza A 05/22/2023    Keloid 05/22/2023    Left knee pain 05/22/2023    Leg edema, left 05/22/2023    Low back pain 05/22/2023    Lumbar radicular pain 05/22/2023    Lumbar sprain 05/22/2023    Lung nodule 05/22/2023    Menopausal osteoporosis 05/22/2023    Mitral regurgitation 05/22/2023    Muscle spasms of lower extremity 05/22/2023    Olecranon bursitis of right elbow 05/22/2023    Olecranon bursitis 05/22/2023    Osteopenia 05/22/2023    Otitis media, left 05/22/2023    Post-op pain 05/22/2023    Renal insufficiency 05/22/2023    Restless legs syndrome 05/22/2023    Rib pain on left side 05/22/2023    Right bundle branch block (RBBB) 05/22/2023    Iliotibial band syndrome of right side 05/22/2023    Right hip pain 05/22/2023    Rosacea 05/22/2023    Scar 05/22/2023    Sinus bradycardia 05/22/2023    Sinobronchitis 05/22/2023    Sinusitis 05/22/2023    Shoulder impingement 05/22/2023    Strain of upper arm 05/22/2023    Traumatic bursitis 05/22/2023    Tibial tendinitis, posterior 05/22/2023    Trochanteric bursitis of right hip 05/22/2023    Urinary incontinence 05/22/2023    Urinary tract infection 05/22/2023     Leukocytes in urine 05/22/2023    Urine ketones 05/22/2023    Benign neoplasm of sigmoid colon 05/22/2023    Diabetes mellitus without complication (CMS/[facility]) 05/22/2023    Diverticulosis of large intestine without diverticulitis 05/22/2023    Dysphagia 05/22/2023    Gastric polyp 05/22/2023    Vaginal vault prolapse 05/22/2023    Vitamin D deficiency 05/22/2023    Presence of aortocoronary bypass graft 08/26/2022    Muscle weakness (generalized) 08/26/2022    Major depressive disorder, recurrent, unspecified (CMS/[facility]) 08/26/2022    Difficulty in walking, not elsewhere classified 08/26/2022    Abnormal posture 08/26/2022    Closed displaced fracture of right femoral neck (CMS/[facility]) 12/09/2021    Vestibular hypofunction, bilateral 06/11/2019    Chronic nonintractable headache 06/11/2019    Cervicalgia 06/11/2019    Ataxic gait 06/11/2019    Urge incontinence of urine 05/14/2019    Ataxia 05/04/2019    [place] grade 2 rectocele 01/07/2019    OAB (overactive bladder) 12/18/2018    Vaginal enterocele 12/18/2018    Vaginal irritation 12/18/2018    Presence of artificial knee joint, bilateral 01/29/2018    Pseudophakia of both eyes 01/22/2016    Dry eye syndrome, bilateral 12/21/2015    Dermatochalasis of both eyelids 12/21/2015       Objective:     There were no vitals filed for this visit.   Wt Readings from Last 4 Encounters:   10/05/23 86.7 kg (191 lb 2.2 oz)   10/04/23 86.2 kg (190 lb)   07/19/23 88.5 kg (195 lb)   05/22/23 89.9 kg (198 lb 3.2 oz)           LAB:     Lab Results   Component Value Date    WBC 7.5 10/05/2023    HGB 13.3 10/05/2023    HCT 42.0 10/05/2023     10/05/2023    CHOL 133 03/08/2023    TRIG 96 03/08/2023    HDL 47.9 03/08/2023    ALT 13 10/06/2023    AST 17 10/06/2023     10/06/2023    K 4.2 10/06/2023     (H) 10/06/2023    CREATININE 1.06 (H) 10/06/2023    BUN 13 10/06/2023    CO2 26 10/06/2023    TSH 1.52 03/08/2023    INR 1.0 10/06/2023    HGBA1C 5.5 08/24/2022        Diagnostic Studies:     No results found.      Radiology:     No orders to display       Physical Exam     General Appearance: alert and oriented to person, place and time, in no acute distress  Cardiovascular: normal rate, regular rhythm, normal S1 and S2, no murmurs, rubs, clicks, or gallops,  no JVD  Pulmonary/Chest: clear to auscultation bilaterally- no wheezes, rales or rhonchi, normal air movement, no respiratory distress  Abdomen: soft, non-tender, non-distended, normal bowel sounds, no masses   Extremities: no cyanosis, clubbing or edema  Skin: warm and dry, no rash or erythema  Eyes: EOMI  Neck: supple and non-tender without mass, no thyromegaly   Neurological: alert, oriented, normal speech, no focal findings or movement disorder noted        [unfilled]

## 2023-10-11 ENCOUNTER — TELEPHONE (OUTPATIENT)
Dept: PRIMARY CARE | Facility: CLINIC | Age: 78
End: 2023-10-11
Payer: MEDICARE

## 2023-10-11 NOTE — TELEPHONE ENCOUNTER
Pt called wondering should she stop taking her Famotidine prior to surgery? She was told to ask you and doesn't recall what you said if anything   {Please Advise)

## 2023-10-12 ENCOUNTER — APPOINTMENT (OUTPATIENT)
Dept: PRIMARY CARE | Facility: CLINIC | Age: 78
End: 2023-10-12
Payer: MEDICARE

## 2023-10-13 ENCOUNTER — TELEPHONE (OUTPATIENT)
Dept: ORTHOPEDIC SURGERY | Facility: CLINIC | Age: 78
End: 2023-10-13
Payer: MEDICARE

## 2023-10-13 ENCOUNTER — TELEPHONE (OUTPATIENT)
Dept: PRIMARY CARE | Facility: CLINIC | Age: 78
End: 2023-10-13
Payer: MEDICARE

## 2023-10-13 DIAGNOSIS — N30.00 ACUTE CYSTITIS WITHOUT HEMATURIA: Primary | ICD-10-CM

## 2023-10-13 RX ORDER — NITROFURANTOIN 25; 75 MG/1; MG/1
100 CAPSULE ORAL 2 TIMES DAILY
Qty: 14 CAPSULE | Refills: 0 | Status: SHIPPED | OUTPATIENT
Start: 2023-10-13 | End: 2023-10-21 | Stop reason: HOSPADM

## 2023-10-13 NOTE — TELEPHONE ENCOUNTER
Pt called worried we would cx sx because of urine results.  I advised pt to call pcp for antibiotic/discuss with them.

## 2023-10-13 NOTE — TELEPHONE ENCOUNTER
Pt got urine labs done and results are in chart  PER DR FAIRCHILD pt needs an antibiotic prescribed BY YOU (PCP) today asap in order to get surgery on Wednesday 10/18    Please advise, thank you!

## 2023-10-15 DIAGNOSIS — K21.9 GASTRO-ESOPHAGEAL REFLUX DISEASE WITHOUT ESOPHAGITIS: ICD-10-CM

## 2023-10-16 RX ORDER — PANTOPRAZOLE SODIUM 40 MG/1
40 TABLET, DELAYED RELEASE ORAL DAILY
Qty: 90 TABLET | Refills: 1 | Status: SHIPPED | OUTPATIENT
Start: 2023-10-16 | End: 2024-05-28 | Stop reason: SDUPTHER

## 2023-10-17 ENCOUNTER — APPOINTMENT (OUTPATIENT)
Dept: ORTHOPEDIC SURGERY | Facility: CLINIC | Age: 78
End: 2023-10-17
Payer: MEDICARE

## 2023-10-17 PROBLEM — M54.40 BACK PAIN OF LUMBAR REGION WITH SCIATICA: Status: ACTIVE | Noted: 2023-10-17

## 2023-10-18 ENCOUNTER — APPOINTMENT (OUTPATIENT)
Dept: RADIOLOGY | Facility: HOSPITAL | Age: 78
DRG: 455 | End: 2023-10-18
Payer: MEDICARE

## 2023-10-18 ENCOUNTER — ANESTHESIA (OUTPATIENT)
Dept: OPERATING ROOM | Facility: HOSPITAL | Age: 78
DRG: 455 | End: 2023-10-18
Payer: MEDICARE

## 2023-10-18 ENCOUNTER — ANESTHESIA EVENT (OUTPATIENT)
Dept: OPERATING ROOM | Facility: HOSPITAL | Age: 78
DRG: 455 | End: 2023-10-18
Payer: MEDICARE

## 2023-10-18 ENCOUNTER — HOSPITAL ENCOUNTER (INPATIENT)
Facility: HOSPITAL | Age: 78
LOS: 3 days | Discharge: HOME | DRG: 455 | End: 2023-10-21
Attending: ORTHOPAEDIC SURGERY | Admitting: ORTHOPAEDIC SURGERY
Payer: MEDICARE

## 2023-10-18 DIAGNOSIS — M48.061 SPINAL STENOSIS, LUMBAR REGION WITHOUT NEUROGENIC CLAUDICATION: ICD-10-CM

## 2023-10-18 DIAGNOSIS — M54.40 BACK PAIN OF LUMBAR REGION WITH SCIATICA: Primary | ICD-10-CM

## 2023-10-18 DIAGNOSIS — M43.16 SPONDYLOLISTHESIS, LUMBAR REGION: ICD-10-CM

## 2023-10-18 LAB — GLUCOSE BLD MANUAL STRIP-MCNC: 142 MG/DL (ref 74–99)

## 2023-10-18 PROCEDURE — 64635 DESTROY LUMB/SAC FACET JNT: CPT | Performed by: PHYSICIAN ASSISTANT

## 2023-10-18 PROCEDURE — 64636 DESTROY L/S FACET JNT ADDL: CPT | Performed by: PHYSICIAN ASSISTANT

## 2023-10-18 PROCEDURE — 22852 REMOVE SPINE FIXATION DEVICE: CPT | Performed by: ORTHOPAEDIC SURGERY

## 2023-10-18 PROCEDURE — 2720000007 HC OR 272 NO HCPCS: Performed by: ORTHOPAEDIC SURGERY

## 2023-10-18 PROCEDURE — 7100000002 HC RECOVERY ROOM TIME - EACH INCREMENTAL 1 MINUTE: Performed by: ORTHOPAEDIC SURGERY

## 2023-10-18 PROCEDURE — 0SG10K1 FUSION OF 2 OR MORE LUMBAR VERTEBRAL JOINTS WITH NONAUTOLOGOUS TISSUE SUBSTITUTE, POSTERIOR APPROACH, POSTERIOR COLUMN, OPEN APPROACH: ICD-10-PCS | Performed by: ORTHOPAEDIC SURGERY

## 2023-10-18 PROCEDURE — 63267 EXCISE INTRSPINL LESION LMBR: CPT | Performed by: PHYSICIAN ASSISTANT

## 2023-10-18 PROCEDURE — 2500000001 HC RX 250 WO HCPCS SELF ADMINISTERED DRUGS (ALT 637 FOR MEDICARE OP): Performed by: PHYSICIAN ASSISTANT

## 2023-10-18 PROCEDURE — 88304 TISSUE EXAM BY PATHOLOGIST: CPT | Mod: TC,SUR | Performed by: ORTHOPAEDIC SURGERY

## 2023-10-18 PROCEDURE — 22852 REMOVE SPINE FIXATION DEVICE: CPT | Performed by: PHYSICIAN ASSISTANT

## 2023-10-18 PROCEDURE — 64635 DESTROY LUMB/SAC FACET JNT: CPT | Performed by: ORTHOPAEDIC SURGERY

## 2023-10-18 PROCEDURE — C1713 ANCHOR/SCREW BN/BN,TIS/BN: HCPCS | Performed by: ORTHOPAEDIC SURGERY

## 2023-10-18 PROCEDURE — 64636 DESTROY L/S FACET JNT ADDL: CPT | Performed by: ORTHOPAEDIC SURGERY

## 2023-10-18 PROCEDURE — 63047 LAM FACETEC & FORAMOT LUMBAR: CPT | Performed by: ORTHOPAEDIC SURGERY

## 2023-10-18 PROCEDURE — 22614 ARTHRD PST TQ 1NTRSPC EA ADD: CPT | Performed by: PHYSICIAN ASSISTANT

## 2023-10-18 PROCEDURE — 87086 URINE CULTURE/COLONY COUNT: CPT | Mod: CMCLAB | Performed by: ORTHOPAEDIC SURGERY

## 2023-10-18 PROCEDURE — C1889 IMPLANT/INSERT DEVICE, NOC: HCPCS | Performed by: ORTHOPAEDIC SURGERY

## 2023-10-18 PROCEDURE — 2500000005 HC RX 250 GENERAL PHARMACY W/O HCPCS: Performed by: ORTHOPAEDIC SURGERY

## 2023-10-18 PROCEDURE — 01NB0ZZ RELEASE LUMBAR NERVE, OPEN APPROACH: ICD-10-PCS | Performed by: ORTHOPAEDIC SURGERY

## 2023-10-18 PROCEDURE — 88304 TISSUE EXAM BY PATHOLOGIST: CPT | Performed by: PATHOLOGY

## 2023-10-18 PROCEDURE — 0QP004Z REMOVAL OF INTERNAL FIXATION DEVICE FROM LUMBAR VERTEBRA, OPEN APPROACH: ICD-10-PCS | Performed by: ORTHOPAEDIC SURGERY

## 2023-10-18 PROCEDURE — A22612 PR ARTHRODESIS POSTERIOR/POSTEROLATERAL LUMBAR: Performed by: NURSE ANESTHETIST, CERTIFIED REGISTERED

## 2023-10-18 PROCEDURE — 82947 ASSAY GLUCOSE BLOOD QUANT: CPT

## 2023-10-18 PROCEDURE — 2500000005 HC RX 250 GENERAL PHARMACY W/O HCPCS: Performed by: NURSE ANESTHETIST, CERTIFIED REGISTERED

## 2023-10-18 PROCEDURE — A22612 PR ARTHRODESIS POSTERIOR/POSTEROLATERAL LUMBAR: Performed by: STUDENT IN AN ORGANIZED HEALTH CARE EDUCATION/TRAINING PROGRAM

## 2023-10-18 PROCEDURE — 22614 ARTHRD PST TQ 1NTRSPC EA ADD: CPT | Performed by: ORTHOPAEDIC SURGERY

## 2023-10-18 PROCEDURE — 3600000017 HC OR TIME - EACH INCREMENTAL 1 MINUTE - PROCEDURE LEVEL SIX: Performed by: ORTHOPAEDIC SURGERY

## 2023-10-18 PROCEDURE — 2500000004 HC RX 250 GENERAL PHARMACY W/ HCPCS (ALT 636 FOR OP/ED): Performed by: NURSE ANESTHETIST, CERTIFIED REGISTERED

## 2023-10-18 PROCEDURE — 2500000001 HC RX 250 WO HCPCS SELF ADMINISTERED DRUGS (ALT 637 FOR MEDICARE OP): Performed by: STUDENT IN AN ORGANIZED HEALTH CARE EDUCATION/TRAINING PROGRAM

## 2023-10-18 PROCEDURE — 2500000004 HC RX 250 GENERAL PHARMACY W/ HCPCS (ALT 636 FOR OP/ED): Performed by: PHYSICIAN ASSISTANT

## 2023-10-18 PROCEDURE — 3600000018 HC OR TIME - INITIAL BASE CHARGE - PROCEDURE LEVEL SIX: Performed by: ORTHOPAEDIC SURGERY

## 2023-10-18 PROCEDURE — 22840 INSERT SPINE FIXATION DEVICE: CPT | Performed by: ORTHOPAEDIC SURGERY

## 2023-10-18 PROCEDURE — 0SG00AJ FUSION OF LUMBAR VERTEBRAL JOINT WITH INTERBODY FUSION DEVICE, POSTERIOR APPROACH, ANTERIOR COLUMN, OPEN APPROACH: ICD-10-PCS | Performed by: ORTHOPAEDIC SURGERY

## 2023-10-18 PROCEDURE — 99100 ANES PT EXTEME AGE<1 YR&>70: CPT | Performed by: STUDENT IN AN ORGANIZED HEALTH CARE EDUCATION/TRAINING PROGRAM

## 2023-10-18 PROCEDURE — 22612 ARTHRD PST TQ 1NTRSPC LUMBAR: CPT | Performed by: ORTHOPAEDIC SURGERY

## 2023-10-18 PROCEDURE — 1200000002 HC GENERAL ROOM WITH TELEMETRY DAILY

## 2023-10-18 PROCEDURE — 22840 INSERT SPINE FIXATION DEVICE: CPT | Performed by: PHYSICIAN ASSISTANT

## 2023-10-18 PROCEDURE — 3600000012 HC PERFUSION TIME - EACH INCREMENTAL 1 MINUTE: Performed by: ORTHOPAEDIC SURGERY

## 2023-10-18 PROCEDURE — 22612 ARTHRD PST TQ 1NTRSPC LUMBAR: CPT | Performed by: PHYSICIAN ASSISTANT

## 2023-10-18 PROCEDURE — 2580000001 HC RX 258 IV SOLUTIONS: Performed by: PHYSICIAN ASSISTANT

## 2023-10-18 PROCEDURE — 76000 FLUOROSCOPY <1 HR PHYS/QHP: CPT

## 2023-10-18 PROCEDURE — 88304 TISSUE EXAM BY PATHOLOGIST: CPT | Mod: TC | Performed by: ORTHOPAEDIC SURGERY

## 2023-10-18 PROCEDURE — 7100000001 HC RECOVERY ROOM TIME - INITIAL BASE CHARGE: Performed by: ORTHOPAEDIC SURGERY

## 2023-10-18 PROCEDURE — 0SB20ZZ EXCISION OF LUMBAR VERTEBRAL DISC, OPEN APPROACH: ICD-10-PCS | Performed by: ORTHOPAEDIC SURGERY

## 2023-10-18 PROCEDURE — 99222 1ST HOSP IP/OBS MODERATE 55: CPT | Performed by: INTERNAL MEDICINE

## 2023-10-18 PROCEDURE — 2500000004 HC RX 250 GENERAL PHARMACY W/ HCPCS (ALT 636 FOR OP/ED): Performed by: STUDENT IN AN ORGANIZED HEALTH CARE EDUCATION/TRAINING PROGRAM

## 2023-10-18 PROCEDURE — 22853 INSJ BIOMECHANICAL DEVICE: CPT | Performed by: ORTHOPAEDIC SURGERY

## 2023-10-18 PROCEDURE — 3700000001 HC GENERAL ANESTHESIA TIME - INITIAL BASE CHARGE: Performed by: ORTHOPAEDIC SURGERY

## 2023-10-18 PROCEDURE — 88311 DECALCIFY TISSUE: CPT | Performed by: PATHOLOGY

## 2023-10-18 PROCEDURE — 2780000003 HC OR 278 NO HCPCS: Performed by: ORTHOPAEDIC SURGERY

## 2023-10-18 PROCEDURE — 3600000011 HC PERFUSION TIME - INITIAL BASE CHARGE: Performed by: ORTHOPAEDIC SURGERY

## 2023-10-18 PROCEDURE — 63047 LAM FACETEC & FORAMOT LUMBAR: CPT | Performed by: PHYSICIAN ASSISTANT

## 2023-10-18 PROCEDURE — 36620 INSERTION CATHETER ARTERY: CPT | Performed by: NURSE ANESTHETIST, CERTIFIED REGISTERED

## 2023-10-18 PROCEDURE — 22853 INSJ BIOMECHANICAL DEVICE: CPT | Performed by: PHYSICIAN ASSISTANT

## 2023-10-18 PROCEDURE — 3700000002 HC GENERAL ANESTHESIA TIME - EACH INCREMENTAL 1 MINUTE: Performed by: ORTHOPAEDIC SURGERY

## 2023-10-18 PROCEDURE — 63267 EXCISE INTRSPINL LESION LMBR: CPT | Performed by: ORTHOPAEDIC SURGERY

## 2023-10-18 DEVICE — SPACER, CATALYFT PL, 7MM, LONG: Type: IMPLANTABLE DEVICE | Site: BACK | Status: FUNCTIONAL

## 2023-10-18 DEVICE — BONE GRAFT SUBSTITUTE - SILICATE SUBSTITUTED CALCIUM PHOSPHATE - 15.8ML PACK SIZE
Type: IMPLANTABLE DEVICE | Site: SPINE LUMBAR | Status: FUNCTIONAL
Brand: ACTIFUSE SHAPE

## 2023-10-18 DEVICE — ROD, 5.5 X 60 CVD TI SOLERA: Type: IMPLANTABLE DEVICE | Site: BACK | Status: FUNCTIONAL

## 2023-10-18 DEVICE — SET SCREW, 5.5, TI NS BRK OFF: Type: IMPLANTABLE DEVICE | Site: BACK | Status: FUNCTIONAL

## 2023-10-18 DEVICE — IMPLANTABLE DEVICE: Type: IMPLANTABLE DEVICE | Site: BACK | Status: FUNCTIONAL

## 2023-10-18 DEVICE — BONE GRAFT SUBSTITUTE - SILICATE SUBSTITUTED CALCIUM PHOSPHATE - 7.5ML PACK SIZE
Type: IMPLANTABLE DEVICE | Site: SPINE LUMBAR | Status: FUNCTIONAL
Brand: ACTIFUSE MIS

## 2023-10-18 RX ORDER — HYDROMORPHONE HYDROCHLORIDE 1 MG/ML
INJECTION, SOLUTION INTRAMUSCULAR; INTRAVENOUS; SUBCUTANEOUS AS NEEDED
Status: DISCONTINUED | OUTPATIENT
Start: 2023-10-18 | End: 2023-10-18

## 2023-10-18 RX ORDER — ROSUVASTATIN CALCIUM 20 MG/1
40 TABLET, COATED ORAL NIGHTLY
Status: DISCONTINUED | OUTPATIENT
Start: 2023-10-18 | End: 2023-10-21 | Stop reason: HOSPADM

## 2023-10-18 RX ORDER — CEFAZOLIN SODIUM 2 G/50ML
SOLUTION INTRAVENOUS AS NEEDED
Status: DISCONTINUED | OUTPATIENT
Start: 2023-10-18 | End: 2023-10-18

## 2023-10-18 RX ORDER — SODIUM CHLORIDE, SODIUM LACTATE, POTASSIUM CHLORIDE, CALCIUM CHLORIDE 600; 310; 30; 20 MG/100ML; MG/100ML; MG/100ML; MG/100ML
100 INJECTION, SOLUTION INTRAVENOUS CONTINUOUS
Status: DISCONTINUED | OUTPATIENT
Start: 2023-10-18 | End: 2023-10-18

## 2023-10-18 RX ORDER — LIDOCAINE HYDROCHLORIDE 20 MG/ML
INJECTION, SOLUTION INFILTRATION; PERINEURAL AS NEEDED
Status: DISCONTINUED | OUTPATIENT
Start: 2023-10-18 | End: 2023-10-18

## 2023-10-18 RX ORDER — POLYETHYLENE GLYCOL 3350 17 G/17G
17 POWDER, FOR SOLUTION ORAL DAILY
Status: DISCONTINUED | OUTPATIENT
Start: 2023-10-18 | End: 2023-10-21 | Stop reason: HOSPADM

## 2023-10-18 RX ORDER — LEVOTHYROXINE SODIUM 25 UG/1
25 TABLET ORAL
Status: DISCONTINUED | OUTPATIENT
Start: 2023-10-19 | End: 2023-10-21 | Stop reason: HOSPADM

## 2023-10-18 RX ORDER — ROCURONIUM BROMIDE 50 MG/5 ML
SYRINGE (ML) INTRAVENOUS AS NEEDED
Status: DISCONTINUED | OUTPATIENT
Start: 2023-10-18 | End: 2023-10-18

## 2023-10-18 RX ORDER — SODIUM CHLORIDE, SODIUM LACTATE, POTASSIUM CHLORIDE, CALCIUM CHLORIDE 600; 310; 30; 20 MG/100ML; MG/100ML; MG/100ML; MG/100ML
INJECTION, SOLUTION INTRAVENOUS CONTINUOUS PRN
Status: DISCONTINUED | OUTPATIENT
Start: 2023-10-18 | End: 2023-10-18

## 2023-10-18 RX ORDER — OXYCODONE HYDROCHLORIDE 5 MG/1
5 TABLET ORAL EVERY 4 HOURS PRN
Status: DISCONTINUED | OUTPATIENT
Start: 2023-10-18 | End: 2023-10-18 | Stop reason: HOSPADM

## 2023-10-18 RX ORDER — HYDRALAZINE HYDROCHLORIDE 20 MG/ML
5 INJECTION INTRAMUSCULAR; INTRAVENOUS EVERY 30 MIN PRN
Status: DISCONTINUED | OUTPATIENT
Start: 2023-10-18 | End: 2023-10-18 | Stop reason: HOSPADM

## 2023-10-18 RX ORDER — FENTANYL CITRATE 50 UG/ML
50 INJECTION, SOLUTION INTRAMUSCULAR; INTRAVENOUS EVERY 5 MIN PRN
Status: DISCONTINUED | OUTPATIENT
Start: 2023-10-18 | End: 2023-10-18 | Stop reason: HOSPADM

## 2023-10-18 RX ORDER — TRANEXAMIC ACID 650 MG/1
1950 TABLET ORAL ONCE
Status: COMPLETED | OUTPATIENT
Start: 2023-10-18 | End: 2023-10-18

## 2023-10-18 RX ORDER — SODIUM CHLORIDE 9 MG/ML
100 INJECTION, SOLUTION INTRAVENOUS CONTINUOUS
Status: DISCONTINUED | OUTPATIENT
Start: 2023-10-18 | End: 2023-10-21 | Stop reason: HOSPADM

## 2023-10-18 RX ORDER — OXYCODONE HYDROCHLORIDE 5 MG/1
2.5 TABLET ORAL EVERY 4 HOURS PRN
Status: DISCONTINUED | OUTPATIENT
Start: 2023-10-18 | End: 2023-10-21 | Stop reason: HOSPADM

## 2023-10-18 RX ORDER — CEFAZOLIN SODIUM 2 G/100ML
2 INJECTION, SOLUTION INTRAVENOUS ONCE
Status: DISCONTINUED | OUTPATIENT
Start: 2023-10-18 | End: 2023-10-18 | Stop reason: HOSPADM

## 2023-10-18 RX ORDER — ONDANSETRON HYDROCHLORIDE 2 MG/ML
4 INJECTION, SOLUTION INTRAVENOUS ONCE AS NEEDED
Status: COMPLETED | OUTPATIENT
Start: 2023-10-18 | End: 2023-10-18

## 2023-10-18 RX ORDER — FENTANYL CITRATE 50 UG/ML
INJECTION, SOLUTION INTRAMUSCULAR; INTRAVENOUS AS NEEDED
Status: DISCONTINUED | OUTPATIENT
Start: 2023-10-18 | End: 2023-10-18

## 2023-10-18 RX ORDER — ONDANSETRON 4 MG/1
4 TABLET, ORALLY DISINTEGRATING ORAL EVERY 8 HOURS PRN
Status: DISCONTINUED | OUTPATIENT
Start: 2023-10-18 | End: 2023-10-21 | Stop reason: HOSPADM

## 2023-10-18 RX ORDER — CEFAZOLIN SODIUM 2 G/100ML
2 INJECTION, SOLUTION INTRAVENOUS EVERY 8 HOURS
Status: COMPLETED | OUTPATIENT
Start: 2023-10-18 | End: 2023-10-19

## 2023-10-18 RX ORDER — ALBUTEROL SULFATE 0.83 MG/ML
2.5 SOLUTION RESPIRATORY (INHALATION) ONCE AS NEEDED
Status: DISCONTINUED | OUTPATIENT
Start: 2023-10-18 | End: 2023-10-18 | Stop reason: HOSPADM

## 2023-10-18 RX ORDER — ACETAMINOPHEN 325 MG/1
650 TABLET ORAL ONCE
Status: COMPLETED | OUTPATIENT
Start: 2023-10-18 | End: 2023-10-18

## 2023-10-18 RX ORDER — HYDROCODONE BITARTRATE AND ACETAMINOPHEN 5; 325 MG/1; MG/1
1 TABLET ORAL EVERY 6 HOURS PRN
Status: DISCONTINUED | OUTPATIENT
Start: 2023-10-18 | End: 2023-10-18

## 2023-10-18 RX ORDER — DIAZEPAM 2 MG/1
2 TABLET ORAL EVERY 6 HOURS PRN
Status: DISCONTINUED | OUTPATIENT
Start: 2023-10-18 | End: 2023-10-21 | Stop reason: HOSPADM

## 2023-10-18 RX ORDER — ONDANSETRON HYDROCHLORIDE 2 MG/ML
INJECTION, SOLUTION INTRAVENOUS AS NEEDED
Status: DISCONTINUED | OUTPATIENT
Start: 2023-10-18 | End: 2023-10-18

## 2023-10-18 RX ORDER — PHENYLEPHRINE HCL IN 0.9% NACL 1 MG/10 ML
SYRINGE (ML) INTRAVENOUS AS NEEDED
Status: DISCONTINUED | OUTPATIENT
Start: 2023-10-18 | End: 2023-10-18

## 2023-10-18 RX ORDER — SODIUM CHLORIDE, SODIUM LACTATE, POTASSIUM CHLORIDE, CALCIUM CHLORIDE 600; 310; 30; 20 MG/100ML; MG/100ML; MG/100ML; MG/100ML
100 INJECTION, SOLUTION INTRAVENOUS CONTINUOUS
Status: DISCONTINUED | OUTPATIENT
Start: 2023-10-18 | End: 2023-10-18 | Stop reason: HOSPADM

## 2023-10-18 RX ORDER — NALOXONE HYDROCHLORIDE 0.4 MG/ML
0.2 INJECTION, SOLUTION INTRAMUSCULAR; INTRAVENOUS; SUBCUTANEOUS EVERY 5 MIN PRN
Status: DISCONTINUED | OUTPATIENT
Start: 2023-10-18 | End: 2023-10-21 | Stop reason: HOSPADM

## 2023-10-18 RX ORDER — OXYCODONE HYDROCHLORIDE 5 MG/1
5 TABLET ORAL EVERY 4 HOURS PRN
Status: DISCONTINUED | OUTPATIENT
Start: 2023-10-18 | End: 2023-10-21 | Stop reason: HOSPADM

## 2023-10-18 RX ORDER — NORETHINDRONE AND ETHINYL ESTRADIOL 0.5-0.035
KIT ORAL AS NEEDED
Status: DISCONTINUED | OUTPATIENT
Start: 2023-10-18 | End: 2023-10-18

## 2023-10-18 RX ORDER — DIPHENHYDRAMINE HCL 25 MG
25 CAPSULE ORAL EVERY 6 HOURS PRN
Status: DISCONTINUED | OUTPATIENT
Start: 2023-10-18 | End: 2023-10-21 | Stop reason: HOSPADM

## 2023-10-18 RX ORDER — PANTOPRAZOLE SODIUM 40 MG/1
40 TABLET, DELAYED RELEASE ORAL
Status: DISCONTINUED | OUTPATIENT
Start: 2023-10-18 | End: 2023-10-21 | Stop reason: HOSPADM

## 2023-10-18 RX ORDER — ONDANSETRON HYDROCHLORIDE 2 MG/ML
4 INJECTION, SOLUTION INTRAVENOUS EVERY 8 HOURS PRN
Status: DISCONTINUED | OUTPATIENT
Start: 2023-10-18 | End: 2023-10-21 | Stop reason: HOSPADM

## 2023-10-18 RX ORDER — PROPOFOL 10 MG/ML
INJECTION, EMULSION INTRAVENOUS CONTINUOUS PRN
Status: DISCONTINUED | OUTPATIENT
Start: 2023-10-18 | End: 2023-10-18

## 2023-10-18 RX ORDER — TRANEXAMIC ACID 650 MG/1
1950 TABLET ORAL ONCE
Status: CANCELLED | OUTPATIENT
Start: 2023-10-18 | End: 2023-10-18

## 2023-10-18 RX ORDER — CELECOXIB 200 MG/1
200 CAPSULE ORAL ONCE
Status: COMPLETED | OUTPATIENT
Start: 2023-10-18 | End: 2023-10-18

## 2023-10-18 RX ORDER — OXYCODONE HYDROCHLORIDE 5 MG/1
7.5 TABLET ORAL EVERY 6 HOURS PRN
Status: DISCONTINUED | OUTPATIENT
Start: 2023-10-18 | End: 2023-10-21 | Stop reason: HOSPADM

## 2023-10-18 RX ORDER — MORPHINE SULFATE 2 MG/ML
2 INJECTION, SOLUTION INTRAMUSCULAR; INTRAVENOUS EVERY 2 HOUR PRN
Status: DISCONTINUED | OUTPATIENT
Start: 2023-10-18 | End: 2023-10-19

## 2023-10-18 RX ORDER — HYDROMORPHONE HYDROCHLORIDE 1 MG/ML
0.5 INJECTION, SOLUTION INTRAMUSCULAR; INTRAVENOUS; SUBCUTANEOUS EVERY 5 MIN PRN
Status: DISCONTINUED | OUTPATIENT
Start: 2023-10-18 | End: 2023-10-18 | Stop reason: HOSPADM

## 2023-10-18 RX ORDER — ACETAMINOPHEN 325 MG/1
650 TABLET ORAL EVERY 6 HOURS
Status: DISCONTINUED | OUTPATIENT
Start: 2023-10-18 | End: 2023-10-21 | Stop reason: HOSPADM

## 2023-10-18 RX ORDER — LIDOCAINE HYDROCHLORIDE 10 MG/ML
0.1 INJECTION, SOLUTION EPIDURAL; INFILTRATION; INTRACAUDAL; PERINEURAL ONCE
Status: CANCELLED | OUTPATIENT
Start: 2023-10-18 | End: 2023-10-18

## 2023-10-18 RX ORDER — PROPOFOL 10 MG/ML
INJECTION, EMULSION INTRAVENOUS AS NEEDED
Status: DISCONTINUED | OUTPATIENT
Start: 2023-10-18 | End: 2023-10-18

## 2023-10-18 RX ORDER — BUPIVACAINE HCL/EPINEPHRINE 0.5-1:200K
VIAL (ML) INJECTION AS NEEDED
Status: DISCONTINUED | OUTPATIENT
Start: 2023-10-18 | End: 2023-10-18 | Stop reason: HOSPADM

## 2023-10-18 RX ORDER — CARVEDILOL 6.25 MG/1
6.25 TABLET ORAL
Status: DISCONTINUED | OUTPATIENT
Start: 2023-10-18 | End: 2023-10-21 | Stop reason: HOSPADM

## 2023-10-18 RX ORDER — AMLODIPINE BESYLATE 5 MG/1
5 TABLET ORAL NIGHTLY
Status: DISCONTINUED | OUTPATIENT
Start: 2023-10-18 | End: 2023-10-21 | Stop reason: HOSPADM

## 2023-10-18 RX ORDER — LABETALOL HYDROCHLORIDE 5 MG/ML
5 INJECTION, SOLUTION INTRAVENOUS ONCE AS NEEDED
Status: DISCONTINUED | OUTPATIENT
Start: 2023-10-18 | End: 2023-10-18 | Stop reason: HOSPADM

## 2023-10-18 RX ADMIN — CEFAZOLIN SODIUM 2 G: 2 INJECTION, SOLUTION INTRAVENOUS at 16:36

## 2023-10-18 RX ADMIN — EPHEDRINE SULFATE 5 MG: 50 INJECTION, SOLUTION INTRAVENOUS at 08:48

## 2023-10-18 RX ADMIN — LIDOCAINE HYDROCHLORIDE 100 MG: 20 INJECTION, SOLUTION INFILTRATION; PERINEURAL at 07:48

## 2023-10-18 RX ADMIN — ONDANSETRON 4 MG: 2 INJECTION INTRAMUSCULAR; INTRAVENOUS at 14:02

## 2023-10-18 RX ADMIN — Medication 20 MG: at 08:17

## 2023-10-18 RX ADMIN — ACETAMINOPHEN 650 MG: 325 TABLET ORAL at 16:36

## 2023-10-18 RX ADMIN — POLYETHYLENE GLYCOL 3350 17 G: 17 POWDER, FOR SOLUTION ORAL at 16:36

## 2023-10-18 RX ADMIN — SUGAMMADEX 200 MG: 100 INJECTION, SOLUTION INTRAVENOUS at 11:35

## 2023-10-18 RX ADMIN — DIPHENHYDRAMINE HYDROCHLORIDE 25 MG: 25 CAPSULE ORAL at 22:41

## 2023-10-18 RX ADMIN — TRANEXAMIC ACID 1950 MG: 650 TABLET ORAL at 07:01

## 2023-10-18 RX ADMIN — SODIUM CHLORIDE, POTASSIUM CHLORIDE, SODIUM LACTATE AND CALCIUM CHLORIDE: 600; 310; 30; 20 INJECTION, SOLUTION INTRAVENOUS at 08:37

## 2023-10-18 RX ADMIN — CEFAZOLIN SODIUM 2 G: 2 SOLUTION INTRAVENOUS at 08:14

## 2023-10-18 RX ADMIN — DEXAMETHASONE SODIUM PHOSPHATE 4 MG: 4 INJECTION, SOLUTION INTRAMUSCULAR; INTRAVENOUS at 08:16

## 2023-10-18 RX ADMIN — Medication 20 MG: at 10:30

## 2023-10-18 RX ADMIN — PROPOFOL 100 MCG/KG/MIN: 10 INJECTION, EMULSION INTRAVENOUS at 08:08

## 2023-10-18 RX ADMIN — OXYCODONE HYDROCHLORIDE 2.5 MG: 5 TABLET ORAL at 18:38

## 2023-10-18 RX ADMIN — Medication 20 MG: at 08:27

## 2023-10-18 RX ADMIN — SODIUM CHLORIDE, POTASSIUM CHLORIDE, SODIUM LACTATE AND CALCIUM CHLORIDE: 600; 310; 30; 20 INJECTION, SOLUTION INTRAVENOUS at 09:15

## 2023-10-18 RX ADMIN — Medication 20 MG: at 09:32

## 2023-10-18 RX ADMIN — Medication 50 MG: at 07:48

## 2023-10-18 RX ADMIN — SODIUM CHLORIDE 100 ML/HR: 9 INJECTION, SOLUTION INTRAVENOUS at 16:36

## 2023-10-18 RX ADMIN — HYDROMORPHONE HYDROCHLORIDE 0.5 MG: 1 INJECTION, SOLUTION INTRAMUSCULAR; INTRAVENOUS; SUBCUTANEOUS at 13:14

## 2023-10-18 RX ADMIN — ONDANSETRON 4 MG: 2 INJECTION INTRAMUSCULAR; INTRAVENOUS at 11:51

## 2023-10-18 RX ADMIN — PANTOPRAZOLE SODIUM 40 MG: 40 TABLET, DELAYED RELEASE ORAL at 16:36

## 2023-10-18 RX ADMIN — PROPOFOL 170 MG: 10 INJECTION, EMULSION INTRAVENOUS at 07:48

## 2023-10-18 RX ADMIN — MORPHINE SULFATE 2 MG: 2 INJECTION, SOLUTION INTRAMUSCULAR; INTRAVENOUS at 22:14

## 2023-10-18 RX ADMIN — AMLODIPINE BESYLATE 5 MG: 5 TABLET ORAL at 21:17

## 2023-10-18 RX ADMIN — HYDROMORPHONE HYDROCHLORIDE 0.5 MG: 1 INJECTION, SOLUTION INTRAMUSCULAR; INTRAVENOUS; SUBCUTANEOUS at 14:23

## 2023-10-18 RX ADMIN — CELECOXIB 200 MG: 200 CAPSULE ORAL at 07:01

## 2023-10-18 RX ADMIN — Medication 100 MCG: at 08:32

## 2023-10-18 RX ADMIN — SODIUM CHLORIDE, POTASSIUM CHLORIDE, SODIUM LACTATE AND CALCIUM CHLORIDE: 600; 310; 30; 20 INJECTION, SOLUTION INTRAVENOUS at 07:39

## 2023-10-18 RX ADMIN — HYDROMORPHONE HYDROCHLORIDE 0.25 MG: 1 INJECTION, SOLUTION INTRAMUSCULAR; INTRAVENOUS; SUBCUTANEOUS at 10:23

## 2023-10-18 RX ADMIN — Medication: at 16:15

## 2023-10-18 RX ADMIN — FENTANYL CITRATE 50 MCG: 50 INJECTION, SOLUTION INTRAMUSCULAR; INTRAVENOUS at 07:48

## 2023-10-18 RX ADMIN — SODIUM CHLORIDE, POTASSIUM CHLORIDE, SODIUM LACTATE AND CALCIUM CHLORIDE 100 ML/HR: 600; 310; 30; 20 INJECTION, SOLUTION INTRAVENOUS at 13:16

## 2023-10-18 RX ADMIN — ROSUVASTATIN CALCIUM 40 MG: 20 TABLET, FILM COATED ORAL at 21:17

## 2023-10-18 RX ADMIN — ACETAMINOPHEN 650 MG: 325 TABLET ORAL at 21:17

## 2023-10-18 RX ADMIN — HYDROMORPHONE HYDROCHLORIDE 0.5 MG: 1 INJECTION, SOLUTION INTRAMUSCULAR; INTRAVENOUS; SUBCUTANEOUS at 13:40

## 2023-10-18 RX ADMIN — HYDROMORPHONE HYDROCHLORIDE 0.25 MG: 1 INJECTION, SOLUTION INTRAMUSCULAR; INTRAVENOUS; SUBCUTANEOUS at 10:04

## 2023-10-18 RX ADMIN — ACETAMINOPHEN 650 MG: 325 TABLET ORAL at 07:01

## 2023-10-18 RX ADMIN — HYDROMORPHONE HYDROCHLORIDE 0.5 MG: 1 INJECTION, SOLUTION INTRAMUSCULAR; INTRAVENOUS; SUBCUTANEOUS at 13:24

## 2023-10-18 RX ADMIN — EPHEDRINE SULFATE 5 MG: 50 INJECTION, SOLUTION INTRAVENOUS at 08:21

## 2023-10-18 RX ADMIN — Medication 100 MCG: at 08:23

## 2023-10-18 RX ADMIN — OXYCODONE HYDROCHLORIDE 5 MG: 5 TABLET ORAL at 15:05

## 2023-10-18 RX ADMIN — HYDROMORPHONE HYDROCHLORIDE 0.5 MG: 1 INJECTION, SOLUTION INTRAMUSCULAR; INTRAVENOUS; SUBCUTANEOUS at 10:47

## 2023-10-18 RX ADMIN — FENTANYL CITRATE 50 MCG: 50 INJECTION, SOLUTION INTRAMUSCULAR; INTRAVENOUS at 08:38

## 2023-10-18 RX ADMIN — CEFAZOLIN SODIUM 2 G: 2 SOLUTION INTRAVENOUS at 12:14

## 2023-10-18 SDOH — SOCIAL STABILITY: SOCIAL INSECURITY: WERE YOU ABLE TO COMPLETE ALL THE BEHAVIORAL HEALTH SCREENINGS?: YES

## 2023-10-18 SDOH — SOCIAL STABILITY: SOCIAL INSECURITY: HAS ANYONE EVER THREATENED TO HURT YOUR FAMILY OR YOUR PETS?: NO

## 2023-10-18 SDOH — SOCIAL STABILITY: SOCIAL INSECURITY: ABUSE: ADULT

## 2023-10-18 SDOH — SOCIAL STABILITY: SOCIAL INSECURITY: ARE YOU OR HAVE YOU BEEN THREATENED OR ABUSED PHYSICALLY, EMOTIONALLY, OR SEXUALLY BY ANYONE?: NO

## 2023-10-18 SDOH — SOCIAL STABILITY: SOCIAL INSECURITY: DO YOU FEEL UNSAFE GOING BACK TO THE PLACE WHERE YOU ARE LIVING?: NO

## 2023-10-18 SDOH — SOCIAL STABILITY: SOCIAL INSECURITY: HAVE YOU HAD THOUGHTS OF HARMING ANYONE ELSE?: NO

## 2023-10-18 SDOH — SOCIAL STABILITY: SOCIAL INSECURITY: ARE THERE ANY APPARENT SIGNS OF INJURIES/BEHAVIORS THAT COULD BE RELATED TO ABUSE/NEGLECT?: NO

## 2023-10-18 SDOH — SOCIAL STABILITY: SOCIAL INSECURITY: DOES ANYONE TRY TO KEEP YOU FROM HAVING/CONTACTING OTHER FRIENDS OR DOING THINGS OUTSIDE YOUR HOME?: NO

## 2023-10-18 SDOH — SOCIAL STABILITY: SOCIAL INSECURITY: DO YOU FEEL ANYONE HAS EXPLOITED OR TAKEN ADVANTAGE OF YOU FINANCIALLY OR OF YOUR PERSONAL PROPERTY?: NO

## 2023-10-18 SDOH — HEALTH STABILITY: MENTAL HEALTH: CURRENT SMOKER: 0

## 2023-10-18 ASSESSMENT — COGNITIVE AND FUNCTIONAL STATUS - GENERAL
MOBILITY SCORE: 17
CLIMB 3 TO 5 STEPS WITH RAILING: A LOT
MOVING FROM LYING ON BACK TO SITTING ON SIDE OF FLAT BED WITH BEDRAILS: A LITTLE
TOILETING: A LITTLE
MOVING TO AND FROM BED TO CHAIR: A LITTLE
HELP NEEDED FOR BATHING: A LITTLE
PATIENT BASELINE BEDBOUND: NO
PERSONAL GROOMING: A LITTLE
WALKING IN HOSPITAL ROOM: A LITTLE
DRESSING REGULAR LOWER BODY CLOTHING: A LITTLE
STANDING UP FROM CHAIR USING ARMS: A LITTLE
DAILY ACTIVITIY SCORE: 19
TURNING FROM BACK TO SIDE WHILE IN FLAT BAD: A LITTLE
DRESSING REGULAR UPPER BODY CLOTHING: A LITTLE

## 2023-10-18 ASSESSMENT — ACTIVITIES OF DAILY LIVING (ADL)
TOILETING: NEEDS ASSISTANCE
WALKS IN HOME: INDEPENDENT
PATIENT'S MEMORY ADEQUATE TO SAFELY COMPLETE DAILY ACTIVITIES?: YES
GROOMING: INDEPENDENT
JUDGMENT_ADEQUATE_SAFELY_COMPLETE_DAILY_ACTIVITIES: YES
LACK_OF_TRANSPORTATION: NO
DRESSING YOURSELF: INDEPENDENT
ADEQUATE_TO_COMPLETE_ADL: YES
GROOMING: INDEPENDENT
WALKS IN HOME: INDEPENDENT
BATHING: INDEPENDENT
TOILETING: INDEPENDENT
PATIENT'S MEMORY ADEQUATE TO SAFELY COMPLETE DAILY ACTIVITIES?: YES
HEARING - RIGHT EAR: DIFFICULTY WITH NOISE
ADEQUATE_TO_COMPLETE_ADL: YES
BATHING: INDEPENDENT
DRESSING YOURSELF: INDEPENDENT
FEEDING YOURSELF: INDEPENDENT
HEARING - LEFT EAR: DIFFICULTY WITH NOISE
JUDGMENT_ADEQUATE_SAFELY_COMPLETE_DAILY_ACTIVITIES: YES
FEEDING YOURSELF: INDEPENDENT

## 2023-10-18 ASSESSMENT — PAIN SCALES - GENERAL
PAINLEVEL_OUTOF10: 7
PAINLEVEL_OUTOF10: 8
PAINLEVEL_OUTOF10: 3
PAINLEVEL_OUTOF10: 3
PAINLEVEL_OUTOF10: 7
PAINLEVEL_OUTOF10: 4
PAINLEVEL_OUTOF10: 8
PAINLEVEL_OUTOF10: 7
PAINLEVEL_OUTOF10: 7

## 2023-10-18 ASSESSMENT — PAIN - FUNCTIONAL ASSESSMENT
PAIN_FUNCTIONAL_ASSESSMENT: 0-10

## 2023-10-18 ASSESSMENT — PATIENT HEALTH QUESTIONNAIRE - PHQ9
2. FEELING DOWN, DEPRESSED OR HOPELESS: NOT AT ALL
1. LITTLE INTEREST OR PLEASURE IN DOING THINGS: NOT AT ALL
SUM OF ALL RESPONSES TO PHQ9 QUESTIONS 1 & 2: 0

## 2023-10-18 ASSESSMENT — COLUMBIA-SUICIDE SEVERITY RATING SCALE - C-SSRS
6. HAVE YOU EVER DONE ANYTHING, STARTED TO DO ANYTHING, OR PREPARED TO DO ANYTHING TO END YOUR LIFE?: NO
2. HAVE YOU ACTUALLY HAD ANY THOUGHTS OF KILLING YOURSELF?: NO
1. IN THE PAST MONTH, HAVE YOU WISHED YOU WERE DEAD OR WISHED YOU COULD GO TO SLEEP AND NOT WAKE UP?: NO

## 2023-10-18 ASSESSMENT — LIFESTYLE VARIABLES
AUDIT-C TOTAL SCORE: 0
HOW OFTEN DO YOU HAVE A DRINK CONTAINING ALCOHOL: NEVER
HOW OFTEN DO YOU HAVE 6 OR MORE DRINKS ON ONE OCCASION: NEVER
SKIP TO QUESTIONS 9-10: 1
HOW MANY STANDARD DRINKS CONTAINING ALCOHOL DO YOU HAVE ON A TYPICAL DAY: PATIENT DOES NOT DRINK
AUDIT-C TOTAL SCORE: 0

## 2023-10-18 NOTE — ANESTHESIA POSTPROCEDURE EVALUATION
Patient: Rosalba Salcedo    Procedure Summary       Date: 10/18/23 Room / Location: [facility] OR 06 / [facility]    Anesthesia Start: 0739 Anesthesia Stop: 1300    Procedure: L2-3 LAMINECTOMY, L4-5 INSTRUMENTATION, POSTERIOR INTERBODY FUSION, POSTERIOR LATERAL FUSION AND L4-5 RIGHT EXCISION OF FACET CYST, EXPLORATION SPINAL FUSION L3-4, CELL SAVER-NOTIFIED (Back) Diagnosis:       Spinal stenosis, lumbar region without neurogenic claudication      Spondylolisthesis, lumbar region      (Spinal stenosis, lumbar region without neurogenic claudication [M48.061]Spondylolisthesis, lumbar region [M43.16])    Surgeons: Adalberto Arizmendi MD Responsible Provider: Ashtyn Rodriguez MD    Anesthesia Type: general ASA Status: 3            Anesthesia Type: general    Vitals Value Taken Time   /61 10/18/23 1330   Temp 36.2 °C (97.2 °F) 10/18/23 1300   Pulse 51 10/18/23 1334   Resp 18 10/18/23 1334   SpO2 100 % 10/18/23 1334   Vitals shown include unvalidated device data.    Anesthesia Post Evaluation    Patient location during evaluation: bedside  Level of consciousness: awake  Pain management: adequate  Cardiovascular status: acceptable  Respiratory status: acceptable        No notable events documented.

## 2023-10-18 NOTE — ANESTHESIA PREPROCEDURE EVALUATION
Patient: Rosalba Salcedo    Procedure Information       Date/Time: 10/18/23 2506    Procedure: L2-3 LAMINECTOMY, L4-5 INSTRUMENTATION, POSTERIOR INTERBODY FUSION, POSTERIOR LATERAL FUSION AND L4-5 RIGHT EXCISION OF FACET CYST, EXPLORATION SPINAL FUSION L3-4, CELL SAVER-NOTIFIED (Back)    Location: [facility] OR 06 / [facility]    Surgeons: Adalberto Arizmendi MD            Relevant Problems   Cardiovascular   (+) CAD (coronary artery disease)   (+) Cardiac murmur   (+) Chest pain   (+) Essential hypertension   (+) Hyperlipidemia   (+) Mitral regurgitation   (+) Rib pain on left side   (+) Right bundle branch block (RBBB)   (+) Sinus bradycardia      Endocrine   (+) Class 2 obesity with body mass index (BMI) of 35.0 to 35.9 in adult   (+) Hypothyroidism      GI   (+) GERD (gastroesophageal reflux disease)      /Renal   (+) Renal insufficiency   (+) Urinary tract infection      Neuro/Psych   (+) Back pain of lumbar region with sciatica   (+) Chronic nonintractable headache   (+) Major depressive disorder, recurrent, unspecified (CMS/HCC)      Pulmonary   (+) COPD with acute exacerbation (CMS/HCC)   (+) Dyspnea on exertion      Hematology   (+) Anemia      Musculoskeletal   (+) Knee osteoarthritis   (+) Osteoarthritis of knee   (+) Post-traumatic osteoarthritis of right hip      Infectious Disease   (+) COVID-19   (+) Erythrasma   (+) Influenza A   (+) Urinary tract infection      Other   (+) Arthritis       Clinical information reviewed:    Allergies  Meds               NPO Detail:  NPO/Void Status  Date of Last Liquid: 10/18/23  Time of Last Liquid: 0659  Date of Last Solid: 10/17/23  Last Intake Type: Clear fluids         PHYSICAL EXAM    Anesthesia Plan    ASA 3     general     The patient is not a current smoker.    Anesthetic plan and risks discussed with patient.  Use of blood products discussed with patient who.    Plan discussed with CRNA.

## 2023-10-18 NOTE — H&P
Interval History and Physical    I have interviewed and examined the patient and reviewed the recent History and Physical.  There have been no changes to the recent H&P documentation.    The patient understands the planned operation and its associated risks and benefits and agrees to proceed.    The surgical consent form has been signed.    There were no vitals taken for this visit.     Adalberto Arizmendi MD

## 2023-10-18 NOTE — ANESTHESIA PROCEDURE NOTES
Airway  Date/Time: 10/18/2023 7:52 AM  Urgency: elective    Airway not difficult    Staffing  Performed: CRNA   Authorized by: Ashtyn Rodriguez MD    Performed by: AMIRAH Parsons-CATALINA  Patient location during procedure: OR    Indications and Patient Condition  Indications for airway management: anesthesia  Spontaneous Ventilation: absent  Sedation level: deep  Preoxygenated: yes  Patient position: sniffing  Mask difficulty assessment: 1 - vent by mask    Final Airway Details  Final airway type: endotracheal airway      Successful airway: ETT  Cuffed: yes   Successful intubation technique: direct laryngoscopy  Facilitating devices/methods: intubating stylet  Blade: [place]ild[place]  Blade size: #3  ETT size (mm): 7.0  Cormack-Lehane Classification: grade IIa - partial view of glottis  Placement verified by: capnometry   Measured from: teeth  ETT to teeth (cm): 21  Number of attempts at approach: 1    Additional Comments  Preexisting chipped/jagged upper left tooth unchanged after intubation

## 2023-10-18 NOTE — DISCHARGE INSTR - ACTIVITY
Dr. Arizmendi spine  Call Dr. Arizmendi for any problems and/or concerns.  *Maintain spine precautions  *Log roll as instructed  *Walk as much as possible  *Avoid pushing, pulling, bending, twisting, and sitting/laying down in the same position for long periods of time  *No baths, hot tubs, or pools until cleared by physician; no lotions, creams, ointments over incision  *You may shower, do not soak or scrub incision; pat dry  *Continue the coughing and deep breathing exercises that you learned in the hospital  *Do not engage in sports, heavy work or lifting  *If you have a brace/collar-wear as instructed by physician and/or therapy, keep the brace/collar clean and dry, check the skin around the brace/collar every day and notify your physician of any concerns    Call Doctor right away for:  *Fever above 100.4/shaking chills  *New pain, weakness, warmth, or numbness in your legs  *Foot, ankle, or calf swelling that is not relieved by elevating your feet  *Inability to urinate/move bowels  *A severe headache  *Chest pain/shortness of breath-call 911  *Difficulty swallowing (cervical surgery)    If your doctor has ordered compression stockings, wear as directed as they help to prevent blood clots and reduce swelling in your legs    Do not use any products that contain nicotine or tobacco, such as cigarettes, e-cigarettes, and chewing tobacco. These can delay bone healing after surgery. If you need help quitting, ask your healthcare provider    -No heavy lifting or straining until patient is seen in the office.  -Encourage ambulation at least 3 times a day.  -No formal physical therapy until ordered by Dr. Arizmendi.  -Follow-up in the office in 2 weeks.  Appointment should already be made.  -You must be accompanied by a responsible adult upon discharge and for 24 hours after surgery.  Do not drive a motor vehicle, operate machinery, power tools or appliances, drink alcoholic beverages, or make critical decisions for 24  hours and while on narcotic pain meds.  -Be aware of dizziness, which may cause a fall.  Change positions slowly.  -You may resume your regular diet, but do so slowly.  -Use your pain medication prescription as prescribed by your physician.  If possible, take your medication with food, and drink plenty of fluids.  -Call your surgeon if you have questions, temperature of 101° or greater, increased bleeding swelling or pain, drainage from the incision or increased redness around the incision.  -Call [phone] with any questions or concerns.

## 2023-10-18 NOTE — OP NOTE
Preoperative diagnosis: L2-3 stenosis.  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  Prior instrumentation/fusion L3-4.  Prior fusion L4-5 and L5-S1.  Lumbar spondylosis.    Postoperative diagnosis: Above    Procedure: L2-3 laminectomy decompressing the L2 and 3 vertebral segments.  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  Removal of hardware L3-4.  Instrumentation L4-5.  Use of interbody cage L4-5.  Posterior lateral fusion L2-3, L3-4.  Exploration of spinal fusion L3-4, L4-5 and L5-S1.  Thermal ablation of the medial nerve branch supplying the facets bilaterally at L2-3 and L4-5.    Surgeon: Adalberto Arizmendi M.D.    Asst.: Jordy CORDOBAThe physician assistant was present through the entire case.  Given the nature of the disease process and the procedure to be performed a skilled surgical assistant was necessary during the case.  The assistant was necessary in order to hold retractors and directly assist in the operation.  A certified scrub tech was at the back table managing instruments and supplies for the surgical case.    Anesthesia: General    HPI: The patient had leg symptoms from the above described condition.  There were scheduled electively for the above-described surgery.  All of the risks, benefits, and potential complications for operative and nonoperative treatment were discussed at length with the patient.  Risks of operative intervention include, but are not limited to: Anesthesia complications, excessive blood loss, infection, damaged to uninjured structures including, but not limited to, the dural sac and nerve roots, blood clots, and lack of improvement or worsening of symptoms.  These complications could result in death, permanent disability, or need for reoperation.  The patient completely understood those risks and wished to  proceed with operative intervention.    Operative implants: Medtronic Catalyft PL cage, Medtronic screws and Actifuse gun and putty bone graft.      Operative procedure: The patient was wheeled from the preanesthesia care unit to the theater.  The patient was placed in supine position and all bony prominences were well-padded.  For the entire procedure anesthesia maintainined control of the patient's head neck.  General anesthesia was induced.  The patient was then placed prone on the Tobin table.  All bony prominences were well-padded.  The head and neck were in neutral position.  The back was cleansed with alcohol and spinal needles were inserted.  X-rays were taken to confirm appropriate levels.  The back was then marked and prepped and draped in normal sterile fashion.    Timeout was performed.  Site verification marking was verified.  Appropriate antibiotic administration was confirmed.  Next, a longitudinal incision in the midline was performed over the operative levels.  Dissection was carried down with a knife through the skin.  A Bovie was used to expose the spinous process at the L2-3 level and remove scar and soft tissue over the prior laminectomy site.  Screws at L3-4 were identified and removed with the Bovie and straight pituitary.  Clamps were then placed in the spinous processes and x-rays were taken confirm the appropriate levels.  Bovie and FloSeal was used to maintain hemostasis.  Bovie was then used to expose the lateral gutters at L2-3, L3-4, L4-5 and L5-S1.  Exploration of spinal fusion showed that there was some fusion mass at the L3-4 L4-5 and L5-S1 levels but there was not complete fusion.  The Bovie was used to perform thermal ablation of the medial nerve branch the L2-3 and L4-5 levels bilaterally.  Using direct visualization in the anatomic area of the location of the medial nerve branch.      Caps were then removed and the rods were removed at the L3-4 level.  The screws were found to  be somewhat loose when they were backed out.  All 4 screws were removed concluding the removal of hardware at L3-4 level.    A rongeur was then used to remove the spinous processes and debulk the lamina of the L2-3 level.  Microscope was then brought in for use. Next, the bur was used to debulk the lamina at the operative levels. This exposed ligamentum flavum along the midline. 60 curved curette was then used to reach underneath the lamina in the most cephalad portion of the dissection. 2 and 3 Kerrisons were used to perform midline laminectomy heading from cephalad to caudal to the most caudal level. At this point there was complete central decompression lifting off the central lamina and ligamentum flavum. The ultrasonic scalpel was then used to widen the laminectomy, first on the patient's left side and then on the patient's right side. Bone was lifted off after the ultrasonic scalpel bone cuts were made. 2 and 3 Kerrisons were then used to clean up the lateral gutters and performed foraminotomies at all levels.  At this time is complete decompression centrally as well as bilateral foraminally from the L2 nerve root to the L3 nerve root bilaterally.  Short ball was placed at the caudal portion of the dissection and x-rays were taken.  This confirmed that the laminectomy was taken down to the mid/caudal portion of the L3 pedicle.  Hemostasis was maintained using bipolar cautery and FloSeal.    Next, attention was placed to the right L4-5 level.  Bovie was used to remove soft tissue from the facet at that level.  Curved curette was used to enter into the prior laminectomy window at the L4-5 level on the right.  A bur was then used to take the facet completely down.  The medial portion of this dissection exposed a facet cyst/intracanal extradural benign tumor which was at the level of the disc heading caudally impinging the L5 nerve root as it swung around the L5 pedicle.  This facet cyst/intracanal extradural  benign tumor was removed with Kerrisons and sent to pathology.  This removal of facet cyst was a separate and distinct procedure as was needed for placement of the interbody cage at that level as interbody fusion dissection was significantly lateral with a completely different approach and exposure as was needed to remove the cyst.  A bur was used to widen the laminectomy on the patient's right side.  This provided nice exposure of the exiting cephalad nerve root and traversing caudal nerve root and the disc space.  Bipolar cautery was used to remove epidural veins to nicely exposed the disc.  A nerve root retractor was then placed to protect the exiting nerve root and traversing dural sac and nerve root.  The disc was box cut with a knife.  Annulus was removed with the pituitary rongeur.  Pituitaries were then used to remove disc from the disc space.  Rotating julian were used until the appropriate amount of friction between the shaver and endplates was obtained.  Pituitary rongeurs were used again to remove disc.  Next curved curettes were used to completely remove disc off of the endplates down to the annulus anteriorly and across the contralateral side.  Up-biting and backbiting and straight biting pituitaries were used as well.  At this point there was complete removal of disc from the disc space.  Actifuse gun was then placed in the disc space and filled the entire disc space with bone graft.  Next the cage was inserted into the disc space.  AP and lateral x-rays were taken to confirm appropriate positioning of the cage.  The cage was then expanded to appropriate tension was obtained.  Final AP lateral x-rays of the construct confirmed appropriate cage positioning and disc height restoration.    Next, pedicle screws were placed into the L3 and 4 pedicles through the prior holes that were remaining from removal of screws at those levels.  The holes were probed with a feeler probe and they were found to be  intact.  Upsizing to 7.5 millimeter screws obtained excellent purchase.  All 4 screws were placed and x-rays taken confirm appropriate screw positioning.  Spinal cord monitoring readings on those for screws were within normal levels.    Next pedicle screws were placed in the L5 pedicles.  This was done using AP lateral x-ray projection technique.  For starting on the right side a trocar was placed at the 3 o'clock position.  The trocar was inserted intra-articularly just through the posterior vertebral body wall.  A guidewire was placed and the trocar was removed.  A tap was placed over the guidewire and spinal cord monitoring reading was appropriate.  The tap was removed and a screw was placed over the wire and the wire was removed.  Next, the identical procedure was performed at the L5 pedicle on the left starting at the 9 o'clock position.  Spinal cord monitoring on the left screw was found to be appropriate as well.  X-rays in AP and lateral projections were taken confirm appropriate screw positioning as well.  Next, locking rods were placed bilaterally traversing the tulips of the pedicle screws.  They were locked into position using the locking caps.  Final x-rays taken confirm the appropriate positioning of the cage screws and rods.    Next, Actifuse putty bone graft was placed from the L2 transverse process to the L4 transverse process in the lateral gutter bilaterally with bone graft contacting the lateral facets and transverse process at all levels.  Locally prepared autograft bone from the remove lamina was also placed in the lateral gutter to augment the fusion.    The entire wound was irrigated with copious amounts of normal saline.  All saline was suctioned dry.  Valsalva was performed and there was no evidence of any dural leak or excessive bleeding.  Retractors were removed.  The deep fascia was closed over a drain that was not resting on the neural elements.  The fascia was closed with a running #1  Stratafix PDS suture.  The fatty layer was closed with 0 Vicryl suture.  The skin was closed with 2-0 Vicryl and staples.  A sterile dressing was applied.  There were no abnormal spinal cord monitor readings for the entire case.  The patient tolerated the procedure well and had pink and warm toes at the conclusion of the case.        This dictation was created using voice recognition software.  If there are any questions about inaccuracies please do not hesitate to contact me.

## 2023-10-18 NOTE — ANESTHESIA PROCEDURE NOTES
Arterial Line:    Date/Time: 10/18/2023 7:55 AM    Staffing  Performed: attending   Authorized by: Ashtyn Rodriguez MD    Performed by: FAY Parsons    An arterial line was placed. in the OR for the following indication(s): continuous blood pressure monitoring and blood sampling needed.    A 20 gauge (size), 1 and 3/4 inch (length), Angiocath (type) catheter was placed into the Left radial artery, secured by TegadermEvents:  patient tolerated procedure well with no complications.

## 2023-10-18 NOTE — CONSULTS
Inpatient consult to Medicine  Consult performed by: Moy Bradshaw MD  Consult ordered by: Jordy Wolf PA-C  Reason for consult: Medical management          Reason For Consult  Medical management    History Of Present Illness  Rosalba Salcedo is a 78-year-old female with past medical history of hypertension, hyperlipidemia, hypothyroidism presented for elective surgery with Dr. Arizmendi.  Patient tolerated the procedure well and is being seen postoperatively by hospitalist service for medical management.  Patient is on supplemental O2 and just waking up.  She provides a good history but is little forgetful.  Patient was slightly bradycardic.  She states that her primary goal is to be going home once she is stable with pain medication and doing therapy.     Past Medical History  She has a past medical history of Other bursitis of elbow, left elbow (05/27/2020), Other specified anxiety disorders (07/27/2022), Pain in unspecified elbow (08/05/2020), and Personal history of other mental and behavioral disorders (04/02/2022).    Surgical History  She has a past surgical history that includes Other surgical history (08/14/2019); Other surgical history (08/14/2019); Other surgical history (08/14/2019); Other surgical history (08/14/2019); Other surgical history (04/04/2022); Other surgical history (04/04/2022); Other surgical history (04/04/2022); Other surgical history (04/04/2022); Other surgical history (04/04/2022); Other surgical history (04/04/2022); Other surgical history (05/11/2021); Other surgical history (04/04/2022); Other surgical history (03/11/2020); CT guided abscess fluid collection drainage (8/23/2022); and CT guided abscess fluid collection drainage (8/23/2022).     Social History  She reports that she has never smoked. She has never used smokeless tobacco. She reports that she does not drink alcohol and does not use drugs.    Family History  Family History   Problem Relation Name Age of Onset     Heart disease Mother      Hypertension Mother      Stomach cancer Mother      Other (cardiac disorder) Mother      Hypertension Father      Cancer Father      Prostate cancer Father      Other (cardiac disorder) Father      No Known Problems Sibling          Allergies  Morphine         Physical Exam  General: Alert, in mild distress on nasal cannula  HEENT: PERRLA, head intact and normocephalic  Neck: Normal to inspection  Lungs: Clear to auscultation, work of breathing within normal limit  Cardiac: Regular rate and rhythm  Abdomen: Soft nontender, positive bowel sounds  : Exam deferred  Skin: Intact  Hematology: No petechia or excessive ecchymosis  Musculoskeletal: Extremities without trauma.  Back dressing noted with RONIT drain in place  Neurological: Alert awake oriented, no focal deficit, cranial nerves grossly intact  Psych: No suicidal ideation or homicidal ideation       Last Recorded Vitals  /70 (BP Location: Right arm, Patient Position: Lying)   Pulse (!) 49   Temp 36 °C (96.8 °F) (Temporal)   Resp 14   Wt 85.7 kg (189 lb)   SpO2 100%     Relevant Results  Results for orders placed or performed during the hospital encounter of 10/18/23 (from the past 24 hour(s))   POCT GLUCOSE   Result Value Ref Range    POCT Glucose 142 (H) 74 - 99 mg/dL          Assessment/Plan :    Rosalba Salcedo is a 78-year-old female with past medical history of hypertension, hyperlipidemia, hypothyroidism presented for elective surgery with Dr. Arizmendi.  Patient tolerated the procedure well and is being seen postoperatively by hospitalist service for medical management.     Assessment:  Medical management postoperatively after back surgery  Status post op day #0  Hypertension  Hyperlipidemia  Hypothyroidism    Plan:  Pain management and PT OT and DVT prophylaxis per primary service  Home med reconciliation reviewed  Appropriate parameters are in place  Monitor heart rate closely  Multimodality pain control  PT  OT evaluation  Side effects of narcotic discussed with patient and recommended incentive spirometry and bowel regimen  Patient reports her goal is to go home and she is approved for 5 days to stay in the hospital last time had forgotten her pain medication and ended up going back to the hospital  DVT prophylaxis as per primary service    Plan discussed with patient at bedside.  Thank you for allowing us to participate in this patient's care.  We will sign off for now as no active medical issues.  Please call or reconsult us if needed    Moderate level of MDM based on above issue and discussing plan    This note is created using voice recognition software. All efforts are made to minimize errors, if there are errors there due to transcription.    Moy Bradshaw  Hospitalist    Moy Bradshaw MD

## 2023-10-18 NOTE — NURSING NOTE
1600: PT. Arrived to the floor at this time from PACU. The pt. Resting and intermittently awake to answer questions. The pt. States that her pain is more controlled now however she is scheduled for tylenol at this time. The pt. Denies any further questions or concerns. Will continue to monitor throughout the duration of this shift.    1850: Dr. Bradshaw at the bedside at this time to speak with the pt. Regarding the medical management order placed.     1800: Pt. Resting at this time and remains free from pain at this time besides aches and pains. The pt. Does not feel she needs any additional medications at this time to help with her pain. The pt. Has call light in reach and bed in lowest position. No further questions or concerns at this time. Will continue to monitor throughout the duration of this shift.     1820: Spoke with daughter and provided update at this time. The pt. And daughter were able to speak over the phone at this time

## 2023-10-19 LAB
ANION GAP SERPL CALC-SCNC: 10 MMOL/L (ref 10–20)
APPEARANCE UR: CLEAR
BACTERIA UR CULT: NO GROWTH
BILIRUB UR STRIP.AUTO-MCNC: NEGATIVE MG/DL
BUN SERPL-MCNC: 16 MG/DL (ref 6–23)
CALCIUM SERPL-MCNC: 8.1 MG/DL (ref 8.6–10.3)
CHLORIDE SERPL-SCNC: 108 MMOL/L (ref 98–107)
CO2 SERPL-SCNC: 23 MMOL/L (ref 21–32)
COLOR UR: NORMAL
CREAT SERPL-MCNC: 0.98 MG/DL (ref 0.5–1.05)
ERYTHROCYTE [DISTWIDTH] IN BLOOD BY AUTOMATED COUNT: 13.2 % (ref 11.5–14.5)
GFR SERPL CREATININE-BSD FRML MDRD: 60 ML/MIN/1.73M*2
GLUCOSE SERPL-MCNC: 95 MG/DL (ref 74–99)
GLUCOSE UR STRIP.AUTO-MCNC: NEGATIVE MG/DL
HCT VFR BLD AUTO: 31.3 % (ref 36–46)
HGB BLD-MCNC: 10.4 G/DL (ref 12–16)
KETONES UR STRIP.AUTO-MCNC: NEGATIVE MG/DL
LEUKOCYTE ESTERASE UR QL STRIP.AUTO: NEGATIVE
MCH RBC QN AUTO: 29.5 PG (ref 26–34)
MCHC RBC AUTO-ENTMCNC: 33.2 G/DL (ref 32–36)
MCV RBC AUTO: 89 FL (ref 80–100)
NITRITE UR QL STRIP.AUTO: NEGATIVE
NRBC BLD-RTO: 0 /100 WBCS (ref 0–0)
PH UR STRIP.AUTO: 6 [PH]
PLATELET # BLD AUTO: 163 X10*3/UL (ref 150–450)
PMV BLD AUTO: 10.2 FL (ref 7.5–11.5)
POTASSIUM SERPL-SCNC: 3.8 MMOL/L (ref 3.5–5.3)
PROT UR STRIP.AUTO-MCNC: NEGATIVE MG/DL
RBC # BLD AUTO: 3.52 X10*6/UL (ref 4–5.2)
RBC # UR STRIP.AUTO: NEGATIVE /UL
SODIUM SERPL-SCNC: 137 MMOL/L (ref 136–145)
SP GR UR STRIP.AUTO: 1.01
UROBILINOGEN UR STRIP.AUTO-MCNC: <2 MG/DL
WBC # BLD AUTO: 11.2 X10*3/UL (ref 4.4–11.3)

## 2023-10-19 PROCEDURE — 85027 COMPLETE CBC AUTOMATED: CPT | Performed by: PHYSICIAN ASSISTANT

## 2023-10-19 PROCEDURE — 97116 GAIT TRAINING THERAPY: CPT | Mod: GP

## 2023-10-19 PROCEDURE — 97535 SELF CARE MNGMENT TRAINING: CPT | Mod: GO | Performed by: OCCUPATIONAL THERAPIST

## 2023-10-19 PROCEDURE — 2500000001 HC RX 250 WO HCPCS SELF ADMINISTERED DRUGS (ALT 637 FOR MEDICARE OP): Performed by: PHYSICIAN ASSISTANT

## 2023-10-19 PROCEDURE — 81003 URINALYSIS AUTO W/O SCOPE: CPT | Performed by: PHYSICIAN ASSISTANT

## 2023-10-19 PROCEDURE — 36415 COLL VENOUS BLD VENIPUNCTURE: CPT | Performed by: PHYSICIAN ASSISTANT

## 2023-10-19 PROCEDURE — 97161 PT EVAL LOW COMPLEX 20 MIN: CPT | Mod: GP

## 2023-10-19 PROCEDURE — 1200000002 HC GENERAL ROOM WITH TELEMETRY DAILY

## 2023-10-19 PROCEDURE — 2500000004 HC RX 250 GENERAL PHARMACY W/ HCPCS (ALT 636 FOR OP/ED): Performed by: PHYSICIAN ASSISTANT

## 2023-10-19 PROCEDURE — 80048 BASIC METABOLIC PNL TOTAL CA: CPT | Performed by: PHYSICIAN ASSISTANT

## 2023-10-19 PROCEDURE — 97166 OT EVAL MOD COMPLEX 45 MIN: CPT | Mod: GO | Performed by: OCCUPATIONAL THERAPIST

## 2023-10-19 RX ORDER — CEFAZOLIN SODIUM 2 G/100ML
2 INJECTION, SOLUTION INTRAVENOUS EVERY 8 HOURS
Status: DISCONTINUED | OUTPATIENT
Start: 2023-10-19 | End: 2023-10-21

## 2023-10-19 RX ORDER — CELECOXIB 200 MG/1
200 CAPSULE ORAL ONCE
Status: DISCONTINUED | OUTPATIENT
Start: 2023-10-19 | End: 2023-10-21 | Stop reason: HOSPADM

## 2023-10-19 RX ORDER — CEFAZOLIN SODIUM 2 G/100ML
2 INJECTION, SOLUTION INTRAVENOUS ONCE
Status: DISCONTINUED | OUTPATIENT
Start: 2023-10-19 | End: 2023-10-21 | Stop reason: HOSPADM

## 2023-10-19 RX ORDER — ACETAMINOPHEN 325 MG/1
650 TABLET ORAL ONCE
Status: DISCONTINUED | OUTPATIENT
Start: 2023-10-19 | End: 2023-10-21 | Stop reason: HOSPADM

## 2023-10-19 RX ADMIN — OXYCODONE HYDROCHLORIDE 5 MG: 5 TABLET ORAL at 22:54

## 2023-10-19 RX ADMIN — ACETAMINOPHEN 650 MG: 325 TABLET ORAL at 16:12

## 2023-10-19 RX ADMIN — SODIUM CHLORIDE 100 ML/HR: 9 INJECTION, SOLUTION INTRAVENOUS at 03:00

## 2023-10-19 RX ADMIN — PANTOPRAZOLE SODIUM 40 MG: 40 TABLET, DELAYED RELEASE ORAL at 06:25

## 2023-10-19 RX ADMIN — ACETAMINOPHEN 650 MG: 325 TABLET ORAL at 09:54

## 2023-10-19 RX ADMIN — CEFAZOLIN SODIUM 2 G: 2 INJECTION, SOLUTION INTRAVENOUS at 20:20

## 2023-10-19 RX ADMIN — OXYCODONE HYDROCHLORIDE 7.5 MG: 5 TABLET ORAL at 07:13

## 2023-10-19 RX ADMIN — ROSUVASTATIN CALCIUM 40 MG: 20 TABLET, FILM COATED ORAL at 22:04

## 2023-10-19 RX ADMIN — CEFAZOLIN SODIUM 2 G: 2 INJECTION, SOLUTION INTRAVENOUS at 00:22

## 2023-10-19 RX ADMIN — ACETAMINOPHEN 650 MG: 325 TABLET ORAL at 04:15

## 2023-10-19 RX ADMIN — OXYCODONE HYDROCHLORIDE 5 MG: 5 TABLET ORAL at 11:21

## 2023-10-19 RX ADMIN — ACETAMINOPHEN 650 MG: 325 TABLET ORAL at 22:04

## 2023-10-19 RX ADMIN — CEFAZOLIN SODIUM 2 G: 2 INJECTION, SOLUTION INTRAVENOUS at 12:42

## 2023-10-19 RX ADMIN — Medication: at 08:00

## 2023-10-19 RX ADMIN — LEVOTHYROXINE SODIUM 25 MCG: 25 TABLET ORAL at 06:25

## 2023-10-19 RX ADMIN — POLYETHYLENE GLYCOL 3350 17 G: 17 POWDER, FOR SOLUTION ORAL at 09:21

## 2023-10-19 ASSESSMENT — PAIN SCALES - GENERAL
PAINLEVEL_OUTOF10: 4
PAINLEVEL_OUTOF10: 8
PAINLEVEL_OUTOF10: 4
PAINLEVEL_OUTOF10: 7
PAINLEVEL_OUTOF10: 2
PAINLEVEL_OUTOF10: 5 - MODERATE PAIN
PAINLEVEL_OUTOF10: 2
PAINLEVEL_OUTOF10: 5 - MODERATE PAIN

## 2023-10-19 ASSESSMENT — COGNITIVE AND FUNCTIONAL STATUS - GENERAL
TOILETING: A LOT
DRESSING REGULAR UPPER BODY CLOTHING: A LITTLE
MOVING TO AND FROM BED TO CHAIR: A LITTLE
DRESSING REGULAR LOWER BODY CLOTHING: A LOT
CLIMB 3 TO 5 STEPS WITH RAILING: A LOT
PERSONAL GROOMING: A LITTLE
HELP NEEDED FOR BATHING: A LOT
STANDING UP FROM CHAIR USING ARMS: A LITTLE
TURNING FROM BACK TO SIDE WHILE IN FLAT BAD: A LITTLE
MOVING FROM LYING ON BACK TO SITTING ON SIDE OF FLAT BED WITH BEDRAILS: A LITTLE
DAILY ACTIVITIY SCORE: 16
MOBILITY SCORE: 17
WALKING IN HOSPITAL ROOM: A LITTLE

## 2023-10-19 ASSESSMENT — PAIN - FUNCTIONAL ASSESSMENT
PAIN_FUNCTIONAL_ASSESSMENT: 0-10

## 2023-10-19 ASSESSMENT — PAIN DESCRIPTION - DESCRIPTORS: DESCRIPTORS: ACHING;DISCOMFORT

## 2023-10-19 ASSESSMENT — ACTIVITIES OF DAILY LIVING (ADL)
HOME_MANAGEMENT_TIME_ENTRY: 9
ADL_ASSISTANCE: INDEPENDENT

## 2023-10-19 NOTE — NURSING NOTE
2218 - MD notified of HR <50 per orders (HR 45). MD placed order for tele. Tele has been placed on pt and is now being monitored.     Pt complains of pain 8 out of 10. First line PRN oxy is not available d/t last time of administration. Pt was given Morphine for breakthrough pain. Pt and nurse aware of allergy. Both agreed benefits outweighed the risk. Allergy to morphine is low and causes drowsiness. Nurse will continue to monitor for any changes.    0140 - Pt HR showed no change throughout shift. (HR 45-49).

## 2023-10-19 NOTE — PROGRESS NOTES
Rosalba Salcedo is a 78 y.o. female on day 1 of admission presenting with Back pain of lumbar region with sciatica.    Subjective   Lying in bed on right side upon entering room.  Currently verbalizing no complaints.  Does admit to a moderate amount of pain with activity that is well controlled with current pain medication.  No complaints of chest pain, shortness of breath, lightheaded or dizziness.  Tolerating a diet with no nausea.  Voiding spontaneously.  States prior to surgery was having pain in bilateral lower extremities with activity or standing and is unable to tell if there is been any improvement postoperatively.  Plans on discharge to home.       Objective     Physical Exam  Constitutional:       Appearance: Normal appearance.   HENT:      Head: Normocephalic and atraumatic.      Nose: Nose normal.      Mouth/Throat:      Mouth: Mucous membranes are moist.      Pharynx: Oropharynx is clear.   Eyes:      Pupils: Pupils are equal, round, and reactive to light.   Cardiovascular:      Rate and Rhythm: Normal rate and regular rhythm.   Pulmonary:      Effort: Pulmonary effort is normal.      Breath sounds: Normal breath sounds.   Abdominal:      General: Bowel sounds are normal.      Palpations: Abdomen is soft.   Musculoskeletal:         General: Normal range of motion.      Cervical back: Normal range of motion and neck supple.      Comments: Back with Aquacel dressing in place small area of staining.  Sensation in bilateral lower extremities is equal and intact.  Plantar and dorsiflexion of bilateral feet present.  DP pulses palpable in bilateral feet.   Skin:     General: Skin is warm and dry.   Neurological:      General: No focal deficit present.      Mental Status: She is alert and oriented to person, place, and time.   Psychiatric:         Mood and Affect: Mood normal.         Last Recorded Vitals  Blood pressure 114/60, pulse 57, temperature 36 °C (96.8 °F), temperature source Temporal, resp.  "rate 17, height 1.626 m (5' 4\"), weight 85.7 kg (189 lb), SpO2 100 %.  Intake/Output last 3 Shifts:  I/O last 3 completed shifts:  In: 4056.7 (47.3 mL/kg) [P.O.:120; I.V.:3836.7 (44.8 mL/kg); IV Piggyback:100]  Out: 1760 (20.5 mL/kg) [Urine:1225 (0.4 mL/kg/hr); Drains:385; Blood:150]  Weight: 85.7 kg     Relevant Results  Scheduled medications  acetaminophen, 650 mg, oral, Once  acetaminophen, 650 mg, oral, q6h  amLODIPine, 5 mg, oral, Nightly  carvedilol, 6.25 mg, oral, BID with meals  ceFAZolin, 2 g, intravenous, Once  celecoxib, 200 mg, oral, Once  levothyroxine, 25 mcg, oral, Daily before breakfast  oxygen, , inhalation, Continuous - 02/gases  pantoprazole, 40 mg, oral, Daily before breakfast  polyethylene glycol, 17 g, oral, Daily  rosuvastatin, 40 mg, oral, Nightly      Continuous medications  sodium chloride 0.9%, 100 mL/hr, Last Rate: 100 mL/hr (10/19/23 0300)      PRN medications  PRN medications: diazePAM, diphenhydrAMINE, naloxone, ondansetron ODT **OR** ondansetron, oxyCODONE, oxyCODONE, oxyCODONE   Results for orders placed or performed during the hospital encounter of 10/18/23 (from the past 24 hour(s))   POCT GLUCOSE   Result Value Ref Range    POCT Glucose 142 (H) 74 - 99 mg/dL   CBC   Result Value Ref Range    WBC 11.2 4.4 - 11.3 x10*3/uL    nRBC 0.0 0.0 - 0.0 /100 WBCs    RBC 3.52 (L) 4.00 - 5.20 x10*6/uL    Hemoglobin 10.4 (L) 12.0 - 16.0 g/dL    Hematocrit 31.3 (L) 36.0 - 46.0 %    MCV 89 80 - 100 fL    MCH 29.5 26.0 - 34.0 pg    MCHC 33.2 32.0 - 36.0 g/dL    RDW 13.2 11.5 - 14.5 %    Platelets 163 150 - 450 x10*3/uL    MPV 10.2 7.5 - 11.5 fL   Basic metabolic panel   Result Value Ref Range    Glucose 95 74 - 99 mg/dL    Sodium 137 136 - 145 mmol/L    Potassium 3.8 3.5 - 5.3 mmol/L    Chloride 108 (H) 98 - 107 mmol/L    Bicarbonate 23 21 - 32 mmol/L    Anion Gap 10 10 - 20 mmol/L    Urea Nitrogen 16 6 - 23 mg/dL    Creatinine 0.98 0.50 - 1.05 mg/dL    eGFR 60 (L) >60 mL/min/1.73m*2    Calcium 8.1 " (L) 8.6 - 10.3 mg/dL                   Assessment/Plan   Principal Problem:    Back pain of lumbar region with sciatica  Active Problems:    Essential hypertension    Hyperlipidemia    Hypothyroidism    Postop day #1 of L2-3 laminectomy decompressing the L2 and 3 vertebral segments.  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  Removal of hardware L3-4.  Instrumentation L4-5.  Use of interbody cage L4-5.  Posterior lateral fusion L2-3, L3-4.  Exploration of spinal fusion L3-4, L4-5 and L5-S1.  Thermal ablation of the medial nerve branch supplying the facets bilaterally at L2-3 and L4-5.     Physical and Occupational Therapy are on consult  Weightbearing as tolerated, no bending twisting or lifting  Pain controlled with her current pain regime  Bowel regime  Appropriate home medications for chronic medical conditions, medical team on for management of chronic medical conditions, appreciate their input  Labs reviewed  Encourage use of incentive spirometry  Patient with recent UTI will recheck UA  Plans on discharge to home with home health care when appropriate  Follow-up with Dr. Arizmendi as directed       I spent 30 minutes in the professional and overall care of this patient.      Rufina Lee PA-C

## 2023-10-19 NOTE — PROGRESS NOTES
Met with patient at bedside. Admitted for lumbar laminectomy. Pt lives alone and was independent PTA with no HHC. Pt has a walker. PT/OT evals pending. Pt plans to discharge home. Would like HH for wound check. [facility]. Family will provide transport home.

## 2023-10-19 NOTE — PROGRESS NOTES
Occupational Therapy    Evaluation/Treatment    Patient Name: Rosalba Salcedo  MRN: [id number]  : 1945  Today's Date: 10/19/23  Time Calculation  Start Time: 952  Stop Time:   Time Calculation (min): 24 min       Assessment:  OT Assessment: Patient tolerated session well. Patient motivated.  Patient requires reinforcement for safety and adherence with spinal precautions.  Anticipate patient will progress well.  Prognosis: Good  Barriers to Discharge: Decreased caregiver support  Evaluation/Treatment Tolerance: Patient tolerated treatment well  Medical Staff Made Aware: Yes  End of Session Communication: Bedside nurse  End of Session Patient Position: Up in chair, Alarm on  OT Assessment Results: Decreased ADL status, Decreased endurance, Decreased functional mobility  Prognosis: Good  Barriers to Discharge: Decreased caregiver support  Evaluation/Treatment Tolerance: Patient tolerated treatment well  Medical Staff Made Aware: Yes  Strengths: Attitude of self    Plan:  Treatment Interventions: ADL retraining, Functional transfer training, Endurance training, Patient/family training  OT Frequency: 1 time per day until discharge  OT Discharge Recommendations: Moderate intensity level of continued care, Low intensity level of continued care (pending progress)  OT Recommended Transfer Status: Moderate assist, Assist of 2  Treatment Interventions: ADL retraining, Functional transfer training, Endurance training, Patient/family training  Subjective     Current Problem:  1. Back pain of lumbar region with sciatica        2. Spinal stenosis, lumbar region without neurogenic claudication  Surgical Pathology Exam    Surgical Pathology Exam    CANCELED: Urine Culture    CANCELED: Urine Culture      3. Spondylolisthesis, lumbar region  Surgical Pathology Exam    Surgical Pathology Exam    CANCELED: Urine Culture    CANCELED: Urine Culture        Past Medical History:   Diagnosis Date    Other bursitis of elbow,  left elbow 05/27/2020    Bursitis of left elbow    Other specified anxiety disorders 07/27/2022    Depression with anxiety    Pain in unspecified elbow 08/05/2020    Elbow pain    Personal history of other mental and behavioral disorders 04/02/2022    History of depression     Past Surgical History:   Procedure Laterality Date    CT GUIDED ABSCESS FLUID COLLECTION DRAINAGE  8/23/2022    CT GUIDED ABSCESS FLUID COLLECTION DRAINAGE 8/23/2022 [facility] CLINICAL LEGACY    CT GUIDED ABSCESS FLUID COLLECTION DRAINAGE  8/23/2022    CT GUIDED ABSCESS FLUID COLLECTION DRAINAGE    OTHER SURGICAL HISTORY  08/14/2019    Wrist surgery    OTHER SURGICAL HISTORY  08/14/2019    Hysterectomy    OTHER SURGICAL HISTORY  08/14/2019    Breast reduction    OTHER SURGICAL HISTORY  08/14/2019    Cholecystectomy    OTHER SURGICAL HISTORY  04/04/2022    Vaginoplasty    OTHER SURGICAL HISTORY  04/04/2022    Bladder surgery    OTHER SURGICAL HISTORY  04/04/2022    Complete colonoscopy    OTHER SURGICAL HISTORY  04/04/2022    Esophagogastroduodenoscopy    OTHER SURGICAL HISTORY  04/04/2022    Elbow surgery    OTHER SURGICAL HISTORY  04/04/2022    Cataract surgery    OTHER SURGICAL HISTORY  05/11/2021    Breast biopsy    OTHER SURGICAL HISTORY  04/04/2022    Knee surgery    OTHER SURGICAL HISTORY  03/11/2020    Coronary artery bypass graft       General:   OT Received On: 10/19/23  General  Reason for Referral: s/p spine surgery  Referred By: Dr. Arizmendi  Past Medical History Relevant to Rehab: POD 1 s/p L2-3 laminectomy decompressing the L2 and 3 vertebral segments.  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  Removal of hardware L3-4.  Instrumentation L4-5.  Use of interbody cage L4-5.  Posterior lateral fusion L2-3, L3-4.  Exploration of spinal fusion L3-4, L4-5 and L5-S1.  Thermal ablation of the  medial nerve branch supplying the facets bilaterally at L2-3 and L4-5.  Family/Caregiver Present: No  Prior to Session Communication: Bedside nurse  Patient Position Received: Bed, 3 rail up, Alarm on  Preferred Learning Style: verbal  General Comment: Pt agreeable to OT, nursing cleared for treatment.    Precautions:  LE Weight Bearing Status: Weight Bearing as Tolerated  Medical Precautions: Fall precautions  Post-Surgical Precautions: Spinal precautions  Braces Applied: LSO when OOB    Pain:  Pain Assessment  Pain Assessment: 0-10  Pain Score:  (pain not rated but verbalized pain)  Pain Type: Acute pain  Pain Location: Back  Pain Onset: Progressive  Pain Interventions: Medication (See MAR)  Objective     Cognition:  Overall Cognitive Status: Within Functional Limits  Orientation Level: Oriented X4  Safety/Judgement:  (needs cues for safety with FWW)             Home Living:  Type of Home: House  Lives With: Alone  Home Adaptive Equipment: Walker rolling or standard, Sock aid, Reacher  Home Layout: Multi-level  Home Access: Stairs to enter with rails  Entrance Stairs-Number of Steps: 5  Bathroom Shower/Tub: Walk-in shower  Home Living Comments: Patient lives in split level home with 5 steps to each level.Patient states that she will not have assistance upon discharge.    Prior Function:  Level of [place]: Independent with ADLs and functional transfers    ADL   LE Dressing Assistance: Maximal  LE Dressing Deficit: Thread RLE into underwear, Thread LLE into underwear, Pull up over hips  Toileting Assistance with Device: Moderate  Toileting Deficit: Bedside commode, Clothing management up, Clothing management down    Activity Tolerance:  Endurance: Tolerates 10 - 20 min exercise with multiple rests      Bed Mobility/Transfers: Bed Mobility  Bed Mobility: Yes  Bed Mobility 1  Bed Mobility 1: Supine to sitting  Level of Assistance 1: Moderate assistance  Bed Mobility Comments 1: x 2  Transfers  Transfer:  Yes  Transfer 1  Transfer From 1: Sit to  Transfer to 1: Stand  Transfer Device 1: Walker  Transfer Level of Assistance 1: Moderate assistance, +2  Trials/Comments 1: Transferred to C.  Transfers 2  Transfer From 2: Stand to  Transfer to 2: Sit  Transfer Device 2: Walker  Transfer Level of Assistance 2: Moderate assistance, +2  Trials/Comments 2: Transferred from BS to bedside chair      Sensation:  Sensation Comment: denies numbness and tingling    Extremities: RUE   RUE : Within Functional Limits and LUE   LUE: Within Functional Limits    Outcome Measures: [facility] Daily Activity  Putting on and taking off regular lower body clothing: A lot  Bathing (including washing, rinsing, drying): A lot  Putting on and taking off regular upper body clothing: A little  Toileting, which includes using toilet, bedpan or urinal: A lot  Taking care of personal grooming such as brushing teeth: A little  Eating Meals: None  Daily Activity - Total Score: 16  Education Documentation  Precautions, taught by Juan Carlos Villeda OT at 10/19/2023 11:24 AM.  Learner: Patient  Readiness: Acceptance  Method: Explanation  Response: Needs Reinforcement    ADL Training, taught by Juan Carlos Villeda OT at 10/19/2023 11:24 AM.  Learner: Patient  Readiness: Acceptance  Method: Explanation  Response: Needs Reinforcement    Education Comments  No comments found.        EDUCATION:  Education  Individual(s) Educated: Patient  Education Provided: Risk and benefits of OT discussed with patient or other, Fall precautons  Patient/Caregiver Demonstrated Understanding: yes  Plan of Care Discussed and Agreed Upon: yes  Patient Response to Education: Patient/Caregiver Verbalized Understanding of Information    Goals:  Encounter Problems       Encounter Problems (Active)       OT Goals       Patient will complete functional transfers with mod I. (Progressing)       Start:  10/19/23    Expected End:  11/02/23            Patient will complete toileting with mod I.  (Progressing)       Start:  10/19/23    Expected End:  11/02/23            Patient will complete LE dressing with mod I. (Progressing)       Start:  10/19/23    Expected End:  11/02/23

## 2023-10-19 NOTE — NURSING NOTE
Pt remained safe during the shift. Worked with PT/OT & used her back brace while ambulating. Sat up in the chair for lunch & dinner. Uses the BSC & voiding throughout the shift. Left Luis Enrique drained removed. Both Iv's saline locked.       Held 9:00 Am & 17:00 Pm Coreg due to low Bp 94/53 & HR low 60's     Pain under control & tolerable pain while sitting up

## 2023-10-19 NOTE — NURSING NOTE
2130: Pt's heart rate-50, temperature- 36.1, pt resting comfortably in bed. This RN notified Dr. Lipscomb, will anticipate new orders and continue to monitor pt.

## 2023-10-19 NOTE — PROGRESS NOTES
Physical Therapy    Physical Therapy    Physical Therapy Evaluation    Patient Name: Rosalba Salcedo  MRN: [id number]  Today's Date: 10/19/2023   Time Calculation  Start Time: 0952  Stop Time: 1020  Time Calculation (min): 28 min    Assessment/Plan   PT Assessment  PT Assessment Results: Decreased strength, Decreased range of motion, Decreased endurance, Impaired balance, Decreased mobility, Decreased safety awareness, Pain, Impaired hearing  Rehab Prognosis: Good  Evaluation/Treatment Tolerance: Patient tolerated treatment well  Medical Staff Made Aware: Yes  End of Session Communication: Bedside nurse  Assessment Comment: Pt presents today below baseline level of function and requires continued PT during hospital stay. Pt requires PT at a moderate intensity level at discharge to maximize functional mobility and safety. Will continue to assess patient's homegoing potential with follow up visits.     End of Session Patient Position: Up in chair, Alarm on  IP OR SWING BED PT PLAN  Inpatient or Swing Bed: Inpatient  PT Plan  Treatment/Interventions: Bed mobility, Transfer training, Gait training, Balance training, Neuromuscular re-education, Strengthening, Endurance training, Range of motion, Therapeutic exercise, Therapeutic activity, Home exercise program, Stair training  PT Plan: Skilled PT  PT Frequency: Daily  PT Discharge Recommendations: Moderate intensity level of continued care  PT Recommended Transfer Status: Assist x1 (FWW, LSO brace)    Subjective     Current Problem:  1. Back pain of lumbar region with sciatica        2. Spinal stenosis, lumbar region without neurogenic claudication  Surgical Pathology Exam    Surgical Pathology Exam    CANCELED: Urine Culture    CANCELED: Urine Culture      3. Spondylolisthesis, lumbar region  Surgical Pathology Exam    Surgical Pathology Exam    CANCELED: Urine Culture    CANCELED: Urine Culture        Past Medical History:   Diagnosis Date    Other bursitis of  elbow, left elbow 05/27/2020    Bursitis of left elbow    Other specified anxiety disorders 07/27/2022    Depression with anxiety    Pain in unspecified elbow 08/05/2020    Elbow pain    Personal history of other mental and behavioral disorders 04/02/2022    History of depression     Past Surgical History:   Procedure Laterality Date    CT GUIDED ABSCESS FLUID COLLECTION DRAINAGE  8/23/2022    CT GUIDED ABSCESS FLUID COLLECTION DRAINAGE 8/23/2022 [facility] [place] CLINICAL LEGACY    CT GUIDED ABSCESS FLUID COLLECTION DRAINAGE  8/23/2022    CT GUIDED ABSCESS FLUID COLLECTION DRAINAGE    OTHER SURGICAL HISTORY  08/14/2019    Wrist surgery    OTHER SURGICAL HISTORY  08/14/2019    Hysterectomy    OTHER SURGICAL HISTORY  08/14/2019    Breast reduction    OTHER SURGICAL HISTORY  08/14/2019    Cholecystectomy    OTHER SURGICAL HISTORY  04/04/2022    Vaginoplasty    OTHER SURGICAL HISTORY  04/04/2022    Bladder surgery    OTHER SURGICAL HISTORY  04/04/2022    Complete colonoscopy    OTHER SURGICAL HISTORY  04/04/2022    Esophagogastroduodenoscopy    OTHER SURGICAL HISTORY  04/04/2022    Elbow surgery    OTHER SURGICAL HISTORY  04/04/2022    Cataract surgery    OTHER SURGICAL HISTORY  05/11/2021    Breast biopsy    OTHER SURGICAL HISTORY  04/04/2022    Knee surgery    OTHER SURGICAL HISTORY  03/11/2020    Coronary artery bypass graft       General Visit Information:  General  Reason for Referral: s/p spine surgery  Referred By: Dr. Adalberto Arizmendi  Past Medical History Relevant to Rehab: POD 1 s/p L2-3 laminectomy decompressing the L2 and 3 vertebral segments.  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  Removal of hardware L3-4.  Instrumentation L4-5.  Use of interbody cage L4-5.  Posterior lateral fusion L2-3, L3-4.  Exploration of spinal fusion L3-4, L4-5 and L5-S1.  Family/Caregiver  Present: No  Co-Treatment: OT  Co-Treatment Reason: to facilitate safety and functional mobility  Prior to Session Communication: Bedside nurse  Patient Position Received: Bed, 3 rail up, Alarm on  Preferred Learning Style: verbal  General Comment: Pt agreeable to PT, nursing cleared for treatment.    Home Living:  Home Living  Type of Home: House  Lives With:  (grandaughter)  Home Adaptive Equipment: Walker rolling or standard, Cane (rollator)  Home Layout: Stairs to alternate level with rails (split level, bed upstairs, recliner and shower downstairs. Kitchen main level)  Alternate Level Stairs-Number of Steps: 5  Home Access: Stairs to enter with rails  Entrance Stairs-Number of Steps: 5  Bathroom Shower/Tub: Walk-in shower  Bathroom Equipment:  (GB, SC)  Home Living Comments: pt states she will not have assistance when she discharges home    Prior Level of Function:  Prior Function Per Pt/Caregiver Report  ADL Assistance: Independent  Homemaking Assistance: Independent  Ambulatory Assistance:  (ambualtes with walker)    Precautions:  Precautions  Hearing/Visual Limitations: [place]  Medical Precautions: Fall precautions  Post-Surgical Precautions: Spinal precautions (LSO brace at all times when up)    Vital Signs:     Objective     Pain:  Pain Assessment  Pain Assessment: 0-10  Pain Score: 7  Pain Type: Surgical pain  Pain Location: Back    Cognition:  Cognition  Overall Cognitive Status: Within Functional Limits  Orientation Level: Oriented X4    General Assessments:         Sensation  Sensation Comment: denies numbness and tingling              Static Sitting Balance  Static Sitting-Comment/Number of Minutes: good  Dynamic Sitting Balance  Dynamic Sitting-Comments: good  Static Standing Balance  Static Standing-Comment/Number of Minutes: fair  Dynamic Standing Balance  Dynamic Standing-Comments: fair    Functional Assessments:     Bed Mobility  Bed Mobility: Yes  Bed Mobility 1  Bed Mobility 1: Supine to  sitting  Level of Assistance 1: Minimum assistance  Bed Mobility Comments 1: using log roll  Transfers  Transfer: Yes  Transfer 1  Transfer From 1: Sit to  Transfer to 1: Stand  Transfer Device 1: Walker  Transfer Level of Assistance 1: Contact guard  Transfers 2  Transfer From 2: Stand to  Transfer to 2: Sit  Transfer Device 2: Walker  Transfer Level of Assistance 2: Contact guard  Trials/Comments 2: decreased safety awarenes with use of AD and approach to chair/commode with AD  Ambulation/Gait Training  Ambulation/Gait Training Performed: Yes  Ambulation/Gait Training 1  Device 1: Rolling walker  Gait Support Devices: Gait belt  Assistance 1: Contact guard  Quality of Gait 1:  (Step to gait, slow pace and decreased stability right LE. Pt walks on ball of right foot)  Comments/Distance (ft) 1: 2 feet, 5 feet      Treatment    Education provided in spinal precautions and log roll technique. Instruction given on purpose and use of LSO brace. Cues for safe hand placement with use of FWW for sit<>stand. Instruction provided in step to gait pattern with cues given for sequencing with FWW. Safety cues provided throughout for maintaining precautions and navigation with FWW.     Extremity/Trunk Assessments:        RLE   RLE : Within Functional Limits  LLE   LLE : Within Functional Limits    Outcome Measures:  [facility] Basic Mobility  Turning from your back to your side while in a flat bed without using bedrails: A little  Moving from lying on your back to sitting on the side of a flat bed without using bedrails: A little  Moving to and from bed to chair (including a wheelchair): A little  Standing up from a chair using your arms (e.g. wheelchair or bedside chair): A little  To walk in hospital room: A little  Climbing 3-5 steps with railing: A lot  Basic Mobility - Total Score: 17                            Goals:  Encounter Problems       Encounter Problems (Active)       PT Problem       Pt will perform bed mobility with  mod I.       Start:  10/19/23    Expected End:  11/02/23            Pt will complete sit <> stand and bed <> chair transfers with mod I.       Start:  10/19/23    Expected End:  11/02/23            Pt will ambulate 300 feet mod I using FWW with no significant gait deviations.         Start:  10/19/23    Expected End:  11/02/23            Pt will ascend/descend at least 5 stairs using rail in order to navigate home environment.         Start:  10/19/23    Expected End:  11/02/23                 Education Documentation  Precautions, taught by Indiana Larson, PT at 10/19/2023 11:27 AM.  Learner: Patient  Readiness: Acceptance  Method: Explanation  Response: Needs Reinforcement    Body Mechanics, taught by Indiana Larson PT at 10/19/2023 11:27 AM.  Learner: Patient  Readiness: Acceptance  Method: Explanation  Response: Needs Reinforcement    Mobility Training, taught by Indiana Larson, PT at 10/19/2023 11:27 AM.  Learner: Patient  Readiness: Acceptance  Method: Explanation  Response: Needs Reinforcement        Education Comments  No comments found.

## 2023-10-20 LAB
ANION GAP SERPL CALC-SCNC: 10 MMOL/L (ref 10–20)
BUN SERPL-MCNC: 16 MG/DL (ref 6–23)
CALCIUM SERPL-MCNC: 8.2 MG/DL (ref 8.6–10.3)
CHLORIDE SERPL-SCNC: 109 MMOL/L (ref 98–107)
CO2 SERPL-SCNC: 25 MMOL/L (ref 21–32)
CREAT SERPL-MCNC: 1.07 MG/DL (ref 0.5–1.05)
ERYTHROCYTE [DISTWIDTH] IN BLOOD BY AUTOMATED COUNT: 13.8 % (ref 11.5–14.5)
GFR SERPL CREATININE-BSD FRML MDRD: 54 ML/MIN/1.73M*2
GLUCOSE SERPL-MCNC: 94 MG/DL (ref 74–99)
HCT VFR BLD AUTO: 33.1 % (ref 36–46)
HGB BLD-MCNC: 10.5 G/DL (ref 12–16)
MCH RBC QN AUTO: 29.2 PG (ref 26–34)
MCHC RBC AUTO-ENTMCNC: 31.7 G/DL (ref 32–36)
MCV RBC AUTO: 92 FL (ref 80–100)
NRBC BLD-RTO: 0 /100 WBCS (ref 0–0)
PLATELET # BLD AUTO: 163 X10*3/UL (ref 150–450)
PMV BLD AUTO: 10.4 FL (ref 7.5–11.5)
POTASSIUM SERPL-SCNC: 3.5 MMOL/L (ref 3.5–5.3)
RBC # BLD AUTO: 3.6 X10*6/UL (ref 4–5.2)
SODIUM SERPL-SCNC: 140 MMOL/L (ref 136–145)
WBC # BLD AUTO: 7.3 X10*3/UL (ref 4.4–11.3)

## 2023-10-20 PROCEDURE — 2500000001 HC RX 250 WO HCPCS SELF ADMINISTERED DRUGS (ALT 637 FOR MEDICARE OP): Performed by: PHYSICIAN ASSISTANT

## 2023-10-20 PROCEDURE — 36415 COLL VENOUS BLD VENIPUNCTURE: CPT | Performed by: PHYSICIAN ASSISTANT

## 2023-10-20 PROCEDURE — 97110 THERAPEUTIC EXERCISES: CPT | Mod: GP,CQ

## 2023-10-20 PROCEDURE — 97116 GAIT TRAINING THERAPY: CPT | Mod: GP,CQ

## 2023-10-20 PROCEDURE — 97530 THERAPEUTIC ACTIVITIES: CPT | Mod: GO,CO

## 2023-10-20 PROCEDURE — 2500000004 HC RX 250 GENERAL PHARMACY W/ HCPCS (ALT 636 FOR OP/ED): Performed by: PHYSICIAN ASSISTANT

## 2023-10-20 PROCEDURE — 1200000002 HC GENERAL ROOM WITH TELEMETRY DAILY

## 2023-10-20 PROCEDURE — 85027 COMPLETE CBC AUTOMATED: CPT | Performed by: PHYSICIAN ASSISTANT

## 2023-10-20 PROCEDURE — 97535 SELF CARE MNGMENT TRAINING: CPT | Mod: GO,CO

## 2023-10-20 PROCEDURE — 2500000001 HC RX 250 WO HCPCS SELF ADMINISTERED DRUGS (ALT 637 FOR MEDICARE OP): Performed by: INTERNAL MEDICINE

## 2023-10-20 PROCEDURE — 80048 BASIC METABOLIC PNL TOTAL CA: CPT | Performed by: PHYSICIAN ASSISTANT

## 2023-10-20 RX ORDER — SENNOSIDES 8.6 MG/1
1 TABLET ORAL NIGHTLY PRN
Status: DISCONTINUED | OUTPATIENT
Start: 2023-10-20 | End: 2023-10-21 | Stop reason: HOSPADM

## 2023-10-20 RX ORDER — DOCUSATE SODIUM 100 MG/1
100 CAPSULE, LIQUID FILLED ORAL 2 TIMES DAILY
Status: DISCONTINUED | OUTPATIENT
Start: 2023-10-20 | End: 2023-10-21 | Stop reason: HOSPADM

## 2023-10-20 RX ADMIN — CEFAZOLIN SODIUM 2 G: 2 INJECTION, SOLUTION INTRAVENOUS at 19:45

## 2023-10-20 RX ADMIN — CEFAZOLIN SODIUM 2 G: 2 INJECTION, SOLUTION INTRAVENOUS at 04:40

## 2023-10-20 RX ADMIN — AMLODIPINE BESYLATE 5 MG: 5 TABLET ORAL at 21:32

## 2023-10-20 RX ADMIN — POLYETHYLENE GLYCOL 3350 17 G: 17 POWDER, FOR SOLUTION ORAL at 09:49

## 2023-10-20 RX ADMIN — CARVEDILOL 6.25 MG: 6.25 TABLET, FILM COATED ORAL at 16:43

## 2023-10-20 RX ADMIN — ACETAMINOPHEN 650 MG: 325 TABLET ORAL at 16:43

## 2023-10-20 RX ADMIN — OXYCODONE HYDROCHLORIDE 5 MG: 5 TABLET ORAL at 22:50

## 2023-10-20 RX ADMIN — ACETAMINOPHEN 650 MG: 325 TABLET ORAL at 10:36

## 2023-10-20 RX ADMIN — LEVOTHYROXINE SODIUM 25 MCG: 25 TABLET ORAL at 06:25

## 2023-10-20 RX ADMIN — DOCUSATE SODIUM 100 MG: 100 CAPSULE, LIQUID FILLED ORAL at 21:32

## 2023-10-20 RX ADMIN — ROSUVASTATIN CALCIUM 40 MG: 20 TABLET, FILM COATED ORAL at 21:33

## 2023-10-20 RX ADMIN — PANTOPRAZOLE SODIUM 40 MG: 40 TABLET, DELAYED RELEASE ORAL at 06:25

## 2023-10-20 RX ADMIN — CEFAZOLIN SODIUM 2 G: 2 INJECTION, SOLUTION INTRAVENOUS at 12:32

## 2023-10-20 RX ADMIN — OXYCODONE HYDROCHLORIDE 5 MG: 5 TABLET ORAL at 17:21

## 2023-10-20 RX ADMIN — Medication: at 08:00

## 2023-10-20 RX ADMIN — ACETAMINOPHEN 650 MG: 325 TABLET ORAL at 22:50

## 2023-10-20 RX ADMIN — ACETAMINOPHEN 650 MG: 325 TABLET ORAL at 04:41

## 2023-10-20 RX ADMIN — SENNOSIDES 8.6 MG: 8.6 TABLET, FILM COATED ORAL at 17:18

## 2023-10-20 ASSESSMENT — COGNITIVE AND FUNCTIONAL STATUS - GENERAL
TURNING FROM BACK TO SIDE WHILE IN FLAT BAD: A LOT
WALKING IN HOSPITAL ROOM: A LITTLE
DAILY ACTIVITIY SCORE: 21
DAILY ACTIVITIY SCORE: 21
CLIMB 3 TO 5 STEPS WITH RAILING: A LITTLE
DRESSING REGULAR UPPER BODY CLOTHING: A LITTLE
DRESSING REGULAR UPPER BODY CLOTHING: A LITTLE
STANDING UP FROM CHAIR USING ARMS: A LITTLE
MOVING TO AND FROM BED TO CHAIR: A LITTLE
STANDING UP FROM CHAIR USING ARMS: A LITTLE
WALKING IN HOSPITAL ROOM: A LITTLE
DRESSING REGULAR LOWER BODY CLOTHING: A LITTLE
HELP NEEDED FOR BATHING: A LITTLE
MOVING FROM LYING ON BACK TO SITTING ON SIDE OF FLAT BED WITH BEDRAILS: A LOT
TURNING FROM BACK TO SIDE WHILE IN FLAT BAD: A LOT
MOBILITY SCORE: 16
MOVING TO AND FROM BED TO CHAIR: A LITTLE
HELP NEEDED FOR BATHING: A LITTLE
CLIMB 3 TO 5 STEPS WITH RAILING: A LITTLE
DRESSING REGULAR LOWER BODY CLOTHING: A LITTLE
MOBILITY SCORE: 16
MOVING FROM LYING ON BACK TO SITTING ON SIDE OF FLAT BED WITH BEDRAILS: A LOT

## 2023-10-20 ASSESSMENT — PAIN SCALES - GENERAL
PAINLEVEL_OUTOF10: 6
PAINLEVEL_OUTOF10: 5 - MODERATE PAIN
PAINLEVEL_OUTOF10: 3
PAINLEVEL_OUTOF10: 6
PAINLEVEL_OUTOF10: 6

## 2023-10-20 ASSESSMENT — PAIN SCALES - PAIN ASSESSMENT IN ADVANCED DEMENTIA (PAINAD)
TOTALSCORE: 2
BREATHING: NORMAL
CONSOLABILITY: NO NEED TO CONSOLE
BODYLANGUAGE: RELAXED
FACIALEXPRESSION: FACIAL GRIMACING

## 2023-10-20 ASSESSMENT — PAIN - FUNCTIONAL ASSESSMENT
PAIN_FUNCTIONAL_ASSESSMENT: 0-10

## 2023-10-20 ASSESSMENT — PAIN SCALES - WONG BAKER
WONGBAKER_NUMERICALRESPONSE: HURTS LITTLE BIT
WONGBAKER_NUMERICALRESPONSE: HURTS LITTLE MORE

## 2023-10-20 ASSESSMENT — ACTIVITIES OF DAILY LIVING (ADL): HOME_MANAGEMENT_TIME_ENTRY: 30

## 2023-10-20 NOTE — PROGRESS NOTES
Occupational Therapy    OT Treatment    Patient Name: Rosalba Salcedo  MRN: [id number]  Today's Date: 10/20/2023  Time Calculation  Start Time: 1007  Stop Time: 1045  Time Calculation (min): 38 min         Assessment:          Plan:        Subjective     Current Problem:  1. Back pain of lumbar region with sciatica        2. Spinal stenosis, lumbar region without neurogenic claudication  Surgical Pathology Exam    Surgical Pathology Exam    CANCELED: Urine Culture    CANCELED: Urine Culture      3. Spondylolisthesis, lumbar region  Surgical Pathology Exam    Surgical Pathology Exam    CANCELED: Urine Culture    CANCELED: Urine Culture          General:     Patient Position Received: Bed, 0 rail up  General Comment:  (Patient was in bed and agreeable to working with O.T.  Patient is very talkative and required cues to focus on task at hand.)    Vital Signs:       Pain:  Pain Assessment  Pain Assessment: 0-10  Pain Score: 6  Pain Type: Surgical pain  Pain Location: Back  Objective      Activities of Daily Living:    Grooming  Grooming Comments:  (After toileting, patient walked to sink for hand washing.  Verbal and tactile cues for safe walker placement at sink)        UE Dressing  UE Dressing Comments:  (Patient had her back brace and knew how to don it.  Some assist needed.)     Toileting  Toileting Adaptive Equipment: Other (Comment) (Bedside commode frame was placed over the toilet  so patient would have arms to hold on to stand to sit/sit to stand.)  Toileting Level of Assistance: Contact guard  Where Assessed: Toilet    Functional Standing Tolerance:  Functional Standing Tolerance Comments:  (Patient stood at sink for hand washing. cues for safe walker placement.)    Bed Mobility/Transfers: Bed Mobility 1  Bed Mobility 1: Log roll  Level of Assistance 1: Moderate assistance  Bed Mobility Comments 1:  (Patient required moderate verbal cues to complete the log roll correctly.)  Transfer 1  Trials/Comments 1:   (Patient walked to the ortho gym and this LING stood by while patient practiced stairs with P.T.A.)                Therapy/Activity:               Strength:       Other Activity:       Outcome Measures:[facility] Daily Activity  Putting on and taking off regular lower body clothing: A little  Bathing (including washing, rinsing, drying): A little  Putting on and taking off regular upper body clothing: A little  Toileting, which includes using toilet, bedpan or urinal: None  Taking care of personal grooming such as brushing teeth: None  Eating Meals: None  Daily Activity - Total Score: 21  Education Documentation  Body Mechanics, taught by IVET Billingsley at 10/20/2023 12:39 PM.  Learner: Patient  Readiness: Acceptance  Method: Demonstration, Explanation  Response: Needs Reinforcement, Verbalizes Understanding    Precautions, taught by IVET Billingsley at 10/20/2023 12:39 PM.  Learner: Patient  Readiness: Acceptance  Method: Demonstration, Explanation  Response: Needs Reinforcement, Verbalizes Understanding    ADL Training, taught by IVET Billingsley at 10/20/2023 12:39 PM.  Learner: Patient  Readiness: Acceptance  Method: Demonstration, Explanation  Response: Needs Reinforcement, Verbalizes Understanding    Education Comments  No comments found.        EDUCATION:  Education  Individual(s) Educated: Patient  Education Provided: Risk and benefits of OT discussed with patient or other, Fall precautons  Patient/Caregiver Demonstrated Understanding: yes  Plan of Care Discussed and Agreed Upon: yes  Patient Response to Education: Patient/Caregiver Verbalized Understanding of Information    Goals:  Encounter Problems       Encounter Problems (Active)       OT Goals       Patient will complete functional transfers with mod I. (Progressing)       Start:  10/19/23    Expected End:  11/02/23            Patient will complete toileting with mod I. (Progressing)       Start:  10/19/23    Expected End:  11/02/23             Patient will complete LE dressing with mod I. (Progressing)       Start:  10/19/23    Expected End:  11/02/23

## 2023-10-20 NOTE — NURSING NOTE
10:39 Pt worked with PT/Ot to day. Ambulated in the halls & made it to the therapy room    Pt sat up in chair with back brace    Voiding in the bathroom through out the shift   No BM, I reached out to Rufina & she ordered scheduled Colace & PRN senokot. I gave the pt senokot at 17:18    17:21 Pt then said she was in pain. Rated her pain at a 6, I gave her a pain pill at that time    Pt resting in bed comfortably, with blankets & temperature turned up in the room

## 2023-10-20 NOTE — PROGRESS NOTES
"Rosalba Salcedo is a 78 y.o. female on day 2 of admission presenting with Back pain of lumbar region with sciatica.    Subjective   Sitting up in chair c/o back sticking to chair, O/W no complaints. Voiding, passing flatus. Tolerating a diet. Ambulating with Therapy. Plan for dc to home when appropriate.       Objective     Physical Exam  Skin:     Comments: RONIT drain with mod amount serosang drainage, drain site w no erythema       Constitutional:       Appearance: Normal appearance.   HENT:      Head: Normocephalic and atraumatic.      Nose: Nose normal.      Mouth/Throat:      Mouth: Mucous membranes are moist.      Pharynx: Oropharynx is clear.   Eyes:      Pupils: Pupils are equal, round, and reactive to light.   Cardiovascular:      Rate and Rhythm: Normal rate and regular rhythm.   Pulmonary:      Effort: Pulmonary effort is normal.      Breath sounds: Normal breath sounds.   Abdominal:      General: Bowel sounds are normal.      Palpations: Abdomen is soft.   Musculoskeletal:         General: Normal range of motion.      Cervical back: Normal range of motion and neck supple.      Comments: Back with Aquacel dressing in place small area of staining.  Sensation in bilateral lower extremities is equal and intact.  Plantar and dorsiflexion of bilateral feet present.  DP pulses palpable in bilateral feet.   Skin:     General: Skin is warm and dry.   Neurological:      General: No focal deficit present.      Mental Status: She is alert and oriented to person, place, and time.   Psychiatric:         Mood and Affect: Mood normal.     Last Recorded Vitals  Blood pressure 118/55, pulse 72, temperature 36.6 °C (97.9 °F), temperature source Temporal, resp. rate 20, height 1.626 m (5' 4\"), weight 85.7 kg (189 lb), SpO2 100 %.  Intake/Output last 3 Shifts:  I/O last 3 completed shifts:  In: 1905 (22.2 mL/kg) [I.V.:1905 (22.2 mL/kg)]  Out: 2490 (29 mL/kg) [Urine:2075 (0.7 mL/kg/hr); Drains:415]  Weight: 85.7 kg "     Relevant Results  Scheduled medications  acetaminophen, 650 mg, oral, Once  acetaminophen, 650 mg, oral, q6h  amLODIPine, 5 mg, oral, Nightly  carvedilol, 6.25 mg, oral, BID with meals  ceFAZolin, 2 g, intravenous, Once  ceFAZolin, 2 g, intravenous, q8h  celecoxib, 200 mg, oral, Once  levothyroxine, 25 mcg, oral, Daily before breakfast  oxygen, , inhalation, Continuous - 02/gases  pantoprazole, 40 mg, oral, Daily before breakfast  polyethylene glycol, 17 g, oral, Daily  rosuvastatin, 40 mg, oral, Nightly      Continuous medications  sodium chloride 0.9%, 100 mL/hr, Last Rate: Stopped (10/19/23 1300)      PRN medications  PRN medications: diazePAM, diphenhydrAMINE, naloxone, ondansetron ODT **OR** ondansetron, oxyCODONE, oxyCODONE, oxyCODONE   Results for orders placed or performed during the hospital encounter of 10/18/23 (from the past 24 hour(s))   Urinalysis with Reflex Microscopic   Result Value Ref Range    Color, Urine Straw Straw, Yellow    Appearance, Urine Clear Clear    Specific Gravity, Urine 1.008 1.005 - 1.035    pH, Urine 6.0 5.0, 5.5, 6.0, 6.5, 7.0, 7.5, 8.0    Protein, Urine NEGATIVE NEGATIVE mg/dL    Glucose, Urine NEGATIVE NEGATIVE mg/dL    Blood, Urine NEGATIVE NEGATIVE    Ketones, Urine NEGATIVE NEGATIVE mg/dL    Bilirubin, Urine NEGATIVE NEGATIVE    Urobilinogen, Urine <2.0 <2.0 mg/dL    Nitrite, Urine NEGATIVE NEGATIVE    Leukocyte Esterase, Urine NEGATIVE NEGATIVE   Urine Gray Tube   Result Value Ref Range    Extra Tube Hold for add-ons.    Basic metabolic panel   Result Value Ref Range    Glucose 94 74 - 99 mg/dL    Sodium 140 136 - 145 mmol/L    Potassium 3.5 3.5 - 5.3 mmol/L    Chloride 109 (H) 98 - 107 mmol/L    Bicarbonate 25 21 - 32 mmol/L    Anion Gap 10 10 - 20 mmol/L    Urea Nitrogen 16 6 - 23 mg/dL    Creatinine 1.07 (H) 0.50 - 1.05 mg/dL    eGFR 54 (L) >60 mL/min/1.73m*2    Calcium 8.2 (L) 8.6 - 10.3 mg/dL   CBC   Result Value Ref Range    WBC 7.3 4.4 - 11.3 x10*3/uL    nRBC 0.0  0.0 - 0.0 /100 WBCs    RBC 3.60 (L) 4.00 - 5.20 x10*6/uL    Hemoglobin 10.5 (L) 12.0 - 16.0 g/dL    Hematocrit 33.1 (L) 36.0 - 46.0 %    MCV 92 80 - 100 fL    MCH 29.2 26.0 - 34.0 pg    MCHC 31.7 (L) 32.0 - 36.0 g/dL    RDW 13.8 11.5 - 14.5 %    Platelets 163 150 - 450 x10*3/uL    MPV 10.4 7.5 - 11.5 fL                   Assessment/Plan   Principal Problem:    Back pain of lumbar region with sciatica  Active Problems:    Essential hypertension    Hyperlipidemia    Hypothyroidism    Postop day #2 of L2-3 laminectomy decompressing the L2 and 3 vertebral segments.  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  Removal of hardware L3-4.  Instrumentation L4-5.  Use of interbody cage L4-5.  Posterior lateral fusion L2-3, L3-4.  Exploration of spinal fusion L3-4, L4-5 and L5-S1.  Thermal ablation of the medial nerve branch supplying the facets bilaterally at L2-3 and L4-5.      Physical and Occupational Therapy are on consult  Weightbearing as tolerated, no bending twisting or lifting  Pain controlled with her current pain regime  Bowel regime  Continues with large amount of drainage from RONIT, will continue antibiotic until able to dc drain  Appropriate home medications for chronic medical conditions, medical team on for management of chronic medical conditions, appreciate their input  Labs reviewed  Encourage use of incentive spirometry  Patient with recent UTI will recheck UA  Plans on discharge to home with home health care when appropriate  Follow-up with Dr. Arizmendi as directed       I spent 30 minutes in the professional and overall care of this patient.      Rufina Lee PA-C

## 2023-10-20 NOTE — CARE PLAN
Problem: Skin  Goal: Decreased wound size/increased tissue granulation at next dressing change  Outcome: Progressing  Goal: Participates in plan/prevention/treatment measures  Outcome: Progressing  Goal: Prevent/manage excess moisture  Outcome: Progressing  Goal: Prevent/minimize sheer/friction injuries  Outcome: Progressing  Goal: Promote/optimize nutrition  Outcome: Progressing  Goal: Promote skin healing  Outcome: Progressing     Problem: Fall/Injury  Goal: Verbalize understanding of personal risk factors for fall in the hospital  Outcome: Progressing  Goal: Verbalize understanding of risk factor reduction measures to prevent injury from fall in the home  Outcome: Progressing  Goal: Use assistive devices by end of the shift  Outcome: Progressing  Goal: Pace activities to prevent fatigue by end of the shift  Outcome: Progressing     Problem: Pain  Goal: Takes deep breaths with improved pain control throughout the shift  Outcome: Progressing  Goal: Turns in bed with improved pain control throughout the shift  Outcome: Progressing  Goal: Walks with improved pain control throughout the shift  Outcome: Progressing   The patient's goals for the shift include      The clinical goals for the shift include pt to work with therapy

## 2023-10-20 NOTE — NURSING NOTE
2202 - Shift assessment. A&Ox4 - mildly impaired hearing and vision. /51. Scheduled Norvasc 5mg held d/t parameters. Pt denies headache and nausea. Pt complains of 5 out of 10 pain; Pt agreed to wait for the next PRN Oxy 5mg due. Both IV sites flushed well with no blood return. Pt up to bedside commode with standby assist. Voided 350 ml. Pt lying on left side. R drain emptied - 30cc. Pt denies any other needs at this time. Call light within reach.

## 2023-10-20 NOTE — CARE PLAN
The patient's goals for the shift include      The clinical goals for the shift include pt work ed with therapy & walks in the halls      Problem: Skin  Goal: Decreased wound size/increased tissue granulation at next dressing change  Outcome: Progressing  Goal: Participates in plan/prevention/treatment measures  Outcome: Progressing  Goal: Prevent/manage excess moisture  Outcome: Progressing  Goal: Prevent/minimize sheer/friction injuries  Outcome: Progressing  Goal: Promote/optimize nutrition  Outcome: Progressing  Goal: Promote skin healing  Outcome: Progressing     Problem: Fall/Injury  Goal: Verbalize understanding of personal risk factors for fall in the hospital  Outcome: Progressing  Goal: Verbalize understanding of risk factor reduction measures to prevent injury from fall in the home  Outcome: Progressing  Goal: Use assistive devices by end of the shift  Outcome: Progressing  Goal: Pace activities to prevent fatigue by end of the shift  Outcome: Progressing     Problem: Pain  Goal: Takes deep breaths with improved pain control throughout the shift  Outcome: Progressing  Goal: Turns in bed with improved pain control throughout the shift  Outcome: Progressing  Goal: Walks with improved pain control throughout the shift  Outcome: Progressing  Goal: Performs ADL's with improved pain control throughout shift  Outcome: Progressing  Goal: Participates in PT with improved pain control throughout the shift  Outcome: Progressing  Goal: Free from opioid side effects throughout the shift  Outcome: Progressing  Goal: Free from acute confusion related to pain meds throughout the shift  Outcome: Progressing     Problem: Chronic Conditions and Co-morbidities  Goal: Patient's chronic conditions and co-morbidity symptoms are monitored and maintained or improved  Outcome: Progressing

## 2023-10-20 NOTE — PROGRESS NOTES
Physical Therapy    Physical Therapy Treatment    Patient Name: Rosalba Salcedo  MRN: [id number]  Today's Date: 10/20/2023  Time Calculation  Start Time: 1016  Stop Time: 1045  Time Calculation (min): 29 min       Assessment/Plan     PT Plan  Inpatient/Swing Bed or Outpatient: Inpatient  PT Plan  Treatment/Interventions: Bed mobility, Transfer training, Gait training, Balance training, Neuromuscular re-education, Strengthening, Endurance training, Range of motion, Therapeutic exercise, Therapeutic activity, Home exercise program, Stair training  PT Plan: Skilled PT  PT Frequency: Daily  PT Discharge Recommendations: Moderate intensity level of continued care  PT Recommended Transfer Status: Assist x1 (FWW, LSO brace)      General Visit Information:   PT  Visit  PT Received On: 10/20/23  General  Reason for Referral: s/p spine surgery  Referred By: Dr. Arizmendi    Subjective   Precautions:  Precautions  Hearing/Visual Limitations: [place]  Medical Precautions: Fall precautions  Post-Surgical Precautions: Spinal precautions (LSO brace at all times when up)  Braces Applied: LSO when OOB  Vital Signs:       Objective   Pain:  Pain Assessment  Pain Assessment: 0-10  Pain Score: 6  Pain Type: Surgical pain  Pain Location: Back  Cognition:  Cognition  Overall Cognitive Status: Within Functional Limits (very talkative)  Postural Control:     Extremity/Trunk Assessments:    Activity Tolerance:     Treatments:       Bed Mobility  Bed Mobility: Yes  Bed Mobility 1  Bed Mobility 1: Supine to sitting  Level of Assistance 1: Minimum assistance, Moderate assistance (x2 to complete log roll)    Ambulation/Gait Training  Ambulation/Gait Training Performed: Yes  Ambulation/Gait Training 1  Surface 1: Level tile  Device 1: Rolling walker  Gait Support Devices: Gait belt (donned high)  Assistance 1: Minimum assistance  Quality of Gait 1: Diminished heel strike  Comments/Distance (ft) 1: 20, 70 x 2 (Patient has tendancy to frag R LE is  able to coirret with verba;lcues.  Posture is kyphotic  brace is donned)  Transfers  Transfer: Yes  Transfer 1  Transfer From 1: Sit to  Transfer to 1: Stand  Transfer Device 1: Walker  Transfer Level of Assistance 1: Contact guard  Trials/Comments 1: x2 (Patient  needing redirect to stay on topic)    Stairs  Stairs: Yes  Stairs  Rails 1: Right  Curb Step 1: No  Device 1: Single point cane  Support Devices 1: Gait belt  Assistance 1: Minimum assistance  Comment/Number of Steps 1: 3    Outcome Measures:  [facility] Basic Mobility  Turning from your back to your side while in a flat bed without using bedrails: A lot  Moving from lying on your back to sitting on the side of a flat bed without using bedrails: A lot  Moving to and from bed to chair (including a wheelchair): A little  Standing up from a chair using your arms (e.g. wheelchair or bedside chair): A little  To walk in hospital room: A little  Climbing 3-5 steps with railing: A little  Basic Mobility - Total Score: 16    Education Documentation  Precautions, taught by Mona Minor PTA at 10/20/2023 11:53 AM.  Learner: Patient  Readiness: Acceptance  Method: Explanation, Demonstration  Response: Demonstrated Understanding, Verbalizes Understanding    Body Mechanics, taught by Mona Minor PTA at 10/20/2023 11:53 AM.  Learner: Patient  Readiness: Acceptance  Method: Explanation, Demonstration  Response: Demonstrated Understanding, Verbalizes Understanding    Mobility Training, taught by Mona Minor PTA at 10/20/2023 11:53 AM.  Learner: Patient  Readiness: Acceptance  Method: Explanation, Demonstration  Response: Demonstrated Understanding, Verbalizes Understanding    Education Comments  No comments found.        OP EDUCATION:       Encounter Problems       Encounter Problems (Active)       PT Problem       Pt will perform bed mobility with mod I. (Progressing)       Start:  10/19/23    Expected End:  11/02/23            Pt will complete sit <> stand and  bed <> chair transfers with mod I. (Progressing)       Start:  10/19/23    Expected End:  11/02/23            Pt will ambulate 300 feet mod I using FWW with no significant gait deviations.   (Progressing)       Start:  10/19/23    Expected End:  11/02/23            Pt will ascend/descend at least 5 stairs using rail in order to navigate home environment.   (Progressing)       Start:  10/19/23    Expected End:  11/02/23

## 2023-10-21 ENCOUNTER — HOME HEALTH ADMISSION (OUTPATIENT)
Dept: HOME HEALTH SERVICES | Facility: HOME HEALTH | Age: 78
End: 2023-10-21
Payer: MEDICARE

## 2023-10-21 VITALS
HEIGHT: 64 IN | HEART RATE: 54 BPM | DIASTOLIC BLOOD PRESSURE: 69 MMHG | TEMPERATURE: 97.3 F | WEIGHT: 189 LBS | BODY MASS INDEX: 32.27 KG/M2 | SYSTOLIC BLOOD PRESSURE: 137 MMHG | OXYGEN SATURATION: 97 % | RESPIRATION RATE: 17 BRPM

## 2023-10-21 LAB
ANION GAP SERPL CALC-SCNC: 12 MMOL/L (ref 10–20)
BUN SERPL-MCNC: 16 MG/DL (ref 6–23)
CALCIUM SERPL-MCNC: 8.2 MG/DL (ref 8.6–10.3)
CHLORIDE SERPL-SCNC: 108 MMOL/L (ref 98–107)
CO2 SERPL-SCNC: 23 MMOL/L (ref 21–32)
CREAT SERPL-MCNC: 1.04 MG/DL (ref 0.5–1.05)
ERYTHROCYTE [DISTWIDTH] IN BLOOD BY AUTOMATED COUNT: 14 % (ref 11.5–14.5)
GFR SERPL CREATININE-BSD FRML MDRD: 55 ML/MIN/1.73M*2
GLUCOSE SERPL-MCNC: 102 MG/DL (ref 74–99)
HCT VFR BLD AUTO: 31.1 % (ref 36–46)
HGB BLD-MCNC: 9.8 G/DL (ref 12–16)
MCH RBC QN AUTO: 29 PG (ref 26–34)
MCHC RBC AUTO-ENTMCNC: 31.5 G/DL (ref 32–36)
MCV RBC AUTO: 92 FL (ref 80–100)
NRBC BLD-RTO: 0 /100 WBCS (ref 0–0)
PLATELET # BLD AUTO: 157 X10*3/UL (ref 150–450)
PMV BLD AUTO: 10.6 FL (ref 7.5–11.5)
POTASSIUM SERPL-SCNC: 3.5 MMOL/L (ref 3.5–5.3)
RBC # BLD AUTO: 3.38 X10*6/UL (ref 4–5.2)
SODIUM SERPL-SCNC: 139 MMOL/L (ref 136–145)
WBC # BLD AUTO: 7.2 X10*3/UL (ref 4.4–11.3)

## 2023-10-21 PROCEDURE — 97110 THERAPEUTIC EXERCISES: CPT | Mod: GP

## 2023-10-21 PROCEDURE — 2500000004 HC RX 250 GENERAL PHARMACY W/ HCPCS (ALT 636 FOR OP/ED): Performed by: PHYSICIAN ASSISTANT

## 2023-10-21 PROCEDURE — 2500000001 HC RX 250 WO HCPCS SELF ADMINISTERED DRUGS (ALT 637 FOR MEDICARE OP): Performed by: PHYSICIAN ASSISTANT

## 2023-10-21 PROCEDURE — 2500000001 HC RX 250 WO HCPCS SELF ADMINISTERED DRUGS (ALT 637 FOR MEDICARE OP): Performed by: INTERNAL MEDICINE

## 2023-10-21 PROCEDURE — 97116 GAIT TRAINING THERAPY: CPT | Mod: GP

## 2023-10-21 PROCEDURE — 36415 COLL VENOUS BLD VENIPUNCTURE: CPT | Performed by: PHYSICIAN ASSISTANT

## 2023-10-21 PROCEDURE — 97535 SELF CARE MNGMENT TRAINING: CPT | Mod: GO,CO

## 2023-10-21 PROCEDURE — 82565 ASSAY OF CREATININE: CPT | Performed by: PHYSICIAN ASSISTANT

## 2023-10-21 PROCEDURE — 85027 COMPLETE CBC AUTOMATED: CPT | Performed by: PHYSICIAN ASSISTANT

## 2023-10-21 PROCEDURE — 97530 THERAPEUTIC ACTIVITIES: CPT | Mod: GO,CO

## 2023-10-21 RX ORDER — DOCUSATE SODIUM 100 MG/1
100 CAPSULE, LIQUID FILLED ORAL 2 TIMES DAILY
Qty: 14 CAPSULE | Refills: 0 | Status: SHIPPED | OUTPATIENT
Start: 2023-10-21 | End: 2023-10-28

## 2023-10-21 RX ORDER — POLYETHYLENE GLYCOL 3350 17 G/17G
17 POWDER, FOR SOLUTION ORAL DAILY
Qty: 14 PACKET | Refills: 0 | Status: SHIPPED | OUTPATIENT
Start: 2023-10-22 | End: 2023-10-21

## 2023-10-21 RX ORDER — POLYETHYLENE GLYCOL 3350 17 G/17G
17 POWDER, FOR SOLUTION ORAL DAILY
Qty: 7 PACKET | Refills: 0 | Status: SHIPPED | OUTPATIENT
Start: 2023-10-22 | End: 2023-10-29

## 2023-10-21 RX ORDER — ACETAMINOPHEN 325 MG/1
650 TABLET ORAL EVERY 6 HOURS PRN
Qty: 180 TABLET | Refills: 0 | Status: SHIPPED | OUTPATIENT
Start: 2023-10-21

## 2023-10-21 RX ORDER — ACETAMINOPHEN 325 MG/1
650 TABLET ORAL EVERY 6 HOURS PRN
Qty: 30 TABLET | Refills: 0 | Status: SHIPPED | OUTPATIENT
Start: 2023-10-21 | End: 2023-10-21

## 2023-10-21 RX ORDER — OXYCODONE HYDROCHLORIDE 5 MG/1
5 TABLET ORAL EVERY 6 HOURS PRN
Qty: 28 TABLET | Refills: 0 | Status: SHIPPED | OUTPATIENT
Start: 2023-10-21 | End: 2023-10-21

## 2023-10-21 RX ORDER — DOCUSATE SODIUM 100 MG/1
100 CAPSULE, LIQUID FILLED ORAL 2 TIMES DAILY
Qty: 60 CAPSULE | Refills: 0 | Status: SHIPPED | OUTPATIENT
Start: 2023-10-21 | End: 2023-10-21

## 2023-10-21 RX ORDER — OXYCODONE HYDROCHLORIDE 5 MG/1
5 TABLET ORAL EVERY 6 HOURS PRN
Qty: 28 TABLET | Refills: 0 | Status: SHIPPED | OUTPATIENT
Start: 2023-10-21 | End: 2023-10-28

## 2023-10-21 RX ADMIN — CEFAZOLIN SODIUM 2 G: 2 INJECTION, SOLUTION INTRAVENOUS at 04:23

## 2023-10-21 RX ADMIN — Medication: at 08:00

## 2023-10-21 RX ADMIN — DOCUSATE SODIUM 100 MG: 100 CAPSULE, LIQUID FILLED ORAL at 09:24

## 2023-10-21 RX ADMIN — ACETAMINOPHEN 650 MG: 325 TABLET ORAL at 04:21

## 2023-10-21 RX ADMIN — CARVEDILOL 6.25 MG: 6.25 TABLET, FILM COATED ORAL at 09:24

## 2023-10-21 RX ADMIN — LEVOTHYROXINE SODIUM 25 MCG: 25 TABLET ORAL at 06:41

## 2023-10-21 RX ADMIN — ACETAMINOPHEN 650 MG: 325 TABLET ORAL at 09:24

## 2023-10-21 RX ADMIN — OXYCODONE HYDROCHLORIDE 5 MG: 5 TABLET ORAL at 16:35

## 2023-10-21 RX ADMIN — OXYCODONE HYDROCHLORIDE 5 MG: 5 TABLET ORAL at 09:24

## 2023-10-21 RX ADMIN — DIAZEPAM 2 MG: 2 TABLET ORAL at 00:28

## 2023-10-21 RX ADMIN — OXYCODONE HYDROCHLORIDE 5 MG: 5 TABLET ORAL at 04:29

## 2023-10-21 RX ADMIN — PANTOPRAZOLE SODIUM 40 MG: 40 TABLET, DELAYED RELEASE ORAL at 06:41

## 2023-10-21 ASSESSMENT — COGNITIVE AND FUNCTIONAL STATUS - GENERAL
DAILY ACTIVITIY SCORE: 21
DAILY ACTIVITIY SCORE: 17
STANDING UP FROM CHAIR USING ARMS: A LITTLE
DRESSING REGULAR LOWER BODY CLOTHING: A LOT
MOVING FROM LYING ON BACK TO SITTING ON SIDE OF FLAT BED WITH BEDRAILS: A LITTLE
TOILETING: A LITTLE
TURNING FROM BACK TO SIDE WHILE IN FLAT BAD: A LITTLE
WALKING IN HOSPITAL ROOM: A LITTLE
HELP NEEDED FOR BATHING: A LITTLE
CLIMB 3 TO 5 STEPS WITH RAILING: A LITTLE
DRESSING REGULAR UPPER BODY CLOTHING: A LITTLE
MOBILITY SCORE: 24
DRESSING REGULAR UPPER BODY CLOTHING: A LITTLE
DRESSING REGULAR LOWER BODY CLOTHING: A LITTLE
MOVING TO AND FROM BED TO CHAIR: A LITTLE
HELP NEEDED FOR BATHING: A LOT
MOBILITY SCORE: 18
PERSONAL GROOMING: A LITTLE

## 2023-10-21 ASSESSMENT — PAIN SCALES - GENERAL
PAINLEVEL_OUTOF10: 0 - NO PAIN
PAINLEVEL_OUTOF10: 6
PAINLEVEL_OUTOF10: 2
PAINLEVEL_OUTOF10: 0 - NO PAIN

## 2023-10-21 ASSESSMENT — ACTIVITIES OF DAILY LIVING (ADL)
HOME_MANAGEMENT_TIME_ENTRY: 25
BATHING_LEVEL_OF_ASSISTANCE: MAXIMUM ASSISTANCE

## 2023-10-21 ASSESSMENT — PAIN SCALES - WONG BAKER: WONGBAKER_NUMERICALRESPONSE: NO HURT

## 2023-10-21 ASSESSMENT — PAIN - FUNCTIONAL ASSESSMENT
PAIN_FUNCTIONAL_ASSESSMENT: 0-10
PAIN_FUNCTIONAL_ASSESSMENT: 0-10

## 2023-10-21 NOTE — PROGRESS NOTES
Physical Therapy      Physical Therapy Treatment    Patient Name: Rosalba Salcedo  MRN: [id number]  Today's Date: 10/21/2023  Time Calculation  Start Time: 1232  Stop Time: 1300  Time Calculation (min): 28 min       Assessment/Plan   PT Assessment  PT Assessment Results: Decreased mobility  Rehab Prognosis: Good  Evaluation/Treatment Tolerance: Patient tolerated treatment well  Medical Staff Made Aware: Yes  End of Session Communication: Bedside nurse    End of Session Patient Position: Up in chair, Alarm on  PT Plan  Inpatient/Swing Bed or Outpatient: Inpatient  PT Plan  Treatment/Interventions: Bed mobility, Transfer training, Gait training, Stair training, Strengthening, Therapeutic exercise  PT Plan:  (looks safe for home.)  PT Frequency: Daily  PT Discharge Recommendations: Low intensity level of continued care  Equipment Recommended upon Discharge: Wheeled walker, Straight cane  PT Recommended Transfer Status: Assist x1    Current Problem:  1. Back pain of lumbar region with sciatica  Referral to Home Health    oxyCODONE (Roxicodone) 5 mg immediate release tablet    polyethylene glycol (Glycolax, Miralax) packet    acetaminophen (Tylenol) 325 mg tablet    docusate sodium (Colace) 100 mg capsule    DISCONTINUED: acetaminophen (Tylenol) 325 mg tablet    DISCONTINUED: docusate sodium (Colace) 100 mg capsule    DISCONTINUED: oxyCODONE (Roxicodone) 5 mg immediate release tablet    DISCONTINUED: polyethylene glycol (Glycolax, Miralax) packet      2. Spinal stenosis, lumbar region without neurogenic claudication  Surgical Pathology Exam    Surgical Pathology Exam    CANCELED: Urine Culture    CANCELED: Urine Culture      3. Spondylolisthesis, lumbar region  Surgical Pathology Exam    Surgical Pathology Exam    CANCELED: Urine Culture    CANCELED: Urine Culture          General Visit Information:   PT  Visit  PT Received On: 10/21/23  Response to Previous Treatment: Patient with no complaints from previous  session.  General  Reason for Referral: L2-3 Lami.; L 2-3, 4-5 Fusion  Referred By: Adalberto Arizmendi MD  Past Medical History Relevant to Rehab: hip fx. repair, other spine surgeries  Patient Position Received: Bed, 3 rail up, Alarm on  Preferred Learning Style: verbal, visual  Subjective     Precautions:  Precautions  Hearing/Visual Limitations: [place]  Medical Precautions: Fall precautions  Post-Surgical Precautions: Spinal precautions  Braces Applied: LSO donned.    Vital Signs:     Objective     Pain:  Pain Assessment  Pain Assessment: 0-10  Pain Score: 0 - No pain  Pain Type: Surgical pain  Pain Location: Leg  Pain Orientation: Right  Pain Onset: Gradual  Clinical Progression:  (after 400 ft. walker amb..)  Effect of Pain on Daily Activities: after 400- ft. walker amb..    Cognition:  Cognition  Overall Cognitive Status: Within Functional Limits    Postural Control:  Postural Control  Postural Control: Within Functional Limits    Extremity/Trunk Assessments:                Activity Tolerance:  Activity Tolerance  Endurance: Endurance does not limit participation in activity    Treatments:  Therapeutic Exercise  Therapeutic Exercise Performed: Yes  Therapeutic Exercise Activity 1: Performed seated LAQ, heel-toe raises x10.  standing hip planes, flexion, abduction, and HSC x5 each.  also 1/4 squats x 5.        Bed Mobility  Bed Mobility: Yes  Bed Mobility 1  Bed Mobility 1: Supine to sitting  Level of Assistance 1: Independent  Bed Mobility Comments 1: with Log Rolling to L.  Ambulation/Gait Training  Ambulation/Gait Training Performed: Yes  Ambulation/Gait Training 1  Surface 1: Level tile  Device 1: Rolling walker  Gait Support Devices: Gait belt  Assistance 1: Distant supervision  Comments/Distance (ft) 1: x 100 ft., x 400 ft..  Transfers  Transfer: Yes  Transfer 1  Transfer From 1: Sit to, Stand to  Transfer Device 1: Gait belt, Walker (under her axilla.)  Transfer Level of Assistance 1: Distant  supervision  Stairs  Stairs: Yes  Stairs  Rails 1: Bilateral  Device 1: Single point cane  Support Devices 1: Gait belt  Assistance 1: Distant supervision  Comment/Number of Steps 1: 6       Outcome Measures:  [facility] Basic Mobility  Turning from your back to your side while in a flat bed without using bedrails: None  Moving from lying on your back to sitting on the side of a flat bed without using bedrails: None  Moving to and from bed to chair (including a wheelchair): None  Standing up from a chair using your arms (e.g. wheelchair or bedside chair): None  To walk in hospital room: None  Climbing 3-5 steps with railing: None  Basic Mobility - Total Score: 24  Education Documentation  Precautions, taught by Taj Toth PT at 10/21/2023  1:52 PM.  Learner: Patient  Readiness: Eager  Method: Demonstration  Response: Demonstrated Understanding    Body Mechanics, taught by Taj Toth PT at 10/21/2023  1:52 PM.  Learner: Patient  Readiness: Eager  Method: Demonstration  Response: Demonstrated Understanding    Mobility Training, taught by Taj Toth PT at 10/21/2023  1:52 PM.  Learner: Patient  Readiness: Eager  Method: Demonstration  Response: Demonstrated Understanding    Body Mechanics, taught by Taj Toth PT at 10/21/2023  1:52 PM.  Learner: Patient  Readiness: Eager  Method: Demonstration  Response: Demonstrated Understanding    Precautions, taught by Taj Toth PT at 10/21/2023  1:52 PM.  Learner: Patient  Readiness: Eager  Method: Demonstration  Response: Demonstrated Understanding    ADL Training, taught by Taj Toth PT at 10/21/2023  1:52 PM.  Learner: Patient  Readiness: Eager  Method: Demonstration  Response: Demonstrated Understanding    Education Comments  No comments found.        EDUCATION:  Education  Individual(s) Educated: Patient  Education Provided: Post-Op Precautions, Body Mechanics  Patient Response to Education: Patient/Caregiver Verbalized  Understanding of Information  Encounter Problems       Encounter Problems (Active)       PT Problem       Pt will perform bed mobility with mod I. (Progressing)       Start:  10/19/23    Expected End:  11/02/23            Pt will complete sit <> stand and bed <> chair transfers with mod I. (Progressing)       Start:  10/19/23    Expected End:  11/02/23            Pt will ambulate 300 feet mod I using FWW with no significant gait deviations.   (Progressing)       Start:  10/19/23    Expected End:  11/02/23            Pt will ascend/descend at least 5 stairs using rail in order to navigate home environment.   (Progressing)       Start:  10/19/23    Expected End:  11/02/23

## 2023-10-21 NOTE — PROGRESS NOTES
Occupational Therapy    OT Treatment    Patient Name: Rosalba Salcedo  MRN: [id number]  Today's Date: 10/21/2023  Time Calculation  Start Time: 0820  Stop Time: 0900  Time Calculation (min): 40 min       Assessment:  Medical Staff Made Aware: Yes  End of Session Communication: Bedside nurse  End of Session Patient Position: Up in chair, Alarm on  Medical Staff Made Aware: Yes  Plan:  OT Frequency: 1 time per day until discharge  OT Discharge Recommendations: Moderate intensity level of continued care, Low intensity level of continued care       Subjective   General:  OT Received On: 10/21/23  Prior to Session Communication: Bedside nurse  Patient Position Received: Up in chair, Alarm on  Vital Signs:     Pain:  Pain Assessment  Pain Assessment: 0-10  Pain Score: 0 - No pain  Pain Type: Surgical pain    Objective    Activities of Daily Living: Grooming  Grooming Level of Assistance: Setup (SBA)  Grooming Where Assessed: Standing sinkside  Grooming Comments: patient performed grooming tasks, oral care, washed face and hands with SBA after set up.    UE Bathing  UE Bathing Level of Assistance: Setup, Close supervision  UE Bathing Where Assessed: Other (Comment) (recliner)  UE Bathing Comments: with 1 verbal cue for spinal precautions    LE Bathing  LE Bathing Adaptive Equipment:  (reacher)  LE Bathing Level of Assistance: Maximum assistance  LE Bathing Where Assessed:  (recliner)  LE Bathing Comments: after education, patient performed with comp tech and max a    UE Dressing  UE Dressing Level of Assistance:  (brace: SBA with 1 verbal cue for correct fit/tightness.  over head dress: supervision)  UE Dressing Where Assessed: Recliner    LE Dressing  LE Dressing: Yes  LE Dressing Adaptive Equipment: Reacher  Adult Briefs Level of Assistance:  (doffed/donned underwear-with ae and min a. pants mgmt while in stance min a)    Toileting  Toileting Level of Assistance:  (sba)  Where Assessed: Toilet  Toileting Comments:  weight shifting  Functional Standing Tolerance:  Functional Standing Tolerance Comments: patient stood for a total of 6 mins this date with fair/fair+ balance with 2-0 ue support as needed during fucntional ambulation/transfers and ADL Tasks  Bed Mobility/Transfers:      Transfer 1  Technique 1: Sit to stand  Transfer Device 1: Walker  Transfer Level of Assistance 1: Contact guard  Transfers 2  Transfer Device 2: Walker  Transfer Level of Assistance 2: Contact guard  Trials/Comments 2: functional ambulation    Toilet Transfers  Toilet Transfer to: Standard toilet (with BSC frame over toilet)  Toilet Transfer Technique: Ambulating  Toilet Transfers: Contact guard              Outcome Measures:[facility] Daily Activity  Putting on and taking off regular lower body clothing: A lot  Bathing (including washing, rinsing, drying): A lot  Putting on and taking off regular upper body clothing: A little  Toileting, which includes using toilet, bedpan or urinal: A little  Taking care of personal grooming such as brushing teeth: A little  Eating Meals: None  Daily Activity - Total Score: 17    Education Documentation  Body Mechanics, taught by IVET Salas at 10/21/2023 12:34 PM.  Learner: Patient  Readiness: Acceptance  Method: Explanation  Response: Verbalizes Understanding, Needs Reinforcement    Precautions, taught by IVET Salas at 10/21/2023 12:34 PM.  Learner: Patient  Readiness: Acceptance  Method: Explanation  Response: Verbalizes Understanding, Needs Reinforcement    ADL Training, taught by IVET Salas at 10/21/2023 12:34 PM.  Learner: Patient  Readiness: Acceptance  Method: Explanation  Response: Verbalizes Understanding, Needs Reinforcement    Education Comments  No comments found.        IP EDUCATION:  Education  Individual(s) Educated: Patient  Education Provided: Diagnosis & Precautions, Joint protection and energy conservation, Fall precautons (spinal precautions, AE, home DME)  Diagnosis and  Precautions: spinal precautions  Patient/Caregiver Demonstrated Understanding: yes  Plan of Care Discussed and Agreed Upon: yes  Patient Response to Education: Patient/Caregiver Verbalized Understanding of Information, Patient/Caregiver Performed Return Demonstration of Exercises/Activities, Patient/Caregiver Asked Appropriate Questions    Goals:  Encounter Problems       Encounter Problems (Active)       OT Goals       Patient will complete functional transfers with mod I. (Progressing)       Start:  10/19/23    Expected End:  11/02/23            Patient will complete toileting with mod I. (Progressing)       Start:  10/19/23    Expected End:  11/02/23            Patient will complete LE dressing with mod I. (Progressing)       Start:  10/19/23    Expected End:  11/02/23

## 2023-10-21 NOTE — DISCHARGE INSTRUCTIONS
No heavy lifting or straining until patient is seen in the office  Ok to remove dressing in 48 hours and get wound wet in th shower. Do no scrub. Cover wound with dry dressing after shower. No baths, hot tubs, or pools.   Encourage ambulation at least 3 times per day.   No formal physical therapy until ordered by Dr. Arizmendi  Follow up in the office in two weeks. Appointment made prior to surgery  You must be accompanied by a responsible adult upon discharge and for 24 hours after surgery. Do no drive a motor vehicle, operate machinery, power tools or appliances, drink alcoholic beverages, or make critical decisions for 24 hours and while on narcotic pain medication.  Be aware of dizziness, which may cause a fall. Change positions slowly.   You may resume regular diet, but do so slowly.   Use your pain medication as prescribed by your physician/nurse practitioner/physician assistant. If possible, take your medication with food, and drink plenty of fluids.   Contact surgeon if you have questions, temperature of 101 degree or greater, increased bleeding, swelling, or pain, drainage from the incision or increased redness around the incision   Call [phone] with any questions or concerns

## 2023-10-21 NOTE — DISCHARGE SUMMARY
Discharge Diagnosis  Back pain of lumbar region with sciatica    Issues Requiring Follow-Up  Lumbar back surgery    Test Results Pending At Discharge  Pending Labs       Order Current Status    Surgical Pathology Exam In process    Extra Tubes Preliminary result    Urine [place] Tube Preliminary result            Hospital Course     Discharge summary  This patient Rosalba Salcedo was admitted to the hospital on 10/18/2023  after undergoing Procedure(s) (LRB):  L2-3 LAMINECTOMY, L4-5 INSTRUMENTATION, POSTERIOR INTERBODY FUSION, POSTERIOR LATERAL FUSION AND L4-5 RIGHT EXCISION OF FACET CYST, EXPLORATION SPINAL FUSION L3-4, CELL SAVER-NOTIFIED (N/A) without complications that morning.    During the postoperative period,while in hospital, patient was medically managed by the hospitalist. Please see medial notes and H&P for patients additional diagnoses.  Ortho agrees with all medical diagnoses and treatments while patient in hospital.  No significant or unexpected findings or abnormal ortho imaging were noted during the hospital stay    Hospital course      Patient tolerated surgical procedure well and there was no complications. Patient progressed adequately through their recovery during hospital stay including PT and rehabilitation.    Patient was then D/C on  to home  in stable condition.  Patient was instructed on the use of pain medications, the signs and symptoms of infection, and was given our number to call should they have any questions or concerns following discharge.    Based on my clinical judgment, the patient was provided with a 7-day prescription for opioid medication at 30 MED, indicated for treatment of acute pain in the setting of recent surgery. OARRS report was run and has demonstrated an appropriate time course.  The patient has been provided with counseling pertaining to safe use of opioid medication.         Aquacel dressing to be removed pod7 and incision left open to air  Follow up with  surgeon in 2 weeks             Pertinent Physical Exam At Time of Discharge  Physical Exam    Home Medications     Medication List      START taking these medications     oxyCODONE 5 mg immediate release tablet; Commonly known as: Roxicodone;   Take 1 tablet (5 mg) by mouth every 6 hours if needed (pain) for up to 7   days.   polyethylene glycol packet; Commonly known as: Glycolax, Miralax; Take   17 g by mouth once daily for 14 days. Do not start before October 22, 2023.; Start taking on: October 22, 2023     CHANGE how you take these medications     acetaminophen 325 mg tablet; Commonly known as: Tylenol; Take 2 tablets   (650 mg) by mouth every 6 hours if needed (pain).; What changed:   medication strength, how much to take, reasons to take this   docusate sodium 100 mg capsule; Commonly known as: Colace; Take 1   capsule (100 mg) by mouth 2 times a day.; What changed: when to take this   rosuvastatin 40 mg tablet; Commonly known as: Crestor; Take 1 tablet (40   mg) by mouth once every 24 hours.; What changed: when to take this     CONTINUE taking these medications     amLODIPine 5 mg tablet; Commonly known as: Norvasc   carvedilol 6.25 mg tablet; Commonly known as: Coreg; Take 1 tablet (6.25   mg) by mouth 2 times a day with meals.   diclofenac sodium 1 % gel gel; Commonly known as: Voltaren   fluticasone 50 mcg/actuation nasal spray; Commonly known as: Flonase   levothyroxine 25 mcg tablet; Commonly known as: Synthroid, Levoxyl   nitroglycerin 0.4 mg SL tablet; Commonly known as: Nitrostat   pantoprazole 40 mg EC tablet; Commonly known as: ProtoNix; TAKE 1 TABLET   DAILY.     STOP taking these medications     alendronate 70 mg tablet; Commonly known as: Fosamax   aspirin 81 mg EC tablet   chlorhexidine 0.12 % solution; Commonly known as: Peridex   nitrofurantoin (macrocrystal-monohydrate) 100 mg capsule; Commonly known   as: Macrobid       Outpatient Follow-Up  Future Appointments   Date Time Provider  Department Center   10/31/2023 10:45 AM Adalberto Arizmendi MD [id number] [place]   4/9/2024 10:45 AM Hoang Ho MD [id number]       Rufina Lee PALorenaC

## 2023-10-21 NOTE — PROGRESS NOTES
Rosalba Salcedo is a 78 y.o. female on day 3 of admission presenting with Back pain of lumbar region with sciatica.    Subjective   Patient states she is doing well today.  Does have intermittent pain that is well controlled with current pain medications.  Woke up overnight without right lower extremity pain which was similar to what she had before surgery but resolved after taking SCDs off.  No complaints of chest pain, shortness of breath, lightheaded or dizziness.  Participating in physical and occupational therapy.  Voiding well.  No BM yet but passing flatus and patient states she has sluggish bowels he has infrequent bowel movements throughout the week.  No new numbness tingling, or altered sensation in extremities.  No saddle anesthesia.  Plan is for discharge to home with home health care.       Objective     Physical Exam    Appearance: Normal appearance.   HENT:      Head: Normocephalic and atraumatic.      Nose: Nose normal.      Mouth/Throat:      Mouth: Mucous membranes are moist.      Pharynx: Oropharynx is clear.   Eyes:      Pupils: Pupils are equal, round, and reactive to light.   Cardiovascular:      Rate and Rhythm: Normal rate and regular rhythm.   Pulmonary:      Effort: Pulmonary effort is normal.      Breath sounds: Normal breath sounds.   Abdominal:      General: Bowel sounds are normal.      Palpations: Abdomen is soft.   Musculoskeletal:         General: Normal range of motion.      Cervical back: Normal range of motion and neck supple.      Comments: Back with Aquacel dressing in place small central area of staining.  Sensation in bilateral lower extremities is equal and intact.  Plantar and dorsiflexion of bilateral feet present.  DP pulses palpable in bilateral feet.  Deep RONIT with small amount of serosanguineous drainage present, site no erythema or drainage, previous superficial site of RONIT drain well-healed.  Skin:     General: Skin is warm and dry.   Neurological:      General: No  "focal deficit present.      Mental Status: She is alert and oriented to person, place, and time.   Psychiatric:         Mood and Affect: Mood normal.   Last Recorded Vitals  Blood pressure 116/65, pulse 55, temperature 36.2 °C (97.2 °F), temperature source Temporal, resp. rate 17, height 1.626 m (5' 4\"), weight 85.7 kg (189 lb), SpO2 100 %.  Intake/Output last 3 Shifts:  I/O last 3 completed shifts:  In: 200 (2.3 mL/kg) [IV Piggyback:200]  Out: 1211 (14.1 mL/kg) [Urine:951 (0.3 mL/kg/hr); Drains:260]  Weight: 85.7 kg     Relevant Results  Scheduled medications  acetaminophen, 650 mg, oral, Once  acetaminophen, 650 mg, oral, q6h  amLODIPine, 5 mg, oral, Nightly  carvedilol, 6.25 mg, oral, BID with meals  ceFAZolin, 2 g, intravenous, Once  ceFAZolin, 2 g, intravenous, q8h  celecoxib, 200 mg, oral, Once  docusate sodium, 100 mg, oral, BID  levothyroxine, 25 mcg, oral, Daily before breakfast  oxygen, , inhalation, Continuous - 02/gases  pantoprazole, 40 mg, oral, Daily before breakfast  polyethylene glycol, 17 g, oral, Daily  rosuvastatin, 40 mg, oral, Nightly      Continuous medications  sodium chloride 0.9%, 100 mL/hr, Last Rate: Stopped (10/19/23 1300)      PRN medications  PRN medications: diazePAM, diphenhydrAMINE, naloxone, ondansetron ODT **OR** ondansetron, oxyCODONE, oxyCODONE, oxyCODONE, sennosides                    Assessment/Plan   Principal Problem:    Back pain of lumbar region with sciatica  Active Problems:    Essential hypertension    Hyperlipidemia    Hypothyroidism    Postop day #3 of L2-3 laminectomy decompressing the L2 and 3 vertebral segments.  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  Removal of hardware L3-4.  Instrumentation L4-5.  Use of interbody cage L4-5.  Posterior lateral fusion L2-3, L3-4.  Exploration of spinal fusion L3-4, L4-5 and L5-S1.  " Thermal ablation of the medial nerve branch supplying the facets bilaterally at L2-3 and L4-5.      Physical and Occupational Therapy are on consult  Weightbearing as tolerated, no bending twisting or lifting  Pain controlled with her current pain regime  Bowel regime  drainage appears to be decreasing from RONIT, plan for removal and DC of antibiotics   appropriate home medications for chronic medical conditions, medical team on for management of chronic medical conditions, appreciate their input  Encourage use of incentive spirometry  Patient with recent UTI will UA negative  Plans on discharge to home with home health care when appropriate  Follow-up with Dr. Arizmendi as directed       I spent 30 minutes in the professional and overall care of this patient.      Rufina Lee PA-C

## 2023-10-21 NOTE — PROGRESS NOTES
"PT AM-PAC is 16. PT recommending moderate intensity therapy.. Met with patient, identified self and role. Discussed discharge planning and if patient feels safe being discharged home with [facility]. Patient has a walker. Patient reported \" My granddaughter and other family members are staying with me for a few days. Explained [facility] for PT/OT and patient in agreement. Explained that patient would receive a call and HC will see in the next few days. Plan for discharge today.  Savanna Martinez RN      "

## 2023-10-23 ENCOUNTER — DOCUMENTATION (OUTPATIENT)
Dept: PRIMARY CARE | Facility: CLINIC | Age: 78
End: 2023-10-23
Payer: MEDICARE

## 2023-10-24 LAB
LABORATORY COMMENT REPORT: NORMAL
PATH REPORT.FINAL DX SPEC: NORMAL
PATH REPORT.GROSS SPEC: NORMAL
PATH REPORT.RELEVANT HX SPEC: NORMAL
PATH REPORT.TOTAL CANCER: NORMAL

## 2023-10-25 ENCOUNTER — HOME CARE VISIT (OUTPATIENT)
Dept: HOME HEALTH SERVICES | Facility: HOME HEALTH | Age: 78
End: 2023-10-25
Payer: MEDICARE

## 2023-10-25 VITALS
OXYGEN SATURATION: 100 % | RESPIRATION RATE: 20 BRPM | TEMPERATURE: 98.2 F | HEART RATE: 78 BPM | DIASTOLIC BLOOD PRESSURE: 82 MMHG | SYSTOLIC BLOOD PRESSURE: 152 MMHG

## 2023-10-25 PROCEDURE — G0299 HHS/HOSPICE OF RN EA 15 MIN: HCPCS | Mod: HHH

## 2023-10-25 PROCEDURE — G0152 HHCP-SERV OF OT,EA 15 MIN: HCPCS | Mod: HHH

## 2023-10-25 PROCEDURE — 0023 HH SOC

## 2023-10-25 PROCEDURE — 1090000001 HH PPS REVENUE CREDIT

## 2023-10-25 PROCEDURE — 169592 NO-PAY CLAIM PROCEDURE

## 2023-10-25 PROCEDURE — 1090000002 HH PPS REVENUE DEBIT

## 2023-10-25 SDOH — ECONOMIC STABILITY: HOUSING INSECURITY: HOME SAFETY: PT HAS BACK PRECAUTIONS. PT HAS TO WEAR BACK BRACE WHEN OOB.

## 2023-10-25 ASSESSMENT — ACTIVITIES OF DAILY LIVING (ADL)
AMBULATION ASSISTANCE: 1
TOILETING: 1
AMBULATION ASSISTANCE: SUPERVISION
PREPARING MEALS: NEEDS ASSISTANCE
DRESSING_LB_CURRENT_FUNCTION: INDEPENDENT
BATHING_CURRENT_FUNCTION: INDEPENDENT
BATHING ASSESSED: 1
TOILETING: INDEPENDENT

## 2023-10-25 ASSESSMENT — ENCOUNTER SYMPTOMS
PAIN: 1
PAIN SEVERITY GOAL: 0/10
PAIN LOCATION - PAIN SEVERITY: 5/10
PERSON REPORTING PAIN: PATIENT
SUBJECTIVE PAIN PROGRESSION: GRADUALLY IMPROVING
PAIN LOCATION - PAIN FREQUENCY: INTERMITTENT
PAIN LOCATION - PAIN SEVERITY: 5/10
LOWEST PAIN SEVERITY IN PAST 24 HOURS: 2/10
PAIN LOCATION - PAIN QUALITY: PULLING
PAIN LOCATION: LEFT LEG
PAIN LOCATION: RIGHT LEG
HIGHEST PAIN SEVERITY IN PAST 24 HOURS: 7/10
PAIN LOCATION - RELIEVING FACTORS: PAIN MEDS
PAIN LOCATION: BACK
PAIN LOCATION - PAIN SEVERITY: 5/10
PAIN LOCATION - EXACERBATING FACTORS: GETTING IN AND OUT OF BED

## 2023-10-26 PROCEDURE — 1090000002 HH PPS REVENUE DEBIT

## 2023-10-26 PROCEDURE — 1090000001 HH PPS REVENUE CREDIT

## 2023-10-26 ASSESSMENT — ENCOUNTER SYMPTOMS
SUBJECTIVE PAIN PROGRESSION: UNCHANGED
PAIN: 1
MUSCLE WEAKNESS: 1
LOWEST PAIN SEVERITY IN PAST 24 HOURS: 5/10
PAIN SEVERITY GOAL: 2/10
PAIN LOCATION - PAIN FREQUENCY: INTERMITTENT
CHANGE IN APPETITE: UNCHANGED
APPETITE LEVEL: GOOD
HIGHEST PAIN SEVERITY IN PAST 24 HOURS: 10/10
LOWER EXTREMITY EDEMA: 1
PAIN LOCATION - PAIN SEVERITY: 8/10
PERSON REPORTING PAIN: PATIENT
DESCRIPTION OF MEMORY LOSS: SHORT TERM
FORGETFULNESS: 1
PAIN LOCATION: BACK
PAIN LOCATION - PAIN QUALITY: ACHING

## 2023-10-26 ASSESSMENT — ACTIVITIES OF DAILY LIVING (ADL)
AMBULATION ASSISTANCE: STAND BY ASSIST
ENTERING_EXITING_HOME: ONE PERSON
CURRENT_FUNCTION: STAND BY ASSIST

## 2023-10-27 ENCOUNTER — HOME CARE VISIT (OUTPATIENT)
Dept: HOME HEALTH SERVICES | Facility: HOME HEALTH | Age: 78
End: 2023-10-27
Payer: MEDICARE

## 2023-10-27 DIAGNOSIS — J44.1 COPD WITH ACUTE EXACERBATION (MULTI): Primary | ICD-10-CM

## 2023-10-27 PROCEDURE — 1090000002 HH PPS REVENUE DEBIT

## 2023-10-27 PROCEDURE — G0152 HHCP-SERV OF OT,EA 15 MIN: HCPCS | Mod: HHH

## 2023-10-27 PROCEDURE — G0151 HHCP-SERV OF PT,EA 15 MIN: HCPCS | Mod: HHH

## 2023-10-27 PROCEDURE — 1090000001 HH PPS REVENUE CREDIT

## 2023-10-27 SDOH — ECONOMIC STABILITY: HOUSING INSECURITY: HOME SAFETY: BACK PRECAUTIONS. PT MUST WEAR BACK BRACE WHEN OOB.

## 2023-10-27 SDOH — HEALTH STABILITY: PHYSICAL HEALTH

## 2023-10-27 ASSESSMENT — ENCOUNTER SYMPTOMS
PAIN LOCATION - PAIN SEVERITY: 4/10
PAIN: 1
SUBJECTIVE PAIN PROGRESSION: GRADUALLY IMPROVING
SUBJECTIVE PAIN PROGRESSION: UNCHANGED
PAIN LOCATION: BACK
PAIN LOCATION - PAIN FREQUENCY: INTERMITTENT
PAIN LOCATION: BACK
LOWEST PAIN SEVERITY IN PAST 24 HOURS: 5/10
PAIN: 1
HIGHEST PAIN SEVERITY IN PAST 24 HOURS: 5/10
PAIN LOCATION - RELIEVING FACTORS: PAIN PILLS
PAIN SEVERITY GOAL: 1/10
PERSON REPORTING PAIN: PATIENT

## 2023-10-27 ASSESSMENT — ACTIVITIES OF DAILY LIVING (ADL)
CURRENT_FUNCTION: SUPERVISION
LAUNDRY ASSESSED: 1
AMBULATION ASSISTANCE: 1
AMBULATION ASSISTANCE: SUPERVISION
PHYSICAL TRANSFERS ASSESSED: 1
PREPARING MEALS: INDEPENDENT
LAUNDRY: INDEPENDENT

## 2023-10-28 PROCEDURE — 1090000002 HH PPS REVENUE DEBIT

## 2023-10-28 PROCEDURE — 1090000001 HH PPS REVENUE CREDIT

## 2023-10-28 NOTE — HOME HEALTH
pt lives with celestino, who works during the day, in split level home, 1 step to enter. pt known from previous p.t. pt has ambul with wh walker or rollator for several months. pt goal for p.t. is to walk good. to see dr degroot [phone]

## 2023-10-29 PROCEDURE — 1090000001 HH PPS REVENUE CREDIT

## 2023-10-29 PROCEDURE — 1090000002 HH PPS REVENUE DEBIT

## 2023-10-30 ENCOUNTER — HOME CARE VISIT (OUTPATIENT)
Dept: HOME HEALTH SERVICES | Facility: HOME HEALTH | Age: 78
End: 2023-10-30
Payer: MEDICARE

## 2023-10-30 PROCEDURE — G0152 HHCP-SERV OF OT,EA 15 MIN: HCPCS | Mod: HHH

## 2023-10-30 PROCEDURE — 1090000002 HH PPS REVENUE DEBIT

## 2023-10-30 PROCEDURE — 1090000001 HH PPS REVENUE CREDIT

## 2023-10-30 SDOH — ECONOMIC STABILITY: HOUSING INSECURITY: HOME SAFETY: PT HAS BACK PRECAUTIONS. PT MUST WEAR BACK BRACE WHEN OOB.

## 2023-10-30 ASSESSMENT — ACTIVITIES OF DAILY LIVING (ADL)
DRESSING_UB_CURRENT_FUNCTION: INDEPENDENT
TOILETING: 1
BATHING ASSESSED: 1
AMBULATION ASSISTANCE: INDEPENDENT
PREPARING MEALS: INDEPENDENT
AMBULATION ASSISTANCE: 1
DRESSING_LB_CURRENT_FUNCTION: INDEPENDENT
BATHING_CURRENT_FUNCTION: INDEPENDENT
TOILETING: INDEPENDENT

## 2023-10-30 ASSESSMENT — ENCOUNTER SYMPTOMS
PAIN SEVERITY GOAL: 2/10
PAIN LOCATION - PAIN QUALITY: SORENESS
PAIN LOCATION - PAIN SEVERITY: 7/10
PAIN LOCATION - EXACERBATING FACTORS: SITTING TOO LONG
PAIN LOCATION: BACK
PAIN: 1
HIGHEST PAIN SEVERITY IN PAST 24 HOURS: 7/10
PERSON REPORTING PAIN: PATIENT
SUBJECTIVE PAIN PROGRESSION: GRADUALLY IMPROVING

## 2023-10-31 ENCOUNTER — ANCILLARY PROCEDURE (OUTPATIENT)
Dept: RADIOLOGY | Facility: CLINIC | Age: 78
End: 2023-10-31
Payer: MEDICARE

## 2023-10-31 ENCOUNTER — OFFICE VISIT (OUTPATIENT)
Dept: ORTHOPEDIC SURGERY | Facility: CLINIC | Age: 78
End: 2023-10-31
Payer: MEDICARE

## 2023-10-31 DIAGNOSIS — M54.30 BACK PAIN WITH SCIATICA: ICD-10-CM

## 2023-10-31 DIAGNOSIS — M54.30 BACK PAIN WITH SCIATICA: Primary | ICD-10-CM

## 2023-10-31 DIAGNOSIS — M54.9 BACK PAIN WITH SCIATICA: Primary | ICD-10-CM

## 2023-10-31 DIAGNOSIS — M54.9 BACK PAIN WITH SCIATICA: ICD-10-CM

## 2023-10-31 PROCEDURE — 3079F DIAST BP 80-89 MM HG: CPT | Performed by: ORTHOPAEDIC SURGERY

## 2023-10-31 PROCEDURE — 72100 X-RAY EXAM L-S SPINE 2/3 VWS: CPT | Mod: FY

## 2023-10-31 PROCEDURE — 1159F MED LIST DOCD IN RCRD: CPT | Performed by: ORTHOPAEDIC SURGERY

## 2023-10-31 PROCEDURE — 1036F TOBACCO NON-USER: CPT | Performed by: ORTHOPAEDIC SURGERY

## 2023-10-31 PROCEDURE — 1160F RVW MEDS BY RX/DR IN RCRD: CPT | Performed by: ORTHOPAEDIC SURGERY

## 2023-10-31 PROCEDURE — 1126F AMNT PAIN NOTED NONE PRSNT: CPT | Performed by: ORTHOPAEDIC SURGERY

## 2023-10-31 PROCEDURE — 1111F DSCHRG MED/CURRENT MED MERGE: CPT | Performed by: ORTHOPAEDIC SURGERY

## 2023-10-31 PROCEDURE — 72100 X-RAY EXAM L-S SPINE 2/3 VWS: CPT | Performed by: ORTHOPAEDIC SURGERY

## 2023-10-31 PROCEDURE — 3077F SYST BP >= 140 MM HG: CPT | Performed by: ORTHOPAEDIC SURGERY

## 2023-10-31 PROCEDURE — 99024 POSTOP FOLLOW-UP VISIT: CPT | Performed by: ORTHOPAEDIC SURGERY

## 2023-10-31 PROCEDURE — 1090000001 HH PPS REVENUE CREDIT

## 2023-10-31 PROCEDURE — 1090000002 HH PPS REVENUE DEBIT

## 2023-10-31 NOTE — PROGRESS NOTES
Rosalba Salcedo is a 78 y.o. female who presents for Post-op of the Spine (DOS-10/18/23/L2-3 Laminectomy, L4-5 Instrumentation, Posterior interbody fusion, Posterior lateral fusion & L4-5 right excision of facet cyst, Exploration spinal fusion L3-4).    HPI:  78-year-old female is here for 2-week follow-up.  She is 2 weeks out from an L2-3 laminectomy with L4-5 TLIF and excision of right facet cyst and exploration of spinal fusion at L3-4.  She denies any fever chills nausea vomiting night sweats she has no bowel or bladder complaint.    Physical exam:  Well-nourished, well kept.  No lymphangitis or lymphadenopathy in the examined extremities.  Gait normal.  Can walk on heels and toes.   Examination of the back shows tenderness in the paraspinous musculature.  There is decreased range of motion in all directions due to guarding/muscle spasms and pain at extremes.  There is good strength and no instability.  Examination of the lower extremities reveals no point tenderness, swelling, or deformity.  Range of motion of the hips, knees, and ankles are full without crepitance, instability, or exacerbation of pain.  Strength is 5/5 throughout.  Affect normal.  Alert and oriented ×3.  Coordination normal.  The incision is very well-healed.  The staples are in place.  There is no signs of dehiscence or other wound abnormalities no drainage or signs of infection.    Imaging studies:  AP lateral plain films were obtained and reviewed today.    Assessment:  78-year-old female here for 2-week follow-up.  She is 2 weeks out from an L2-3 laminectomy with an L4-5 TLIF and excision of right facet cyst with exploration of spinal fusion at L3-4.  She is doing okay today probably 50% better she has her good days and bad days.  She has a lot of other issues going on at home.  Her son was here helping take care of her but he is now home.  She has been doing plenty of walking she is wearing her brace as instructed she still getting  a little pain in the back and a little bit of numbness in the legs but overall she is doing well.  Her bone growth stimulator was denied by insurance but the rep is working with her to try to get one.    Plan:  We will see her back in 3 months for her scheduled 3-month postop.  We will get x-rays at that time.  We will remove the staples today and we will cover with Steri-Strips.

## 2023-11-01 ENCOUNTER — HOME CARE VISIT (OUTPATIENT)
Dept: HOME HEALTH SERVICES | Facility: HOME HEALTH | Age: 78
End: 2023-11-01
Payer: MEDICARE

## 2023-11-01 PROCEDURE — 1090000001 HH PPS REVENUE CREDIT

## 2023-11-01 PROCEDURE — 1090000002 HH PPS REVENUE DEBIT

## 2023-11-01 PROCEDURE — G0151 HHCP-SERV OF PT,EA 15 MIN: HCPCS | Mod: HHH

## 2023-11-01 SDOH — HEALTH STABILITY: PHYSICAL HEALTH: EXERCISE COMMENTS: NO THER EX PER SURGEON PROTOCOL

## 2023-11-01 ASSESSMENT — ENCOUNTER SYMPTOMS
HIGHEST PAIN SEVERITY IN PAST 24 HOURS: 8/10
PERSON REPORTING PAIN: PATIENT
PAIN: 1
LOWEST PAIN SEVERITY IN PAST 24 HOURS: 4/10
PAIN SEVERITY GOAL: 1/10
PAIN LOCATION: BACK
SUBJECTIVE PAIN PROGRESSION: UNCHANGED

## 2023-11-01 ASSESSMENT — ACTIVITIES OF DAILY LIVING (ADL): AMBULATION ASSISTANCE ON FLAT SURFACES: 1

## 2023-11-01 NOTE — HOME HEALTH
pt does not have furnace working, is cold feels this is increasing her general pain. has been compliant with home program of progressive ambul. saw dr mikayla richardson yesterday. staples removed from incision. next follow appt feb 2024

## 2023-11-02 ENCOUNTER — HOME CARE VISIT (OUTPATIENT)
Dept: HOME HEALTH SERVICES | Facility: HOME HEALTH | Age: 78
End: 2023-11-02
Payer: MEDICARE

## 2023-11-02 ENCOUNTER — TELEMEDICINE (OUTPATIENT)
Dept: PHARMACY | Facility: HOSPITAL | Age: 78
End: 2023-11-02
Payer: MEDICARE

## 2023-11-02 VITALS
RESPIRATION RATE: 20 BRPM | HEART RATE: 70 BPM | DIASTOLIC BLOOD PRESSURE: 56 MMHG | OXYGEN SATURATION: 100 % | TEMPERATURE: 98.3 F | SYSTOLIC BLOOD PRESSURE: 122 MMHG

## 2023-11-02 DIAGNOSIS — J44.1 COPD WITH ACUTE EXACERBATION (MULTI): ICD-10-CM

## 2023-11-02 PROCEDURE — 1090000001 HH PPS REVENUE CREDIT

## 2023-11-02 PROCEDURE — G0299 HHS/HOSPICE OF RN EA 15 MIN: HCPCS | Mod: HHH

## 2023-11-02 PROCEDURE — 1090000002 HH PPS REVENUE DEBIT

## 2023-11-02 ASSESSMENT — ENCOUNTER SYMPTOMS
CHANGE IN APPETITE: UNCHANGED
PAIN LOCATION - PAIN SEVERITY: 5/10
PAIN: 1
LOWEST PAIN SEVERITY IN PAST 24 HOURS: 2/10
PERSON REPORTING PAIN: PATIENT
SUBJECTIVE PAIN PROGRESSION: GRADUALLY IMPROVING
MUSCLE WEAKNESS: 1
HIGHEST PAIN SEVERITY IN PAST 24 HOURS: 8/10
APPETITE LEVEL: GOOD
PAIN SEVERITY GOAL: 1/10

## 2023-11-02 NOTE — PROGRESS NOTES
Pharmacy Post-Discharge Visit  Rosalba Salcedo is a 78 y.o. female was referred to Clinical Pharmacy Team to complete a post-discharge medication optimization and monitoring visit.  The patient was referred for their No chief complaint on file..    Admission Date: 10/18/2023  Discharge Date: 10/21/2023    Referring Provider: Henry Leroy,     Subjective   Allergies   Allergen Reactions    Morphine Hives, Itching, Rash and Unknown     Drowsiness with Percocet and Vicodin       [facility] - [place], [place] - [address]  [address] [address]  Phone: [phone] Fax: [phone]    [facility] - [place] - [facility] AT [address] [facility]  [place] [address]  Phone: [phone] Fax: [phone]    [facility]  [address], [address]  [place] [address]  Phone: [phone] Fax: [phone]      Social History     Social History Narrative    Not on file        Notable Medication changes following discharge:  Start:   - oxycodone 5 mg x 7 days (completed)  - Miralax 17 g   Stop:   - alendronate 70 mg   - aspirin 81 mg   Change:   - docusate 100 mg increased to twice daily     HPI  Patient expresses that she does not have COPD. She did use a nebulizer once when she was acutely ill, but has not had any trouble breathing.       Objective     There were no vitals taken for this visit.     LAB  Lab Results   Component Value Date    BILITOT 0.5 10/06/2023    CALCIUM 8.2 (L) 10/21/2023    CO2 23 10/21/2023     (H) 10/21/2023    CREATININE 1.04 10/21/2023    GLUCOSE 102 (H) 10/21/2023    ALKPHOS 62 10/06/2023    K 3.5 10/21/2023    PROT 7.6 10/06/2023     10/21/2023    AST 17 10/06/2023    ALT 13 10/06/2023    BUN 16 10/21/2023    ANIONGAP 12 10/21/2023    MG 1.86 03/08/2023    ALBUMIN 4.2 10/06/2023    GFRF CANCELED 07/28/2023    GFRMALE CANCELED 07/28/2023     Lab  Results   Component Value Date    TRIG 96 2023    CHOL 133 2023    HDL 47.9 2023     Lab Results   Component Value Date    HGBA1C 5.5 2022         Current Outpatient Medications on File Prior to Visit   Medication Sig Dispense Refill    acetaminophen (Tylenol) 325 mg tablet Take 2 tablets (650 mg) by mouth every 6 hours if needed (pain). 180 tablet 0    amLODIPine (Norvasc) 5 mg tablet Take 1 tablet (5 mg) by mouth once daily at bedtime.      carvedilol (Coreg) 6.25 mg tablet Take 1 tablet (6.25 mg) by mouth 2 times a day with meals. 180 tablet 3    diclofenac sodium (Voltaren) 1 % gel gel APPLY TO AFFECTED FOOT UP TO 3 TIMES DAILY AS NEEDED FOR PAIN      levothyroxine (Synthroid, Levoxyl) 25 mcg tablet Take 1 tablet (25 mcg) by mouth once daily in the morning. Take before meals.      pantoprazole (ProtoNix) 40 mg EC tablet TAKE 1 TABLET DAILY. 90 tablet 1    rosuvastatin (Crestor) 40 mg tablet Take 1 tablet (40 mg) by mouth once every 24 hours. (Patient taking differently: Take 1 tablet (40 mg) by mouth once daily at bedtime.) 90 tablet 3    [] docusate sodium (Colace) 100 mg capsule Take 1 capsule (100 mg) by mouth 2 times a day for 7 days. 14 capsule 0    fluticasone (Flonase) 50 mcg/actuation nasal spray Administer 1 spray into each nostril once daily as needed for rhinitis or allergies.      nitroglycerin (Nitrostat) 0.4 mg SL tablet Place 1 tablet (0.4 mg) under the tongue every 5 minutes if needed for chest pain.      [] oxyCODONE (Roxicodone) 5 mg immediate release tablet Take 1 tablet (5 mg) by mouth every 6 hours if needed (pain) for up to 7 days. 28 tablet 0    [] polyethylene glycol (Glycolax, Miralax) packet Take 17 g by mouth once daily for 7 days. Do not start before 2023. 7 packet 0     No current facility-administered medications on file prior to visit.        HISTORICAL PHARMACOTHERAPY  -none    DRUG INTERATIONS  - no notable  interactions    Assessment/Plan   Problem List Items Addressed This Visit       COPD with acute exacerbation (CMS/[facility])     Patient states that she does not have COPD. She does not use any short acting or maintenance inhalers and requests that COPD be removed from her active problem list.     Pharmacy Follow Up: as requested per PCP or patient.             Continue all meds under the continuation of care with the referring provider and clinical pharmacy team.    Sonali Juarez PharmD     Verbal consent to manage patient's drug therapy was obtained from the patient and/or an individual authorized to act on behalf of a patient. They were informed they may decline to participate or withdraw from participation in pharmacy services at any time.

## 2023-11-02 NOTE — ASSESSMENT & PLAN NOTE
Patient states that she does not have COPD. She does not use any short acting or maintenance inhalers and requests that COPD be removed from her active problem list.     Pharmacy Follow Up: as requested per PCP or patient.

## 2023-11-03 ENCOUNTER — HOME CARE VISIT (OUTPATIENT)
Dept: HOME HEALTH SERVICES | Facility: HOME HEALTH | Age: 78
End: 2023-11-03
Payer: MEDICARE

## 2023-11-03 PROCEDURE — 1090000001 HH PPS REVENUE CREDIT

## 2023-11-03 PROCEDURE — 1090000002 HH PPS REVENUE DEBIT

## 2023-11-03 PROCEDURE — G0151 HHCP-SERV OF PT,EA 15 MIN: HCPCS | Mod: HHH

## 2023-11-03 ASSESSMENT — ENCOUNTER SYMPTOMS
PERSON REPORTING PAIN: PATIENT
PAIN: 1
HIGHEST PAIN SEVERITY IN PAST 24 HOURS: 6/10
PAIN LOCATION: BACK
LOWEST PAIN SEVERITY IN PAST 24 HOURS: 1/10
PAIN SEVERITY GOAL: 1/10
SUBJECTIVE PAIN PROGRESSION: GRADUALLY IMPROVING

## 2023-11-03 ASSESSMENT — ACTIVITIES OF DAILY LIVING (ADL)
AMBULATION ASSISTANCE ON UNEVEN SURFACES: 1
AMBULATION ASSISTANCE ON FLAT SURFACES: 1

## 2023-11-04 PROCEDURE — 1090000002 HH PPS REVENUE DEBIT

## 2023-11-04 PROCEDURE — 1090000001 HH PPS REVENUE CREDIT

## 2023-11-05 PROCEDURE — 1090000001 HH PPS REVENUE CREDIT

## 2023-11-05 PROCEDURE — 1090000002 HH PPS REVENUE DEBIT

## 2023-11-06 ENCOUNTER — TELEPHONE (OUTPATIENT)
Dept: PRIMARY CARE | Facility: CLINIC | Age: 78
End: 2023-11-06
Payer: MEDICARE

## 2023-11-06 PROCEDURE — 1090000001 HH PPS REVENUE CREDIT

## 2023-11-06 PROCEDURE — 1090000002 HH PPS REVENUE DEBIT

## 2023-11-06 NOTE — TELEPHONE ENCOUNTER
Pt is about 2 weeks post op-   She is wondering can she go back to taking her Vitamins and Baby Aspirin now

## 2023-11-07 ENCOUNTER — HOME CARE VISIT (OUTPATIENT)
Dept: HOME HEALTH SERVICES | Facility: HOME HEALTH | Age: 78
End: 2023-11-07
Payer: MEDICARE

## 2023-11-07 PROCEDURE — 1090000002 HH PPS REVENUE DEBIT

## 2023-11-07 PROCEDURE — G0151 HHCP-SERV OF PT,EA 15 MIN: HCPCS | Mod: HHH

## 2023-11-07 PROCEDURE — 1090000001 HH PPS REVENUE CREDIT

## 2023-11-07 SDOH — HEALTH STABILITY: PHYSICAL HEALTH: EXERCISE COMMENTS: REPORTS COMPLIANCE WITH PROGRESSIVE AMBUL INCLUDING OUTDOORS WITH FAM SUPERV

## 2023-11-07 ASSESSMENT — ENCOUNTER SYMPTOMS
PERSON REPORTING PAIN: PATIENT
PAIN LOCATION: BACK
LOWEST PAIN SEVERITY IN PAST 24 HOURS: 1/10
HIGHEST PAIN SEVERITY IN PAST 24 HOURS: 5/10
PAIN SEVERITY GOAL: 1/10
SUBJECTIVE PAIN PROGRESSION: GRADUALLY IMPROVING
PAIN: 1

## 2023-11-07 ASSESSMENT — ACTIVITIES OF DAILY LIVING (ADL)
AMBULATION ASSISTANCE ON FLAT SURFACES: 1
AMBULATION ASSISTANCE ON UNEVEN SURFACES: 1

## 2023-11-08 PROCEDURE — 1090000002 HH PPS REVENUE DEBIT

## 2023-11-08 PROCEDURE — 1090000001 HH PPS REVENUE CREDIT

## 2023-11-09 PROCEDURE — 1090000002 HH PPS REVENUE DEBIT

## 2023-11-09 PROCEDURE — 1090000001 HH PPS REVENUE CREDIT

## 2023-11-09 NOTE — TELEPHONE ENCOUNTER
Pt is requesting a refill on famotidine 20mg tablet  It looks like this medication was discontinued on 10/10/2023   Somehow the pt was taking this same medication with an expiration date of 2022 on the bottle   She is confused and wants to continue taking this medication  Please advise, thank you!

## 2023-11-10 ENCOUNTER — HOME CARE VISIT (OUTPATIENT)
Dept: HOME HEALTH SERVICES | Facility: HOME HEALTH | Age: 78
End: 2023-11-10
Payer: MEDICARE

## 2023-11-10 DIAGNOSIS — K21.9 GASTROESOPHAGEAL REFLUX DISEASE, UNSPECIFIED WHETHER ESOPHAGITIS PRESENT: Primary | ICD-10-CM

## 2023-11-10 PROCEDURE — 1090000001 HH PPS REVENUE CREDIT

## 2023-11-10 PROCEDURE — G0180 MD CERTIFICATION HHA PATIENT: HCPCS | Performed by: ORTHOPAEDIC SURGERY

## 2023-11-10 PROCEDURE — 1090000002 HH PPS REVENUE DEBIT

## 2023-11-10 PROCEDURE — G0151 HHCP-SERV OF PT,EA 15 MIN: HCPCS | Mod: HHH

## 2023-11-10 RX ORDER — FAMOTIDINE 20 MG/1
20 TABLET, FILM COATED ORAL 2 TIMES DAILY
Qty: 60 TABLET | Refills: 5 | Status: SHIPPED | OUTPATIENT
Start: 2023-11-10 | End: 2024-05-28 | Stop reason: SDUPTHER

## 2023-11-10 ASSESSMENT — ENCOUNTER SYMPTOMS
PERSON REPORTING PAIN: PATIENT
SUBJECTIVE PAIN PROGRESSION: UNCHANGED
PAIN SEVERITY GOAL: 1/10
HIGHEST PAIN SEVERITY IN PAST 24 HOURS: 6/10
PAIN LOCATION: BACK
PAIN LOCATION: RIGHT BUTTOCK
PAIN LOCATION: LEFT BUTTOCK
LOWEST PAIN SEVERITY IN PAST 24 HOURS: 2/10
PAIN: 1

## 2023-11-10 ASSESSMENT — ACTIVITIES OF DAILY LIVING (ADL)
AMBULATION ASSISTANCE ON UNEVEN SURFACES: 1
OASIS_M1830: 03

## 2023-11-10 NOTE — HOME HEALTH
reports compliance with regular indoor walking. has not had fam member available to superv outdoor ambul. reports frustration with general body pain. has low back, buttock and ant thigh pain from surg

## 2023-11-11 ENCOUNTER — HOME CARE VISIT (OUTPATIENT)
Dept: HOME HEALTH SERVICES | Facility: HOME HEALTH | Age: 78
End: 2023-11-11
Payer: MEDICARE

## 2023-11-11 VITALS
OXYGEN SATURATION: 98 % | DIASTOLIC BLOOD PRESSURE: 50 MMHG | RESPIRATION RATE: 16 BRPM | HEART RATE: 64 BPM | SYSTOLIC BLOOD PRESSURE: 110 MMHG | TEMPERATURE: 98.8 F

## 2023-11-11 PROCEDURE — 1090000001 HH PPS REVENUE CREDIT

## 2023-11-11 PROCEDURE — G0299 HHS/HOSPICE OF RN EA 15 MIN: HCPCS | Mod: HHH

## 2023-11-11 PROCEDURE — 1090000002 HH PPS REVENUE DEBIT

## 2023-11-11 ASSESSMENT — ENCOUNTER SYMPTOMS
PAIN: 1
PAIN LOCATION: BACK
CHANGE IN APPETITE: UNCHANGED
LOWER EXTREMITY EDEMA: 1
PAIN LOCATION - PAIN QUALITY: ACHING
MUSCLE WEAKNESS: 1
PAIN LOCATION - PAIN SEVERITY: 4/10
APPETITE LEVEL: GOOD
HIGHEST PAIN SEVERITY IN PAST 24 HOURS: 8/10
SUBJECTIVE PAIN PROGRESSION: GRADUALLY IMPROVING
PERSON REPORTING PAIN: PATIENT
PAIN LOCATION - PAIN FREQUENCY: CONSTANT
PAIN SEVERITY GOAL: 0/10
LOWEST PAIN SEVERITY IN PAST 24 HOURS: 0/10

## 2023-11-12 PROCEDURE — 1090000001 HH PPS REVENUE CREDIT

## 2023-11-12 PROCEDURE — 1090000002 HH PPS REVENUE DEBIT

## 2023-11-13 PROCEDURE — 1090000001 HH PPS REVENUE CREDIT

## 2023-11-13 PROCEDURE — 1090000002 HH PPS REVENUE DEBIT

## 2023-11-14 ENCOUNTER — HOME CARE VISIT (OUTPATIENT)
Dept: HOME HEALTH SERVICES | Facility: HOME HEALTH | Age: 78
End: 2023-11-14
Payer: MEDICARE

## 2023-11-14 PROCEDURE — 1090000001 HH PPS REVENUE CREDIT

## 2023-11-14 PROCEDURE — G0151 HHCP-SERV OF PT,EA 15 MIN: HCPCS | Mod: HHH

## 2023-11-14 PROCEDURE — 1090000002 HH PPS REVENUE DEBIT

## 2023-11-14 ASSESSMENT — ENCOUNTER SYMPTOMS
SUBJECTIVE PAIN PROGRESSION: UNCHANGED
PAIN SEVERITY GOAL: 1/10
PAIN: 1
PAIN LOCATION: BACK
LOWEST PAIN SEVERITY IN PAST 24 HOURS: 5/10
HIGHEST PAIN SEVERITY IN PAST 24 HOURS: 9/10

## 2023-11-14 ASSESSMENT — ACTIVITIES OF DAILY LIVING (ADL)
AMBULATION ASSISTANCE ON UNEVEN SURFACES: 1
AMBULATION ASSISTANCE ON FLAT SURFACES: 1
OASIS_M1830: 00
HOME_HEALTH_OASIS: 00

## 2023-11-15 ENCOUNTER — TELEPHONE (OUTPATIENT)
Dept: ORTHOPEDIC SURGERY | Facility: CLINIC | Age: 78
End: 2023-11-15

## 2023-11-15 NOTE — TELEPHONE ENCOUNTER
Pt called if can start aspirin per M.A. I said yes.  Pt wanted to know if can bend, I stated no continuous bending per m.a. remember to wear brace.  Pt states does.  Pt also states got bone stimulator. Pt also states keeps getting calls for p t but thought is only just suppose to walk.  I said correct can ignore p t calls.

## 2023-11-17 ENCOUNTER — APPOINTMENT (OUTPATIENT)
Dept: ORTHOPEDIC SURGERY | Facility: CLINIC | Age: 78
End: 2023-11-17
Payer: MEDICARE

## 2023-11-22 ENCOUNTER — LAB (OUTPATIENT)
Dept: LAB | Facility: LAB | Age: 78
End: 2023-11-22
Payer: MEDICARE

## 2023-11-22 DIAGNOSIS — I51.9 HEART DISEASE, UNSPECIFIED: ICD-10-CM

## 2023-11-22 DIAGNOSIS — E03.9 HYPOTHYROIDISM, UNSPECIFIED: ICD-10-CM

## 2023-11-22 DIAGNOSIS — E55.9 VITAMIN D DEFICIENCY, UNSPECIFIED: ICD-10-CM

## 2023-11-22 DIAGNOSIS — D64.9 ANEMIA, UNSPECIFIED: Primary | ICD-10-CM

## 2023-11-22 DIAGNOSIS — E11.9 TYPE 2 DIABETES MELLITUS WITHOUT COMPLICATIONS (MULTI): ICD-10-CM

## 2023-11-22 DIAGNOSIS — N18.9 CHRONIC KIDNEY DISEASE, UNSPECIFIED: ICD-10-CM

## 2023-11-22 DIAGNOSIS — E78.5 HYPERLIPIDEMIA, UNSPECIFIED: ICD-10-CM

## 2023-11-22 LAB
ALBUMIN SERPL BCP-MCNC: 4 G/DL (ref 3.4–5)
ALP SERPL-CCNC: 85 U/L (ref 33–136)
ALT SERPL W P-5'-P-CCNC: 11 U/L (ref 7–45)
ANION GAP SERPL CALC-SCNC: 14 MMOL/L (ref 10–20)
APPEARANCE UR: ABNORMAL
AST SERPL W P-5'-P-CCNC: 17 U/L (ref 9–39)
BASOPHILS # BLD AUTO: 0.06 X10*3/UL (ref 0–0.1)
BASOPHILS NFR BLD AUTO: 0.7 %
BILIRUB SERPL-MCNC: 0.6 MG/DL (ref 0–1.2)
BILIRUB UR STRIP.AUTO-MCNC: NEGATIVE MG/DL
BUN SERPL-MCNC: 18 MG/DL (ref 6–23)
CALCIUM SERPL-MCNC: 8.9 MG/DL (ref 8.6–10.3)
CHLORIDE SERPL-SCNC: 104 MMOL/L (ref 98–107)
CO2 SERPL-SCNC: 26 MMOL/L (ref 21–32)
COLOR UR: YELLOW
CREAT SERPL-MCNC: 1.15 MG/DL (ref 0.5–1.05)
CREAT UR-MCNC: 172 MG/DL (ref 20–320)
EOSINOPHIL # BLD AUTO: 0.15 X10*3/UL (ref 0–0.4)
EOSINOPHIL NFR BLD AUTO: 1.8 %
ERYTHROCYTE [DISTWIDTH] IN BLOOD BY AUTOMATED COUNT: 13.2 % (ref 11.5–14.5)
GFR SERPL CREATININE-BSD FRML MDRD: 49 ML/MIN/1.73M*2
GLUCOSE SERPL-MCNC: 92 MG/DL (ref 74–99)
GLUCOSE UR STRIP.AUTO-MCNC: NEGATIVE MG/DL
HCT VFR BLD AUTO: 37.6 % (ref 36–46)
HGB BLD-MCNC: 11.9 G/DL (ref 12–16)
HYALINE CASTS #/AREA URNS AUTO: ABNORMAL /LPF
IMM GRANULOCYTES # BLD AUTO: 0.03 X10*3/UL (ref 0–0.5)
IMM GRANULOCYTES NFR BLD AUTO: 0.4 % (ref 0–0.9)
IRON SATN MFR SERPL: 17 % (ref 25–45)
IRON SERPL-MCNC: 56 UG/DL (ref 35–150)
KETONES UR STRIP.AUTO-MCNC: NEGATIVE MG/DL
LEUKOCYTE ESTERASE UR QL STRIP.AUTO: NEGATIVE
LYMPHOCYTES # BLD AUTO: 1 X10*3/UL (ref 0.8–3)
LYMPHOCYTES NFR BLD AUTO: 12.3 %
MCH RBC QN AUTO: 28.7 PG (ref 26–34)
MCHC RBC AUTO-ENTMCNC: 31.6 G/DL (ref 32–36)
MCV RBC AUTO: 91 FL (ref 80–100)
MICROALBUMIN UR-MCNC: 14.9 MG/L
MICROALBUMIN/CREAT UR: 8.7 UG/MG CREAT
MONOCYTES # BLD AUTO: 0.83 X10*3/UL (ref 0.05–0.8)
MONOCYTES NFR BLD AUTO: 10.2 %
MUCOUS THREADS #/AREA URNS AUTO: ABNORMAL /LPF
NEUTROPHILS # BLD AUTO: 6.07 X10*3/UL (ref 1.6–5.5)
NEUTROPHILS NFR BLD AUTO: 74.6 %
NITRITE UR QL STRIP.AUTO: NEGATIVE
NRBC BLD-RTO: 0 /100 WBCS (ref 0–0)
PH UR STRIP.AUTO: 6 [PH]
PLATELET # BLD AUTO: 248 X10*3/UL (ref 150–450)
POTASSIUM SERPL-SCNC: 3.9 MMOL/L (ref 3.5–5.3)
PROT SERPL-MCNC: 7.7 G/DL (ref 6.4–8.2)
PROT UR STRIP.AUTO-MCNC: ABNORMAL MG/DL
RBC # BLD AUTO: 4.14 X10*6/UL (ref 4–5.2)
RBC # UR STRIP.AUTO: NEGATIVE /UL
RBC #/AREA URNS AUTO: ABNORMAL /HPF
SODIUM SERPL-SCNC: 140 MMOL/L (ref 136–145)
SP GR UR STRIP.AUTO: 1.02
SQUAMOUS #/AREA URNS AUTO: ABNORMAL /HPF
TIBC SERPL-MCNC: 337 UG/DL (ref 240–445)
UIBC SERPL-MCNC: 281 UG/DL (ref 110–370)
UROBILINOGEN UR STRIP.AUTO-MCNC: 2 MG/DL
WBC # BLD AUTO: 8.1 X10*3/UL (ref 4.4–11.3)
WBC #/AREA URNS AUTO: ABNORMAL /HPF

## 2023-11-22 PROCEDURE — 85025 COMPLETE CBC W/AUTO DIFF WBC: CPT

## 2023-11-22 PROCEDURE — 82043 UR ALBUMIN QUANTITATIVE: CPT

## 2023-11-22 PROCEDURE — 81001 URINALYSIS AUTO W/SCOPE: CPT

## 2023-11-22 PROCEDURE — 82570 ASSAY OF URINE CREATININE: CPT

## 2023-11-22 PROCEDURE — 36415 COLL VENOUS BLD VENIPUNCTURE: CPT

## 2023-11-22 PROCEDURE — 83550 IRON BINDING TEST: CPT

## 2023-11-22 PROCEDURE — 83540 ASSAY OF IRON: CPT

## 2023-11-22 PROCEDURE — 80053 COMPREHEN METABOLIC PANEL: CPT

## 2023-11-27 ENCOUNTER — TELEPHONE (OUTPATIENT)
Dept: ORTHOPEDIC SURGERY | Facility: CLINIC | Age: 78
End: 2023-11-27
Payer: MEDICARE

## 2023-11-27 NOTE — TELEPHONE ENCOUNTER
Pt lvm stating has ? Re: problemt she is having want you to ask Dr Arizmendi.  I lvm got your msg give will be happy to help.

## 2023-11-29 ENCOUNTER — TELEPHONE (OUTPATIENT)
Dept: ORTHOPEDIC SURGERY | Facility: CLINIC | Age: 78
End: 2023-11-29
Payer: MEDICARE

## 2023-11-29 NOTE — TELEPHONE ENCOUNTER
Pt called has concerns with pain in toes believes is back related.  Is there anything she can do for this?

## 2023-12-04 ENCOUNTER — TELEMEDICINE (OUTPATIENT)
Dept: PRIMARY CARE | Facility: CLINIC | Age: 78
End: 2023-12-04
Payer: MEDICARE

## 2023-12-04 DIAGNOSIS — J40 SINOBRONCHITIS: Primary | ICD-10-CM

## 2023-12-04 DIAGNOSIS — J32.9 SINOBRONCHITIS: Primary | ICD-10-CM

## 2023-12-04 PROCEDURE — 99213 OFFICE O/P EST LOW 20 MIN: CPT | Performed by: FAMILY MEDICINE

## 2023-12-04 RX ORDER — AMOXICILLIN 875 MG/1
875 TABLET, FILM COATED ORAL 2 TIMES DAILY
Qty: 14 TABLET | Refills: 0 | Status: SHIPPED | OUTPATIENT
Start: 2023-12-04 | End: 2023-12-11

## 2023-12-04 RX ORDER — BENZONATATE 100 MG/1
100 CAPSULE ORAL 3 TIMES DAILY PRN
Qty: 21 CAPSULE | Refills: 0 | Status: SHIPPED | OUTPATIENT
Start: 2023-12-04 | End: 2023-12-11

## 2023-12-04 ASSESSMENT — ENCOUNTER SYMPTOMS
DYSURIA: 0
WHEEZING: 0
DIARRHEA: 0
VOMITING: 0
SORE THROAT: 1
RHINORRHEA: 0
SWOLLEN GLANDS: 1
SINUS PAIN: 1
NAUSEA: 0
COUGH: 1
HEADACHES: 1

## 2023-12-05 NOTE — ASSESSMENT & PLAN NOTE
Lets start antibiotic therapy for treatment of sinobronchitis and will send in cough pills as well push fluids take vitamin C chicken or soup and if fails to improve over the next 72 hours please call back or to office for recheck  Rest and drink plenty of water/fluid  Please use a humidifier or   use warm  mist  in a   hot shower ...repeat   3-4 times a day for approximatelly .... 10 minutes at a time..... this lessens congestion....  Use salt water sprays..... as directed... saline sprays  Apply a warm moist wash...cloth to  your face  3-4 times a day  Avoid tobacco smoke and other environmental irritants

## 2023-12-05 NOTE — PATIENT INSTRUCTIONS
Discussed medication side effects.  The  risk benefits and treatment options  discussed with patient.       Please schedule follow-up appointment based upon your improvement/failure to improve/chronic medical conditions and physician recommendations during office appointment at the .       Patient advised to go to er if symptoms worsen or to call answering service, or to return to office for additional evaluation    This note was partially  generated using Dragon voice recognition and there may be incorrect words, wording, spelling, or pronunciation errors that were not corrected prior to committing the note to the medical record.

## 2023-12-05 NOTE — PROGRESS NOTES
Subjective   Patient ID: Rosalba Salcedo is a 78 y.o. female who presents for No chief complaint on file..  This visit was completed via virtual  visit...due to the restrictions of patients schedule. All issues as below were discussed and addressed, but physical examination was limited to visual inspection only and was performed.. IN THIS restricted fashion. iF It was felt that the patient should be evaluated in clinic then  .they were directed there. The patient verbally consented to visit.    URI   This is a new problem. The current episode started yesterday. The problem has been gradually worsening. There has been no fever. The fever has been present for 1 to 2 days. Associated symptoms include chest pain, congestion, coughing, headaches, sinus pain, a sore throat and swollen glands. Pertinent negatives include no diarrhea, dysuria, nausea, rhinorrhea, vomiting or wheezing.       Review of Systems   HENT:  Positive for congestion, sinus pain and sore throat. Negative for rhinorrhea.    Respiratory:  Positive for cough. Negative for wheezing.    Cardiovascular:  Positive for chest pain.   Gastrointestinal:  Negative for diarrhea, nausea and vomiting.   Genitourinary:  Negative for dysuria.   Neurological:  Positive for headaches.       Objective   Physical Exam  Physical examination the visual auditory observations  Vital signs none provided by patient  General no acute distress alert and oriented  Head and neck no obvious mass or deformity appreciated no obvious xanthelasma  Lungs no obvious respiratory distress. No audible wheezes noted  Abdomen..Obesity appreciated  Extremities no visual  abnormalties appreciated noted  Neuro. No visually apparent deficits  Psychiatric. Well with normal mood   Labs        Assessment/Plan   Problem List Items Addressed This Visit       Sinobronchitis - Primary     Lets start antibiotic therapy for treatment of sinobronchitis and will send in cough pills as well push fluids  take vitamin C chicken or soup and if fails to improve over the next 72 hours please call back or to office for recheck  Rest and drink plenty of water/fluid  Please use a humidifier or   use warm  mist  in a   hot shower ...repeat   3-4 times a day for approximatelly .... 10 minutes at a time..... this lessens congestion....  Use salt water sprays..... as directed... saline sprays  Apply a warm moist wash...cloth to  your face  3-4 times a day  Avoid tobacco smoke and other environmental irritants          Relevant Medications    amoxicillin (Amoxil) 875 mg tablet    dextromethorphan-guaifenesin (Mucinex DM)  mg 12 hr tablet    benzonatate (Tessalon) 100 mg capsule

## 2023-12-07 ENCOUNTER — TELEPHONE (OUTPATIENT)
Dept: PRIMARY CARE | Facility: CLINIC | Age: 78
End: 2023-12-07
Payer: MEDICARE

## 2023-12-07 NOTE — TELEPHONE ENCOUNTER
Pt called again today stating she is really sick.   She said she may need other meds.   What do you want her to do at this point?   Another Virtual? ER? Or Please advise   (Printing for you to see sooner)   Thanks

## 2023-12-08 ENCOUNTER — TELEPHONE (OUTPATIENT)
Dept: CARDIOLOGY | Facility: CLINIC | Age: 78
End: 2023-12-08
Payer: MEDICARE

## 2023-12-08 ENCOUNTER — HOSPITAL ENCOUNTER (EMERGENCY)
Age: 78
Discharge: HOME | End: 2023-12-08
Attending: EMERGENCY MEDICINE
Payer: MEDICARE

## 2023-12-08 ENCOUNTER — APPOINTMENT (OUTPATIENT)
Dept: GENERAL RADIOLOGY | Age: 78
End: 2023-12-08
Payer: MEDICARE

## 2023-12-08 VITALS
SYSTOLIC BLOOD PRESSURE: 119 MMHG | DIASTOLIC BLOOD PRESSURE: 64 MMHG | OXYGEN SATURATION: 98 % | RESPIRATION RATE: 16 BRPM | TEMPERATURE: 97.5 F | WEIGHT: 189 LBS | HEART RATE: 64 BPM | BODY MASS INDEX: 31.49 KG/M2 | HEIGHT: 65 IN

## 2023-12-08 DIAGNOSIS — J40 BRONCHITIS: Primary | ICD-10-CM

## 2023-12-08 PROCEDURE — 99283 EMERGENCY DEPT VISIT LOW MDM: CPT

## 2023-12-08 PROCEDURE — 71046 X-RAY EXAM CHEST 2 VIEWS: CPT

## 2023-12-08 PROCEDURE — 6370000000 HC RX 637 (ALT 250 FOR IP): Performed by: EMERGENCY MEDICINE

## 2023-12-08 RX ORDER — PREDNISONE 50 MG/1
50 TABLET ORAL DAILY
Qty: 5 TABLET | Refills: 0 | Status: SHIPPED | OUTPATIENT
Start: 2023-12-08 | End: 2023-12-13

## 2023-12-08 RX ORDER — AZITHROMYCIN 250 MG/1
TABLET, FILM COATED ORAL
Qty: 1 PACKET | Refills: 0 | Status: SHIPPED | OUTPATIENT
Start: 2023-12-08 | End: 2023-12-12

## 2023-12-08 RX ORDER — ALBUTEROL SULFATE 90 UG/1
2 AEROSOL, METERED RESPIRATORY (INHALATION) 4 TIMES DAILY PRN
Qty: 18 G | Refills: 0 | Status: SHIPPED | OUTPATIENT
Start: 2023-12-08 | End: 2023-12-08 | Stop reason: SDUPTHER

## 2023-12-08 RX ORDER — PREDNISONE 20 MG/1
60 TABLET ORAL ONCE
Status: COMPLETED | OUTPATIENT
Start: 2023-12-08 | End: 2023-12-08

## 2023-12-08 RX ORDER — AZITHROMYCIN 500 MG/1
500 TABLET, FILM COATED ORAL ONCE
Status: COMPLETED | OUTPATIENT
Start: 2023-12-08 | End: 2023-12-08

## 2023-12-08 RX ORDER — ALBUTEROL SULFATE 90 UG/1
2 AEROSOL, METERED RESPIRATORY (INHALATION) 4 TIMES DAILY PRN
Qty: 18 G | Refills: 0 | Status: SHIPPED | OUTPATIENT
Start: 2023-12-08

## 2023-12-08 RX ORDER — PREDNISONE 50 MG/1
50 TABLET ORAL DAILY
Qty: 5 TABLET | Refills: 0 | Status: SHIPPED | OUTPATIENT
Start: 2023-12-08 | End: 2023-12-08 | Stop reason: SDUPTHER

## 2023-12-08 RX ORDER — AZITHROMYCIN 250 MG/1
TABLET, FILM COATED ORAL
Qty: 1 PACKET | Refills: 0 | Status: SHIPPED | OUTPATIENT
Start: 2023-12-08 | End: 2023-12-08 | Stop reason: SDUPTHER

## 2023-12-08 RX ADMIN — AZITHROMYCIN 500 MG: 500 TABLET, FILM COATED ORAL at 10:20

## 2023-12-08 RX ADMIN — PREDNISONE 60 MG: 20 TABLET ORAL at 10:21

## 2023-12-08 ASSESSMENT — LIFESTYLE VARIABLES
HOW MANY STANDARD DRINKS CONTAINING ALCOHOL DO YOU HAVE ON A TYPICAL DAY: PATIENT DOES NOT DRINK
HOW OFTEN DO YOU HAVE A DRINK CONTAINING ALCOHOL: NEVER

## 2023-12-08 ASSESSMENT — ENCOUNTER SYMPTOMS
ALLERGIC/IMMUNOLOGIC NEGATIVE: 1
EYES NEGATIVE: 1
SHORTNESS OF BREATH: 1
GASTROINTESTINAL NEGATIVE: 1
COUGH: 1

## 2023-12-08 ASSESSMENT — PAIN - FUNCTIONAL ASSESSMENT: PAIN_FUNCTIONAL_ASSESSMENT: NONE - DENIES PAIN

## 2023-12-08 NOTE — TELEPHONE ENCOUNTER
Pt wanted me to tell you she was diagnosed with bronchitis at the ER and slight COPD   They put her on medication

## 2023-12-08 NOTE — ED TRIAGE NOTES
Pt states that she was treated by PCP by telehealth  Pt was treated with ATB and tessalon Perles  Pt states that she feels she has PNE and that atb started on has not improved

## 2023-12-08 NOTE — TELEPHONE ENCOUNTER
Pt calls in states that she went to the urgent care and they prescribed her amoxicillin, azithromycin, albuterol, steroid, tessalon pearls. And  mucinex. Pt would like to know if that is ok to take with her current medication list. Jose

## 2023-12-08 NOTE — ED PROVIDER NOTES
[facility] ED  EMERGENCY DEPARTMENT ENCOUNTER      Pt Name: Merrill Isabel  MRN: [id number]  [address] [facility] [place] 1945  Date of evaluation: 12/8/2023  Provider: Jayne Maldonado MD    CHIEF COMPLAINT       Chief Complaint   Patient presents with    Cough     States was started on ATB and tesslon perles   Pt was given Amoxicillin  Pt states that it has worsened   Pt states that she is coughing up clear to small amount of yellow         HISTORY OF PRESENT ILLNESS   (Location/Symptom, Timing/Onset, Context/Setting, Quality, Duration, Modifying Factors, Severity)  Note limiting factors. Merrill Isabel is a 69 y.o. female who presents to the emergency department. This is a 71-year-old female with a past medical history of coronary artery disease, hypertension, hyperlipidemia, and GERD who presents for cough. Of note, the patient states that she has had a productive cough over the past 2 to 3 weeks. She states that she was seen by her primary care physician on Monday and started on a course of amoxicillin and Tessalon Perles. However she states that she continues to have a productive cough. Associated with subjective fevers and chills. However she denies any body aches, runny nose, earache, sore throat, chest pain, abdominal pain, nausea, vomiting, diarrhea, or dysuria. No known sick contacts. HPI    Nursing Notes were reviewed. REVIEW OF SYSTEMS    (2-9 systems for level 4, 10 or more for level 5)     Review of Systems   Constitutional:  Positive for fatigue and fever. HENT:  Positive for congestion. Eyes: Negative. Respiratory:  Positive for cough and shortness of breath. Cardiovascular: Negative. Gastrointestinal: Negative. Endocrine: Negative. Genitourinary: Negative. Musculoskeletal: Negative. Skin: Negative. Allergic/Immunologic: Negative. Neurological: Negative. Hematological: Negative. Psychiatric/Behavioral: Negative.      All other systems reviewed the patient's chest x-ray. Patient's x-ray shows signs of COPD with no clear pneumonia. However given the patient's productive cough and duration of symptoms, we will start the patient on a course of azithromycin. Also start the patient on a course of prednisone. I encouraged good hand hygiene, sick contact avoidance and adequate hydration. Will otherwise reassure the patient and discharge the patient home with close follow-up. REASSESSMENT          CRITICAL CARE TIME       CONSULTS:  None    PROCEDURES:  Unless otherwise noted below, none     Procedures      FINAL IMPRESSION      1. Bronchitis          DISPOSITION/PLAN   DISPOSITION Decision To Discharge 12/08/2023 10:18:04 AM      PATIENT REFERRED TO:  Olayinka Garcia MD  [id number]  THE [facility] [phone]      As needed      DISCHARGE MEDICATIONS:  New Prescriptions    ALBUTEROL SULFATE HFA (VENTOLIN HFA) 108 (90 BASE) MCG/ACT INHALER    Inhale 2 puffs into the lungs 4 times daily as needed for Wheezing    AZITHROMYCIN (ZITHROMAX Z-VIV) 250 MG TABLET    Take 2 tablets (500 mg) on Day 1, and then take 1 tablet (250 mg) on days 2 through 5.     PREDNISONE (DELTASONE) 50 MG TABLET    Take 1 tablet by mouth daily for 5 days     Controlled Substances Monitoring:          No data to display                (Please note that portions of this note were completed with a voice recognition program.  Efforts were made to edit the dictations but occasionally words are mis-transcribed.)    Silas Cardenas MD (electronically signed)  Attending Emergency Physician           Silas Cardenas MD  12/08/23 [id number]

## 2023-12-11 DIAGNOSIS — J44.1 COPD WITH ACUTE EXACERBATION (MULTI): ICD-10-CM

## 2023-12-11 DIAGNOSIS — J40 BRONCHITIS: ICD-10-CM

## 2023-12-11 RX ORDER — PREDNISONE 50 MG/1
50 TABLET ORAL DAILY
COMMUNITY
Start: 2023-12-08 | End: 2023-12-13

## 2023-12-11 RX ORDER — ALBUTEROL SULFATE 90 UG/1
2 AEROSOL, METERED RESPIRATORY (INHALATION)
COMMUNITY
Start: 2023-12-08 | End: 2024-05-21 | Stop reason: WASHOUT

## 2023-12-11 RX ORDER — ALBUTEROL SULFATE 5 MG/ML
2.5 SOLUTION RESPIRATORY (INHALATION) EVERY 6 HOURS PRN
COMMUNITY
End: 2023-12-11 | Stop reason: SDUPTHER

## 2023-12-11 RX ORDER — ALBUTEROL SULFATE 5 MG/ML
2.5 SOLUTION RESPIRATORY (INHALATION) EVERY 6 HOURS PRN
Qty: 20 ML | Refills: 11 | Status: SHIPPED | OUTPATIENT
Start: 2023-12-11 | End: 2023-12-12

## 2023-12-11 RX ORDER — AZITHROMYCIN 250 MG/1
TABLET, FILM COATED ORAL
COMMUNITY
Start: 2023-12-08 | End: 2023-12-12

## 2023-12-11 NOTE — TELEPHONE ENCOUNTER
Pt needs albuterol for her Nebulizer.  (Request sent)   She did go to ER and is taking Steroid with Antibiotics. However she only has 2 days left. She is somewhat better, but is still not sleeping, is still coughing, and wonders what happens next.   She does not want this to turn into anything else/worse   Please Advise

## 2023-12-12 DIAGNOSIS — J40 BRONCHITIS: ICD-10-CM

## 2023-12-12 DIAGNOSIS — J44.1 COPD WITH ACUTE EXACERBATION (MULTI): ICD-10-CM

## 2023-12-12 RX ORDER — ALBUTEROL SULFATE 0.83 MG/ML
2.5 SOLUTION RESPIRATORY (INHALATION) EVERY 4 HOURS PRN
COMMUNITY
End: 2024-05-21 | Stop reason: WASHOUT

## 2023-12-12 RX ORDER — ALBUTEROL SULFATE 0.83 MG/ML
2.5 SOLUTION RESPIRATORY (INHALATION) EVERY 6 HOURS PRN
Qty: 75 ML | Refills: 11 | Status: SHIPPED | OUTPATIENT
Start: 2023-12-12 | End: 2024-04-10 | Stop reason: WASHOUT

## 2023-12-12 NOTE — TELEPHONE ENCOUNTER
The Albuterol sent yesterday was incorrect   She needs regular 0.83% for her Nebulizer   Please verify and resend   Thank you

## 2023-12-13 LAB — HOLD SPECIMEN: NORMAL

## 2023-12-17 DIAGNOSIS — J30.9 ALLERGIC RHINITIS, UNSPECIFIED SEASONALITY, UNSPECIFIED TRIGGER: Primary | ICD-10-CM

## 2023-12-17 DIAGNOSIS — R53.83 OTHER FATIGUE: ICD-10-CM

## 2023-12-18 RX ORDER — LEVOTHYROXINE SODIUM 25 UG/1
25 TABLET ORAL DAILY
Qty: 90 TABLET | Refills: 3 | Status: SHIPPED | OUTPATIENT
Start: 2023-12-18 | End: 2024-05-28 | Stop reason: SDUPTHER

## 2023-12-18 RX ORDER — FLUTICASONE PROPIONATE 50 MCG
1 SPRAY, SUSPENSION (ML) NASAL DAILY PRN
Qty: 16 G | Refills: 3 | Status: SHIPPED | OUTPATIENT
Start: 2023-12-18 | End: 2024-05-28 | Stop reason: SDUPTHER

## 2023-12-18 NOTE — TELEPHONE ENCOUNTER
Rx Refill Request Telephone Encounter    Name:  Rosalba RUSSELL Coreycraig  :  [id number]  Medication Name:  levothyroxine (Synthroid, Levoxyl) 25 mcg tablet   pantoprazole (ProtoNix) 40 mg EC tablet   fluticasone (Flonase) 50 mcg/actuation nasal spray             Specific Pharmacy location:  [facility] [place]  Date of last appointment:  NA  Date of next appointment:  NA  Best number to reach patient:  NA

## 2023-12-18 NOTE — TELEPHONE ENCOUNTER
"Patient Finished her medication on 12/15/23  She still has a cough and a \"whispy feeling in her chest\"  Is this something she needs to just wait out or would you like to see her again for a follow up?    Please Advise  "

## 2023-12-20 ENCOUNTER — OFFICE VISIT (OUTPATIENT)
Dept: ORTHOPEDIC SURGERY | Facility: CLINIC | Age: 78
End: 2023-12-20
Payer: MEDICARE

## 2023-12-20 ENCOUNTER — ANCILLARY PROCEDURE (OUTPATIENT)
Dept: RADIOLOGY | Facility: CLINIC | Age: 78
End: 2023-12-20
Payer: MEDICARE

## 2023-12-20 DIAGNOSIS — M54.50 LOW BACK PAIN, UNSPECIFIED BACK PAIN LATERALITY, UNSPECIFIED CHRONICITY, UNSPECIFIED WHETHER SCIATICA PRESENT: ICD-10-CM

## 2023-12-20 PROCEDURE — 72100 X-RAY EXAM L-S SPINE 2/3 VWS: CPT

## 2023-12-20 PROCEDURE — 1160F RVW MEDS BY RX/DR IN RCRD: CPT | Performed by: PHYSICIAN ASSISTANT

## 2023-12-20 PROCEDURE — 1159F MED LIST DOCD IN RCRD: CPT | Performed by: PHYSICIAN ASSISTANT

## 2023-12-20 PROCEDURE — 1036F TOBACCO NON-USER: CPT | Performed by: PHYSICIAN ASSISTANT

## 2023-12-20 PROCEDURE — 1126F AMNT PAIN NOTED NONE PRSNT: CPT | Performed by: PHYSICIAN ASSISTANT

## 2023-12-20 PROCEDURE — 99024 POSTOP FOLLOW-UP VISIT: CPT | Performed by: PHYSICIAN ASSISTANT

## 2023-12-20 PROCEDURE — 72100 X-RAY EXAM L-S SPINE 2/3 VWS: CPT | Performed by: FAMILY MEDICINE

## 2023-12-20 NOTE — PROGRESS NOTES
Patient 2 months out from L2-3 laminectomy for facet cyst and L4-5 interbody fusion.  She had some issues where she was sick and 1 make sure she did not do any damage to her back and was doing a lot of coughing.  No bowel or bladder complaints.  No constant radiculopathy paresthesia fever chills nausea vomiting night sweats.    Physical exam well-nourished, well kept.  No lymphangitis or lymphadenopathy in the examined extremities.  Good perfusion to the lower extremities bilaterally.  Dorsalis pedis pulses 2+.  Capillary refill to the digits brisk.  No distal edema.  Patient walks with a roller walker decreased range of motion flexion extension rotation lumbar spine mildly tender good strength no instability examination of the lower extremities reveals no point tenderness, swelling, or deformity.  Range of motion of the hips, knees, and ankles are full without crepitance, instability, or exacerbation of pain.  Strength is 5/5 throughout.  No redness, abrasions, or lesions on the lower extremities bilaterally.  Gross sensation intact to the lower extremity is bilaterally.  Affect normal.    X-ray: X-ray taken today and reviewed shows good placement of instrumentation L3-5 interbody fusion at L3-4 and L4-5.  No change in bony alignment compared to prior x-rays no loosening migration of any hardware.    Plan: Patient will continue BLT restrictions.  She will follow-up on her 3-month scheduled appointment she already has that I believe.  X-ray will be needed AP on that visit.  And a dictation

## 2023-12-29 ENCOUNTER — APPOINTMENT (OUTPATIENT)
Dept: PRIMARY CARE | Facility: CLINIC | Age: 78
End: 2023-12-29
Payer: MEDICARE

## 2024-01-02 DIAGNOSIS — R07.9 CHEST PAIN, UNSPECIFIED TYPE: ICD-10-CM

## 2024-01-02 RX ORDER — NITROGLYCERIN 0.4 MG/1
0.4 TABLET SUBLINGUAL EVERY 5 MIN PRN
Qty: 90 TABLET | Refills: 1 | Status: SHIPPED | OUTPATIENT
Start: 2024-01-02 | End: 2025-01-01

## 2024-02-06 ENCOUNTER — HOSPITAL ENCOUNTER (OUTPATIENT)
Dept: RADIOLOGY | Facility: CLINIC | Age: 79
Discharge: HOME | End: 2024-02-06
Payer: MEDICARE

## 2024-02-06 ENCOUNTER — OFFICE VISIT (OUTPATIENT)
Dept: ORTHOPEDIC SURGERY | Facility: CLINIC | Age: 79
End: 2024-02-06
Payer: MEDICARE

## 2024-02-06 DIAGNOSIS — M16.10 HIP ARTHRITIS: ICD-10-CM

## 2024-02-06 DIAGNOSIS — M54.16 LUMBAR RADICULOPATHY: Primary | ICD-10-CM

## 2024-02-06 DIAGNOSIS — M54.16 LUMBAR RADICULOPATHY: ICD-10-CM

## 2024-02-06 DIAGNOSIS — M54.50 LOW BACK PAIN, UNSPECIFIED BACK PAIN LATERALITY, UNSPECIFIED CHRONICITY, UNSPECIFIED WHETHER SCIATICA PRESENT: ICD-10-CM

## 2024-02-06 DIAGNOSIS — M54.30 BACK PAIN WITH SCIATICA: ICD-10-CM

## 2024-02-06 DIAGNOSIS — M54.9 BACK PAIN WITH SCIATICA: ICD-10-CM

## 2024-02-06 PROCEDURE — 72100 X-RAY EXAM L-S SPINE 2/3 VWS: CPT

## 2024-02-06 PROCEDURE — 1036F TOBACCO NON-USER: CPT | Performed by: ORTHOPAEDIC SURGERY

## 2024-02-06 PROCEDURE — 99215 OFFICE O/P EST HI 40 MIN: CPT | Performed by: ORTHOPAEDIC SURGERY

## 2024-02-06 PROCEDURE — 20610 DRAIN/INJ JOINT/BURSA W/O US: CPT | Mod: RT | Performed by: ORTHOPAEDIC SURGERY

## 2024-02-06 PROCEDURE — 1126F AMNT PAIN NOTED NONE PRSNT: CPT | Performed by: ORTHOPAEDIC SURGERY

## 2024-02-06 PROCEDURE — 2500000005 HC RX 250 GENERAL PHARMACY W/O HCPCS: Performed by: ORTHOPAEDIC SURGERY

## 2024-02-06 PROCEDURE — 72100 X-RAY EXAM L-S SPINE 2/3 VWS: CPT | Performed by: ORTHOPAEDIC SURGERY

## 2024-02-06 PROCEDURE — 2500000004 HC RX 250 GENERAL PHARMACY W/ HCPCS (ALT 636 FOR OP/ED): Performed by: ORTHOPAEDIC SURGERY

## 2024-02-06 RX ORDER — TRIAMCINOLONE ACETONIDE 40 MG/ML
40 INJECTION, SUSPENSION INTRA-ARTICULAR; INTRAMUSCULAR
Status: COMPLETED | OUTPATIENT
Start: 2024-02-06 | End: 2024-02-06

## 2024-02-06 RX ORDER — LIDOCAINE HYDROCHLORIDE 10 MG/ML
9 INJECTION INFILTRATION; PERINEURAL
Status: COMPLETED | OUTPATIENT
Start: 2024-02-06 | End: 2024-02-06

## 2024-02-06 RX ADMIN — LIDOCAINE HYDROCHLORIDE 9 ML: 10 INJECTION, SOLUTION INFILTRATION; PERINEURAL at 10:07

## 2024-02-06 RX ADMIN — TRIAMCINOLONE ACETONIDE 40 MG: 40 INJECTION, SUSPENSION INTRA-ARTICULAR; INTRAMUSCULAR at 10:07

## 2024-02-06 NOTE — PROGRESS NOTES
Rosalba Salcedo is a 78 y.o. female who presents for Follow-up of the Lower Back (L2-3 laminectomy for facet cyst and L4-5 interbody fusion. 10/18/23).    HPI:  78-year-old female here for follow-up of the low back.  She had a L3-S1 laminectomy with an L3-4 TLIF prior to an L2-3 laminectomy with an L4-5 facetectomy on the right and TLIF at L4-5.  She is a little over 3 months out from the L4-5 TLIF and facetectomy.  She denies any fever chills nausea vomiting night sweats.  She has no bowel or bladder complaints.    Physical exam:  Well-nourished, well kept.No lymphangitis or lymphadenopathy in the examined extremities.  Gait normal.  Can stand on heels and toes if she balances on her walker.   Examination of the back shows mild tenderness in the paraspinous musculature.  There is decreased range of motion in all directions due to guarding/muscle spasms and pain at extremes.  There is good strength and no instability.  Examination of the lower extremities reveals no point tenderness, swelling, or deformity.  Range of motion of the hips, knees, and ankles are full without crepitance, instability, or exacerbation of pain.  Strength is 5/5 throughout.  No redness, abrasions, or lesions on extremities  Gross sensation intact in the extremities. .  Affect normal.  Alert and oriented ×3.  Coordination normal.    Imaging studies:  AP lateral x-rays were obtained of the lumbar spine today and reviewed.    Assessment:  78-year-old female here for follow-up.  She is a little over 3 months out from an L2-3 laminectomy with an L4-5 open TLIF, L4-5 right facetectomy and cyst removal above a prior L3-S1 laminectomy with an L3-4 TLIF October 2023.  She was doing fine until December when she had a very bad case of bronchitis and started coughing heavily, causing her bad back pain to return.  She saw Blayne Yeboah in December 2023.  She presents today for follow-up, she is still having a lot of right leg issues.  She is having  pain that starts in the buttock radiating down the back of the leg through the posterior calf sometimes into the foot sometimes not into the foot on the right side.  She is describing minimal back pain, and no real left-sided symptoms.  She recently bought a new bed and it is not helping her symptoms, she feels it is making her worse.    Plan:  For complete plan and/or surgical details, please refer to Dr. Arizmendi's portion of this split dictation.    In a face-to-face encounter, I performed a history and physical examination, discussed pertinent diagnostic studies if indicated, and discussed diagnosis and management strategies with both the patient and the midlevel provider.  I reviewed the midlevel's note and agree with the documented findings and plan of care.    Patient with a lateral right hip buttock groin thigh and leg pain.  She walks with a significant limp unable to bear weight on the right side much.  On exam she is exquisitely tender over the right hip bursa.  Right hip range of motion does reproduce a lot of the pain she is having.  She has had advanced collapse and degenerative changes of her right hip compared to prior x-rays.  She did have a hip pinning and had her screws removed.  At this point on exam I think this is more of a hip problem than a back problem.  Her main issue is leg pain.  She had was coughing a lot and she had bronchitis and that flared up her back pain and right buttock thigh and leg pain.  The back pain has since resolved and she is just left with this lateral hip pain groin pain buttock pain on the right side.    Risks/benefits of a cortisone injection including infection, local skin irritation, skin atrophy, calcification, continued pain/discomfort, elevated blood sugar, burning, failure to relieve pain, and possible leg infection.  Postop discomfort can be alleviated with additional medications/ice/elevation/rest over the first 24 hours as recommended.  After explaining  these issues to the patient, it was understood.  The injection site was sprayed with ethyl chloride.    L Inj/Asp: R hip joint on 2/6/2024 10:07 AM  Indications: pain  Details: 22 G needle, lateral approach  Medications: 40 mg triamcinolone acetonide 40 mg/mL; 9 mL lidocaine 10 mg/mL (1 %)      Were going to get her into see Dr. Travis for evaluation of her right hip.  Her back looks good at this point we will see her back in 3 months for AP lateral x-rays of the lumbar spine.  This patient has severe exacerbation of a chronic problem.  We did discuss with her that Dr. Travis is being consulted for possible hip replacement.  She is not sure she wants to proceed down that road but surgical invention was discussed.    Addendum: Patient is about 40% improved after her right hip injection into the greater troches bursa.  She is walking with much less pain.  There is certainly a bursal component going on here.  Will have her follow-up as described above.

## 2024-02-07 ENCOUNTER — TELEPHONE (OUTPATIENT)
Dept: PRIMARY CARE | Facility: CLINIC | Age: 79
End: 2024-02-07
Payer: MEDICARE

## 2024-02-07 DIAGNOSIS — K59.00 CONSTIPATION, UNSPECIFIED CONSTIPATION TYPE: Primary | ICD-10-CM

## 2024-02-07 RX ORDER — DOCUSATE SODIUM 100 MG/1
100 CAPSULE, LIQUID FILLED ORAL 2 TIMES DAILY
Qty: 60 CAPSULE | Refills: 0 | Status: SHIPPED | OUTPATIENT
Start: 2024-02-07 | End: 2024-03-08

## 2024-02-07 NOTE — TELEPHONE ENCOUNTER
Patient stopped in the office would like to have something called in for a stool softner and possibly something for pain.  States that they think it is her hip causing all of the pain and not actually her back,    She sees the Surgeon for her leg on 02/15/2024  Doesn't know if you would want to see her prior to this appointment or should she just wait until after.    Please Advise

## 2024-02-14 DIAGNOSIS — M25.551 RIGHT HIP PAIN: Primary | ICD-10-CM

## 2024-02-15 ENCOUNTER — HOSPITAL ENCOUNTER (OUTPATIENT)
Dept: RADIOLOGY | Facility: CLINIC | Age: 79
Discharge: HOME | End: 2024-02-15
Payer: MEDICARE

## 2024-02-15 ENCOUNTER — OFFICE VISIT (OUTPATIENT)
Dept: ORTHOPEDIC SURGERY | Facility: CLINIC | Age: 79
End: 2024-02-15
Payer: MEDICARE

## 2024-02-15 DIAGNOSIS — M25.551 RIGHT HIP PAIN: ICD-10-CM

## 2024-02-15 DIAGNOSIS — M87.051 AVASCULAR NECROSIS OF HIP, RIGHT (MULTI): Primary | ICD-10-CM

## 2024-02-15 PROCEDURE — 99214 OFFICE O/P EST MOD 30 MIN: CPT | Performed by: ORTHOPAEDIC SURGERY

## 2024-02-15 PROCEDURE — 99214 OFFICE O/P EST MOD 30 MIN: CPT | Mod: 24,57 | Performed by: ORTHOPAEDIC SURGERY

## 2024-02-15 PROCEDURE — 1036F TOBACCO NON-USER: CPT | Performed by: ORTHOPAEDIC SURGERY

## 2024-02-15 PROCEDURE — 73502 X-RAY EXAM HIP UNI 2-3 VIEWS: CPT | Mod: RIGHT SIDE | Performed by: ORTHOPAEDIC SURGERY

## 2024-02-15 PROCEDURE — 1126F AMNT PAIN NOTED NONE PRSNT: CPT | Performed by: ORTHOPAEDIC SURGERY

## 2024-02-15 PROCEDURE — 73502 X-RAY EXAM HIP UNI 2-3 VIEWS: CPT | Mod: RT

## 2024-02-15 NOTE — PROGRESS NOTES
History of Present Illness  Chief Complaint   Patient presents with    Right Hip - Follow-up     S/p hardware removal on 7/26/23       Patient presents with side: right hip pain after hardware removal and subsequent avascular necrosis.  It has been worsening over the past  3 months.  The patient localizes the pain predominantly in the groin, buttock.  Recently there has been concern for falls and instability.  There is increasing difficulty with activities of daily living and significant disability related to the hip pain.  The patient endorses the following failed non-operative treatments: Rest, ice, elevation and Tylenol.   There is increasing frustration with persistent pain and discomfort and decreasing distance of ambulation.    Pain is described as aching, sore, stiff, and severe .  Better with rest, worse with activity.   Pain level: 9  Assistive device: walker  History of surgery on the hip: history of hip pinning and pin removal  Back pain: Yes  Numbness or tingling radiating to the lower extremities: Yes    Past Medical History:   Diagnosis Date    Other bursitis of elbow, left elbow 05/27/2020    Bursitis of left elbow    Other specified anxiety disorders 07/27/2022    Depression with anxiety    Pain in unspecified elbow 08/05/2020    Elbow pain    Personal history of other mental and behavioral disorders 04/02/2022    History of depression       Medication Documentation Review Audit       Reviewed by Eun SaldanaD (Pharmacist) on 11/02/23 at 1001      Medication Order Taking? Sig Documenting Provider Last Dose Status   acetaminophen (Tylenol) 325 mg tablet [id number] Yes Take 2 tablets (650 mg) by mouth every 6 hours if needed (pain). Rufina Lee PA-C Taking Active   amLODIPine (Norvasc) 5 mg tablet [id number] Yes Take 1 tablet (5 mg) by mouth once daily at bedtime. Historical Provider, MD Taking Active   carvedilol (Coreg) 6.25 mg tablet [id number] Yes Take 1 tablet (6.25 mg) by mouth 2  times a day with meals. Henry Leroy DO Taking Active   diclofenac sodium (Voltaren) 1 % gel gel [id number] Yes APPLY TO AFFECTED FOOT UP TO 3 TIMES DAILY AS NEEDED FOR PAIN Historical Provider, MD Taking Active   docusate sodium (Colace) 100 mg capsule [id number]  Take 1 capsule (100 mg) by mouth 2 times a day for 7 days. Rufina Lee PA-C   10/28/23 2359   fluticasone (Flonase) 50 mcg/actuation nasal spray [id number] No Administer 1 spray into each nostril once daily as needed for rhinitis or allergies. Historical Provider, MD Not Taking Active   levothyroxine (Synthroid, Levoxyl) 25 mcg tablet [id number] Yes Take 1 tablet (25 mcg) by mouth once daily in the morning. Take before meals. Historical Provider, MD Taking Active   nitroglycerin (Nitrostat) 0.4 mg SL tablet [id number]  Place 1 tablet (0.4 mg) under the tongue every 5 minutes if needed for chest pain. Historical Provider, MD  Active   oxyCODONE (Roxicodone) 5 mg immediate release tablet [id number]  Take 1 tablet (5 mg) by mouth every 6 hours if needed (pain) for up to 7 days. Rufina Lee PA-C   10/28/23 2359   pantoprazole (ProtoNix) 40 mg EC tablet [id number] Yes TAKE 1 TABLET DAILY. Henry Leroy DO Taking Active   polyethylene glycol (Glycolax, Miralax) packet [id number]  Take 17 g by mouth once daily for 7 days. Do not start before 2023. Rufina Lee PA-C   10/29/23 2359   rosuvastatin (Crestor) 40 mg tablet [id number] Yes Take 1 tablet (40 mg) by mouth once every 24 hours.   Patient taking differently: Take 1 tablet (40 mg) by mouth once daily at bedtime.    Henry Leroy DO Taking Active                    Allergies   Allergen Reactions    Morphine Hives, Itching, Rash and Unknown     Drowsiness with Percocet and Vicodin       Social History     Socioeconomic History    Marital status:      Spouse name: Not on file    Number of children: Not on file    Years of education: Not on file     Highest education level: Not on file   Occupational History    Not on file   Tobacco Use    Smoking status: Never    Smokeless tobacco: Never   Vaping Use    Vaping Use: Never used   Substance and Sexual Activity    Alcohol use: Never    Drug use: Never    Sexual activity: Defer   Other Topics Concern    Not on file   Social History Narrative    Not on file     Social Determinants of Health     Financial Resource Strain: Low Risk  (10/18/2023)    Overall Financial Resource Strain (CARDIA)     Difficulty of Paying Living Expenses: Not very hard   Food Insecurity: Not on file   Transportation Needs: No Transportation Needs (11/14/2023)    OASIS : Transportation     Lack of Transportation (Medical): No     Lack of Transportation (Non-Medical): No     Patient Unable or Declines to Respond: No   Physical Activity: Not on file   Stress: Not on file   Social Connections: Feeling Socially Integrated (11/14/2023)    OASIS : Social Isolation     Frequency of experiencing loneliness or isolation: Never   Intimate Partner Violence: Not on file   Housing Stability: Low Risk  (10/18/2023)    Housing Stability Vital Sign     Unable to Pay for Housing in the Last Year: No     Number of Places Lived in the Last Year: 2     Unstable Housing in the Last Year: No       Past Surgical History:   Procedure Laterality Date    CT GUIDED ABSCESS FLUID COLLECTION DRAINAGE  8/23/2022    CT GUIDED ABSCESS FLUID COLLECTION DRAINAGE 8/23/2022 [facility] CLINICAL LEGACY    CT GUIDED ABSCESS FLUID COLLECTION DRAINAGE  8/23/2022    CT GUIDED ABSCESS FLUID COLLECTION DRAINAGE    OTHER SURGICAL HISTORY  08/14/2019    Wrist surgery    OTHER SURGICAL HISTORY  08/14/2019    Hysterectomy    OTHER SURGICAL HISTORY  08/14/2019    Breast reduction    OTHER SURGICAL HISTORY  08/14/2019    Cholecystectomy    OTHER SURGICAL HISTORY  04/04/2022    Vaginoplasty    OTHER SURGICAL HISTORY  04/04/2022    Bladder surgery    OTHER SURGICAL HISTORY  04/04/2022     Complete colonoscopy    OTHER SURGICAL HISTORY  04/04/2022    Esophagogastroduodenoscopy    OTHER SURGICAL HISTORY  04/04/2022    Elbow surgery    OTHER SURGICAL HISTORY  04/04/2022    Cataract surgery    OTHER SURGICAL HISTORY  05/11/2021    Breast biopsy    OTHER SURGICAL HISTORY  04/04/2022    Knee surgery    OTHER SURGICAL HISTORY  03/11/2020    Coronary artery bypass graft       No images are attached to the encounter.    BMI  32       Review of Systems   GENERAL: Negative for malaise, significant weight loss, fever  MUSCULOSKELETAL: see HPI  NEURO:  Negative     Exam  side: right Hip:  Antalgic gait  Negative Trendelenburg sign  Skin healthy and intact  No tenderness to palpation over lumbar spine  Minimal tenderness over greater trochanter     Range of motion:  Flexion: 110 internal rotation: 10 external rotation: 10 abduction: 20  Pain with: walking, getting in and out of cars or chairs, and internal and external rotation of the hip  No weakness with resisted hip flexion, abduction or adduction     Negative straight leg raise  Neurovascular exam normal distally     Radiographs  XR hip right with pelvis when performed 2 or 3 views  Interpreted By:  Jamar Travis,   STUDY:  XR HIP RIGHT WITH PELVIS WHEN PERFORMED 2 OR 3 VIEWS;  ;  2/15/2024  10:01 am      INDICATION:  Signs/Symptoms:pain.      ACCESSION NUMBER(S):  [id number]      ORDERING CLINICIAN:  JAMAR TRAVIS      FINDINGS:  Right hip films show ghost tracks from the screw removal. The femoral  head has collapse superiorly and there is some flattening. There is  some moderate degenerative change of the hip is well. There is  hardware seen in the lower lumbar spine. No fracture is identified.          Signed by: Jamar Travis 2/15/2024 10:28 AM  Dictation workstation:   [id number]         Assessment  S/p right hip hardware removal  Avascular necrosis of side: right hip     Plan  We discussed with the patient the diagnosis of avascular necrosis of the hip.  We  reviewed an evidence-based approach to osteoarthritis of the hip.  We strongly encouraged low-impact aerobic activity and non-opioid analgesics.  We discussed the role of physical therapy to aid in strengthening and gait training.  We discussed temporary pain relief with corticosteroid injections and the associated risks.      The patient elected a total hip arthroplasty.      The patient has failed to improve with multiple non-operative modalities.  There is increasing difficulty with activities of daily living and concern for falls.  The patient is endorsing severe pain and disability.       We had a lengthy discussion regarding total hip arthroplasty including the orthopaedic risks, including but not limited to, instability, infection, hematoma, early aseptic loosening, neurologic or vascular injury, clicking, limb length inequality and incomplete relief of pain.  We reviewed the medical risks, including but not limited to, deep venous thrombosis, pulmonary embolism, and cardiovascular/pulmonary issues.     We discussed the anticipated longevity of the implants and potential for revision surgery.  We also discussed anticoagulation, rehabilitation goals, and the hospital course.  We discussed the likelihood of opioid analgesics and risks associated with them.  The next step is to obtain medical risk stratification and encouraged the pre-operative information seminar at the hospital.  We are happy to provide assistance and counseling if the patient has any additional concerns..

## 2024-02-16 DIAGNOSIS — M16.11 PRIMARY OSTEOARTHRITIS OF RIGHT HIP: Primary | ICD-10-CM

## 2024-02-20 ENCOUNTER — OFFICE VISIT (OUTPATIENT)
Dept: CARDIOLOGY | Facility: CLINIC | Age: 79
End: 2024-02-20
Payer: MEDICARE

## 2024-02-20 VITALS
WEIGHT: 188 LBS | BODY MASS INDEX: 32.1 KG/M2 | DIASTOLIC BLOOD PRESSURE: 64 MMHG | HEIGHT: 64 IN | SYSTOLIC BLOOD PRESSURE: 116 MMHG | HEART RATE: 68 BPM

## 2024-02-20 DIAGNOSIS — Z95.1 S/P CABG (CORONARY ARTERY BYPASS GRAFT): ICD-10-CM

## 2024-02-20 DIAGNOSIS — Z01.818 ENCOUNTER FOR PREOPERATIVE ASSESSMENT: Primary | ICD-10-CM

## 2024-02-20 DIAGNOSIS — E78.2 MIXED HYPERLIPIDEMIA: ICD-10-CM

## 2024-02-20 DIAGNOSIS — I25.10 CORONARY ARTERY DISEASE INVOLVING NATIVE HEART WITHOUT ANGINA PECTORIS, UNSPECIFIED VESSEL OR LESION TYPE: ICD-10-CM

## 2024-02-20 DIAGNOSIS — I10 ESSENTIAL HYPERTENSION: ICD-10-CM

## 2024-02-20 PROCEDURE — 99214 OFFICE O/P EST MOD 30 MIN: CPT | Performed by: NURSE PRACTITIONER

## 2024-02-20 PROCEDURE — 1160F RVW MEDS BY RX/DR IN RCRD: CPT | Performed by: NURSE PRACTITIONER

## 2024-02-20 PROCEDURE — 93000 ELECTROCARDIOGRAM COMPLETE: CPT | Performed by: NURSE PRACTITIONER

## 2024-02-20 PROCEDURE — 1036F TOBACCO NON-USER: CPT | Performed by: NURSE PRACTITIONER

## 2024-02-20 PROCEDURE — 3074F SYST BP LT 130 MM HG: CPT | Performed by: NURSE PRACTITIONER

## 2024-02-20 PROCEDURE — 1126F AMNT PAIN NOTED NONE PRSNT: CPT | Performed by: NURSE PRACTITIONER

## 2024-02-20 PROCEDURE — 1159F MED LIST DOCD IN RCRD: CPT | Performed by: NURSE PRACTITIONER

## 2024-02-20 PROCEDURE — 3078F DIAST BP <80 MM HG: CPT | Performed by: NURSE PRACTITIONER

## 2024-02-20 NOTE — PROGRESS NOTES
Rosalba Salcedo is a 79 y.o. female that presents to the office today for pre-operative cardiac evaluation.   She follows with her  primary cardiologist Dr. Ho and was added to my schedule today.    she  has a PMH of HTN, HLD, CAD with CABG 2009, Normal Lexiscan perfusion stress test 2020, GERD, Osteoarthritis. Laminectomy 10/18/2023 without incident.     She is scheduled for right hip replacement with Dr. Travis 3/11/2024 at [facility].  Overall she states that she is doing well other that her right hip/leg pain.  She denies chest pain, chest pressure, chest tightness, shortness of breath, lightheadedness, dizziness or palpitations.  She is able to achieve greater than 5 METS. She lives independently and is able to do her own shopping, cooking and cleaning without cardiac symptoms       Testing Reviewed  EKG obtained in the office today and verified with Dr. Ho shows NSR with Sinus arrhythmia, RBBB  HR 68  bpm, QT/Qtc 430/457    Assessment/Plan  Coronary artery disease: continues on beta blocker, statin and Asa without symptoms suggestive of progressive CAD.  Hypertensin:  Well controlled  Hyperlipidemia:  Continue Statin therapy  She is at acceptable/moderate risk to proceed with surgical procedure  from a cardiac standpoint.    Follow with Dr. Ho in office 3 months or sooner if needed.         Patient Active Problem List   Diagnosis    Abnormal mammogram    Allergic rhinitis    Anemia    Angioedema    Ankle pain    Foot pain    Ankle strain    Acute bronchitis    Acute otitis externa    Arthritis    Bronchitis    COPD with acute exacerbation (CMS/HCC)    Otitis externa    Aseptic loosening of prosthetic knee (CMS/HCC)    Osteoarthritis of knee    CAD (coronary artery disease)    Cardiac murmur    Carotid bruit    Abdominal pain    Chest pain    Constipation    COVID-19    Dizziness    Vertigo    Dry skin    Dyspnea on exertion    Earache    Limb pain    Easy bruising    Edema    Elevated d-dimer     Erythrasma    Essential hypertension    External hemorrhoid    Fatigue    Flu-like symptoms    Food allergy    Foot sprain    Fracture of radius, distal, closed    Fracture of unspecified part of neck of right femur, subsequent encounter for closed fracture with routine healing    GERD (gastroesophageal reflux disease)    Hair loss    Hematuria    Hip bursitis, left    Hip fracture, right (CMS/HCC)    Post-traumatic osteoarthritis of right hip    History of coronary artery bypass surgery    Hyperlipidemia    Hypothyroidism    Impacted cerumen, right ear    Impaired gait and mobility    Influenza A    Keloid    Left knee pain    Leg edema, left    Low back pain    Lumbar radicular pain    Lumbar sprain    Lung nodule    Menopausal osteoporosis    Mitral regurgitation    Muscle spasms of lower extremity    Olecranon bursitis of right elbow    Olecranon bursitis    Osteopenia    Otitis media, left    Post-op pain    Renal insufficiency    Restless legs syndrome    Rib pain on left side    Right bundle branch block (RBBB)    Iliotibial band syndrome of right side    Right hip pain    Rosacea    Scar    Sinus bradycardia    Sinobronchitis    Sinusitis    Shoulder impingement    Strain of upper arm    Traumatic bursitis    Tibial tendinitis, posterior    Trochanteric bursitis of right hip    Urinary incontinence    Urinary tract infection    Leukocytes in urine    Urine ketones    Ataxia    Benign neoplasm of sigmoid colon    Diabetes mellitus without complication (CMS/HCC)    Diverticulosis of large intestine without diverticulitis    Dysphagia    Gastric polyp    OAB (overactive bladder)    Urge incontinence of urine    [place]-Walker grade 2 rectocele    Vaginal enterocele    Vaginal irritation    Vaginal vault prolapse    Vitamin D deficiency    Vestibular hypofunction, bilateral    Pseudophakia of both eyes    Presence of artificial knee joint, bilateral    Dry eye syndrome, bilateral    Dermatochalasis of both  eyelids    Hx of degenerative disc disease    Status post total left knee replacement    Pain in joint, lower leg    Hip pain, left    Chronic nonintractable headache    Cervicalgia    Back pain    Closed displaced fracture of right femoral neck (CMS/HCC)    Ataxic gait    Knee osteoarthritis    Class 2 obesity with body mass index (BMI) of 35.0 to 35.9 in adult    S/P CABG (coronary artery bypass graft)    History of traumatic fracture    Presence of aortocoronary bypass graft    Muscle weakness (generalized)    Major depressive disorder, recurrent, unspecified (CMS/HCC)    Difficulty in walking, not elsewhere classified    Abnormal posture    Back pain of lumbar region with sciatica       Social History     Tobacco Use    Smoking status: Never    Smokeless tobacco: Never   Vaping Use    Vaping Use: Never used   Substance Use Topics    Alcohol use: Never    Drug use: Never       Past Medical History:   Diagnosis Date    Other bursitis of elbow, left elbow 05/27/2020    Bursitis of left elbow    Other specified anxiety disorders 07/27/2022    Depression with anxiety    Pain in unspecified elbow 08/05/2020    Elbow pain    Personal history of other mental and behavioral disorders 04/02/2022    History of depression         Current Outpatient Medications:     acetaminophen (Tylenol) 325 mg tablet, Take 2 tablets (650 mg) by mouth every 6 hours if needed (pain). (Patient taking differently: Take 2 tablets (650 mg) by mouth every 6 hours if needed (pain). Pt is taking 500mg), Disp: 180 tablet, Rfl: 0    amLODIPine (Norvasc) 5 mg tablet, Take 1 tablet (5 mg) by mouth once daily at bedtime., Disp: , Rfl:     carvedilol (Coreg) 6.25 mg tablet, Take 1 tablet (6.25 mg) by mouth 2 times a day with meals., Disp: 180 tablet, Rfl: 3    diclofenac sodium (Voltaren) 1 % gel gel, APPLY TO AFFECTED FOOT UP TO 3 TIMES DAILY AS NEEDED FOR PAIN, Disp: , Rfl:     docusate sodium (Colace) 100 mg capsule, Take 1 capsule (100 mg) by mouth  2 times a day., Disp: 60 capsule, Rfl: 0    famotidine (Pepcid) 20 mg tablet, Take 1 tablet (20 mg) by mouth 2 times a day., Disp: 60 tablet, Rfl: 5    fluticasone (Flonase) 50 mcg/actuation nasal spray, Administer 1 spray into each nostril once daily as needed for rhinitis or allergies., Disp: 16 g, Rfl: 3    levothyroxine (Synthroid, Levoxyl) 25 mcg tablet, Take 1 tablet daily, Disp: 90 tablet, Rfl: 3    loratadine 10 mg capsule, Take 1 capsule by mouth once daily as needed., Disp: , Rfl:     nitroglycerin (Nitrostat) 0.4 mg SL tablet, Place 1 tablet (0.4 mg) under the tongue every 5 minutes if needed for chest pain., Disp: 90 tablet, Rfl: 1    pantoprazole (ProtoNix) 40 mg EC tablet, TAKE 1 TABLET DAILY., Disp: 90 tablet, Rfl: 1    rosuvastatin (Crestor) 40 mg tablet, Take 1 tablet (40 mg) by mouth once every 24 hours. (Patient taking differently: Take 1 tablet (40 mg) by mouth once daily at bedtime.), Disp: 90 tablet, Rfl: 3    albuterol 2.5 mg /3 mL (0.083 %) nebulizer solution, Take 3 mL (2.5 mg) by nebulization every 6 hours if needed for wheezing. (Patient not taking: Reported on 2/20/2024), Disp: 75 mL, Rfl: 11    albuterol 2.5 mg /3 mL (0.083 %) nebulizer solution, Take 3 mL (2.5 mg) by nebulization every 4 hours if needed for wheezing., Disp: , Rfl:     albuterol 90 mcg/actuation inhaler, Inhale 2 puffs 4 times a day., Disp: , Rfl:     Morphine    Family History   Problem Relation Name Age of Onset    Heart disease Mother      Hypertension Mother      Stomach cancer Mother      Other (cardiac disorder) Mother      Hypertension Father      Cancer Father      Prostate cancer Father      Other (cardiac disorder) Father      No Known Problems Sibling         Past Surgical History:   Procedure Laterality Date    CT GUIDED ABSCESS FLUID COLLECTION DRAINAGE  8/23/2022    CT GUIDED ABSCESS FLUID COLLECTION DRAINAGE 8/23/2022 [facility] CLINICAL LEGACY    CT GUIDED ABSCESS FLUID COLLECTION DRAINAGE  8/23/2022    CT  "GUIDED ABSCESS FLUID COLLECTION DRAINAGE    OTHER SURGICAL HISTORY  08/14/2019    Wrist surgery    OTHER SURGICAL HISTORY  08/14/2019    Hysterectomy    OTHER SURGICAL HISTORY  08/14/2019    Breast reduction    OTHER SURGICAL HISTORY  08/14/2019    Cholecystectomy    OTHER SURGICAL HISTORY  04/04/2022    Vaginoplasty    OTHER SURGICAL HISTORY  04/04/2022    Bladder surgery    OTHER SURGICAL HISTORY  04/04/2022    Complete colonoscopy    OTHER SURGICAL HISTORY  04/04/2022    Esophagogastroduodenoscopy    OTHER SURGICAL HISTORY  04/04/2022    Elbow surgery    OTHER SURGICAL HISTORY  04/04/2022    Cataract surgery    OTHER SURGICAL HISTORY  05/11/2021    Breast biopsy    OTHER SURGICAL HISTORY  04/04/2022    Knee surgery    OTHER SURGICAL HISTORY  03/11/2020    Coronary artery bypass graft          Review of systems  Constitutional: No weight loss, fever, chills, weakness or fatigue  HEENT: No visual loss, blurred vision, double vision or yellow sclerae  Skin: No rash or itching  Cardiovascular: No chest pain, pressure or discomfort, No palpitations or edema.  Respiratory: No shortness of breath, cough or sputum  Gastrointestinal: No nausea, vomiting or diarrhea. No bloody or dark tarry stools.  Neurological: No headache, lightheadedness, dizziness, syncope.   Musculoskeletal:Right hip/leg pain  Hematologic: No anemia, bleeding or bruising.    /64 (BP Location: Left arm, Patient Position: Sitting)   Pulse 68   Ht 1.626 m (5' 4\")   Wt 85.3 kg (188 lb)   BMI 32.27 kg/m²     Patient Active Problem List   Diagnosis    Abnormal mammogram    Allergic rhinitis    Anemia    Angioedema    Ankle pain    Foot pain    Ankle strain    Acute bronchitis    Acute otitis externa    Arthritis    Bronchitis    COPD with acute exacerbation (CMS/HCC)    Otitis externa    Aseptic loosening of prosthetic knee (CMS/HCC)    Osteoarthritis of knee    CAD (coronary artery disease)    Cardiac murmur    Carotid bruit    Abdominal pain    " Chest pain    Constipation    COVID-19    Dizziness    Vertigo    Dry skin    Dyspnea on exertion    Earache    Limb pain    Easy bruising    Edema    Elevated d-dimer    Erythrasma    Essential hypertension    External hemorrhoid    Fatigue    Flu-like symptoms    Food allergy    Foot sprain    Fracture of radius, distal, closed    Fracture of unspecified part of neck of right femur, subsequent encounter for closed fracture with routine healing    GERD (gastroesophageal reflux disease)    Hair loss    Hematuria    Hip bursitis, left    Hip fracture, right (CMS/HCC)    Post-traumatic osteoarthritis of right hip    History of coronary artery bypass surgery    Hyperlipidemia    Hypothyroidism    Impacted cerumen, right ear    Impaired gait and mobility    Influenza A    Keloid    Left knee pain    Leg edema, left    Low back pain    Lumbar radicular pain    Lumbar sprain    Lung nodule    Menopausal osteoporosis    Mitral regurgitation    Muscle spasms of lower extremity    Olecranon bursitis of right elbow    Olecranon bursitis    Osteopenia    Otitis media, left    Post-op pain    Renal insufficiency    Restless legs syndrome    Rib pain on left side    Right bundle branch block (RBBB)    Iliotibial band syndrome of right side    Right hip pain    Rosacea    Scar    Sinus bradycardia    Sinobronchitis    Sinusitis    Shoulder impingement    Strain of upper arm    Traumatic bursitis    Tibial tendinitis, posterior    Trochanteric bursitis of right hip    Urinary incontinence    Urinary tract infection    Leukocytes in urine    Urine ketones    Ataxia    Benign neoplasm of sigmoid colon    Diabetes mellitus without complication (CMS/HCC)    Diverticulosis of large intestine without diverticulitis    Dysphagia    Gastric polyp    OAB (overactive bladder)    Urge incontinence of urine    [place]-Walker grade 2 rectocele    Vaginal enterocele    Vaginal irritation    Vaginal vault prolapse    Vitamin D deficiency     Vestibular hypofunction, bilateral    Pseudophakia of both eyes    Presence of artificial knee joint, bilateral    Dry eye syndrome, bilateral    Dermatochalasis of both eyelids    Hx of degenerative disc disease    Status post total left knee replacement    Pain in joint, lower leg    Hip pain, left    Chronic nonintractable headache    Cervicalgia    Back pain    Closed displaced fracture of right femoral neck (CMS/HCC)    Ataxic gait    Knee osteoarthritis    Class 2 obesity with body mass index (BMI) of 35.0 to 35.9 in adult    S/P CABG (coronary artery bypass graft)    History of traumatic fracture    Presence of aortocoronary bypass graft    Muscle weakness (generalized)    Major depressive disorder, recurrent, unspecified (CMS/HCC)    Difficulty in walking, not elsewhere classified    Abnormal posture    Back pain of lumbar region with sciatica         Physical Exam  Constitutional: Well developed, awake/alert x 3, no distress.  Head/Neck: No JVD, No bruits  Respiratory/Thorax: patent airways, CTAB, normal breath sounds with good expansion.  Cardiovascular: Regular rate and rhythm, no murmurs, normal S1 and S2,   Gastrointestinal: Non distended, soft, non-tender, no rebound tenderness or guarding.  Extremities: No cyanosis, edema.    Neurological: Alert and oriented x 3. Moves extremities spontaneous with purpose.  Psychological: Appropriate mood and behavior  Skin: Warm and Dry. No lesions or rashes.         Please excuse any errors in grammar or translation related to dictation, voice recognition software was used to prepare this document.

## 2024-02-27 ENCOUNTER — TELEPHONE (OUTPATIENT)
Dept: PRIMARY CARE | Facility: CLINIC | Age: 79
End: 2024-02-27

## 2024-02-27 ENCOUNTER — OFFICE VISIT (OUTPATIENT)
Dept: PRIMARY CARE | Facility: CLINIC | Age: 79
End: 2024-02-27
Payer: MEDICARE

## 2024-02-27 VITALS
DIASTOLIC BLOOD PRESSURE: 71 MMHG | TEMPERATURE: 97 F | RESPIRATION RATE: 18 BRPM | HEIGHT: 64 IN | SYSTOLIC BLOOD PRESSURE: 125 MMHG | WEIGHT: 186 LBS | OXYGEN SATURATION: 98 % | BODY MASS INDEX: 31.76 KG/M2 | HEART RATE: 70 BPM

## 2024-02-27 DIAGNOSIS — J44.1 COPD WITH ACUTE EXACERBATION (MULTI): ICD-10-CM

## 2024-02-27 DIAGNOSIS — G95.89 OTHER SPECIFIED DISEASES OF SPINAL CORD (MULTI): ICD-10-CM

## 2024-02-27 DIAGNOSIS — M87.051 AVASCULAR NECROSIS OF BONE OF HIP, RIGHT (MULTI): ICD-10-CM

## 2024-02-27 DIAGNOSIS — Z95.1 HISTORY OF CORONARY ARTERY BYPASS SURGERY: ICD-10-CM

## 2024-02-27 DIAGNOSIS — H61.21 IMPACTED CERUMEN, RIGHT EAR: ICD-10-CM

## 2024-02-27 DIAGNOSIS — H60.509 ACUTE OTITIS EXTERNA, UNSPECIFIED LATERALITY, UNSPECIFIED TYPE: ICD-10-CM

## 2024-02-27 DIAGNOSIS — F33.9 RECURRENT MAJOR DEPRESSIVE DISORDER, REMISSION STATUS UNSPECIFIED (CMS-HCC): ICD-10-CM

## 2024-02-27 DIAGNOSIS — E11.9 DIABETES MELLITUS WITHOUT COMPLICATION (MULTI): Primary | ICD-10-CM

## 2024-02-27 DIAGNOSIS — N18.31 STAGE 3A CHRONIC KIDNEY DISEASE (MULTI): ICD-10-CM

## 2024-02-27 DIAGNOSIS — I10 ESSENTIAL HYPERTENSION: ICD-10-CM

## 2024-02-27 PROBLEM — F32.A DEPRESSIVE DISORDER: Status: ACTIVE | Noted: 2022-08-26

## 2024-02-27 PROBLEM — M25.69 STIFFNESS OF OTHER SPECIFIED JOINT, NOT ELSEWHERE CLASSIFIED: Status: ACTIVE | Noted: 2023-10-06

## 2024-02-27 LAB — POC HEMOGLOBIN A1C: 5.7 % (ref 4.2–6.5)

## 2024-02-27 PROCEDURE — 69210 REMOVE IMPACTED EAR WAX UNI: CPT | Performed by: FAMILY MEDICINE

## 2024-02-27 PROCEDURE — 99214 OFFICE O/P EST MOD 30 MIN: CPT | Performed by: FAMILY MEDICINE

## 2024-02-27 PROCEDURE — 1036F TOBACCO NON-USER: CPT | Performed by: FAMILY MEDICINE

## 2024-02-27 PROCEDURE — 3074F SYST BP LT 130 MM HG: CPT | Performed by: FAMILY MEDICINE

## 2024-02-27 PROCEDURE — 83036 HEMOGLOBIN GLYCOSYLATED A1C: CPT | Performed by: FAMILY MEDICINE

## 2024-02-27 PROCEDURE — 1160F RVW MEDS BY RX/DR IN RCRD: CPT | Performed by: FAMILY MEDICINE

## 2024-02-27 PROCEDURE — 1159F MED LIST DOCD IN RCRD: CPT | Performed by: FAMILY MEDICINE

## 2024-02-27 PROCEDURE — 1126F AMNT PAIN NOTED NONE PRSNT: CPT | Performed by: FAMILY MEDICINE

## 2024-02-27 PROCEDURE — 3078F DIAST BP <80 MM HG: CPT | Performed by: FAMILY MEDICINE

## 2024-02-27 RX ORDER — OFLOXACIN 3 MG/ML
5 SOLUTION AURICULAR (OTIC) 2 TIMES DAILY
Qty: 10 ML | Refills: 3 | Status: SHIPPED | OUTPATIENT
Start: 2024-02-27 | End: 2024-03-28

## 2024-02-27 NOTE — ASSESSMENT & PLAN NOTE
Hemoglobin A1c done and result...5.7. Was prediabetic and now controlled  AND ENCOURAGED TO CONTINUE

## 2024-02-27 NOTE — PROGRESS NOTES
HPI   patient is being seen for a preoperative visit.  The procedure is a right    total   HIP     replacement scheduled  for  3 /   11 /2024 with Dr. TEE       .  The indication for surgery is due to AVASCULAR  NECROSIS   OF RIGHT HIP    Surgical risk assessment:  Prior anesthesia:  she had prior anesthesia.  A prior adverse reaction to epidural anesthesia and no prior adverse reaction to general anesthesia.    Pertinent past medical history:DENIES Pulmonary embolism, DVT, diabetes, neck osteoarthritis, wearing denture, seizure disorder, CVA, asthma, COPD, obstructive sleep apnea, and renal disease denies..  Exercise capacity: Able to walk 4 blocks without symptoms and able to walk 2 flights of stairs without symptoms.    Lifestyle factors: Denies tobacco use, denies heavy alcohol consumption, and denies illegal drug use.    Review of systems: No frequent nosebleeds.  Patient denies dyspnea, denies symptoms of respiratory infection, denies chest pain,    Living situation: YES  Concerns with HER  living situation and living independently with 2 KITTIES...    Review of systems  Constitutional: YES feeling tired and no fever  Eyes: No eyesight problems, no redness and no pain.  ENT:YES ear pain   RIGHT OUTER , no swollen lymph nodes, no sore throat, no nasal discharge and nasal congestion.  Cardiovascular: No chest pain, no palpitations and no lower extremity edema.  Respiratory: No cough dyspnea with exertion, no dyspnea at rest and no wheezing.  Gastrointestinal: No abdominal pain,YES  constipation, no diarrhea, no heartburn had no nausea.  Genitourinary: No dysuria, no hesitancy, normal urinary frequency. YES INCONTINENCE  Musculoskeletal:YES  Arthralgias  RIGHT  HIP  but no myalgias and no joint swelling  Integumentary: No skin lesions and no rashes.  Neurologic: Dizziness but no headache no numbness or tingling, no seizures, no limb weakness, no memory changes and had no speech difficulty  Psychiatric: No  mood changes, no sleep disturbances, no substance use and no feelings of anxiety.  Endocrine: No weight change and no temperature intolerance.  Hematologic/lymphatic: No easy bruising and no swollen glands    Patient had evaluation by cardiology with normal sinus rhythm with sinus arrhythmia right bundle branch block heart rate 60 bpm.  Assessment continues on beta-blocker statin and aspirin without symptoms suggestive of CAD.  She has acceptable/moderate risk to proceed with surgery FROM cardiology perspective....     Vitals: I have reviewed the vitals  General: Well-developed.  In no acute distress.  Eyes:   sclera nonicteric.  Conjunctiva not injected.  No discharge.   HEAD: Normocephalic, atraumatic.  HEENT   Mucous membranes moist.  Posterior oropharynx nonerythematous, no tonsillar exudates.    EAR  WITH ERYTHEMA  OF      EXTERNAL AUDITORY CANAL  ON RIGHT  No cervical lymphadenopathy.  Cardio: Regular rate and rhythm.  No murmur, rub or gallop.  Pulmonary: Lungs clear to auscultation in all fields.  No accessory muscle use.  GI/: Normal active bowel sounds.  Soft, nontender.  No masses or organomegaly appreciated.  Musculoskeletal: No gross deformities appreciated.Flexion: 110 internal rotation: 10 external rotation: 10 abduction: 20  Pain with: walking, getting in and out of cars or chairs, and internal and external rotation of the hip  No weakness with resisted hip flexion, abduction or adduction  Neuro: Alert, age-appropriate.  Normal muscle tone.  Moving all extremities.  Skin: No rash, bruises or lesions.     RI and ght hip films show ghost tracks from the screw removal. The femoral  head has collapse superiorly and there is some flattening. There is  some moderate degenerative change of the hip is well. There is  hardware seen in the lower lumbar spine. No fracture is identified.    Essential hypertension  Stable with meds i.e. amlodipine Coreg advised to continue till morning of surgery    Impacted  cerumen, right ear  Ear irrigated with patient performed removal of wax eardrops prescribed    History of coronary artery bypass surgery  Review of cardiology note acceptable/moderate risk to proceed with surgery from cardiology perspective    Diabetes mellitus without complication (CMS/[facility])  Hemoglobin A1c done and result...5.7. Was prediabetic and now controlled  AND ENCOURAGED TO CONTINUE    COPD with acute exacerbation (CMS/HCC)  Stable and in remission consider use of nebulized albuterol in recovery room    Avascular necrosis of bone of hip, right (CMS/[facility])  LOW RISK FOR INTENDED SURGERY  PENDING   LAB REVIEW Patient ID: Rosalba Salcedo is a 79 y.o. female.    Ear Cerumen Removal    Date/Time: 2/27/2024 1:36 PM    Performed by: Henry Leroy DO  Authorized by: Henry Leroy DO    Consent:     Consent obtained:  Verbal    Consent given by:  Patient    Risks discussed:  Pain  Universal protocol:     Patient identity confirmed:  Verbally with patient  Procedure details:     Location:  R ear    Procedure type: curette      Procedure outcomes: cerumen removed    Post-procedure details:     Inspection:  Some cerumen remaining    Hearing quality:  Improved    Procedure completion:  Tolerated  Comments:      using bioniTippr disposable ear curettes #[id number]  the cerumen was carefully removed opening a partially/fully occluded ear canal...it  was/was not necessary to irrigate the ear canal at time of procedure.... patient tolerated /found painful procedure... there was clear ear canal at end of procedure and instructions were given

## 2024-02-27 NOTE — H&P (VIEW-ONLY)
HPI   patient is being seen for a preoperative visit.  The procedure is a right    total   HIP     replacement scheduled  for  3 /   11 /2024 with Dr. BURT       .  The indication for surgery is due to AVASCULAR  NECROSIS   OF RIGHT HIP    Surgical risk assessment:  Prior anesthesia:  she had prior anesthesia.  A prior adverse reaction to epidural anesthesia and no prior adverse reaction to general anesthesia.    Pertinent past medical history:DENIES Pulmonary embolism, DVT, diabetes, neck osteoarthritis, wearing denture, seizure disorder, CVA, asthma, COPD, obstructive sleep apnea, and renal disease denies..  Exercise capacity: Able to walk 4 blocks without symptoms and able to walk 2 flights of stairs without symptoms.    Lifestyle factors: Denies tobacco use, denies heavy alcohol consumption, and denies illegal drug use.    Review of systems: No frequent nosebleeds.  Patient denies dyspnea, denies symptoms of respiratory infection, denies chest pain,    Living situation: YES  Concerns with HER  living situation and living independently with 2 KITTIES...    Review of systems  Constitutional: YES feeling tired and no fever  Eyes: No eyesight problems, no redness and no pain.  ENT:YES ear pain   RIGHT OUTER , no swollen lymph nodes, no sore throat, no nasal discharge and nasal congestion.  Cardiovascular: No chest pain, no palpitations and no lower extremity edema.  Respiratory: No cough dyspnea with exertion, no dyspnea at rest and no wheezing.  Gastrointestinal: No abdominal pain,YES  constipation, no diarrhea, no heartburn had no nausea.  Genitourinary: No dysuria, no hesitancy, normal urinary frequency. YES INCONTINENCE  Musculoskeletal:YES  Arthralgias  RIGHT  HIP  but no myalgias and no joint swelling  Integumentary: No skin lesions and no rashes.  Neurologic: Dizziness but no headache no numbness or tingling, no seizures, no limb weakness, no memory changes and had no speech difficulty  Psychiatric: No

## 2024-02-28 ENCOUNTER — TELEPHONE (OUTPATIENT)
Dept: ORTHOPEDIC SURGERY | Facility: CLINIC | Age: 79
End: 2024-02-28
Payer: MEDICARE

## 2024-02-29 ENCOUNTER — TELEPHONE (OUTPATIENT)
Dept: ORTHOPEDIC SURGERY | Facility: CLINIC | Age: 79
End: 2024-02-29
Payer: MEDICARE

## 2024-02-29 NOTE — TELEPHONE ENCOUNTER
02/29/24  M for pt to contact our office and let us know if she has a wheeled walker to use following sx or if she needs one, in which case we can provide her with one.  
Statement Selected

## 2024-02-29 NOTE — TELEPHONE ENCOUNTER
02/29/24  Pt returned call and she does have a wheeled walker to use following sx.  Was instructed to take it to the hospital on day of sx so it is available when PT begins to work with her.

## 2024-03-01 ENCOUNTER — PREP FOR PROCEDURE (OUTPATIENT)
Dept: ORTHOPEDIC SURGERY | Age: 79
End: 2024-03-01

## 2024-03-01 DIAGNOSIS — Z96.641 STATUS POST TOTAL HIP REPLACEMENT, RIGHT: Primary | ICD-10-CM

## 2024-03-01 RX ORDER — MAGNESIUM HYDROXIDE/ALUMINUM HYDROXICE/SIMETHICONE 120; 1200; 1200 MG/30ML; MG/30ML; MG/30ML
15 SUSPENSION ORAL EVERY 6 HOURS PRN
Status: CANCELLED | OUTPATIENT
Start: 2024-03-01

## 2024-03-01 RX ORDER — SODIUM CHLORIDE 9 MG/ML
INJECTION, SOLUTION INTRAVENOUS PRN
Status: CANCELLED | OUTPATIENT
Start: 2024-03-01

## 2024-03-01 RX ORDER — KETOROLAC TROMETHAMINE 30 MG/ML
7.5 INJECTION, SOLUTION INTRAMUSCULAR; INTRAVENOUS EVERY 6 HOURS
Status: CANCELLED | OUTPATIENT
Start: 2024-03-01 | End: 2024-03-02

## 2024-03-01 RX ORDER — ACETAMINOPHEN 325 MG/1
650 TABLET ORAL EVERY 6 HOURS
Status: CANCELLED | OUTPATIENT
Start: 2024-03-01

## 2024-03-01 RX ORDER — OXYCODONE HYDROCHLORIDE 5 MG/1
2.5 TABLET ORAL EVERY 4 HOURS PRN
Status: CANCELLED | OUTPATIENT
Start: 2024-03-01

## 2024-03-01 RX ORDER — TRANEXAMIC ACID 650 MG/1
1950 TABLET ORAL EVERY 8 HOURS
Status: CANCELLED | OUTPATIENT
Start: 2024-03-01 | End: 2024-03-01

## 2024-03-01 RX ORDER — CYCLOBENZAPRINE HCL 10 MG
5 TABLET ORAL 3 TIMES DAILY PRN
Status: CANCELLED | OUTPATIENT
Start: 2024-03-01

## 2024-03-01 RX ORDER — SODIUM CHLORIDE 0.9 % (FLUSH) 0.9 %
5-40 SYRINGE (ML) INJECTION EVERY 12 HOURS SCHEDULED
Status: CANCELLED | OUTPATIENT
Start: 2024-03-01

## 2024-03-01 RX ORDER — ASPIRIN 81 MG/1
81 TABLET ORAL 2 TIMES DAILY
Status: CANCELLED | OUTPATIENT
Start: 2024-03-01

## 2024-03-01 RX ORDER — SODIUM CHLORIDE 9 MG/ML
INJECTION, SOLUTION INTRAVENOUS CONTINUOUS
Status: CANCELLED | OUTPATIENT
Start: 2024-03-01

## 2024-03-01 RX ORDER — SODIUM CHLORIDE 0.9 % (FLUSH) 0.9 %
5-40 SYRINGE (ML) INJECTION PRN
Status: CANCELLED | OUTPATIENT
Start: 2024-03-01

## 2024-03-01 RX ORDER — TRANEXAMIC ACID 650 MG/1
1950 TABLET ORAL ONCE
Status: CANCELLED | OUTPATIENT
Start: 2024-03-02 | End: 2024-03-02

## 2024-03-01 RX ORDER — SENNA AND DOCUSATE SODIUM 50; 8.6 MG/1; MG/1
1 TABLET, FILM COATED ORAL 2 TIMES DAILY
Status: CANCELLED | OUTPATIENT
Start: 2024-03-01

## 2024-03-01 RX ORDER — OXYCODONE HYDROCHLORIDE 5 MG/1
5 TABLET ORAL EVERY 4 HOURS PRN
Status: CANCELLED | OUTPATIENT
Start: 2024-03-01

## 2024-03-01 RX ORDER — HYDROXYZINE HYDROCHLORIDE 10 MG/1
10 TABLET, FILM COATED ORAL EVERY 8 HOURS PRN
Status: CANCELLED | OUTPATIENT
Start: 2024-03-01

## 2024-03-01 RX ORDER — ONDANSETRON 2 MG/ML
4 INJECTION INTRAMUSCULAR; INTRAVENOUS EVERY 6 HOURS PRN
Status: CANCELLED | OUTPATIENT
Start: 2024-03-01

## 2024-03-01 RX ORDER — ONDANSETRON 4 MG/1
4 TABLET, ORALLY DISINTEGRATING ORAL EVERY 8 HOURS PRN
Status: CANCELLED | OUTPATIENT
Start: 2024-03-01

## 2024-03-04 ENCOUNTER — HOSPITAL ENCOUNTER (OUTPATIENT)
Dept: PREADMISSION TESTING | Age: 79
Discharge: HOME | End: 2024-03-08
Payer: MEDICARE

## 2024-03-04 VITALS
OXYGEN SATURATION: 97 % | TEMPERATURE: 98.3 F | DIASTOLIC BLOOD PRESSURE: 73 MMHG | BODY MASS INDEX: 31.72 KG/M2 | WEIGHT: 185.8 LBS | HEIGHT: 64 IN | HEART RATE: 56 BPM | RESPIRATION RATE: 20 BRPM | SYSTOLIC BLOOD PRESSURE: 143 MMHG

## 2024-03-04 LAB
ABO + RH BLD: NORMAL
ABO + RH BLD: NORMAL
ALBUMIN SERPL-MCNC: 3.9 G/DL (ref 3.5–4.6)
ALP SERPL-CCNC: 67 U/L (ref 40–130)
ALT SERPL-CCNC: 13 U/L (ref 0–33)
ANION GAP SERPL CALCULATED.3IONS-SCNC: 11 MEQ/L (ref 9–15)
APTT PPP: 26.2 SEC (ref 24.4–36.8)
AST SERPL-CCNC: 13 U/L (ref 0–35)
BASOPHILS # BLD: 0 K/UL (ref 0–0.2)
BASOPHILS NFR BLD: 0.5 %
BILIRUB SERPL-MCNC: 0.3 MG/DL (ref 0.2–0.7)
BILIRUB UR QL STRIP: NEGATIVE
BLD GP AB SCN SERPL QL: NORMAL
BUN SERPL-MCNC: 18 MG/DL (ref 8–23)
CALCIUM SERPL-MCNC: 9.7 MG/DL (ref 8.5–9.9)
CHLORIDE SERPL-SCNC: 107 MEQ/L (ref 95–107)
CLARITY UR: CLEAR
CO2 SERPL-SCNC: 25 MEQ/L (ref 20–31)
COLOR UR: YELLOW
CREAT SERPL-MCNC: 0.99 MG/DL (ref 0.5–0.9)
EOSINOPHIL # BLD: 0.1 K/UL (ref 0–0.7)
EOSINOPHIL NFR BLD: 0.9 %
ERYTHROCYTE [DISTWIDTH] IN BLOOD BY AUTOMATED COUNT: 15.8 % (ref 11.5–14.5)
GLOBULIN SER CALC-MCNC: 3.1 G/DL (ref 2.3–3.5)
GLUCOSE SERPL-MCNC: 80 MG/DL (ref 70–99)
GLUCOSE UR STRIP-MCNC: NEGATIVE MG/DL
HCT VFR BLD AUTO: 40.4 % (ref 37–47)
HGB BLD-MCNC: 12.7 G/DL (ref 12–16)
HGB UR QL STRIP: NEGATIVE
INR PPP: 1
KETONES UR STRIP-MCNC: NEGATIVE MG/DL
LEUKOCYTE ESTERASE UR QL STRIP: NEGATIVE
LYMPHOCYTES # BLD: 1.2 K/UL (ref 1–4.8)
LYMPHOCYTES NFR BLD: 16.5 %
MCH RBC QN AUTO: 27.8 PG (ref 27–31.3)
MCHC RBC AUTO-ENTMCNC: 31.4 % (ref 33–37)
MCV RBC AUTO: 88.4 FL (ref 79.4–94.8)
MONOCYTES # BLD: 0.8 K/UL (ref 0.2–0.8)
MONOCYTES NFR BLD: 10.3 %
NEUTROPHILS # BLD: 5.3 K/UL (ref 1.4–6.5)
NEUTS SEG NFR BLD: 71.1 %
NITRITE UR QL STRIP: NEGATIVE
PH UR STRIP: 5 [PH] (ref 5–9)
PLATELET # BLD AUTO: 210 K/UL (ref 130–400)
POTASSIUM SERPL-SCNC: 4.7 MEQ/L (ref 3.4–4.9)
PROT SERPL-MCNC: 7 G/DL (ref 6.3–8)
PROT UR STRIP-MCNC: NEGATIVE MG/DL
PROTHROMBIN TIME: 12.9 SEC (ref 12.3–14.9)
RBC # BLD AUTO: 4.57 M/UL (ref 4.2–5.4)
SODIUM SERPL-SCNC: 143 MEQ/L (ref 135–144)
SP GR UR STRIP: 1.02 (ref 1–1.03)
URINE REFLEX TO CULTURE: NORMAL
UROBILINOGEN UR STRIP-ACNC: 0.2 E.U./DL
WBC # BLD AUTO: 7.4 K/UL (ref 4.8–10.8)

## 2024-03-04 PROCEDURE — 87641 MR-STAPH DNA AMP PROBE: CPT

## 2024-03-04 PROCEDURE — 80053 COMPREHEN METABOLIC PANEL: CPT

## 2024-03-04 PROCEDURE — 86901 BLOOD TYPING SEROLOGIC RH(D): CPT

## 2024-03-04 PROCEDURE — 86850 RBC ANTIBODY SCREEN: CPT

## 2024-03-04 PROCEDURE — 86900 BLOOD TYPING SEROLOGIC ABO: CPT

## 2024-03-04 PROCEDURE — 85730 THROMBOPLASTIN TIME PARTIAL: CPT

## 2024-03-04 PROCEDURE — 85610 PROTHROMBIN TIME: CPT

## 2024-03-04 PROCEDURE — 81003 URINALYSIS AUTO W/O SCOPE: CPT

## 2024-03-04 PROCEDURE — 85025 COMPLETE CBC W/AUTO DIFF WBC: CPT

## 2024-03-04 RX ORDER — PSEUDOEPHEDRINE HCL 30 MG
100 TABLET ORAL 2 TIMES DAILY
COMMUNITY
Start: 2024-02-07 | End: 2024-03-08

## 2024-03-04 RX ORDER — LORATADINE 10 MG/1
TABLET ORAL
COMMUNITY

## 2024-03-04 RX ORDER — FAMOTIDINE 20 MG/1
20 TABLET, FILM COATED ORAL 2 TIMES DAILY
COMMUNITY
Start: 2019-10-29 | End: 2024-05-08

## 2024-03-04 RX ORDER — ROSUVASTATIN CALCIUM 40 MG/1
40 TABLET, COATED ORAL EVERY 24 HOURS
COMMUNITY
Start: 2020-01-28

## 2024-03-04 RX ORDER — OFLOXACIN 3 MG/ML
SOLUTION AURICULAR (OTIC)
COMMUNITY
Start: 2021-12-23

## 2024-03-05 ENCOUNTER — TELEPHONE (OUTPATIENT)
Dept: ORTHOPEDIC SURGERY | Age: 79
End: 2024-03-05

## 2024-03-05 ENCOUNTER — TELEPHONE (OUTPATIENT)
Dept: ORTHOPEDIC SURGERY | Facility: CLINIC | Age: 79
End: 2024-03-05
Payer: MEDICARE

## 2024-03-05 DIAGNOSIS — Z96.641 STATUS POST TOTAL HIP REPLACEMENT, RIGHT: Primary | ICD-10-CM

## 2024-03-05 LAB
MRSA, DNA, NASAL: NEGATIVE
SPECIMEN DESCRIPTION: NORMAL

## 2024-03-05 RX ORDER — OXYCODONE HYDROCHLORIDE 5 MG/1
5 TABLET ORAL EVERY 6 HOURS PRN
Qty: 28 TABLET | Refills: 0 | Status: SHIPPED | OUTPATIENT
Start: 2024-03-05 | End: 2024-03-12

## 2024-03-05 RX ORDER — ASPIRIN 81 MG/1
81 TABLET ORAL 2 TIMES DAILY
Qty: 60 TABLET | Refills: 0 | Status: SHIPPED | OUTPATIENT
Start: 2024-03-05 | End: 2024-04-04

## 2024-03-05 RX ORDER — SENNA AND DOCUSATE SODIUM 50; 8.6 MG/1; MG/1
1 TABLET, FILM COATED ORAL 2 TIMES DAILY
Qty: 60 TABLET | Refills: 0 | Status: SHIPPED | OUTPATIENT
Start: 2024-03-05 | End: 2024-04-04

## 2024-03-05 RX ORDER — HYDROXYZINE HYDROCHLORIDE 10 MG/1
10 TABLET, FILM COATED ORAL EVERY 8 HOURS PRN
Qty: 15 TABLET | Refills: 0 | Status: SHIPPED | OUTPATIENT
Start: 2024-03-05 | End: 2024-03-10

## 2024-03-05 RX ORDER — CYCLOBENZAPRINE HCL 5 MG
5 TABLET ORAL 3 TIMES DAILY PRN
Qty: 30 TABLET | Refills: 0 | Status: SHIPPED | OUTPATIENT
Start: 2024-03-05 | End: 2024-03-15

## 2024-03-05 NOTE — TELEPHONE ENCOUNTER
Pt called and asked if she needs to bring the back stimulator to the hospital, or if she needs to use it while she is in the nursing home, States Dr Arizmendi ordered the device for her so that is why she is asking, she has sx monday

## 2024-03-06 NOTE — CONSULTS
Service:   Service: General Internal Medicine     Consult:  Consult requested by (Attending Name): Jamar Travis   Reason: medical - HTN, CAD     History of Present Illness:   Admission Reason: Right Hip Arthoplasty   HPI:    KIRA GARCIA is a 78 year old Female presents to the Orthopedics team with increase pain and decreased ability to perform ADLS due to right hip OA. After  failed conservative treatment, patient underwent surgery with no immediate post op complications, reports minimal pain to site described as dull in nature, mild in severity, responding well to pain meds. No other complaints. Hospitalist services were  consulted for management of the patient's medical conditions post/op.  Patient examined and seen, resting comfortably. Denies chest pain, shortness of breath, abdominal pain, dizziness, fever or chills. Currently patient vital signs are stable. Plan of  care was discussed with patient, verbalized understanding through teach back method. Hospitalist team will continue to follow until discharge.      Hospitalist team consulted for inpatient medical management of comorbidities including hypertension and hypothyroidism.    Past medical history: CAD, hypertension, hyperlipidemia, GERD, hypothyroidism, constipation, anemia, murmur, renal insufficiency, right bundle branch block, CABG, vertigo, bladder surgery, cholecystectomy, wrist surgery, hysterectomy, breast reduction    Social history: denies drug use, alcohol use or tobacco use     Family history: reviewed non contributory to admission     Review of systems: 10 system were reviewed and were negative except what was mentioned in history of present illness                 Allergies:  ·  morphine : Rash, Itching    Objective:     Objective Information:        T   P  R  BP   MAP  SpO2   Value  36.1  56  16  145/70   99  98%  Date/Time 7/26 9:47 7/26 9:47 7/26 13:11 7/26 13:11  7/26 9:47 7/26 9:47  Range  (36.1C - 36.1C )  (56 - 56 )  (16  - 16 )  (144 - 145 )/ (69 - 70 )  (99 - 99 )  (98% - 98% )    Physical Exam Narrative:  ·  Physical Exam:    Constitutional: Well developed, awake/alert/oriented x3, cooperative  Eyes: PERRL,  clear sclera  ENMT: mucous membranes moist, no apparent injury, no lesions seen  Head/Neck: Neck supple, no apparent injury,  Respiratory/Thorax: Patent airways,  normal breath sounds with good chest expansion, thorax symmetric  Cardiovascular: Regular, rate and rhythm, + murmurs - chronic , 2+ equal pulses of the extremities, normal S 1and S 2  Gastrointestinal: Nondistended, soft, non-tender,   Musculoskeletal: mild decrease range of motion to right hip s/p sx intervention, good capillary refills bilaterally, +2 pulses, good sensation   Extremities: normal extremities,  incision covered with Aquacel, CDI   Skin: warm, dry, intact  Neurological: alert/oriented x 3, speech clear  Psychiatric: appropriate mood and behavior      Medications:    Medications:      ANTI-INFECTIVES:    1. ceFAZolin 2 gram/ D5W 100 mL Premix IVPB:  100  mL  IntraVenous Piggyback  Every 6 Hours      CARDIOVASCULAR AGENTS:    1. Carvedilol:  6.25  mg  Oral  2 Times a Day      CENTRAL NERVOUS SYSTEM AGENTS:    1. Acetaminophen:  975  mg  Oral  Every 8 Hours    2. Aspirin Enteric Coated:  81  mg  Oral  2 Times a Day    3. oxyCODONE Immediate Release:  7.5  mg  Oral  Every 4 Hours   PRN       4. oxyCODONE Immediate Release:  5  mg  Oral  Every 4 Hours   PRN       5. traMADol:  50  mg  Oral  Every 6 Hours   PRN       6. Ondansetron Injectable:  4  mg  IntraVenous Push  Every 6 Hours   PRN       7. Cyclobenzaprine:  10  mg  Oral  3 Times a Day   PRN         GASTROINTESTINAL AGENTS:    1. Magnesium Hydroxide -Al Hydrox -Simethicone Oral Liquid:  30  mL  Oral  Every 6 Hours   PRN       2. Famotidine:  20  mg  Oral  2 Times a Day    3. Docusate:  100  mg  Oral  2 Times a Day    4. Polyethylene Glycol:  17  gram(s)  Oral  2 Times a Day      HORMONES/HORMONE  MODIFIERS:    1. Levothyroxine:  25  microgram(s)  Oral  Daily      NUTRITIONAL PRODUCTS:    1. Lactated Ringers Infusion:  1000  mL  IntraVenous  <Continuous>    2. Lactated Ringers Infusion:  1000  mL  IntraVenous  <Continuous>      TOPICAL AGENTS:    1. Fluticasone 50 microgram/ Nasal Inhalation:  1  spray(s)  Each Nostril  Daily   PRN         Radiology Results:    Results:        Impression:  Xray Knee Complete 4 or more View [Jul 25 2023  1:50PM]        Assessment:    Hospitalist team consulted for inpatient medical management of comorbidities including hypertension and hypothyroidism.    # Right Hip Hardware Removal   Right Hip Pain  Orthopedic Team Primary   Pain and DVT Prophylaxis per Ortho team  PT/OT treatment evaluation  Fall precautions   Incentive spirometer education and demonstration addressed   Discharge planning  CBC BMP in a.m.  Vital signs every 8    #  HTN / HLD / hx of CAD   Continue statin  Continue Coreg  Vital Signs Q8  Hx of CAD - telemetry monitoring in place for arrythmia     # GERD  Continue Protonix    # Hypothyroidism  continue home medications       Thank you for consult  We will continue to monitor   Hemodynamically stable       Plan of care was discussed extensively with patient, RN, and Ortho NP. Patient verbalized understanding through teach back method. All questions and concerns addressed upon examination.     ***Of note, this documentation is completed using the Dragon Dictation system (voice recognition software). There may be spelling and/or grammatical errors that were not corrected prior to final submission.***          Plan of Care Reviewed With:  Plan of Care Reviewed With: patient     Consult Status:  Consult Order ID: [id number]       Electronic Signatures:  Sissy Michaud (APRN-CNP)  (Signed 26-Jul-2023 16:04)   Authored: Service, History of Present Illness, Allergies,  Objective, Assessment/Recommendations, Note Completion      Last Updated: 26-Jul-2023 16:04 by Jaswant  Sissy (APRN-CNP)

## 2024-03-08 ENCOUNTER — TELEPHONE (OUTPATIENT)
Dept: PRIMARY CARE | Facility: CLINIC | Age: 79
End: 2024-03-08
Payer: MEDICARE

## 2024-03-11 ENCOUNTER — ANESTHESIA (OUTPATIENT)
Dept: OPERATING ROOM | Age: 79
End: 2024-03-11
Payer: MEDICARE

## 2024-03-11 ENCOUNTER — APPOINTMENT (OUTPATIENT)
Dept: GENERAL RADIOLOGY | Age: 79
End: 2024-03-11
Attending: ORTHOPAEDIC SURGERY
Payer: MEDICARE

## 2024-03-11 ENCOUNTER — ANESTHESIA EVENT (OUTPATIENT)
Dept: OPERATING ROOM | Age: 79
End: 2024-03-11
Payer: MEDICARE

## 2024-03-11 ENCOUNTER — HOSPITAL ENCOUNTER (OUTPATIENT)
Age: 79
Setting detail: OBSERVATION
Discharge: NURSING, REHAB OR LONG-TERM CARE | End: 2024-03-13
Attending: ORTHOPAEDIC SURGERY | Admitting: ORTHOPAEDIC SURGERY
Payer: MEDICARE

## 2024-03-11 DIAGNOSIS — Z96.641 STATUS POST TOTAL HIP REPLACEMENT, RIGHT: ICD-10-CM

## 2024-03-11 PROBLEM — M87.051 ASEPTIC NECROSIS OF BONE OF HIP, RIGHT (HCC): Status: ACTIVE | Noted: 2024-03-11

## 2024-03-11 PROCEDURE — 97166 OT EVAL MOD COMPLEX 45 MIN: CPT

## 2024-03-11 PROCEDURE — 6370000000 HC RX 637 (ALT 250 FOR IP): Performed by: INTERNAL MEDICINE

## 2024-03-11 PROCEDURE — A4216 STERILE WATER/SALINE, 10 ML: HCPCS | Performed by: ORTHOPAEDIC SURGERY

## 2024-03-11 PROCEDURE — 3700000000 HC ANESTHESIA ATTENDED CARE: Performed by: ORTHOPAEDIC SURGERY

## 2024-03-11 PROCEDURE — 3700000001 HC ADD 15 MINUTES (ANESTHESIA): Performed by: ORTHOPAEDIC SURGERY

## 2024-03-11 PROCEDURE — 6360000002 HC RX W HCPCS: Performed by: ORTHOPAEDIC SURGERY

## 2024-03-11 PROCEDURE — G0378 HOSPITAL OBSERVATION PER HR: HCPCS

## 2024-03-11 PROCEDURE — C1713 ANCHOR/SCREW BN/BN,TIS/BN: HCPCS | Performed by: ORTHOPAEDIC SURGERY

## 2024-03-11 PROCEDURE — 3600000014 HC SURGERY LEVEL 4 ADDTL 15MIN: Performed by: ORTHOPAEDIC SURGERY

## 2024-03-11 PROCEDURE — 7100000001 HC PACU RECOVERY - ADDTL 15 MIN: Performed by: ORTHOPAEDIC SURGERY

## 2024-03-11 PROCEDURE — 6360000002 HC RX W HCPCS: Performed by: ANESTHESIOLOGY

## 2024-03-11 PROCEDURE — 2580000003 HC RX 258: Performed by: NURSE PRACTITIONER

## 2024-03-11 PROCEDURE — 2580000003 HC RX 258: Performed by: ANESTHESIOLOGY

## 2024-03-11 PROCEDURE — 6360000002 HC RX W HCPCS: Performed by: REGISTERED NURSE

## 2024-03-11 PROCEDURE — A4217 STERILE WATER/SALINE, 500 ML: HCPCS | Performed by: ORTHOPAEDIC SURGERY

## 2024-03-11 PROCEDURE — 2709999900 HC NON-CHARGEABLE SUPPLY: Performed by: ORTHOPAEDIC SURGERY

## 2024-03-11 PROCEDURE — 27130 TOTAL HIP ARTHROPLASTY: CPT | Performed by: PHYSICIAN ASSISTANT

## 2024-03-11 PROCEDURE — 6370000000 HC RX 637 (ALT 250 FOR IP): Performed by: ORTHOPAEDIC SURGERY

## 2024-03-11 PROCEDURE — 72170 X-RAY EXAM OF PELVIS: CPT

## 2024-03-11 PROCEDURE — 2720000010 HC SURG SUPPLY STERILE: Performed by: ORTHOPAEDIC SURGERY

## 2024-03-11 PROCEDURE — 2580000003 HC RX 258: Performed by: ORTHOPAEDIC SURGERY

## 2024-03-11 PROCEDURE — 97162 PT EVAL MOD COMPLEX 30 MIN: CPT

## 2024-03-11 PROCEDURE — 6370000000 HC RX 637 (ALT 250 FOR IP): Performed by: NURSE PRACTITIONER

## 2024-03-11 PROCEDURE — C1776 JOINT DEVICE (IMPLANTABLE): HCPCS | Performed by: ORTHOPAEDIC SURGERY

## 2024-03-11 PROCEDURE — 64447 NJX AA&/STRD FEMORAL NRV IMG: CPT | Performed by: ANESTHESIOLOGY

## 2024-03-11 PROCEDURE — 94150 VITAL CAPACITY TEST: CPT

## 2024-03-11 PROCEDURE — 7100000000 HC PACU RECOVERY - FIRST 15 MIN: Performed by: ORTHOPAEDIC SURGERY

## 2024-03-11 PROCEDURE — 6360000002 HC RX W HCPCS: Performed by: NURSE PRACTITIONER

## 2024-03-11 PROCEDURE — 2500000003 HC RX 250 WO HCPCS: Performed by: REGISTERED NURSE

## 2024-03-11 PROCEDURE — 27130 TOTAL HIP ARTHROPLASTY: CPT | Performed by: ORTHOPAEDIC SURGERY

## 2024-03-11 PROCEDURE — 3600000004 HC SURGERY LEVEL 4 BASE: Performed by: ORTHOPAEDIC SURGERY

## 2024-03-11 DEVICE — BIOLOX® DELTA, CERAMIC FEMORAL HEAD, L, Ø 36/+3.5, TAPER 12/14
Type: IMPLANTABLE DEVICE | Status: FUNCTIONAL
Brand: BIOLOX® DELTA

## 2024-03-11 DEVICE — PREM ST/OSS CP/XL LN/CER HD: Type: IMPLANTABLE DEVICE | Status: FUNCTIONAL

## 2024-03-11 DEVICE — AVENIR COMPLETE HIGH OFFSET COLLARLESS SIZE 3: Type: IMPLANTABLE DEVICE | Status: FUNCTIONAL

## 2024-03-11 DEVICE — IMPLANTABLE DEVICE
Type: IMPLANTABLE DEVICE | Status: FUNCTIONAL
Brand: G7® ACETABULAR SYSTEM

## 2024-03-11 DEVICE — IMPLANTABLE DEVICE
Type: IMPLANTABLE DEVICE | Status: FUNCTIONAL
Brand: G7® LONGEVITY®

## 2024-03-11 DEVICE — IMPLANTABLE DEVICE
Type: IMPLANTABLE DEVICE | Status: FUNCTIONAL
Brand: G7 ACETABULAR SCREW

## 2024-03-11 RX ORDER — MIDAZOLAM HYDROCHLORIDE 1 MG/ML
INJECTION INTRAMUSCULAR; INTRAVENOUS PRN
Status: DISCONTINUED | OUTPATIENT
Start: 2024-03-11 | End: 2024-03-11 | Stop reason: SDUPTHER

## 2024-03-11 RX ORDER — LEVOTHYROXINE SODIUM 0.03 MG/1
25 TABLET ORAL DAILY
Status: DISCONTINUED | OUTPATIENT
Start: 2024-03-11 | End: 2024-03-13 | Stop reason: HOSPADM

## 2024-03-11 RX ORDER — SODIUM CHLORIDE 9 MG/ML
INJECTION, SOLUTION INTRAVENOUS PRN
Status: DISCONTINUED | OUTPATIENT
Start: 2024-03-11 | End: 2024-03-11 | Stop reason: HOSPADM

## 2024-03-11 RX ORDER — ALBUTEROL SULFATE 90 UG/1
2 AEROSOL, METERED RESPIRATORY (INHALATION) 4 TIMES DAILY PRN
Status: DISCONTINUED | OUTPATIENT
Start: 2024-03-11 | End: 2024-03-13 | Stop reason: HOSPADM

## 2024-03-11 RX ORDER — ALBUTEROL SULFATE 2.5 MG/3ML
2.5 SOLUTION RESPIRATORY (INHALATION) EVERY 4 HOURS PRN
Status: DISCONTINUED | OUTPATIENT
Start: 2024-03-11 | End: 2024-03-13 | Stop reason: HOSPADM

## 2024-03-11 RX ORDER — SODIUM CHLORIDE 9 MG/ML
INJECTION, SOLUTION INTRAVENOUS CONTINUOUS
Status: DISCONTINUED | OUTPATIENT
Start: 2024-03-11 | End: 2024-03-11 | Stop reason: SDUPTHER

## 2024-03-11 RX ORDER — HYDRALAZINE HYDROCHLORIDE 20 MG/ML
10 INJECTION INTRAMUSCULAR; INTRAVENOUS
Status: DISCONTINUED | OUTPATIENT
Start: 2024-03-11 | End: 2024-03-11 | Stop reason: HOSPADM

## 2024-03-11 RX ORDER — ONDANSETRON 2 MG/ML
4 INJECTION INTRAMUSCULAR; INTRAVENOUS EVERY 6 HOURS PRN
Status: DISCONTINUED | OUTPATIENT
Start: 2024-03-11 | End: 2024-03-13 | Stop reason: HOSPADM

## 2024-03-11 RX ORDER — AMLODIPINE BESYLATE 5 MG/1
5 TABLET ORAL DAILY
Status: DISCONTINUED | OUTPATIENT
Start: 2024-03-11 | End: 2024-03-13 | Stop reason: HOSPADM

## 2024-03-11 RX ORDER — SENNA AND DOCUSATE SODIUM 50; 8.6 MG/1; MG/1
1 TABLET, FILM COATED ORAL 2 TIMES DAILY
Status: DISCONTINUED | OUTPATIENT
Start: 2024-03-11 | End: 2024-03-11 | Stop reason: SDUPTHER

## 2024-03-11 RX ORDER — SODIUM CHLORIDE 0.9 % (FLUSH) 0.9 %
5-40 SYRINGE (ML) INJECTION EVERY 12 HOURS SCHEDULED
Status: DISCONTINUED | OUTPATIENT
Start: 2024-03-11 | End: 2024-03-11 | Stop reason: SDUPTHER

## 2024-03-11 RX ORDER — KETOROLAC TROMETHAMINE 15 MG/ML
7.5 INJECTION, SOLUTION INTRAMUSCULAR; INTRAVENOUS EVERY 6 HOURS
Status: COMPLETED | OUTPATIENT
Start: 2024-03-11 | End: 2024-03-12

## 2024-03-11 RX ORDER — ROPIVACAINE HYDROCHLORIDE 5 MG/ML
INJECTION, SOLUTION EPIDURAL; INFILTRATION; PERINEURAL
Status: COMPLETED | OUTPATIENT
Start: 2024-03-11 | End: 2024-03-11

## 2024-03-11 RX ORDER — HYDROXYZINE HYDROCHLORIDE 10 MG/1
10 TABLET, FILM COATED ORAL EVERY 8 HOURS PRN
Status: DISCONTINUED | OUTPATIENT
Start: 2024-03-11 | End: 2024-03-13 | Stop reason: HOSPADM

## 2024-03-11 RX ORDER — ROSUVASTATIN CALCIUM 40 MG/1
40 TABLET, COATED ORAL EVERY 24 HOURS
Status: DISCONTINUED | OUTPATIENT
Start: 2024-03-11 | End: 2024-03-13 | Stop reason: HOSPADM

## 2024-03-11 RX ORDER — SODIUM CHLORIDE 0.9 % (FLUSH) 0.9 %
5-40 SYRINGE (ML) INJECTION EVERY 12 HOURS SCHEDULED
Status: DISCONTINUED | OUTPATIENT
Start: 2024-03-11 | End: 2024-03-11 | Stop reason: HOSPADM

## 2024-03-11 RX ORDER — TRANEXAMIC ACID 650 MG/1
1950 TABLET ORAL ONCE
Status: COMPLETED | OUTPATIENT
Start: 2024-03-12 | End: 2024-03-12

## 2024-03-11 RX ORDER — GLUCAGON 1 MG/ML
1 KIT INJECTION PRN
Status: DISCONTINUED | OUTPATIENT
Start: 2024-03-11 | End: 2024-03-11 | Stop reason: HOSPADM

## 2024-03-11 RX ORDER — TRANEXAMIC ACID 650 MG/1
1950 TABLET ORAL EVERY 8 HOURS
Status: COMPLETED | OUTPATIENT
Start: 2024-03-11 | End: 2024-03-11

## 2024-03-11 RX ORDER — TRANEXAMIC ACID 650 MG/1
1950 TABLET ORAL
Status: DISCONTINUED | OUTPATIENT
Start: 2024-03-11 | End: 2024-03-11 | Stop reason: HOSPADM

## 2024-03-11 RX ORDER — SODIUM CHLORIDE 0.9 % (FLUSH) 0.9 %
5-40 SYRINGE (ML) INJECTION PRN
Status: DISCONTINUED | OUTPATIENT
Start: 2024-03-11 | End: 2024-03-11 | Stop reason: HOSPADM

## 2024-03-11 RX ORDER — LIDOCAINE HYDROCHLORIDE 10 MG/ML
1 INJECTION, SOLUTION EPIDURAL; INFILTRATION; INTRACAUDAL; PERINEURAL
Status: DISCONTINUED | OUTPATIENT
Start: 2024-03-11 | End: 2024-03-11 | Stop reason: HOSPADM

## 2024-03-11 RX ORDER — OXYCODONE HYDROCHLORIDE 5 MG/1
5 TABLET ORAL PRN
Status: DISCONTINUED | OUTPATIENT
Start: 2024-03-11 | End: 2024-03-11 | Stop reason: HOSPADM

## 2024-03-11 RX ORDER — KETOROLAC TROMETHAMINE 15 MG/ML
7.5 INJECTION, SOLUTION INTRAMUSCULAR; INTRAVENOUS EVERY 6 HOURS
Status: DISCONTINUED | OUTPATIENT
Start: 2024-03-11 | End: 2024-03-11 | Stop reason: SDUPTHER

## 2024-03-11 RX ORDER — OXYCODONE HYDROCHLORIDE 5 MG/1
2.5 TABLET ORAL EVERY 4 HOURS PRN
Status: DISCONTINUED | OUTPATIENT
Start: 2024-03-11 | End: 2024-03-11 | Stop reason: SDUPTHER

## 2024-03-11 RX ORDER — ESMOLOL HYDROCHLORIDE 10 MG/ML
INJECTION INTRAVENOUS PRN
Status: DISCONTINUED | OUTPATIENT
Start: 2024-03-11 | End: 2024-03-11 | Stop reason: SDUPTHER

## 2024-03-11 RX ORDER — OXYCODONE HCL 10 MG/1
10 TABLET, FILM COATED, EXTENDED RELEASE ORAL ONCE
Status: COMPLETED | OUTPATIENT
Start: 2024-03-11 | End: 2024-03-11

## 2024-03-11 RX ORDER — ONDANSETRON 4 MG/1
4 TABLET, ORALLY DISINTEGRATING ORAL EVERY 8 HOURS PRN
Status: DISCONTINUED | OUTPATIENT
Start: 2024-03-11 | End: 2024-03-11 | Stop reason: SDUPTHER

## 2024-03-11 RX ORDER — CELECOXIB 200 MG/1
200 CAPSULE ORAL ONCE
Status: COMPLETED | OUTPATIENT
Start: 2024-03-11 | End: 2024-03-11

## 2024-03-11 RX ORDER — CYCLOBENZAPRINE HCL 10 MG
10 TABLET ORAL 3 TIMES DAILY PRN
Status: DISCONTINUED | OUTPATIENT
Start: 2024-03-11 | End: 2024-03-11 | Stop reason: SDUPTHER

## 2024-03-11 RX ORDER — ASPIRIN 81 MG/1
81 TABLET ORAL 2 TIMES DAILY
Status: DISCONTINUED | OUTPATIENT
Start: 2024-03-11 | End: 2024-03-11 | Stop reason: SDUPTHER

## 2024-03-11 RX ORDER — ONDANSETRON 2 MG/ML
4 INJECTION INTRAMUSCULAR; INTRAVENOUS EVERY 6 HOURS PRN
Status: DISCONTINUED | OUTPATIENT
Start: 2024-03-11 | End: 2024-03-11 | Stop reason: SDUPTHER

## 2024-03-11 RX ORDER — ONDANSETRON 4 MG/1
4 TABLET, ORALLY DISINTEGRATING ORAL EVERY 8 HOURS PRN
Status: DISCONTINUED | OUTPATIENT
Start: 2024-03-11 | End: 2024-03-13 | Stop reason: HOSPADM

## 2024-03-11 RX ORDER — SODIUM CHLORIDE 0.9 % (FLUSH) 0.9 %
5-40 SYRINGE (ML) INJECTION PRN
Status: DISCONTINUED | OUTPATIENT
Start: 2024-03-11 | End: 2024-03-11 | Stop reason: SDUPTHER

## 2024-03-11 RX ORDER — CYCLOBENZAPRINE HCL 5 MG
5 TABLET ORAL 3 TIMES DAILY PRN
Status: DISCONTINUED | OUTPATIENT
Start: 2024-03-11 | End: 2024-03-11 | Stop reason: SDUPTHER

## 2024-03-11 RX ORDER — ONDANSETRON 2 MG/ML
4 INJECTION INTRAMUSCULAR; INTRAVENOUS
Status: DISCONTINUED | OUTPATIENT
Start: 2024-03-11 | End: 2024-03-11 | Stop reason: HOSPADM

## 2024-03-11 RX ORDER — MAGNESIUM HYDROXIDE/ALUMINUM HYDROXICE/SIMETHICONE 120; 1200; 1200 MG/30ML; MG/30ML; MG/30ML
15 SUSPENSION ORAL EVERY 6 HOURS PRN
Status: DISCONTINUED | OUTPATIENT
Start: 2024-03-11 | End: 2024-03-13 | Stop reason: HOSPADM

## 2024-03-11 RX ORDER — LABETALOL HYDROCHLORIDE 5 MG/ML
10 INJECTION, SOLUTION INTRAVENOUS
Status: DISCONTINUED | OUTPATIENT
Start: 2024-03-11 | End: 2024-03-11 | Stop reason: HOSPADM

## 2024-03-11 RX ORDER — SODIUM CHLORIDE 0.9 % (FLUSH) 0.9 %
5-40 SYRINGE (ML) INJECTION PRN
Status: DISCONTINUED | OUTPATIENT
Start: 2024-03-11 | End: 2024-03-13 | Stop reason: HOSPADM

## 2024-03-11 RX ORDER — ACETAMINOPHEN 325 MG/1
650 TABLET ORAL EVERY 6 HOURS
Status: DISCONTINUED | OUTPATIENT
Start: 2024-03-11 | End: 2024-03-11

## 2024-03-11 RX ORDER — OXYCODONE HYDROCHLORIDE 5 MG/1
5 TABLET ORAL EVERY 4 HOURS PRN
Status: DISCONTINUED | OUTPATIENT
Start: 2024-03-11 | End: 2024-03-13 | Stop reason: HOSPADM

## 2024-03-11 RX ORDER — LABETALOL HYDROCHLORIDE 5 MG/ML
INJECTION, SOLUTION INTRAVENOUS PRN
Status: DISCONTINUED | OUTPATIENT
Start: 2024-03-11 | End: 2024-03-11 | Stop reason: SDUPTHER

## 2024-03-11 RX ORDER — CARVEDILOL 3.12 MG/1
6.25 TABLET ORAL 2 TIMES DAILY
Status: DISCONTINUED | OUTPATIENT
Start: 2024-03-11 | End: 2024-03-13 | Stop reason: HOSPADM

## 2024-03-11 RX ORDER — ASPIRIN 81 MG/1
81 TABLET ORAL 2 TIMES DAILY
Status: DISCONTINUED | OUTPATIENT
Start: 2024-03-11 | End: 2024-03-13 | Stop reason: HOSPADM

## 2024-03-11 RX ORDER — CYCLOBENZAPRINE HCL 5 MG
5 TABLET ORAL 3 TIMES DAILY PRN
Status: DISCONTINUED | OUTPATIENT
Start: 2024-03-11 | End: 2024-03-13 | Stop reason: HOSPADM

## 2024-03-11 RX ORDER — BENZONATATE 100 MG/1
100 CAPSULE ORAL 3 TIMES DAILY PRN
Status: DISCONTINUED | OUTPATIENT
Start: 2024-03-11 | End: 2024-03-13 | Stop reason: HOSPADM

## 2024-03-11 RX ORDER — EPHEDRINE SULFATE/0.9% NACL/PF 25 MG/5 ML
SYRINGE (ML) INTRAVENOUS PRN
Status: DISCONTINUED | OUTPATIENT
Start: 2024-03-11 | End: 2024-03-11 | Stop reason: SDUPTHER

## 2024-03-11 RX ORDER — OXYCODONE HYDROCHLORIDE 5 MG/1
10 TABLET ORAL PRN
Status: DISCONTINUED | OUTPATIENT
Start: 2024-03-11 | End: 2024-03-11 | Stop reason: HOSPADM

## 2024-03-11 RX ORDER — SODIUM CHLORIDE 9 MG/ML
INJECTION, SOLUTION INTRAVENOUS PRN
Status: DISCONTINUED | OUTPATIENT
Start: 2024-03-11 | End: 2024-03-11 | Stop reason: SDUPTHER

## 2024-03-11 RX ORDER — ACETAMINOPHEN 500 MG
1000 TABLET ORAL ONCE
Status: COMPLETED | OUTPATIENT
Start: 2024-03-11 | End: 2024-03-11

## 2024-03-11 RX ORDER — ONDANSETRON 2 MG/ML
INJECTION INTRAMUSCULAR; INTRAVENOUS PRN
Status: DISCONTINUED | OUTPATIENT
Start: 2024-03-11 | End: 2024-03-11 | Stop reason: SDUPTHER

## 2024-03-11 RX ORDER — OXYCODONE HYDROCHLORIDE 5 MG/1
2.5 TABLET ORAL EVERY 4 HOURS PRN
Status: DISCONTINUED | OUTPATIENT
Start: 2024-03-11 | End: 2024-03-13 | Stop reason: HOSPADM

## 2024-03-11 RX ORDER — SENNA AND DOCUSATE SODIUM 50; 8.6 MG/1; MG/1
1 TABLET, FILM COATED ORAL 2 TIMES DAILY
Status: DISCONTINUED | OUTPATIENT
Start: 2024-03-11 | End: 2024-03-13 | Stop reason: HOSPADM

## 2024-03-11 RX ORDER — FENTANYL CITRATE 50 UG/ML
INJECTION, SOLUTION INTRAMUSCULAR; INTRAVENOUS PRN
Status: DISCONTINUED | OUTPATIENT
Start: 2024-03-11 | End: 2024-03-11 | Stop reason: SDUPTHER

## 2024-03-11 RX ORDER — MAGNESIUM HYDROXIDE/ALUMINUM HYDROXICE/SIMETHICONE 120; 1200; 1200 MG/30ML; MG/30ML; MG/30ML
15 SUSPENSION ORAL EVERY 6 HOURS PRN
Status: DISCONTINUED | OUTPATIENT
Start: 2024-03-11 | End: 2024-03-11 | Stop reason: SDUPTHER

## 2024-03-11 RX ORDER — SODIUM CHLORIDE 0.9 % (FLUSH) 0.9 %
5-40 SYRINGE (ML) INJECTION EVERY 12 HOURS SCHEDULED
Status: DISCONTINUED | OUTPATIENT
Start: 2024-03-11 | End: 2024-03-13 | Stop reason: HOSPADM

## 2024-03-11 RX ORDER — MORPHINE SULFATE 2 MG/ML
2 INJECTION, SOLUTION INTRAMUSCULAR; INTRAVENOUS
Status: DISCONTINUED | OUTPATIENT
Start: 2024-03-11 | End: 2024-03-13 | Stop reason: HOSPADM

## 2024-03-11 RX ORDER — METOCLOPRAMIDE HYDROCHLORIDE 5 MG/ML
10 INJECTION INTRAMUSCULAR; INTRAVENOUS
Status: DISCONTINUED | OUTPATIENT
Start: 2024-03-11 | End: 2024-03-11 | Stop reason: HOSPADM

## 2024-03-11 RX ORDER — OXYCODONE HYDROCHLORIDE 5 MG/1
5 TABLET ORAL EVERY 4 HOURS PRN
Status: DISCONTINUED | OUTPATIENT
Start: 2024-03-11 | End: 2024-03-11 | Stop reason: SDUPTHER

## 2024-03-11 RX ORDER — MAGNESIUM HYDROXIDE 1200 MG/15ML
LIQUID ORAL PRN
Status: DISCONTINUED | OUTPATIENT
Start: 2024-03-11 | End: 2024-03-11 | Stop reason: ALTCHOICE

## 2024-03-11 RX ORDER — SODIUM CHLORIDE, SODIUM LACTATE, POTASSIUM CHLORIDE, CALCIUM CHLORIDE 600; 310; 30; 20 MG/100ML; MG/100ML; MG/100ML; MG/100ML
INJECTION, SOLUTION INTRAVENOUS CONTINUOUS
Status: DISCONTINUED | OUTPATIENT
Start: 2024-03-11 | End: 2024-03-11 | Stop reason: HOSPADM

## 2024-03-11 RX ORDER — IPRATROPIUM BROMIDE AND ALBUTEROL SULFATE 2.5; .5 MG/3ML; MG/3ML
1 SOLUTION RESPIRATORY (INHALATION)
Status: DISCONTINUED | OUTPATIENT
Start: 2024-03-11 | End: 2024-03-11 | Stop reason: HOSPADM

## 2024-03-11 RX ORDER — ROCURONIUM BROMIDE 10 MG/ML
INJECTION, SOLUTION INTRAVENOUS PRN
Status: DISCONTINUED | OUTPATIENT
Start: 2024-03-11 | End: 2024-03-11 | Stop reason: SDUPTHER

## 2024-03-11 RX ORDER — DEXTROSE MONOHYDRATE 100 MG/ML
INJECTION, SOLUTION INTRAVENOUS CONTINUOUS PRN
Status: DISCONTINUED | OUTPATIENT
Start: 2024-03-11 | End: 2024-03-11 | Stop reason: HOSPADM

## 2024-03-11 RX ORDER — MAGNESIUM HYDROXIDE 1200 MG/15ML
LIQUID ORAL CONTINUOUS PRN
Status: COMPLETED | OUTPATIENT
Start: 2024-03-11 | End: 2024-03-11

## 2024-03-11 RX ORDER — HYDROXYZINE HYDROCHLORIDE 10 MG/1
10 TABLET, FILM COATED ORAL EVERY 8 HOURS PRN
Status: DISCONTINUED | OUTPATIENT
Start: 2024-03-11 | End: 2024-03-11 | Stop reason: SDUPTHER

## 2024-03-11 RX ORDER — FENTANYL CITRATE 0.05 MG/ML
25 INJECTION, SOLUTION INTRAMUSCULAR; INTRAVENOUS EVERY 5 MIN PRN
Status: DISCONTINUED | OUTPATIENT
Start: 2024-03-11 | End: 2024-03-11 | Stop reason: HOSPADM

## 2024-03-11 RX ORDER — NALOXONE HYDROCHLORIDE 0.4 MG/ML
INJECTION, SOLUTION INTRAMUSCULAR; INTRAVENOUS; SUBCUTANEOUS PRN
Status: DISCONTINUED | OUTPATIENT
Start: 2024-03-11 | End: 2024-03-11 | Stop reason: HOSPADM

## 2024-03-11 RX ORDER — CARBOXYMETHYLCELLULOSE SODIUM 5 MG/ML
1 SOLUTION/ DROPS OPHTHALMIC PRN
Status: DISCONTINUED | OUTPATIENT
Start: 2024-03-11 | End: 2024-03-13 | Stop reason: HOSPADM

## 2024-03-11 RX ORDER — PROPOFOL 10 MG/ML
INJECTION, EMULSION INTRAVENOUS PRN
Status: DISCONTINUED | OUTPATIENT
Start: 2024-03-11 | End: 2024-03-11 | Stop reason: SDUPTHER

## 2024-03-11 RX ORDER — PANTOPRAZOLE SODIUM 40 MG/1
40 TABLET, DELAYED RELEASE ORAL DAILY
Status: DISCONTINUED | OUTPATIENT
Start: 2024-03-11 | End: 2024-03-13 | Stop reason: HOSPADM

## 2024-03-11 RX ORDER — ACETAMINOPHEN 325 MG/1
650 TABLET ORAL EVERY 6 HOURS
Status: DISCONTINUED | OUTPATIENT
Start: 2024-03-11 | End: 2024-03-13 | Stop reason: HOSPADM

## 2024-03-11 RX ORDER — SODIUM CHLORIDE 9 MG/ML
INJECTION, SOLUTION INTRAVENOUS PRN
Status: DISCONTINUED | OUTPATIENT
Start: 2024-03-11 | End: 2024-03-13 | Stop reason: HOSPADM

## 2024-03-11 RX ORDER — SODIUM CHLORIDE 9 MG/ML
INJECTION, SOLUTION INTRAVENOUS CONTINUOUS
Status: DISCONTINUED | OUTPATIENT
Start: 2024-03-11 | End: 2024-03-13 | Stop reason: HOSPADM

## 2024-03-11 RX ORDER — FAMOTIDINE 20 MG/1
20 TABLET, FILM COATED ORAL 2 TIMES DAILY
Status: DISCONTINUED | OUTPATIENT
Start: 2024-03-11 | End: 2024-03-13 | Stop reason: HOSPADM

## 2024-03-11 RX ORDER — NITROGLYCERIN 0.4 MG/1
0.4 TABLET SUBLINGUAL EVERY 5 MIN PRN
Status: DISCONTINUED | OUTPATIENT
Start: 2024-03-11 | End: 2024-03-13 | Stop reason: HOSPADM

## 2024-03-11 RX ADMIN — PROPOFOL 200 MG: 10 INJECTION, EMULSION INTRAVENOUS at 07:43

## 2024-03-11 RX ADMIN — ESMOLOL HYDROCHLORIDE 20 MG: 10 INJECTION, SOLUTION INTRAVENOUS at 08:17

## 2024-03-11 RX ADMIN — KETOROLAC TROMETHAMINE 7.5 MG: 15 INJECTION, SOLUTION INTRAMUSCULAR; INTRAVENOUS at 13:33

## 2024-03-11 RX ADMIN — SODIUM CHLORIDE, PRESERVATIVE FREE 10 ML: 5 INJECTION INTRAVENOUS at 22:19

## 2024-03-11 RX ADMIN — ROCURONIUM BROMIDE 50 MG: 10 INJECTION, SOLUTION INTRAVENOUS at 07:43

## 2024-03-11 RX ADMIN — FAMOTIDINE 20 MG: 20 TABLET, FILM COATED ORAL at 22:19

## 2024-03-11 RX ADMIN — ACETAMINOPHEN 1000 MG: 500 TABLET ORAL at 06:37

## 2024-03-11 RX ADMIN — OXYCODONE 5 MG: 5 TABLET ORAL at 16:50

## 2024-03-11 RX ADMIN — CEFAZOLIN 2000 MG: 2 INJECTION, POWDER, FOR SOLUTION INTRAMUSCULAR; INTRAVENOUS at 16:49

## 2024-03-11 RX ADMIN — LABETALOL HYDROCHLORIDE 10 MG: 5 INJECTION, SOLUTION INTRAVENOUS at 08:22

## 2024-03-11 RX ADMIN — ROPIVACAINE HYDROCHLORIDE 20 ML: 5 INJECTION, SOLUTION EPIDURAL; INFILTRATION; PERINEURAL at 07:21

## 2024-03-11 RX ADMIN — PHENYLEPHRINE HYDROCHLORIDE 100 MCG: 10 INJECTION INTRAVENOUS at 09:05

## 2024-03-11 RX ADMIN — TRANEXAMIC ACID 1950 MG: 650 TABLET ORAL at 16:50

## 2024-03-11 RX ADMIN — ASPIRIN 81 MG: 81 TABLET, COATED ORAL at 22:19

## 2024-03-11 RX ADMIN — BENZOCAINE AND MENTHOL 1 LOZENGE: 15; 3.6 LOZENGE ORAL at 21:30

## 2024-03-11 RX ADMIN — CEFAZOLIN 2000 MG: 2 INJECTION, POWDER, FOR SOLUTION INTRAMUSCULAR; INTRAVENOUS at 07:48

## 2024-03-11 RX ADMIN — ONDANSETRON 4 MG: 2 INJECTION INTRAMUSCULAR; INTRAVENOUS at 18:18

## 2024-03-11 RX ADMIN — DOCUSATE SODIUM AND SENNOSIDES 1 TABLET: 8.6; 5 TABLET, FILM COATED ORAL at 13:32

## 2024-03-11 RX ADMIN — ACETAMINOPHEN 325MG 650 MG: 325 TABLET ORAL at 19:46

## 2024-03-11 RX ADMIN — PANTOPRAZOLE SODIUM 40 MG: 40 TABLET, DELAYED RELEASE ORAL at 13:31

## 2024-03-11 RX ADMIN — EPHEDRINE SULFATE 10 MG: 5 INJECTION INTRAVENOUS at 07:53

## 2024-03-11 RX ADMIN — TRANEXAMIC ACID 1950 MG: 650 TABLET ORAL at 06:36

## 2024-03-11 RX ADMIN — ONDANSETRON 4 MG: 2 INJECTION INTRAMUSCULAR; INTRAVENOUS at 07:43

## 2024-03-11 RX ADMIN — MIDAZOLAM HYDROCHLORIDE 2 MG: 1 INJECTION, SOLUTION INTRAMUSCULAR; INTRAVENOUS at 07:21

## 2024-03-11 RX ADMIN — SODIUM CHLORIDE: 9 INJECTION, SOLUTION INTRAVENOUS at 13:26

## 2024-03-11 RX ADMIN — SUGAMMADEX 200 MG: 100 INJECTION, SOLUTION INTRAVENOUS at 09:31

## 2024-03-11 RX ADMIN — ROSUVASTATIN CALCIUM 40 MG: 40 TABLET, FILM COATED ORAL at 22:18

## 2024-03-11 RX ADMIN — ACETAMINOPHEN 325MG 650 MG: 325 TABLET ORAL at 13:29

## 2024-03-11 RX ADMIN — FAMOTIDINE 20 MG: 20 TABLET, FILM COATED ORAL at 13:32

## 2024-03-11 RX ADMIN — FENTANYL CITRATE 50 MCG: 50 INJECTION, SOLUTION INTRAMUSCULAR; INTRAVENOUS at 09:02

## 2024-03-11 RX ADMIN — FENTANYL CITRATE 50 MCG: 50 INJECTION, SOLUTION INTRAMUSCULAR; INTRAVENOUS at 08:14

## 2024-03-11 RX ADMIN — OXYCODONE HYDROCHLORIDE 10 MG: 10 TABLET, FILM COATED, EXTENDED RELEASE ORAL at 06:37

## 2024-03-11 RX ADMIN — ASPIRIN 81 MG: 81 TABLET, COATED ORAL at 13:31

## 2024-03-11 RX ADMIN — PHENYLEPHRINE HYDROCHLORIDE 100 MCG: 10 INJECTION INTRAVENOUS at 09:18

## 2024-03-11 RX ADMIN — AMLODIPINE BESYLATE 5 MG: 5 TABLET ORAL at 22:19

## 2024-03-11 RX ADMIN — SODIUM CHLORIDE, POTASSIUM CHLORIDE, SODIUM LACTATE AND CALCIUM CHLORIDE: 600; 310; 30; 20 INJECTION, SOLUTION INTRAVENOUS at 06:30

## 2024-03-11 RX ADMIN — KETOROLAC TROMETHAMINE 7.5 MG: 15 INJECTION, SOLUTION INTRAMUSCULAR; INTRAVENOUS at 19:46

## 2024-03-11 RX ADMIN — CELECOXIB 200 MG: 200 CAPSULE ORAL at 06:36

## 2024-03-11 RX ADMIN — DOCUSATE SODIUM AND SENNOSIDES 1 TABLET: 8.6; 5 TABLET, FILM COATED ORAL at 22:19

## 2024-03-11 ASSESSMENT — PAIN DESCRIPTION - ORIENTATION
ORIENTATION: RIGHT
ORIENTATION: RIGHT

## 2024-03-11 ASSESSMENT — PAIN DESCRIPTION - DESCRIPTORS
DESCRIPTORS: ACHING
DESCRIPTORS: ACHING;THROBBING
DESCRIPTORS: ACHING

## 2024-03-11 ASSESSMENT — PAIN DESCRIPTION - LOCATION
LOCATION: ANKLE;HIP
LOCATION: GENERALIZED
LOCATION: GENERALIZED

## 2024-03-11 ASSESSMENT — PAIN SCALES - GENERAL
PAINLEVEL_OUTOF10: 8
PAINLEVEL_OUTOF10: 5
PAINLEVEL_OUTOF10: 8
PAINLEVEL_OUTOF10: 5
PAINLEVEL_OUTOF10: 5
PAINLEVEL_OUTOF10: 10

## 2024-03-11 NOTE — ANESTHESIA PRE PROCEDURE
Department of Anesthesiology  Preprocedure Note       Name:  Jasmyn Lockhart   Age:  79 y.o.  :  1945                                          MRN:  [id number]         Date:  3/11/2024      Surgeon: Surgeon(s):  Timothy Aguilar MD    Procedure: Procedure(s):  RIGHT HIP TOTAL ARTHROPLASTY. VIK.  Pre Op TXA: 1950 MG po x1 90 min. prior to procedure, 200mg Celebrex po, 1000mg Tylenol po, 10mg Oxycontin po. Spinal, Adductor Canal Block. CALL [phone] FOR SURGERY TIME-CHUCK    Medications prior to admission:   Prior to Admission medications    Medication Sig Start Date End Date Taking? Authorizing Provider   oxyCODONE (ROXICODONE) 5 MG immediate release tablet Take 1 tablet by mouth every 6 hours as needed for Pain for up to 7 days. Intended supply: 3 days. Take lowest dose possible to manage pain Max Daily Amount: 20 mg 3/5/24 3/12/24  Suzette Cisneros APRN - CNP   sennosides-docusate sodium (SENOKOT-S) 8.6-50 MG tablet Take 1 tablet by mouth in the morning and at bedtime 3/5/24 4/4/24  Suzette Cisneros APRN - CNP   aspirin 81 MG EC tablet Take 1 tablet by mouth in the morning and at bedtime 3/5/24 4/4/24  Suzette Cisneros APRN - CNP   cyclobenzaprine (FLEXERIL) 5 MG tablet Take 1 tablet by mouth 3 times daily as needed for Muscle spasms 3/5/24 3/15/24  Suzette Cisneros APRN - CNP   famotidine (PEPCID) 20 MG tablet Take 1 tablet by mouth 2 times daily 10/29/19 5/8/24  Nikkie Gallagher MD   rosuvastatin (CRESTOR) 40 MG tablet Take 1 tablet by mouth every 24 hours 20   Nikkie Gallagher MD   loratadine (CLARITIN) 10 MG tablet 1 tablet Orally    Nikkie Gallagher MD   ofloxacin (FLOXIN) 0.3 % otic solution Place in ear(s) 21   Nikkie Gallagher MD   albuterol sulfate HFA (VENTOLIN HFA) 108 (90 Base) MCG/ACT inhaler Inhale 2 puffs into the lungs 4 times daily as needed for Wheezing 23   Andrey Palma II, MD   benzonatate (TESSALON PERLES) 100 MG capsule Take 1 capsule by

## 2024-03-11 NOTE — PLAN OF CARE
Problem: Discharge Planning  Goal: Discharge to home or other facility with appropriate resources  Outcome: Progressing  Flowsheets (Taken 3/11/2024 0607 by Gaye Tafoya, RN)  Discharge to home or other facility with appropriate resources: (general weakness uses walker) --     Problem: Pain  Goal: Verbalizes/displays adequate comfort level or baseline comfort level  Outcome: Progressing     Problem: Safety - Adult  Goal: Free from fall injury  Outcome: Progressing     Problem: ABCDS Injury Assessment  Goal: Absence of physical injury  Outcome: Progressing     Problem: Skin/Tissue Integrity  Goal: Absence of new skin breakdown  Description: 1.  Monitor for areas of redness and/or skin breakdown  2.  Assess vascular access sites hourly  3.  Every 4-6 hours minimum:  Change oxygen saturation probe site  4.  Every 4-6 hours:  If on nasal continuous positive airway pressure, respiratory therapy assess nares and determine need for appliance change or resting period.  Outcome: Progressing

## 2024-03-11 NOTE — INTERVAL H&P NOTE
Update History & Physical    The patient's History and Physical of February 27, 2024 was reviewed with the patient and I examined the patient. There was no change. The surgical site was confirmed by the patient and me.     Plan: The risks, benefits, expected outcome, and alternative to the recommended procedure have been discussed with the patient. Patient understands and wants to proceed with the procedure.     Electronically signed by Timothy Aguilar MD on 3/11/2024 at 6:58 AM

## 2024-03-11 NOTE — PLAN OF CARE
See OT evaluation for all goals and OT POC. Electronically signed by Iveth Mckenzie OTR/L on 3/11/2024 at 4:16 PM

## 2024-03-11 NOTE — CONSULTS
Spiritual Care Services     Summary of Visit:   visited patient for a spiritual care consult. Patient feeling overwhelmed and powerless due to having undergone multiple recent surgeries. Spiritual support important to patient and she watches mass on television even though her physical condition prevents her from attending services.  addressed issues of powerlessness by providing prayer.    Encounter Summary  Encounter Overview/Reason : Spiritual/Emotional Needs  Service Provided For:: Patient  Referral/Consult From:: Rounding  Support System: Children, Family members, Friends/neighbors  Complexity of Encounter: Moderate  Begin Time: 1800  End Time : 1815  Total Time Calculated: 15 min        Spiritual/Emotional needs  Type: Spiritual Support                      Spiritual Assessment/Intervention/Outcomes:    Assessment: Concerns with suffering, Powerlessness    Intervention: Empowerment, Prayer (assurance of)/[place], Nurtured Hope, Sustaining Presence/Ministry of presence    Outcome: Concerns relieved, Receptive      Care Plan:    Plan and Referrals  Plan/Referrals: Continue Support (comment)    Continue to provide prayer and support as needed or requested      Spiritual Care Services   Electronically signed by Chaplain Cole on 3/11/2024 at 6:45 PM.    To reach a  for emotional and spiritual support, place an EPIC consult request.   If a  is needed immediately, dial “0” and ask to page the on-call .

## 2024-03-11 NOTE — OP NOTE
Operative Note      Patient: Jasmyn Lockhart  YOB: 1945  MRN: [id number]    Date of Procedure: 3/11/2024    Pre-Op Diagnosis Codes:     * Avascular necrosis of right femoral head (HCC) [M87.051]    Post-Op Diagnosis: Same       Procedure(s):  RIGHT HIP TOTAL ARTHROPLASTY    Surgeon(s):  Timothy Aguilar MD    Assistant:   Physician Assistant: Lisandro Pierce PA-C    Anesthesia: Spinal    Estimated Blood Loss (mL): less than 50     Complications: None    Specimens:   * No specimens in log *    Implants:  Implant Name Type Inv. Item Serial No.  Lot No. LRB No. Used Action   ANCHOR SUT NO2 DIA2.9MM MAXBRAID DBL LD SFT W/ TAPR NDL - [id number]  ANCHOR SUT NO2 DIA2.9MM MAXBRAID DBL LD SFT W/ TAPR NDL  [employer]- [id number] Right 1 Implanted   SHELL ACET SZ D [id number] 3 H OSSEOTI LIMIT H 2 MOBILITY G7 - [id number]  SHELL ACET SZ D [id number] 3 H OSSEOTI LIMIT H 2 MOBILITY G7  [facility] [id number] Right 1 Implanted   SCREW BNE L30MM DIA6.5MM TI ALLY DOME 2 MOBILITY LO PROF G7 - [id number]  SCREW BNE L30MM DIA6.5MM TI ALLY DOME 2 MOBILITY LO PROF G7  [facility] [id number] Right 1 Implanted   LINER ACET 36 MM HIP - [id number]  LINER ACET 36 MM HIP  [facility] [id number] Right 1 Implanted   Avenir Complete High Offset Collarless Size 3 - [id number]  Avenir Complete High Offset Collarless Size 3  [facility] [id number] Right 1 Implanted   BIOLOX DELTA FEM HEAD 36MM +3.5MM - [id number]  BIOLOX DELTA FEM HEAD 36MM +3.5MM  [facility] [id number] Right 1 Implanted         Drains: * No LDAs found *    Findings: Collapse of the femoral head and degenerative arthrosis of the hip        Detailed Description of Procedure:     Components: Vik Avenir, Biomet G7    See above implant record    Indications:   This is a patient with 79-year-old female of the avascular necrosis of the hip.  Due to their pain, debility,

## 2024-03-11 NOTE — RT PROTOCOL NOTE
Protocol order mode.        4-6 - enter or revise RT Bronchodilator order(s) to two equivalent RT bronchodilator orders with one order with BID Frequency and one order with Frequency of every 4 hours PRN wheezing or increased work of breathing using Per Protocol order mode.        7-10 - enter or revise RT Bronchodilator order(s) to two equivalent RT bronchodilator orders with one order with TID Frequency and one order with Frequency of every 4 hours PRN wheezing or increased work of breathing using Per Protocol order mode.       11-13 - enter or revise RT Bronchodilator order(s) to one equivalent RT bronchodilator order with QID Frequency and an Albuterol order with Frequency of every 4 hours PRN wheezing or increased work of breathing using Per Protocol order mode.      Greater than 13 - enter or revise RT Bronchodilator order(s) to one equivalent RT bronchodilator order with every 4 hours Frequency and an Albuterol order with Frequency of every 2 hours PRN wheezing or increased work of breathing using Per Protocol order mode.     RT to enter RT Home Evaluation for COPD & MDI Assessment order using Per Protocol order mode.    Electronically signed by Amira Zhu RCP on 3/11/2024 at 11:54 AM

## 2024-03-11 NOTE — ANESTHESIA PROCEDURE NOTES
Peripheral Block    Patient location during procedure: pre-op  Reason for block: post-op pain management and at surgeon's request  Start time: 3/11/2024 7:21 AM  End time: 3/11/2024 7:21 AM  Staffing  Performed: anesthesiologist   Anesthesiologist: Chadwick Fleming MD  Performed by: Chadwick Fleming MD  Authorized by: Chadwick Fleming MD    Preanesthetic Checklist  Completed: patient identified, IV checked, site marked, risks and benefits discussed, surgical/procedural consents, equipment checked, pre-op evaluation, timeout performed, anesthesia consent given, oxygen available and monitors applied/VS acknowledged  Peripheral Block   Patient position: supine  Prep: ChloraPrep  Provider prep: mask and sterile gloves (Sterile probe cover)  Patient monitoring: cardiac monitor, continuous pulse ox, frequent blood pressure checks and IV access  Block type: PENG  Laterality: right  Injection technique: single-shot  Guidance: nerve stimulator and ultrasound guided  Local infiltration: ropivacaine  Infiltration strength: 0.5 %  Local infiltration: ropivacaine  Dose: 20 mL    Needle   Needle type: combined needle/nerve stimulator   Needle gauge: 22 G  Needle localization: anatomical landmarks and ultrasound guidance  Needle length: 5 cm  Assessment   Injection assessment: negative aspiration for heme, no paresthesia on injection and local visualized surrounding nerve on ultrasound  Paresthesia pain: immediately resolved  Slow fractionated injection: yes  Hemodynamics: stable    Additional Notes  Ultrasound image printed and saved in patient chart.    Sterile probe cover used    Medications Administered  ropivacaine (NAROPIN) injection 0.5% - Perineural   20 mL - 3/11/2024 7:21:00 AM

## 2024-03-11 NOTE — ANESTHESIA POSTPROCEDURE EVALUATION
Department of Anesthesiology  Postprocedure Note    Patient: Jasmyn Lockhart  MRN: [id number]  YOB: 1945  Date of evaluation: 3/11/2024    Procedure Summary       Date: 03/11/24 Room / Location: [address]    Anesthesia Start: 0733 Anesthesia Stop: 0943    Procedure: RIGHT HIP TOTAL ARTHROPLASTY. VIK.  Pre Op TXA: 1950 MG po x1 90 min. prior to procedure, 200mg Celebrex po, 1000mg Tylenol po, 10mg Oxycontin po. Spinal, Adductor Canal Block. CALL [phone] FOR SURGERY TIME-CHUCK (Right) Diagnosis:       Avascular necrosis of right femoral head (HCC)      (Avascular necrosis of right femoral head (HCC) [M87.051])    Surgeons: Timothy Aguilar MD Responsible Provider: Chadwick Fleming MD    Anesthesia Type: general, regional ASA Status: 3            Anesthesia Type: No value filed.    Gregg Phase I: Gregg Score: 10    Gregg Phase II:      Anesthesia Post Evaluation    Patient location during evaluation: bedside  Patient participation: complete - patient participated  Level of consciousness: awake and awake and alert  Airway patency: patent  Nausea & Vomiting: no nausea and no vomiting  Cardiovascular status: blood pressure returned to baseline and hemodynamically stable  Respiratory status: acceptable  Hydration status: euvolemic  Pain management: adequate        No notable events documented.

## 2024-03-12 PROBLEM — R11.2 PONV (POSTOPERATIVE NAUSEA AND VOMITING): Status: ACTIVE | Noted: 2024-03-12

## 2024-03-12 PROBLEM — Z98.890 PONV (POSTOPERATIVE NAUSEA AND VOMITING): Status: ACTIVE | Noted: 2024-03-12

## 2024-03-12 LAB
ANION GAP SERPL CALCULATED.3IONS-SCNC: 12 MEQ/L (ref 9–15)
BUN SERPL-MCNC: 16 MG/DL (ref 8–23)
CALCIUM SERPL-MCNC: 8.4 MG/DL (ref 8.5–9.9)
CHLORIDE SERPL-SCNC: 109 MEQ/L (ref 95–107)
CO2 SERPL-SCNC: 21 MEQ/L (ref 20–31)
CREAT SERPL-MCNC: 1 MG/DL (ref 0.5–0.9)
ERYTHROCYTE [DISTWIDTH] IN BLOOD BY AUTOMATED COUNT: 15.1 % (ref 11.5–14.5)
GLUCOSE SERPL-MCNC: 144 MG/DL (ref 70–99)
HCT VFR BLD AUTO: 32.9 % (ref 37–47)
HGB BLD-MCNC: 10.5 G/DL (ref 12–16)
MCH RBC QN AUTO: 28 PG (ref 27–31.3)
MCHC RBC AUTO-ENTMCNC: 31.9 % (ref 33–37)
MCV RBC AUTO: 87.7 FL (ref 79.4–94.8)
PLATELET # BLD AUTO: 187 K/UL (ref 130–400)
POTASSIUM SERPL-SCNC: 4.1 MEQ/L (ref 3.4–4.9)
RBC # BLD AUTO: 3.75 M/UL (ref 4.2–5.4)
SODIUM SERPL-SCNC: 142 MEQ/L (ref 135–144)
WBC # BLD AUTO: 7.5 K/UL (ref 4.8–10.8)

## 2024-03-12 PROCEDURE — 96375 TX/PRO/DX INJ NEW DRUG ADDON: CPT

## 2024-03-12 PROCEDURE — 6360000002 HC RX W HCPCS: Performed by: NURSE PRACTITIONER

## 2024-03-12 PROCEDURE — 96374 THER/PROPH/DIAG INJ IV PUSH: CPT

## 2024-03-12 PROCEDURE — 96376 TX/PRO/DX INJ SAME DRUG ADON: CPT

## 2024-03-12 PROCEDURE — 2580000003 HC RX 258: Performed by: NURSE PRACTITIONER

## 2024-03-12 PROCEDURE — 6370000000 HC RX 637 (ALT 250 FOR IP): Performed by: NURSE PRACTITIONER

## 2024-03-12 PROCEDURE — 6370000000 HC RX 637 (ALT 250 FOR IP): Performed by: ORTHOPAEDIC SURGERY

## 2024-03-12 PROCEDURE — G0378 HOSPITAL OBSERVATION PER HR: HCPCS

## 2024-03-12 PROCEDURE — 6370000000 HC RX 637 (ALT 250 FOR IP): Performed by: INTERNAL MEDICINE

## 2024-03-12 PROCEDURE — 85027 COMPLETE CBC AUTOMATED: CPT

## 2024-03-12 PROCEDURE — 36415 COLL VENOUS BLD VENIPUNCTURE: CPT

## 2024-03-12 PROCEDURE — 80048 BASIC METABOLIC PNL TOTAL CA: CPT

## 2024-03-12 PROCEDURE — 97535 SELF CARE MNGMENT TRAINING: CPT

## 2024-03-12 PROCEDURE — 2700000000 HC OXYGEN THERAPY PER DAY

## 2024-03-12 PROCEDURE — 97116 GAIT TRAINING THERAPY: CPT

## 2024-03-12 PROCEDURE — 6360000002 HC RX W HCPCS: Performed by: ORTHOPAEDIC SURGERY

## 2024-03-12 PROCEDURE — 2580000003 HC RX 258: Performed by: ORTHOPAEDIC SURGERY

## 2024-03-12 RX ORDER — METOCLOPRAMIDE HYDROCHLORIDE 5 MG/ML
5 INJECTION INTRAMUSCULAR; INTRAVENOUS ONCE
Status: DISCONTINUED | OUTPATIENT
Start: 2024-03-12 | End: 2024-03-13 | Stop reason: HOSPADM

## 2024-03-12 RX ADMIN — ACETAMINOPHEN 325MG 650 MG: 325 TABLET ORAL at 20:50

## 2024-03-12 RX ADMIN — OXYCODONE 5 MG: 5 TABLET ORAL at 00:02

## 2024-03-12 RX ADMIN — ACETAMINOPHEN 325MG 650 MG: 325 TABLET ORAL at 06:15

## 2024-03-12 RX ADMIN — CARVEDILOL 6.25 MG: 3.12 TABLET, FILM COATED ORAL at 20:42

## 2024-03-12 RX ADMIN — ROSUVASTATIN CALCIUM 40 MG: 40 TABLET, FILM COATED ORAL at 20:42

## 2024-03-12 RX ADMIN — PANTOPRAZOLE SODIUM 40 MG: 40 TABLET, DELAYED RELEASE ORAL at 09:50

## 2024-03-12 RX ADMIN — AMLODIPINE BESYLATE 5 MG: 5 TABLET ORAL at 20:42

## 2024-03-12 RX ADMIN — ACETAMINOPHEN 325MG 650 MG: 325 TABLET ORAL at 13:28

## 2024-03-12 RX ADMIN — DOCUSATE SODIUM AND SENNOSIDES 1 TABLET: 8.6; 5 TABLET, FILM COATED ORAL at 09:47

## 2024-03-12 RX ADMIN — ASPIRIN 81 MG: 81 TABLET, COATED ORAL at 09:51

## 2024-03-12 RX ADMIN — LEVOTHYROXINE SODIUM 25 MCG: 0.03 TABLET ORAL at 06:16

## 2024-03-12 RX ADMIN — TRANEXAMIC ACID 1950 MG: 650 TABLET ORAL at 06:15

## 2024-03-12 RX ADMIN — CARVEDILOL 6.25 MG: 3.12 TABLET, FILM COATED ORAL at 09:48

## 2024-03-12 RX ADMIN — OXYCODONE 5 MG: 5 TABLET ORAL at 04:21

## 2024-03-12 RX ADMIN — OXYCODONE 5 MG: 5 TABLET ORAL at 09:51

## 2024-03-12 RX ADMIN — SODIUM CHLORIDE, PRESERVATIVE FREE 10 ML: 5 INJECTION INTRAVENOUS at 20:44

## 2024-03-12 RX ADMIN — KETOROLAC TROMETHAMINE 7.5 MG: 15 INJECTION, SOLUTION INTRAMUSCULAR; INTRAVENOUS at 00:03

## 2024-03-12 RX ADMIN — CEFAZOLIN 2000 MG: 2 INJECTION, POWDER, FOR SOLUTION INTRAMUSCULAR; INTRAVENOUS at 00:07

## 2024-03-12 RX ADMIN — DOCUSATE SODIUM AND SENNOSIDES 1 TABLET: 8.6; 5 TABLET, FILM COATED ORAL at 20:42

## 2024-03-12 RX ADMIN — ASPIRIN 81 MG: 81 TABLET, COATED ORAL at 20:42

## 2024-03-12 RX ADMIN — KETOROLAC TROMETHAMINE 7.5 MG: 15 INJECTION, SOLUTION INTRAMUSCULAR; INTRAVENOUS at 06:16

## 2024-03-12 RX ADMIN — ACETAMINOPHEN 325MG 650 MG: 325 TABLET ORAL at 00:02

## 2024-03-12 RX ADMIN — BENZOCAINE AND MENTHOL 1 LOZENGE: 15; 3.6 LOZENGE ORAL at 16:36

## 2024-03-12 RX ADMIN — HYDROXYZINE HYDROCHLORIDE 10 MG: 10 TABLET ORAL at 09:51

## 2024-03-12 RX ADMIN — SODIUM CHLORIDE: 9 INJECTION, SOLUTION INTRAVENOUS at 00:06

## 2024-03-12 RX ADMIN — OXYCODONE 5 MG: 5 TABLET ORAL at 16:35

## 2024-03-12 RX ADMIN — ONDANSETRON 4 MG: 2 INJECTION INTRAMUSCULAR; INTRAVENOUS at 06:31

## 2024-03-12 RX ADMIN — FAMOTIDINE 20 MG: 20 TABLET, FILM COATED ORAL at 20:42

## 2024-03-12 ASSESSMENT — PAIN DESCRIPTION - DESCRIPTORS
DESCRIPTORS: ACHING
DESCRIPTORS: ACHING
DESCRIPTORS: ACHING;THROBBING

## 2024-03-12 ASSESSMENT — PAIN SCALES - GENERAL
PAINLEVEL_OUTOF10: 4
PAINLEVEL_OUTOF10: 8
PAINLEVEL_OUTOF10: 7
PAINLEVEL_OUTOF10: 8
PAINLEVEL_OUTOF10: 10
PAINLEVEL_OUTOF10: 4
PAINLEVEL_OUTOF10: 7

## 2024-03-12 ASSESSMENT — PAIN DESCRIPTION - LOCATION
LOCATION: LEG
LOCATION: HEAD
LOCATION: HIP;LEG

## 2024-03-12 ASSESSMENT — PAIN DESCRIPTION - ORIENTATION
ORIENTATION: RIGHT
ORIENTATION: ANTERIOR;LEFT;RIGHT
ORIENTATION: RIGHT

## 2024-03-12 NOTE — CARE COORDINATION
Met with pt at bedside to discuss discharge planning. Pt from home alone, owns walker, no VA, no HD, owns nebulizer. Pt states she feels she needs to go get skilled therapy and would like a SNF. [facility] of choice offered and pt would like [facility]. Referral sent to G[facility] with [facility].

## 2024-03-13 VITALS
HEART RATE: 52 BPM | BODY MASS INDEX: 32.73 KG/M2 | RESPIRATION RATE: 18 BRPM | TEMPERATURE: 97.7 F | WEIGHT: 191.7 LBS | SYSTOLIC BLOOD PRESSURE: 150 MMHG | HEIGHT: 64 IN | OXYGEN SATURATION: 97 % | DIASTOLIC BLOOD PRESSURE: 65 MMHG

## 2024-03-13 PROCEDURE — 6370000000 HC RX 637 (ALT 250 FOR IP): Performed by: ORTHOPAEDIC SURGERY

## 2024-03-13 PROCEDURE — 6370000000 HC RX 637 (ALT 250 FOR IP): Performed by: INTERNAL MEDICINE

## 2024-03-13 PROCEDURE — 6370000000 HC RX 637 (ALT 250 FOR IP): Performed by: NURSE PRACTITIONER

## 2024-03-13 PROCEDURE — G0378 HOSPITAL OBSERVATION PER HR: HCPCS

## 2024-03-13 PROCEDURE — 97116 GAIT TRAINING THERAPY: CPT

## 2024-03-13 RX ORDER — OXYCODONE HYDROCHLORIDE 5 MG/1
5 TABLET ORAL EVERY 6 HOURS PRN
Qty: 1 TABLET | Refills: 0 | Status: SHIPPED | OUTPATIENT
Start: 2024-03-13 | End: 2024-03-14

## 2024-03-13 RX ORDER — OXYCODONE HYDROCHLORIDE 5 MG/1
5 TABLET ORAL EVERY 6 HOURS PRN
Qty: 28 TABLET | Refills: 0 | Status: SHIPPED | DISCHARGE
Start: 2024-03-13 | End: 2024-03-13

## 2024-03-13 RX ADMIN — FAMOTIDINE 20 MG: 20 TABLET, FILM COATED ORAL at 09:20

## 2024-03-13 RX ADMIN — ASPIRIN 81 MG: 81 TABLET, COATED ORAL at 09:20

## 2024-03-13 RX ADMIN — LEVOTHYROXINE SODIUM 25 MCG: 0.03 TABLET ORAL at 06:05

## 2024-03-13 RX ADMIN — CARVEDILOL 6.25 MG: 3.12 TABLET, FILM COATED ORAL at 09:19

## 2024-03-13 RX ADMIN — BENZOCAINE AND MENTHOL 1 LOZENGE: 15; 3.6 LOZENGE ORAL at 17:27

## 2024-03-13 RX ADMIN — ACETAMINOPHEN 325MG 650 MG: 325 TABLET ORAL at 13:28

## 2024-03-13 RX ADMIN — ACETAMINOPHEN 325MG 650 MG: 325 TABLET ORAL at 06:06

## 2024-03-13 RX ADMIN — OXYCODONE 5 MG: 5 TABLET ORAL at 04:35

## 2024-03-13 RX ADMIN — BENZOCAINE AND MENTHOL 1 LOZENGE: 15; 3.6 LOZENGE ORAL at 09:21

## 2024-03-13 RX ADMIN — ACETAMINOPHEN 325MG 650 MG: 325 TABLET ORAL at 01:49

## 2024-03-13 RX ADMIN — CYCLOBENZAPRINE HYDROCHLORIDE 5 MG: 5 TABLET, FILM COATED ORAL at 09:20

## 2024-03-13 RX ADMIN — PANTOPRAZOLE SODIUM 40 MG: 40 TABLET, DELAYED RELEASE ORAL at 09:20

## 2024-03-13 RX ADMIN — OXYCODONE 5 MG: 5 TABLET ORAL at 13:27

## 2024-03-13 RX ADMIN — BENZOCAINE AND MENTHOL 1 LOZENGE: 15; 3.6 LOZENGE ORAL at 06:08

## 2024-03-13 RX ADMIN — DOCUSATE SODIUM AND SENNOSIDES 1 TABLET: 8.6; 5 TABLET, FILM COATED ORAL at 09:21

## 2024-03-13 ASSESSMENT — PAIN DESCRIPTION - DESCRIPTORS
DESCRIPTORS: ACHING
DESCRIPTORS: SORE
DESCRIPTORS: ACHING

## 2024-03-13 ASSESSMENT — PAIN DESCRIPTION - ORIENTATION
ORIENTATION: RIGHT

## 2024-03-13 ASSESSMENT — PAIN DESCRIPTION - LOCATION
LOCATION: LEG;HIP
LOCATION: HIP
LOCATION: HIP
LOCATION: THROAT
LOCATION: LEG
LOCATION: HIP
LOCATION: LEG

## 2024-03-13 ASSESSMENT — PAIN SCALES - GENERAL
PAINLEVEL_OUTOF10: 8
PAINLEVEL_OUTOF10: 7
PAINLEVEL_OUTOF10: 8
PAINLEVEL_OUTOF10: 7
PAINLEVEL_OUTOF10: 7
PAINLEVEL_OUTOF10: 6
PAINLEVEL_OUTOF10: 5

## 2024-03-13 NOTE — DISCHARGE INSTR - COC
Continuity of Care Form    Patient Name: Jasmyn Lockhart   :  1945  MRN:  [id number]    Admit date:  3/11/2024  Discharge date:  3-    Code Status Order: Full Code   Advance Directives:   Advance Care Flowsheet Documentation       Date/Time Healthcare Directive Type of Healthcare Directive Copy in Chart Healthcare Agent Appointed Healthcare Agent's Name Healthcare Agent's Phone Number    [phone] No, patient does not have an advance directive for healthcare treatment -- -- -- -- --            Admitting Physician:  Timothy Aguilar MD  PCP: Wenceslao Sutton MD    Discharging Nurse: Priyanka JEROME  Discharging Hospital Unit/Room#: W264/W264-01  Discharging Unit Phone Number: [phone]    Emergency Contact:   Extended Emergency Contact Information  Primary Emergency Contact: Carmella Lockhart  Home Phone: [phone]  Relation: Child  Preferred language: English   needed? No  Secondary Emergency Contact: Weston Lockhart   [facility]  Home Phone: [phone]  Work Phone: [phone]  Mobile Phone: [phone]  Relation: Child    Past Surgical History:  Past Surgical History:   Procedure Laterality Date    ARTERIAL BYPASS SURGRY      triple / cardiac    BREAST SURGERY Bilateral     reduction and cysts biopsy / has had several biopsies    CARDIAC SURGERY      3 vessel bypass    CATARACT REMOVAL WITH IMPLANT      CHOLECYSTECTOMY      COLONOSCOPY      CT ABSCESS DRAIN SUBCUTANEOUS  2022    CT ABSCESS DRAIN SUBCUTANEOUS    CYSTOURETHROSCOPY  2017    FLEXIBLE / due to UTI    ENDOSCOPY, COLON, DIAGNOSTIC      EYE SURGERY      [place] with IOL OU    HAND SURGERY Right     plate fracture wrist / hardware still remains    HIP SURGERY Right 12/10/2021    RIGHT HIP  PERCUTANEOUS SCREW [place] [facility] [place]    HYSTERECTOMY (CERVIX STATUS UNKNOWN)      JOINT REPLACEMENT  2017    LTKR    OTHER SURGICAL HISTORY      head fatty tumors removed    [place]

## 2024-03-13 NOTE — DISCHARGE INSTR - DIET

## 2024-03-13 NOTE — CARE COORDINATION
Per Makenna from [facility] we have precert for pt. Will need [phone]. Updated bedside nurse.   Transport scheduled for 7pm  by [facility], updated pt and nurse.

## 2024-03-14 NOTE — PROGRESS NOTES
03/11/24 1154   RT Protocol   History Pulmonary Disease 0   Respiratory pattern 0   Breath sounds 0   Cough 0   Indications for Bronchodilator Therapy None   Bronchodilator Assessment Score 0       
CLINICAL PHARMACY NOTE: MEDS TO BEDS    Total # of Prescriptions Filled: 5   The following medications were delivered to the patient:  Oxycodone 5 mg Tab  Hydroxyzine 10 mg Tab  Aspirin 81 mg Tab  Stool Softener 8.6-50 mg Tab  Cyclobenzaprine 5 mg Tab    Additional Documentation:    
Consult received for pt, repeated attempts to visit patient, who has either been asleep or with her provider. Spiritual Health remains available, and will attempt to visit the patient again another day and time. Thank you for the consult.   
Hip surgery    Progress Note    Subjective:     Post-Operative Day: 2 Status Post right Hip Arthroplasty  Systemic or Specific Complaints:No Complaints    Objective:     CURRENT VITALS:  /78   Pulse 60   Temp 98.4 °F (36.9 °C) (Oral)   Resp 16   Ht 1.626 m (5' 4\")   Wt 87 kg (191 lb 11.2 oz)   SpO2 99%   BMI 32.91 kg/m²     General: alert, appears stated age, and cooperative   Wound: Wound clean and dry no evidence of infection.   Motion: Painless Range of Motion   DVT Exam: No evidence of DVT seen on physical exam.       NVI in lower extremity. Thigh swollen but soft. Moving foot and ankle.      Data Review  Recent Labs     03/12/24  0427   WBC 7.5   RBC 3.75*   HGB 10.5*   HCT 32.9*   MCV 87.7   MCH 28.0   MCHC 31.9*   RDW 15.1*          Assessment:     Status Post right Hip Arthroplasty. Doing well postoperatively.     Plan:      1: Continues current post-op course :  2:  Continue Deep venous thrombosis prophylaxis  3:  Continue physical therapy  4:  Continue Pain Control      
Hospitalist Progress Note      Date of Admission: 3/11/2024  Chief Complaint:    No chief complaint on file.    Subjective:  No new complaints.  No nausea, vomiting, chest pain, or headache      Medications:    Infusion Medications    sodium chloride      sodium chloride       Scheduled Medications    pantoprazole  40 mg Oral Daily    levothyroxine  25 mcg Oral Daily    carvedilol  6.25 mg Oral BID    amLODIPine  5 mg Oral Daily    famotidine  20 mg Oral BID    rosuvastatin  40 mg Oral Q24H    ceFAZolin (ANCEF) IVPB  2,000 mg IntraVENous Q8H    sodium chloride flush  5-40 mL IntraVENous 2 times per day    acetaminophen  650 mg Oral Q6H    ketorolac  7.5 mg IntraVENous Q6H    sennosides-docusate sodium  1 tablet Oral BID    aspirin  81 mg Oral BID    tranexamic acid  1,950 mg Oral Q8H    [START ON 3/12/2024] tranexamic acid  1,950 mg Oral Once     PRN Meds: nitroGLYCERIN, albuterol, carboxymethylcellulose PF, benzonatate, albuterol sulfate HFA, morphine, ondansetron **OR** ondansetron, sodium chloride flush, sodium chloride, oxyCODONE **OR** oxyCODONE, aluminum & magnesium hydroxide-simethicone, hydrOXYzine HCl, cyclobenzaprine    Intake/Output Summary (Last 24 hours) at 3/11/2024 1309  Last data filed at 3/11/2024 0943  Gross per 24 hour   Intake 1300 ml   Output 50 ml   Net 1250 ml     Exam:  /66   Pulse 52   Temp (!) 96.5 °F (35.8 °C) (Temporal)   Resp 18   Ht 1.626 m (5' 4\")   Wt 87 kg (191 lb 11.2 oz)   SpO2 100%   BMI 32.91 kg/m²   Head: Normocephalic, atraumatic  Sclera clear  Neck JVD flat  Lungs: normal effort of breathing    Labs:   No results for input(s): \"WBC\", \"HGB\", \"HCT\", \"PLT\" in the last 72 hours.  No results for input(s): \"NA\", \"K\", \"CL\", \"CO2\", \"BUN\", \"CREATININE\", \"CALCIUM\", \"PHOS\", \"AST\", \"ALT\", \"BILIDIR\", \"BILITOT\", \"ALKPHOS\" in the last 72 hours.    Invalid input(s): \"MAGNES\"  No results for input(s): \"INR\" in the last 72 hours.  No results for input(s): \"CKTOTAL\", \"TROPONINI\" in 
Hospitalist Progress Note      Date of Admission: 3/11/2024  Chief Complaint:    No chief complaint on file.    Subjective:  No new complaints.  No nausea, vomiting, chest pain, or headache      Medications:    Infusion Medications    sodium chloride Stopped (03/12/24 0950)    sodium chloride       Scheduled Medications    metoclopramide  5 mg IntraVENous Once    pantoprazole  40 mg Oral Daily    levothyroxine  25 mcg Oral Daily    carvedilol  6.25 mg Oral BID    amLODIPine  5 mg Oral Daily    famotidine  20 mg Oral BID    rosuvastatin  40 mg Oral Q24H    sodium chloride flush  5-40 mL IntraVENous 2 times per day    acetaminophen  650 mg Oral Q6H    sennosides-docusate sodium  1 tablet Oral BID    aspirin  81 mg Oral BID     PRN Meds: nitroGLYCERIN, albuterol, carboxymethylcellulose PF, benzonatate, albuterol sulfate HFA, morphine, ondansetron **OR** ondansetron, sodium chloride flush, sodium chloride, oxyCODONE **OR** oxyCODONE, aluminum & magnesium hydroxide-simethicone, hydrOXYzine HCl, cyclobenzaprine, phenol, benzocaine-menthol  No intake or output data in the 24 hours ending 03/12/24 8593    Exam:  /60   Pulse 53   Temp 98.1 °F (36.7 °C) (Oral)   Resp 16   Ht 1.626 m (5' 4\")   Wt 87 kg (191 lb 11.2 oz)   SpO2 96%   BMI 32.91 kg/m²   Head: Normocephalic, atraumatic  Sclera clear  Neck JVD flat  Lungs: normal effort of breathing    Labs:   Recent Labs     03/12/24  0427   WBC 7.5   HGB 10.5*   HCT 32.9*        Recent Labs     03/12/24  0427      K 4.1   *   CO2 21   BUN 16   CREATININE 1.00*   CALCIUM 8.4*     No results for input(s): \"INR\" in the last 72 hours.  No results for input(s): \"CKTOTAL\", \"TROPONINI\" in the last 72 hours.  Radiology:  XR PELVIS (1-2 VIEWS)   Final Result   Status post left hip arthroplasty.           Assessment/Plan:    Right THR    HTN: monitor BP, adjust meds as needed    Hypothyroid: continue home meds    HPL: statin      ALEXX FREITAS MD ,MD    
Lower middle back dressing changed, abrasion cleansed with saline, dried with DSD, pluroGel applied, 2x2 and mepiles reapplied, right scapula dressing changed, abrasion cleansed with saline, dried with DSD, pluroGel applied, 2x2 and mepilex reapplied, all as ordered, patient tolerated without complaint, repositioned for comfort with pillows  
[facility] MED SURG TREATMENT NOTE     Date: 3/12/2024  Patient Name: Jasmyn Lockhart        MRN: [id number]  Account: [id number]   : 1945  (79 y.o.)  Room: [place] [address]    Chart Review:    Restrictions  Restrictions/Precautions  Restrictions/Precautions: Fall Risk, Weight Bearing, Surgical protocol  Lower Extremity Weight Bearing Restrictions  Right Lower Extremity Weight Bearing: Weight Bearing As Tolerated  Position Activity Restriction  Other position/activity restrictions: Posterior approach with anterior hip precautions     Safety:  Safety Devices  Type of Devices: All fall risk precautions in place;Bed alarm in place;Call light within reach;Left in bed;Nurse notified    Patient's birthday verified: Yes    Subjective:    Pain  Pain at start of treatment: Yes: 10/10 - Head/\"Headache\"; 5/10 - Right Hip     Pain at end of treatment: Yes: 10/10 - Head/\"Headache\" ; 5/10 - Right Hip     Nursing notified: Yes  RN: Veronique  Intervention: Cold applied Repositioned  Notified RN that pt requests to speak with her regarding headache.     Objective: Pt in bed resting upon arrival. Pt initially declining therapy reporting significant headache. When therapist educated pt on role of therapy, pt verbalized understanding. Pt went on to describe the LB dressing AE she owns and uses (sock aid, reacher, etc.). Pt declined to get dressed, however, requests assistance using bedside commode. Pt completed as follows.     ADL Status:  Grooming: Setup  Grooming Skilled Clinical Factors: Pt washed face and hands using wet wash cloth  UE Dressing: Unable to assess  UE Dressing Skilled Clinical Factors: Hospital Gown only  Toileting: Stand by assistance  Skin Care: Bath wipes    ROBERT Hose donned: Yes      Bed Mobility  Supine to Sit: Stand by assistance  Sit to Supine: Moderate assistance (Assist to bring BLEs into bed)  Scooting: Stand by assistance  Bed Mobility Comments: HOB flat, Increased time and 
Physical Therapy  Facility/Department: [facility] MED SURG W264/W264-01  Physical Therapy Discharge      NAME: Jasmyn Lockhart    : 1945 (79 y.o.)  MRN: [id number]    Account: [id number]  Gender: female      Patient has been discharged from acute care hospital. DC patient from current PT program.      Electronically signed by Josey Solis PT on 3/14/24 at 2:00 PM EDT      
Physical Therapy Med Surg Initial Assessment  Facility/Department: [address] ORTHO TELE  Room: [facility]       NAME: Jasmyn Lockhart  : 1945 (79 y.o.)  MRN: [id number]  CODE STATUS: Full Code    Date of Service: 3/11/2024    Patient Diagnosis(es): Avascular necrosis of right femoral head (HCC) [M87.051]  Status post total hip replacement, right [Z96.641]  Aseptic necrosis of bone of hip, right (HCC) [M87.051]   No chief complaint on file.    Patient Active Problem List    Diagnosis Date Noted    Chest pain 2022    Status post total hip replacement, right 2024    Aseptic necrosis of bone of hip, right (HCC) 2024    HTN 12/10/2021    HLD 12/10/2021    GERD 12/10/2021    Closed displaced fracture of right femoral neck (HCC) 2021    Urge incontinence of urine 2019    Dizziness 2019    Ataxia 2019    Acute cystitis without hematuria 2019    Rectocele 2019    Vaginal vault prolapse     [place] grade 2 rectocele 2019    OAB (overactive bladder) 2018    Vaginal irritation 2018    Vaginal enterocele 2018    Gastric polyp     Dysphagia     Congenital malformation of esophagus     Hiatal hernia     Benign neoplasm of sigmoid colon     Diverticulosis of large intestine without diverticulitis     Family history of colon cancer         Past Medical History:   Diagnosis Date    CAD (coronary artery disease)     3 vessel cardiac bypass    Environmental and seasonal allergies     GERD (gastroesophageal reflux disease)     GI bleed     with LTKR / blood transfusion    History of blood transfusion 2017    post op LTKR    History of blood transfusion     post op cardiac bypass    Hyperlipidemia     meds > 15 yrs    Hypertension     meds > 20 yrs    Myocardial infarct (HCC)     followed by 3 vessal cardiac bypass    Pityriasis rosea     PONV (postoperative nausea and vomiting)     Prolonged emergence from general anesthesia     
Pt to room 264 via bed with belongings, dtr Carmella notified of room number, pt resting comfortably, denies pain, MSP's intact, no distress noted.  
Report called to Estrella hernandez [facility]   
,MD    
vomiting)     Prolonged emergence from general anesthesia     Thyroid disease      Past Surgical History:   Procedure Laterality Date    ARTERIAL BYPASS SURGRY      triple / cardiac    BREAST SURGERY Bilateral     reduction and cysts biopsy / has had several biopsies    CARDIAC SURGERY  2005    3 vessel bypass    CATARACT REMOVAL WITH IMPLANT      CHOLECYSTECTOMY      COLONOSCOPY      CT ABSCESS DRAIN SUBCUTANEOUS  8/23/2022    CT ABSCESS DRAIN SUBCUTANEOUS    CYSTOURETHROSCOPY  04/18/2017    FLEXIBLE / due to UTI    ENDOSCOPY, COLON, DIAGNOSTIC      EYE SURGERY      [place] with IOL OU    HAND SURGERY Right     plate fracture wrist / hardware still remains    HIP SURGERY Right 12/10/2021    RIGHT HIP  PERCUTANEOUS SCREW FIXATION performed by Weston Pablo MD at [facility] OR    HYSTERECTOMY (CERVIX STATUS UNKNOWN)      JOINT REPLACEMENT  2017    LTKR    OTHER SURGICAL HISTORY      head fatty tumors removed    OR COLORECTAL SCRN; HI RISK IND N/A 6/11/2018    COLONOSCOPY performed by Chuck Wells MD at [facility]    OR DELIGATION URETER N/A 1/31/2019    WITH S.S.L.S performed by Marika Messer MD at [facility] OR    OR ESOPHAGOGASTRODUODENOSCOPY TRANSORAL DIAGNOSTIC N/A 6/11/2018    EGD ESOPHAGOGASTRODUODENOSCOPY WITH DILATION performed by Chuck Wells MD at [facility]    TOTAL KNEE ARTHROPLASTY Left 08/2016    VAGINA SURGERY N/A 1/31/2019    RECTOCELE REPAIR WITH ENTEROCELE REPAIR performed by Marika Messer MD at [facility] OR       Chart Reviewed: Yes  Family / Caregiver Present: No    Restrictions:  Restrictions/Precautions: Fall Risk;Weight Bearing;Surgical protocol  Lower Extremity Weight Bearing Restrictions  Right Lower Extremity Weight Bearing: Weight Bearing As Tolerated  Position Activity Restriction  Other position/activity restrictions: Posterior approach with anterior hip precautions    SUBJECTIVE:   Subjective: \"I'm still recovering from my back surgery.\"    Pain  Pain: \"8 or 10, its hard 
5-40 mL, IntraVENous, 2 times per day  sodium chloride flush 0.9 % injection 5-40 mL, 5-40 mL, IntraVENous, PRN  0.9 % sodium chloride infusion, , IntraVENous, PRN  acetaminophen (TYLENOL) tablet 650 mg, 650 mg, Oral, Q6H  oxyCODONE (ROXICODONE) immediate release tablet 2.5 mg, 2.5 mg, Oral, Q4H PRN **OR** oxyCODONE (ROXICODONE) immediate release tablet 5 mg, 5 mg, Oral, Q4H PRN  sennosides-docusate sodium (SENOKOT-S) 8.6-50 MG tablet 1 tablet, 1 tablet, Oral, BID  aluminum & magnesium hydroxide-simethicone (MAALOX) 200-200-20 MG/5ML suspension 15 mL, 15 mL, Oral, Q6H PRN  hydrOXYzine HCl (ATARAX) tablet 10 mg, 10 mg, Oral, Q8H PRN  aspirin EC tablet 81 mg, 81 mg, Oral, BID  cyclobenzaprine (FLEXERIL) tablet 5 mg, 5 mg, Oral, TID PRN  phenol 1.4 % mouth spray 1 spray, 1 spray, Mouth/Throat, Q2H PRN  benzocaine-menthol (CEPACOL SORE THROAT) lozenge 1 lozenge, 1 lozenge, Oral, Q2H PRN    ASSESSMENT AND PLAN      Postop day 1 status post right total hip arthroplasty    Mobilize in therapy  Operative findings discussed with patient  Discharge planning for skilled nursing placement  Current pain management regimen  Continue aspirin for DVT prophylaxis  Questions answered  
    Goals  Long Term Goals  Long Term Goal 1: Pt to complete bed mobility indep  Long Term Goal 2: Pt to complete transfers with indep  Long Term Goal 3: Pt to manage 50-150ft with LRD and indep  Long Term Goal 4: Pt to complete 6 steps with HR and indep  Long Term Goal 5: Pt to complete HEP with indep    PLAN    General Plan: 2 times a day 7 days a week            Therapy Time   Individual   Time In 1307   Time Out 1317   Minutes 10     Minutes: 10  Transfers: 2  Gait: 8          Shana Moise PTA, 03/13/24 at 2:20 PM         Definitions for assistance levels  Independent = pt does not require any physical supervision or assistance from another person for activity completion. Device may be needed.  Stand by assistance = pt requires verbal cues or instructions from another person, close to but not touching, to perform the activity  Minimal assistance= pt performs 75% or more of the activity; assistance is required to complete the activity  Moderate assistance= pt performs 50% of the activity; assistance is required to complete the activity  Maximal assistance = pt performs 25% of the activity; assistance is required to complete the activity  Dependent = pt requires total physical assistance to accomplish the task  
light within reach     [facility] (6 CLICK) BASIC MOBILITY  AM-PAC Inpatient Mobility Raw Score : 17     Therapy Time   Individual   Time In 1308   Time Out 1331   Minutes 23      BM/ Transfers: 10   Gait:13       ELLYN AMANDA BERGERON, 03/12/24 at 1:37 PM         Definitions for assistance levels  Independent = pt does not require any physical supervision or assistance from another person for activity completion. Device may be needed.  Stand by assistance = pt requires verbal cues or instructions from another person, close to but not touching, to perform the activity  Minimal assistance= pt performs 75% or more of the activity; assistance is required to complete the activity  Moderate assistance= pt performs 50% of the activity; assistance is required to complete the activity  Maximal assistance = pt performs 25% of the activity; assistance is required to complete the activity  Dependent = pt requires total physical assistance to accomplish the task  
precautions in place, Call light within reach, Chair alarm in place, Left in chair     [facility] (6 CLICK) BASIC MOBILITY  AM-PAC Inpatient Mobility Raw Score : 17     Therapy Time   Individual   Time In 0819   Time Out 0836   Minutes 17      Transfers: 2   Therex: 5   Gait: 10    ELLYN AMANDA BERGERON, 03/13/24 at 9:18 AM         Definitions for assistance levels  Independent = pt does not require any physical supervision or assistance from another person for activity completion. Device may be needed.  Stand by assistance = pt requires verbal cues or instructions from another person, close to but not touching, to perform the activity  Minimal assistance= pt performs 75% or more of the activity; assistance is required to complete the activity  Moderate assistance= pt performs 50% of the activity; assistance is required to complete the activity  Maximal assistance = pt performs 25% of the activity; assistance is required to complete the activity  Dependent = pt requires total physical assistance to accomplish the task  
  Independent/Mod I = Pt. is able to perform task with no assistance but may require a device   Stand by assistance = Pt. does not perform task at an independent level but does not need physical assistance, requires verbal cues  Minimal, Moderate, Maximal Assistance = Pt. requires physical assistance (25%, 50%, 75% assist from helper) for task but is able to actively participate in task   Dependent = Pt. requires total assistance with task and is not able to actively participate with task completion     Plan:  Occupational Therapy Plan  Times Per Week: 1-4x  Therapy Duration:  (Length of acute stay)  Current Treatment Recommendations: Balance training, Strengthening, Functional mobility training, Endurance training, Pain management, Safety education & training, Equipment evaluation, education, & procurement, Patient/Caregiver education & training, Self-Care / ADL, Home management training, Cognitive/Perceptual training    Goals:   Patient will:    - Improve functional endurance to tolerate/complete 30 mins of ADL's  - Be Mod I in UB ADLs   - Be Mod I in LB ADLs  - Be Mod I in ADL transfers without LOB  - Be Mod I  in toileting tasks  - Improve B UE strength and endurance to 4/5 in order to participate in self-care activities as projected.  - Access appropriate D/C site with as few architectural barriers as possible.  - Sequence self-care tasks with no verbal cues for safety    Patient Goal: Patient goals : \"I want to feel better and get this pain out of the way\"     Discussed and agreed upon: Yes Comments:       Therapy Time:   Individual   Time In 1516   Time Out 1528   Minutes 12     Eval: 12 minutes     Electronically signed by:    ASHLEY Meléndez/L,   3/11/2024, 4:13 PM

## 2024-03-15 ENCOUNTER — TELEPHONE (OUTPATIENT)
Dept: ORTHOPEDIC SURGERY | Facility: CLINIC | Age: 79
End: 2024-03-15
Payer: MEDICARE

## 2024-03-15 ENCOUNTER — OFFICE VISIT (OUTPATIENT)
Dept: GERIATRIC MEDICINE | Age: 79
End: 2024-03-15

## 2024-03-15 DIAGNOSIS — Z96.641 S/P TOTAL RIGHT HIP ARTHROPLASTY: ICD-10-CM

## 2024-03-15 DIAGNOSIS — G89.18 ACUTE POST-OPERATIVE PAIN: Primary | ICD-10-CM

## 2024-03-15 DIAGNOSIS — M62.81 MUSCLE WEAKNESS (GENERALIZED): ICD-10-CM

## 2024-03-15 DIAGNOSIS — R26.2 DIFFICULTY IN WALKING: ICD-10-CM

## 2024-03-15 LAB
ANION GAP SERPL CALCULATED.3IONS-SCNC: 12 MEQ/L (ref 9–15)
BUN SERPL-MCNC: 16 MG/DL (ref 8–23)
CALCIUM SERPL-MCNC: 9 MG/DL (ref 8.5–9.9)
CHLORIDE SERPL-SCNC: 106 MEQ/L (ref 95–107)
CO2 SERPL-SCNC: 24 MEQ/L (ref 20–31)
CREAT SERPL-MCNC: 0.98 MG/DL (ref 0.5–0.9)
ERYTHROCYTE [DISTWIDTH] IN BLOOD BY AUTOMATED COUNT: 15.3 % (ref 11.5–14.5)
GLUCOSE SERPL-MCNC: 102 MG/DL (ref 70–99)
HCT VFR BLD AUTO: 36 % (ref 37–47)
HGB BLD-MCNC: 11.2 G/DL (ref 12–16)
MCH RBC QN AUTO: 27.6 PG (ref 27–31.3)
MCHC RBC AUTO-ENTMCNC: 31.1 % (ref 33–37)
MCV RBC AUTO: 88.7 FL (ref 79.4–94.8)
PLATELET # BLD AUTO: 208 K/UL (ref 130–400)
POTASSIUM SERPL-SCNC: 4.5 MEQ/L (ref 3.4–4.9)
RBC # BLD AUTO: 4.06 M/UL (ref 4.2–5.4)
SODIUM SERPL-SCNC: 142 MEQ/L (ref 135–144)
WBC # BLD AUTO: 6.8 K/UL (ref 4.8–10.8)

## 2024-03-15 NOTE — TELEPHONE ENCOUNTER
Patient was called - no answer.  A VM was left for her that she will need to locate the meds that were given to her upon discharge.  She was advised to call back with any other questions.

## 2024-03-15 NOTE — TELEPHONE ENCOUNTER
PATIENT WOULD LIKE A CALL HAS QUESTIONS ABOUT WHAT SHE SHOULD DO,HER MEDS WERE LEFT AT THE HOSPITAL AN NO LONGER HAS ANY

## 2024-03-18 ENCOUNTER — OFFICE VISIT (OUTPATIENT)
Dept: GERIATRIC MEDICINE | Age: 79
End: 2024-03-18
Payer: MEDICARE

## 2024-03-18 ENCOUNTER — TELEPHONE (OUTPATIENT)
Dept: ORTHOPEDIC SURGERY | Facility: CLINIC | Age: 79
End: 2024-03-18
Payer: MEDICARE

## 2024-03-18 DIAGNOSIS — Z47.89 ORTHOPEDIC AFTERCARE: Primary | ICD-10-CM

## 2024-03-18 DIAGNOSIS — I10 HYPERTENSION, UNSPECIFIED TYPE: ICD-10-CM

## 2024-03-18 DIAGNOSIS — E03.9 HYPOTHYROIDISM, UNSPECIFIED TYPE: ICD-10-CM

## 2024-03-18 PROCEDURE — 1123F ACP DISCUSS/DSCN MKR DOCD: CPT | Performed by: INTERNAL MEDICINE

## 2024-03-18 PROCEDURE — 99308 SBSQ NF CARE LOW MDM 20: CPT | Performed by: INTERNAL MEDICINE

## 2024-03-18 NOTE — TELEPHONE ENCOUNTER
Patient was called.  She is currently in a a SNF and is receiving her pain medications as prescribed.  She is concerned about her initial RX that was filled just before her discharge, she states someone took them from [facility] before she was discharged.

## 2024-03-19 ENCOUNTER — OFFICE VISIT (OUTPATIENT)
Dept: GERIATRIC MEDICINE | Age: 79
End: 2024-03-19
Payer: MEDICARE

## 2024-03-19 DIAGNOSIS — J02.9 SORE THROAT: ICD-10-CM

## 2024-03-19 DIAGNOSIS — M62.81 MUSCLE WEAKNESS (GENERALIZED): ICD-10-CM

## 2024-03-19 DIAGNOSIS — Z96.641 S/P TOTAL RIGHT HIP ARTHROPLASTY: ICD-10-CM

## 2024-03-19 DIAGNOSIS — G89.18 ACUTE POST-OPERATIVE PAIN: Primary | ICD-10-CM

## 2024-03-19 DIAGNOSIS — R26.2 DIFFICULTY IN WALKING: ICD-10-CM

## 2024-03-19 PROCEDURE — 1123F ACP DISCUSS/DSCN MKR DOCD: CPT | Performed by: NURSE PRACTITIONER

## 2024-03-19 PROCEDURE — 99309 SBSQ NF CARE MODERATE MDM 30: CPT | Performed by: NURSE PRACTITIONER

## 2024-03-19 NOTE — PROGRESS NOTES
SNF PROGRESS NOTE      Cc- R JACK        Patient is a Jasmyn Lockhart 79 y.o. female who is being seen at [facility] s/p R JACK secondary to avascular necrosis of the hip.      Patient is sitting in the chair. She was talking to guests in her room. Pain medications are helping.         Past Medical History:   Diagnosis Date    CAD (coronary artery disease) 2005    3 vessel cardiac bypass    Environmental and seasonal allergies     GERD (gastroesophageal reflux disease)     GI bleed 2017    with LTKR / blood transfusion    History of blood transfusion 2017    post op LTKR    History of blood transfusion 2005    post op cardiac bypass    Hyperlipidemia     meds > 15 yrs    Hypertension     meds > 20 yrs    Myocardial infarct (HCC) 2005    followed by 3 vessal cardiac bypass    Pityriasis rosea     PONV (postoperative nausea and vomiting)     Prolonged emergence from general anesthesia     Thyroid disease      Morphine and related and Morphine    VS reviewed    Gen- Alert and oriented x 3   Heart- RRR no murmur no LE edema   Lungs- CTA b/l no resp distress RA  oxygen   Abd- bs x 4           Assessment and Plan    S/p R JACK  F/u ortho 4/2/24  PRN oxy  HTN  Coreg, norvasc   CAD s/p CABG  Statin  COPD  Hypothyroid  Synthroid          Kim Mariano DO, FACENID     Electronically signed by: Kim Mariano DO on 3/18/2024

## 2024-03-20 ENCOUNTER — OFFICE VISIT (OUTPATIENT)
Dept: GERIATRIC MEDICINE | Age: 79
End: 2024-03-20

## 2024-03-20 DIAGNOSIS — E03.9 HYPOTHYROIDISM, UNSPECIFIED TYPE: ICD-10-CM

## 2024-03-20 DIAGNOSIS — G89.18 ACUTE POST-OPERATIVE PAIN: ICD-10-CM

## 2024-03-20 DIAGNOSIS — R26.2 DIFFICULTY IN WALKING: ICD-10-CM

## 2024-03-20 DIAGNOSIS — Z47.89 ORTHOPEDIC AFTERCARE: Primary | ICD-10-CM

## 2024-03-20 DIAGNOSIS — I10 HYPERTENSION, UNSPECIFIED TYPE: ICD-10-CM

## 2024-03-20 DIAGNOSIS — Z96.641 S/P TOTAL RIGHT HIP ARTHROPLASTY: ICD-10-CM

## 2024-03-21 ENCOUNTER — OFFICE VISIT (OUTPATIENT)
Dept: GERIATRIC MEDICINE | Age: 79
End: 2024-03-21
Payer: MEDICARE

## 2024-03-21 DIAGNOSIS — I10 HYPERTENSION, UNSPECIFIED TYPE: ICD-10-CM

## 2024-03-21 DIAGNOSIS — E03.9 HYPOTHYROIDISM, UNSPECIFIED TYPE: ICD-10-CM

## 2024-03-21 DIAGNOSIS — G89.18 ACUTE POST-OPERATIVE PAIN: ICD-10-CM

## 2024-03-21 DIAGNOSIS — Z96.641 S/P TOTAL RIGHT HIP ARTHROPLASTY: ICD-10-CM

## 2024-03-21 DIAGNOSIS — Z47.89 ORTHOPEDIC AFTERCARE: Primary | ICD-10-CM

## 2024-03-21 PROBLEM — R26.2 DIFFICULTY IN WALKING: Status: ACTIVE | Noted: 2024-03-21

## 2024-03-21 PROBLEM — M62.81 MUSCLE WEAKNESS (GENERALIZED): Status: ACTIVE | Noted: 2024-03-21

## 2024-03-21 PROCEDURE — 1123F ACP DISCUSS/DSCN MKR DOCD: CPT | Performed by: INTERNAL MEDICINE

## 2024-03-21 PROCEDURE — 99308 SBSQ NF CARE LOW MDM 20: CPT | Performed by: INTERNAL MEDICINE

## 2024-03-21 NOTE — PROGRESS NOTES
Name: Jasmyn Lockhart   Facility: [address]  [address]  Date: 3/15/2024     Subjective:     Chief Complaint   Patient presents with    Follow-up       HPI  Jasmyn Lockhart is a 79 y.o. female being seen today for evaluation of acute rehab progress, difficulty walking and muscle weakness, and postop pain.  Resident admitted from acute care setting status post right hip arthroplasty secondary to avascular necrosis for acute rehab.  She was evaluated by PT and OT staff, plan has been initiated.    Past Medical History:   Diagnosis Date    CAD (coronary artery disease) 2005    3 vessel cardiac bypass    Environmental and seasonal allergies     GERD (gastroesophageal reflux disease)     GI bleed 2017    with LTKR / blood transfusion    History of blood transfusion 2017    post op LTKR    History of blood transfusion 2005    post op cardiac bypass    Hyperlipidemia     meds > 15 yrs    Hypertension     meds > 20 yrs    Myocardial infarct (HCC) 2005    followed by 3 vessal cardiac bypass    Pityriasis rosea     PONV (postoperative nausea and vomiting)     Prolonged emergence from general anesthesia     Thyroid disease          Allergies:  Reviewed in nursing facility records  Medications:  Reviewed and reconciled in nursing facility records    Review of Systems  A 10 Point review of systems was performed and is negative unless previously stated      Objective:   See nursing facility record for vital signs.      Physical Exam  Constitutional:  in recliner chair, well-developed, in no acute distess  Pulmonary/Chest:  breathing unlabored, no use of accessory muscles, lung sounds clear, no shortness of breath at rest, dyspnea with activity  Cardiovascular:  S1-S2 regular, 2+ DP x 2, edema of right leg  Abd/GI:  abdomen soft, round, non-distended, non-tender, active bowel sounds x 4 quadrants  : no SP tenderness,no CVA tenderness, no hematuria  MS/Extremities:  COOK, ROM limited,

## 2024-03-22 LAB
ANION GAP SERPL CALCULATED.3IONS-SCNC: 9 MEQ/L (ref 9–15)
BUN SERPL-MCNC: 20 MG/DL (ref 8–23)
CALCIUM SERPL-MCNC: 8.9 MG/DL (ref 8.5–9.9)
CHLORIDE SERPL-SCNC: 105 MEQ/L (ref 95–107)
CO2 SERPL-SCNC: 24 MEQ/L (ref 20–31)
CREAT SERPL-MCNC: 1.06 MG/DL (ref 0.5–0.9)
ERYTHROCYTE [DISTWIDTH] IN BLOOD BY AUTOMATED COUNT: 15.3 % (ref 11.5–14.5)
GLUCOSE SERPL-MCNC: 89 MG/DL (ref 70–99)
HCT VFR BLD AUTO: 34 % (ref 37–47)
HGB BLD-MCNC: 10.6 G/DL (ref 12–16)
MCH RBC QN AUTO: 28 PG (ref 27–31.3)
MCHC RBC AUTO-ENTMCNC: 31.2 % (ref 33–37)
MCV RBC AUTO: 89.9 FL (ref 79.4–94.8)
PLATELET # BLD AUTO: 229 K/UL (ref 130–400)
POTASSIUM SERPL-SCNC: 4.6 MEQ/L (ref 3.4–4.9)
RBC # BLD AUTO: 3.78 M/UL (ref 4.2–5.4)
SODIUM SERPL-SCNC: 138 MEQ/L (ref 135–144)
WBC # BLD AUTO: 6.5 K/UL (ref 4.8–10.8)

## 2024-03-22 NOTE — PROGRESS NOTES
SNF PROGRESS NOTE      Cc- R JACK        Patient is a Jasmyn Lockhart 79 y.o. female who is being seen at [facility] s/p R JACK secondary to avascular necrosis of the hip.       Patient is sitting up in the chair. She is eating well. + BM.  Pain is controlled.         Past Medical History:   Diagnosis Date    CAD (coronary artery disease) 2005    3 vessel cardiac bypass    Environmental and seasonal allergies     GERD (gastroesophageal reflux disease)     GI bleed 2017    with LTKR / blood transfusion    History of blood transfusion 2017    post op LTKR    History of blood transfusion 2005    post op cardiac bypass    Hyperlipidemia     meds > 15 yrs    Hypertension     meds > 20 yrs    Myocardial infarct (HCC) 2005    followed by 3 vessal cardiac bypass    Pityriasis rosea     PONV (postoperative nausea and vomiting)     Prolonged emergence from general anesthesia     Thyroid disease      Morphine and related and Morphine    VS reviewed    Gen- Alert and oriented x 3   Heart- RRR no murmur no LE edema   Lungs- CTA b/l no resp distress RA  oxygen   Abd- bs x 4           Assessment and Plan    S/p R JACK  F/u ortho 4/2/24  PRN oxy  HTN  Coreg, norvasc   CAD s/p CABG  Statin  COPD  Hypothyroid  Synthroid       Kim Mariano DO, FACOI     Electronically signed by: Kim Mariano DO on 3/20/2024

## 2024-03-24 ENCOUNTER — OFFICE VISIT (OUTPATIENT)
Dept: GERIATRIC MEDICINE | Age: 79
End: 2024-03-24
Payer: MEDICARE

## 2024-03-24 DIAGNOSIS — I10 HYPERTENSION, UNSPECIFIED TYPE: ICD-10-CM

## 2024-03-24 DIAGNOSIS — Z47.89 ORTHOPEDIC AFTERCARE: Primary | ICD-10-CM

## 2024-03-24 DIAGNOSIS — G89.18 ACUTE POST-OPERATIVE PAIN: ICD-10-CM

## 2024-03-24 DIAGNOSIS — R26.2 DIFFICULTY IN WALKING: ICD-10-CM

## 2024-03-24 DIAGNOSIS — E03.9 HYPOTHYROIDISM, UNSPECIFIED TYPE: ICD-10-CM

## 2024-03-24 DIAGNOSIS — Z96.641 S/P TOTAL RIGHT HIP ARTHROPLASTY: ICD-10-CM

## 2024-03-24 LAB
BILIRUB UR QL STRIP: NEGATIVE
CLARITY UR: CLEAR
COLOR UR: YELLOW
GLUCOSE UR STRIP-MCNC: NEGATIVE MG/DL
HGB UR QL STRIP: NEGATIVE
KETONES UR STRIP-MCNC: NEGATIVE MG/DL
LEUKOCYTE ESTERASE UR QL STRIP: NEGATIVE
NITRITE UR QL STRIP: NEGATIVE
PH UR STRIP: 5 [PH] (ref 5–9)
PROT UR STRIP-MCNC: NEGATIVE MG/DL
SP GR UR STRIP: 1.02 (ref 1–1.03)
UROBILINOGEN UR STRIP-ACNC: 0.2 E.U./DL

## 2024-03-24 PROCEDURE — 1123F ACP DISCUSS/DSCN MKR DOCD: CPT | Performed by: INTERNAL MEDICINE

## 2024-03-24 PROCEDURE — 99308 SBSQ NF CARE LOW MDM 20: CPT | Performed by: INTERNAL MEDICINE

## 2024-03-25 ENCOUNTER — OFFICE VISIT (OUTPATIENT)
Dept: GERIATRIC MEDICINE | Age: 79
End: 2024-03-25
Payer: MEDICARE

## 2024-03-25 DIAGNOSIS — Z96.641 S/P TOTAL RIGHT HIP ARTHROPLASTY: ICD-10-CM

## 2024-03-25 DIAGNOSIS — Z47.89 ORTHOPEDIC AFTERCARE: Primary | ICD-10-CM

## 2024-03-25 DIAGNOSIS — I10 HYPERTENSION, UNSPECIFIED TYPE: ICD-10-CM

## 2024-03-25 DIAGNOSIS — E03.9 HYPOTHYROIDISM, UNSPECIFIED TYPE: ICD-10-CM

## 2024-03-25 PROBLEM — J02.9 SORE THROAT: Status: ACTIVE | Noted: 2024-03-25

## 2024-03-25 PROCEDURE — 1123F ACP DISCUSS/DSCN MKR DOCD: CPT | Performed by: INTERNAL MEDICINE

## 2024-03-25 PROCEDURE — 99308 SBSQ NF CARE LOW MDM 20: CPT | Performed by: INTERNAL MEDICINE

## 2024-03-25 NOTE — PROGRESS NOTES
SNF PROGRESS NOTE      Cc- R JACK         Patient is a Jasmyn Lockhart 79 y.o. female who is being seen at [facility] s/p R JACK secondary to avascular necrosis of the hip.       Patient is going well with therapy and walking with walker. She continues to have pain but controlled with meds.         Past Medical History:   Diagnosis Date    CAD (coronary artery disease) 2005    3 vessel cardiac bypass    Environmental and seasonal allergies     GERD (gastroesophageal reflux disease)     GI bleed 2017    with LTKR / blood transfusion    History of blood transfusion 2017    post op LTKR    History of blood transfusion 2005    post op cardiac bypass    Hyperlipidemia     meds > 15 yrs    Hypertension     meds > 20 yrs    Myocardial infarct (HCC) 2005    followed by 3 vessal cardiac bypass    Pityriasis rosea     PONV (postoperative nausea and vomiting)     Prolonged emergence from general anesthesia     Thyroid disease      Morphine and related and Morphine    VS reviewed      Gen- Alert and oriented x 3   Heart- RRR no murmur no LE edema   Lungs- CTA b/l no resp distress RA  oxygen   Abd- bs x 4         Assessment and Plan    S/p R JACK  F/u ortho 4/2/24  PRN oxy  HTN  Coreg, norvasc   CAD s/p CABG  Statin  COPD  Hypothyroid  Synthroid       Kim Mariano DO, FACOI     Electronically signed by: Kim Mariano DO on 3/21/2024

## 2024-03-26 ENCOUNTER — HOME HEALTH ADMISSION (OUTPATIENT)
Dept: HOME HEALTH SERVICES | Facility: HOME HEALTH | Age: 79
End: 2024-03-26
Payer: MEDICARE

## 2024-03-26 ENCOUNTER — OFFICE VISIT (OUTPATIENT)
Dept: GERIATRIC MEDICINE | Age: 79
End: 2024-03-26

## 2024-03-26 DIAGNOSIS — Z96.641 S/P TOTAL RIGHT HIP ARTHROPLASTY: ICD-10-CM

## 2024-03-26 DIAGNOSIS — G89.18 ACUTE POST-OPERATIVE PAIN: Primary | ICD-10-CM

## 2024-03-26 DIAGNOSIS — R26.2 DIFFICULTY IN WALKING: ICD-10-CM

## 2024-03-26 DIAGNOSIS — M62.81 MUSCLE WEAKNESS (GENERALIZED): ICD-10-CM

## 2024-03-26 LAB — BACTERIA UR CULT: NORMAL

## 2024-03-26 NOTE — PROGRESS NOTES
mg/dL 0.50-0.90 H Final             GFR 59.1  >60 L Final     Pediatric calculator link  https://www.kidney.org/professionals/kdoqi/gfr_calculatorped  Effective Oct 3, 2022  These results are not intended for use in patients  <18 years of age.  eGFR results are calculated without  a race factor using the 2021 CKD-EPI equation.  Careful  clinical correlation is recommended, particularly when  comparing to results calculated using previous equations.  The CKD-EPI equation is less accurate in patients with  extremes of muscle mass, extra-renal metabolism of  creatinine, excessive creatinine ingestion, or following  therapy that affects renal tubular secretion.        Calcium 9.0 mg/dL 8.5-9.9  Final     Time based visit:  time spent 30 minutes.  Reviewed with the patient: current clinical status, medications, activities and diet. Side effects, adverse effects of the medication prescribed, as well as treatment plan and result expectations.  Discussed diagnostic results, other suggested diagnostic testing, prognosis, risk and benefits of treatment options, importance of compliance with treatment options with resident and nursing staff.  Reviewed therapy plan and progress with therapists.  Reviewed medical record, labs, medications, etc.      I have reviewed the patient's medical history in detail and updated the computerized patient record.    This note was partially generated using Dragon voice recognition system, and there may be some incorrect words, spellings, punctuation that were not noticed in checking the note before saving.      Electronically signed by: CLOVER Browne CNP on 3/19/2024

## 2024-03-27 ENCOUNTER — OFFICE VISIT (OUTPATIENT)
Dept: GERIATRIC MEDICINE | Age: 79
End: 2024-03-27

## 2024-03-27 DIAGNOSIS — G89.18 ACUTE POST-OPERATIVE PAIN: ICD-10-CM

## 2024-03-27 DIAGNOSIS — M62.81 MUSCLE WEAKNESS (GENERALIZED): ICD-10-CM

## 2024-03-27 DIAGNOSIS — E03.9 HYPOTHYROIDISM, UNSPECIFIED TYPE: ICD-10-CM

## 2024-03-27 DIAGNOSIS — Z47.89 ORTHOPEDIC AFTERCARE: Primary | ICD-10-CM

## 2024-03-27 DIAGNOSIS — Z96.641 S/P TOTAL RIGHT HIP ARTHROPLASTY: ICD-10-CM

## 2024-03-27 DIAGNOSIS — I10 HYPERTENSION, UNSPECIFIED TYPE: ICD-10-CM

## 2024-03-27 DIAGNOSIS — R26.2 DIFFICULTY IN WALKING: ICD-10-CM

## 2024-03-27 NOTE — PROGRESS NOTES
SNF PROGRESS NOTE      Cc- R JACK           Patient is a Jasmyn Lockhart 79 y.o. female who is being seen at [facility] s/p R JACK secondary to avascular necrosis of the hip.         Patient is sitting up in the therapy gym. She is working with therapy.         Past Medical History:   Diagnosis Date    CAD (coronary artery disease) 2005    3 vessel cardiac bypass    Environmental and seasonal allergies     GERD (gastroesophageal reflux disease)     GI bleed 2017    with LTKR / blood transfusion    History of blood transfusion 2017    post op LTKR    History of blood transfusion 2005    post op cardiac bypass    Hyperlipidemia     meds > 15 yrs    Hypertension     meds > 20 yrs    Myocardial infarct (HCC) 2005    followed by 3 vessal cardiac bypass    Pityriasis rosea     PONV (postoperative nausea and vomiting)     Prolonged emergence from general anesthesia     Thyroid disease      Morphine and related and Morphine    VS reviewed    Gen- Alert and oriented x 3   Heart- RRR no murmur no LE edema   Lungs- CTA b/l no resp distress RA  oxygen   Abd- bs x 4           Assessment and Plan    S/p R JACK  F/u ortho 4/2/24  PRN oxy  HTN  Coreg, norvasc   CAD s/p CABG  Statin  COPD  Hypothyroid  Synthroid          WES Mcclelland DO     Electronically signed by: Kim Mariano DO on 3/25/2024

## 2024-03-27 NOTE — PROGRESS NOTES
SNF PROGRESS NOTE      Cc- R JACK          Patient is a Jasmyn Lockhart 79 y.o. female  who is being seen at [facility] s/p R JACK secondary to avascular necrosis of the hip.        Patient is laying in the bed. She continues to get PRN pain meds. She is doing ok. No complaints at this time.         Past Medical History:   Diagnosis Date    CAD (coronary artery disease) 2005    3 vessel cardiac bypass    Environmental and seasonal allergies     GERD (gastroesophageal reflux disease)     GI bleed 2017    with LTKR / blood transfusion    History of blood transfusion 2017    post op LTKR    History of blood transfusion 2005    post op cardiac bypass    Hyperlipidemia     meds > 15 yrs    Hypertension     meds > 20 yrs    Myocardial infarct (HCC) 2005    followed by 3 vessal cardiac bypass    Pityriasis rosea     PONV (postoperative nausea and vomiting)     Prolonged emergence from general anesthesia     Thyroid disease      Morphine and related and Morphine    VS reviewed    Gen- Alert and oriented x 3   Heart- RRR no murmur no LE edema   Lungs- CTA b/l no resp distress RA  oxygen   Abd- bs x 4         Assessment and Plan    S/p R JACK  F/u ortho 4/2/24  PRN oxy  HTN  Coreg, norvasc   CAD s/p CABG  Statin  COPD  Hypothyroid  Synthroid       WES Mcclelland DO     Electronically signed by: Kim Mariano DO on 3/24/2024

## 2024-03-28 ENCOUNTER — DOCUMENTATION (OUTPATIENT)
Dept: PRIMARY CARE | Facility: CLINIC | Age: 79
End: 2024-03-28
Payer: MEDICARE

## 2024-03-28 ENCOUNTER — TELEPHONE (OUTPATIENT)
Dept: ORTHOPEDIC SURGERY | Facility: CLINIC | Age: 79
End: 2024-03-28
Payer: MEDICARE

## 2024-03-28 ENCOUNTER — OFFICE VISIT (OUTPATIENT)
Dept: GERIATRIC MEDICINE | Age: 79
End: 2024-03-28

## 2024-03-28 DIAGNOSIS — I10 HYPERTENSION, UNSPECIFIED TYPE: ICD-10-CM

## 2024-03-28 DIAGNOSIS — M62.81 MUSCLE WEAKNESS (GENERALIZED): ICD-10-CM

## 2024-03-28 DIAGNOSIS — Z47.89 ORTHOPEDIC AFTERCARE: Primary | ICD-10-CM

## 2024-03-28 DIAGNOSIS — Z96.641 S/P TOTAL RIGHT HIP ARTHROPLASTY: ICD-10-CM

## 2024-03-28 DIAGNOSIS — R26.2 DIFFICULTY IN WALKING: ICD-10-CM

## 2024-03-28 DIAGNOSIS — G89.18 ACUTE POST-OPERATIVE PAIN: ICD-10-CM

## 2024-03-28 DIAGNOSIS — E03.9 HYPOTHYROIDISM, UNSPECIFIED TYPE: ICD-10-CM

## 2024-03-28 RX ORDER — CYCLOBENZAPRINE HCL 5 MG
TABLET ORAL
COMMUNITY
Start: 2024-03-05 | End: 2024-05-21 | Stop reason: WASHOUT

## 2024-03-28 RX ORDER — CYCLOBENZAPRINE HCL 5 MG
TABLET ORAL
Qty: 30 TABLET | Refills: 0 | Status: CANCELLED | OUTPATIENT
Start: 2024-03-28

## 2024-03-28 RX ORDER — HYDROXYZINE HYDROCHLORIDE 10 MG/1
TABLET, FILM COATED ORAL
COMMUNITY
Start: 2024-03-05 | End: 2024-05-21 | Stop reason: WASHOUT

## 2024-03-28 RX ORDER — OXYCODONE HYDROCHLORIDE 5 MG/1
TABLET ORAL
COMMUNITY
Start: 2024-03-05 | End: 2024-05-21 | Stop reason: WASHOUT

## 2024-03-28 NOTE — TELEPHONE ENCOUNTER
Pt called and wondered what restrictions on what she has for PT      Lower Back (L2-3 laminectomy for facet cyst and L4-5 interbody fusion. 10/18/23).       Pt would like a return call

## 2024-03-29 ENCOUNTER — HOME CARE VISIT (OUTPATIENT)
Dept: HOME HEALTH SERVICES | Facility: HOME HEALTH | Age: 79
End: 2024-03-29
Payer: MEDICARE

## 2024-03-29 ENCOUNTER — PATIENT OUTREACH (OUTPATIENT)
Dept: PRIMARY CARE | Facility: CLINIC | Age: 79
End: 2024-03-29
Payer: MEDICARE

## 2024-03-29 VITALS — TEMPERATURE: 97.1 F

## 2024-03-29 PROCEDURE — 169592 NO-PAY CLAIM PROCEDURE

## 2024-03-29 PROCEDURE — 1090000002 HH PPS REVENUE DEBIT

## 2024-03-29 PROCEDURE — 1090000001 HH PPS REVENUE CREDIT

## 2024-03-29 PROCEDURE — 0023 HH SOC

## 2024-03-29 PROCEDURE — G0151 HHCP-SERV OF PT,EA 15 MIN: HCPCS | Mod: HHH

## 2024-03-29 SDOH — HEALTH STABILITY: PHYSICAL HEALTH: PHYSICAL EXERCISE: SEATED

## 2024-03-29 SDOH — HEALTH STABILITY: PHYSICAL HEALTH: EXERCISE ACTIVITY: KNEE FLEXIOIN

## 2024-03-29 SDOH — HEALTH STABILITY: PHYSICAL HEALTH: EXERCISE TYPE: INSTRUCTED AND DEMONSTRATED SEATED THER EX PROGRAM, USING HOSPITAL HANDOUTS

## 2024-03-29 SDOH — HEALTH STABILITY: PHYSICAL HEALTH: EXERCISE ACTIVITY: ANKLE PUMPS

## 2024-03-29 SDOH — HEALTH STABILITY: PHYSICAL HEALTH: EXERCISE ACTIVITY: HIP FLEXION

## 2024-03-29 SDOH — HEALTH STABILITY: PHYSICAL HEALTH: EXERCISE ACTIVITY: HIP ADDUCITON

## 2024-03-29 SDOH — HEALTH STABILITY: PHYSICAL HEALTH: EXERCISE ACTIVITY: KNEE EXTENSION

## 2024-03-29 SDOH — HEALTH STABILITY: PHYSICAL HEALTH: EXERCISE ACTIVITY: SIT TO STAND

## 2024-03-29 ASSESSMENT — ACTIVITIES OF DAILY LIVING (ADL)
OASIS_M1830: 03
AMBULATION ASSISTANCE ON FLAT SURFACES: 1
ENTERING_EXITING_HOME: MINIMUM ASSIST

## 2024-03-29 ASSESSMENT — ENCOUNTER SYMPTOMS
PAIN LOCATION - PAIN FREQUENCY: CONSTANT
HIGHEST PAIN SEVERITY IN PAST 24 HOURS: 10/10
SUBJECTIVE PAIN PROGRESSION: UNCHANGED
LOWEST PAIN SEVERITY IN PAST 24 HOURS: 4/10
PERSON REPORTING PAIN: PATIENT
HYPERTENSION: 1
PAIN LOCATION: RIGHT HIP
PAIN LOCATION - PAIN QUALITY: ACHING
PAIN LOCATION - PAIN SEVERITY: 8/10
OCCASIONAL FEELINGS OF UNSTEADINESS: 1
PAIN SEVERITY GOAL: 0/10
PAIN: 1

## 2024-03-29 NOTE — PROGRESS NOTES
Discharge Facility: [facility]  Discharge Diagnosis: Aftercare following joint replacement surgery  Admission Date: 3/13/2024  Discharge Date: 3/28/2024    PCP Appointment Date: none  Specialist Appointment Date: ortho 4/2/2024  Cardiology 5/21/2024  Hospital Encounter and Summary:  not available at this time   See discharge assessment below for further details    Limited info from SNF    Engagement  Call Start Time: 1015 (3/29/2024 10:15 AM)    Medications  Medications reviewed with patient/caregiver?: No (3/29/2024 10:15 AM)  Is the patient having any side effects they believe may be caused by any medication additions or changes?: No (3/29/2024 10:15 AM)  Does the patient have all medications ordered at discharge?: Not applicable (3/29/2024 10:15 AM)  Prescription Comments: limited info from SNF (3/29/2024 10:15 AM)  Is the patient taking all medications as directed (includes completed medication regime)?: Not applicable (3/29/2024 10:15 AM)  Medication Comments: see med list (3/29/2024 10:15 AM)    Appointments  Does the patient have a primary care provider?: No (3/29/2024 10:15 AM)  Care Management Interventions: Advised patient to make appointment (3/29/2024 10:15 AM)  Has the patient kept scheduled appointments due by today?: Yes (3/29/2024 10:15 AM)  Care Management Interventions: Advised patient to keep appointment (3/29/2024 10:15 AM)    Self Management  What is the home health agency?:  Home care (3/29/2024 10:15 AM)  Has home health visited the patient within 72 hours of discharge?: Yes (3/29/2024 10:15 AM)    Patient Teaching  Does the patient have access to their discharge instructions?: Yes (3/29/2024 10:15 AM)  What is the patient's perception of their health status since discharge?: Worsening (3/29/2024 10:15 AM)  Is the patient/caregiver able to teach back the hierarchy of who to call/visit for symptoms/problems? PCP, Specialist, Home Health nurse, Urgent Care, ED, 911: Yes  (3/29/2024 10:15 AM)    Wrap Up  Wrap Up Additional Comments: CTS spoke with patient. She was discharged from a SNF on 3/28/2024. She stated that she was doing good while in the SNF. Patient stated no med changes. Last night she stated that her whole body started to hurt. Her heels were sore and she could barely put them down. Was trying to get more info from patient, however home health arrived at the house during our conversation and she abruptly hung up. Will task office staff for help with appt for follow up. (3/29/2024 10:15 AM)  Call End Time: 1021 (3/29/2024 10:15 AM)

## 2024-03-30 PROCEDURE — 1090000002 HH PPS REVENUE DEBIT

## 2024-03-30 PROCEDURE — 1090000001 HH PPS REVENUE CREDIT

## 2024-03-31 PROCEDURE — 1090000002 HH PPS REVENUE DEBIT

## 2024-03-31 PROCEDURE — 1090000001 HH PPS REVENUE CREDIT

## 2024-04-01 DIAGNOSIS — Z96.641 S/P TOTAL RIGHT HIP ARTHROPLASTY: Primary | ICD-10-CM

## 2024-04-01 PROCEDURE — 1090000001 HH PPS REVENUE CREDIT

## 2024-04-01 PROCEDURE — 1090000002 HH PPS REVENUE DEBIT

## 2024-04-01 RX ORDER — CYCLOBENZAPRINE HCL 5 MG
TABLET ORAL
Qty: 30 TABLET | Refills: 0 | OUTPATIENT
Start: 2024-04-01

## 2024-04-01 NOTE — PROGRESS NOTES
Chief Complaint   Patient presents with    Right Hip - Post-op     Post op ANGELINA 03-11-24       The patient is here for follow-up of their conversion of right TFN to side: right hip arthroplasty.  The patient has mild hip pain.  The patient has no mechanical symptoms.  The patient is approximately 3 weeks postop.    Physical examination:    Examination of the side: right hip  The incision is healing well  No erythema or warmth.  Range of motion: Forward Flexion: 120 Internal Rotation: 30 External Rotation: 30 Abduction 30  The Patient can single leg stand and actively abduct  Calf is soft, Homans negative  The patient has intact ankle dorsiflexion and plantarflexion.    Radiographs:  XR hip right with pelvis when performed 2 or 3 views  Interpreted By:  Jamar Travis,   STUDY:  XR HIP RIGHT WITH PELVIS WHEN PERFORMED 2 OR 3 VIEWS; ; 4/2/2024 9:51  am      INDICATION:  Signs/Symptoms:pain.      ACCESSION NUMBER(S):  [id number]      ORDERING CLINICIAN:  JAMAR TRAVIS      FINDINGS:  Right hip films show a total hip arthroplasty in satisfactory  position. The hip is reduced. No fracture is identified. No obvious  loosening or wear is appreciated. There is hardware seen in the lower  lumbar spine.          Signed by: Jamar Travis 4/2/2024 9:54 AM  Dictation workstation:   [id number]        Impression:  Status post side: right total hip arthroplasty    Plan:  She was in a rehab facility and is just begun home physical therapy.  We will place an order for outpatient physical therapy to be started once home physical therapy is completed  Follow up in  9 weeks with an x-ray  All questions answered

## 2024-04-01 NOTE — TELEPHONE ENCOUNTER
----- Message from Candy Nixon CMA sent at 3/29/2024 10:25 AM EDT -----  Regarding: tcm    Can you please contact pt to schedule hosp  follow up within 14 days of discharge.  This patient was discharged from:  SNF  Discharge diagnosis:   Aftercare following joint replacement surgery  Date of discharge:      3/28/2024    Thank you

## 2024-04-01 NOTE — TELEPHONE ENCOUNTER
Called pt 2x 04/01/2024  N/A both times  Pt called back, she has appointments upcoming she will call back sometime Wednesday to schedule when she knows more

## 2024-04-02 ENCOUNTER — HOME CARE VISIT (OUTPATIENT)
Dept: HOME HEALTH SERVICES | Facility: HOME HEALTH | Age: 79
End: 2024-04-02
Payer: MEDICARE

## 2024-04-02 ENCOUNTER — CLINICAL SUPPORT (OUTPATIENT)
Dept: ORTHOPEDIC SURGERY | Facility: CLINIC | Age: 79
End: 2024-04-02
Payer: MEDICARE

## 2024-04-02 ENCOUNTER — TELEPHONE (OUTPATIENT)
Dept: PRIMARY CARE | Facility: CLINIC | Age: 79
End: 2024-04-02

## 2024-04-02 ENCOUNTER — APPOINTMENT (OUTPATIENT)
Dept: PRIMARY CARE | Facility: CLINIC | Age: 79
End: 2024-04-02
Payer: MEDICARE

## 2024-04-02 ENCOUNTER — OFFICE VISIT (OUTPATIENT)
Dept: ORTHOPEDIC SURGERY | Facility: CLINIC | Age: 79
End: 2024-04-02
Payer: MEDICARE

## 2024-04-02 DIAGNOSIS — Z96.641 S/P TOTAL RIGHT HIP ARTHROPLASTY: ICD-10-CM

## 2024-04-02 DIAGNOSIS — Z96.641 S/P TOTAL RIGHT HIP ARTHROPLASTY: Primary | ICD-10-CM

## 2024-04-02 PROCEDURE — 73502 X-RAY EXAM HIP UNI 2-3 VIEWS: CPT | Mod: RIGHT SIDE | Performed by: ORTHOPAEDIC SURGERY

## 2024-04-02 PROCEDURE — 1090000001 HH PPS REVENUE CREDIT

## 2024-04-02 PROCEDURE — 1090000002 HH PPS REVENUE DEBIT

## 2024-04-02 PROCEDURE — 1160F RVW MEDS BY RX/DR IN RCRD: CPT | Performed by: ORTHOPAEDIC SURGERY

## 2024-04-02 PROCEDURE — 99024 POSTOP FOLLOW-UP VISIT: CPT | Performed by: ORTHOPAEDIC SURGERY

## 2024-04-02 PROCEDURE — 1159F MED LIST DOCD IN RCRD: CPT | Performed by: ORTHOPAEDIC SURGERY

## 2024-04-02 NOTE — TELEPHONE ENCOUNTER
Rosalba called -There is no pain she is ok but can not come in as of yet -if you need to give her a call - please do. ol

## 2024-04-03 ENCOUNTER — TELEPHONE (OUTPATIENT)
Dept: ORTHOPEDIC SURGERY | Facility: CLINIC | Age: 79
End: 2024-04-03

## 2024-04-03 PROCEDURE — 1090000001 HH PPS REVENUE CREDIT

## 2024-04-03 PROCEDURE — 1090000002 HH PPS REVENUE DEBIT

## 2024-04-03 PROCEDURE — G0180 MD CERTIFICATION HHA PATIENT: HCPCS | Performed by: FAMILY MEDICINE

## 2024-04-03 NOTE — TELEPHONE ENCOUNTER
Patient called stating she cannot get the swelling on her feet to go down, she saw Dr. Murcia yesterday Dr. Murcia told her to put her feet up and the swelling should go down.  Patient called back today stating that they are more swollen than they were yesterday and she has been doing nothing but sitting in the chair all day and her feet are elevated.  I told her that her get worse then she should go to the ER or call the on-call for Ortho tonight if they get worse.  Patient said she is unable to go to the ER because she is unable to drive. Would like a call back

## 2024-04-03 NOTE — TELEPHONE ENCOUNTER
Saw dr Travis yesterday Is concerned legs are swollen , would like a call back she can't put her socks on

## 2024-04-04 PROCEDURE — 1090000002 HH PPS REVENUE DEBIT

## 2024-04-04 PROCEDURE — 1090000001 HH PPS REVENUE CREDIT

## 2024-04-05 ENCOUNTER — HOME CARE VISIT (OUTPATIENT)
Dept: HOME HEALTH SERVICES | Facility: HOME HEALTH | Age: 79
End: 2024-04-05
Payer: MEDICARE

## 2024-04-05 PROCEDURE — 1090000002 HH PPS REVENUE DEBIT

## 2024-04-05 PROCEDURE — G0151 HHCP-SERV OF PT,EA 15 MIN: HCPCS | Mod: HHH

## 2024-04-05 PROCEDURE — 1090000001 HH PPS REVENUE CREDIT

## 2024-04-05 SDOH — HEALTH STABILITY: PHYSICAL HEALTH: EXERCISE COMMENTS: INSTRUCTED IN AND GIVEN WRITTEN PROGRAM. STAND AT SINK MARCH, HIP ABD, HEEL RAISES, PARTIAL SQUTS

## 2024-04-05 ASSESSMENT — ENCOUNTER SYMPTOMS
LOWEST PAIN SEVERITY IN PAST 24 HOURS: 3/10
PAIN SEVERITY GOAL: 1/10
HIGHEST PAIN SEVERITY IN PAST 24 HOURS: 4/10
PAIN LOCATION: RIGHT HIP
PAIN: 1
SUBJECTIVE PAIN PROGRESSION: GRADUALLY IMPROVING
PERSON REPORTING PAIN: PATIENT

## 2024-04-05 ASSESSMENT — ACTIVITIES OF DAILY LIVING (ADL): AMBULATION ASSISTANCE ON FLAT SURFACES: 1

## 2024-04-06 PROCEDURE — 1090000002 HH PPS REVENUE DEBIT

## 2024-04-06 PROCEDURE — 1090000001 HH PPS REVENUE CREDIT

## 2024-04-06 NOTE — HOME HEALTH
reports had r clint [id number] at [facility] then went to snf at [facility] until [id number]. saw dr bergeron [id number], got good report. to trn [id number]. was told she could drive when she is walking without walker. pt known from [facility] when had lumb laminectomy [id number].

## 2024-04-07 PROCEDURE — 1090000001 HH PPS REVENUE CREDIT

## 2024-04-07 PROCEDURE — 1090000002 HH PPS REVENUE DEBIT

## 2024-04-08 PROCEDURE — 1090000002 HH PPS REVENUE DEBIT

## 2024-04-08 PROCEDURE — 1090000001 HH PPS REVENUE CREDIT

## 2024-04-09 ENCOUNTER — APPOINTMENT (OUTPATIENT)
Dept: CARDIOLOGY | Facility: CLINIC | Age: 79
End: 2024-04-09
Payer: MEDICARE

## 2024-04-09 ENCOUNTER — HOME CARE VISIT (OUTPATIENT)
Dept: HOME HEALTH SERVICES | Facility: HOME HEALTH | Age: 79
End: 2024-04-09
Payer: MEDICARE

## 2024-04-09 PROCEDURE — 1090000002 HH PPS REVENUE DEBIT

## 2024-04-09 PROCEDURE — 1090000001 HH PPS REVENUE CREDIT

## 2024-04-09 PROCEDURE — G0151 HHCP-SERV OF PT,EA 15 MIN: HCPCS | Mod: HHH

## 2024-04-09 SDOH — HEALTH STABILITY: PHYSICAL HEALTH: EXERCISE COMMENTS: REINSTRUCTED IN HEP. STAND AT SINK MARCH, HEEL RAISES, PARTIAL SQUATS. DISCONTINUED HIP ABD

## 2024-04-09 ASSESSMENT — ENCOUNTER SYMPTOMS
PAIN LOCATION: BACK
PAIN: 1
PAIN SEVERITY GOAL: 1/10
HIGHEST PAIN SEVERITY IN PAST 24 HOURS: 7/10
SUBJECTIVE PAIN PROGRESSION: UNCHANGED
LOWEST PAIN SEVERITY IN PAST 24 HOURS: 2/10
PERSON REPORTING PAIN: PATIENT

## 2024-04-09 ASSESSMENT — ACTIVITIES OF DAILY LIVING (ADL)
AMBULATION ASSISTANCE ON UNEVEN SURFACES: 1
AMBULATION ASSISTANCE ON FLAT SURFACES: 1

## 2024-04-09 NOTE — TELEPHONE ENCOUNTER
"(Can you add for Virtual)     Pt states she is \"just not feeling right\"  Therapy came today and she just feels Exhausted, trouble sleeping, and her R breast is bothering her \"sore\"   She is unable to drive right now,  hard for her to find a ride. She would like to do at least a Virtual tonight and go from there to determine next step. She wasn't like this when she came home from hospital. States it's getting worse.  (She is using her compression stockings and walker for support) But is limited to what she can do.  Would like to talk to you first.    Will add for Virtual tonight    Making you aware   "

## 2024-04-10 ENCOUNTER — OFFICE VISIT (OUTPATIENT)
Dept: PRIMARY CARE | Facility: CLINIC | Age: 79
End: 2024-04-10
Payer: MEDICARE

## 2024-04-10 VITALS
DIASTOLIC BLOOD PRESSURE: 62 MMHG | SYSTOLIC BLOOD PRESSURE: 120 MMHG | OXYGEN SATURATION: 97 % | RESPIRATION RATE: 18 BRPM | TEMPERATURE: 97 F | WEIGHT: 191 LBS | HEART RATE: 62 BPM | BODY MASS INDEX: 32.61 KG/M2 | HEIGHT: 64 IN

## 2024-04-10 DIAGNOSIS — F41.9 ANXIETY: ICD-10-CM

## 2024-04-10 DIAGNOSIS — R71.8 OTHER ABNORMALITY OF RED BLOOD CELLS: ICD-10-CM

## 2024-04-10 DIAGNOSIS — D64.9 ANEMIA, UNSPECIFIED TYPE: ICD-10-CM

## 2024-04-10 DIAGNOSIS — R06.09 DYSPNEA ON EXERTION: Primary | ICD-10-CM

## 2024-04-10 DIAGNOSIS — R07.89 OTHER CHEST PAIN: ICD-10-CM

## 2024-04-10 DIAGNOSIS — R60.9 EDEMA, UNSPECIFIED TYPE: ICD-10-CM

## 2024-04-10 DIAGNOSIS — H91.91 HEARING LOSS OF RIGHT EAR, UNSPECIFIED HEARING LOSS TYPE: ICD-10-CM

## 2024-04-10 PROBLEM — J02.9 SORE THROAT: Status: ACTIVE | Noted: 2024-03-25

## 2024-04-10 PROBLEM — H47.393 CUP TO DISC ASYMMETRY, BILATERAL: Status: ACTIVE | Noted: 2024-03-01

## 2024-04-10 PROBLEM — R11.2 PONV (POSTOPERATIVE NAUSEA AND VOMITING): Status: ACTIVE | Noted: 2024-03-12

## 2024-04-10 PROBLEM — Z98.890 PONV (POSTOPERATIVE NAUSEA AND VOMITING): Status: ACTIVE | Noted: 2024-03-12

## 2024-04-10 PROBLEM — Z96.641 S/P TOTAL RIGHT HIP ARTHROPLASTY: Status: ACTIVE | Noted: 2024-03-11

## 2024-04-10 PROBLEM — R60.0 LOCALIZED EDEMA: Status: ACTIVE | Noted: 2023-05-22

## 2024-04-10 PROCEDURE — 1090000001 HH PPS REVENUE CREDIT

## 2024-04-10 PROCEDURE — 99495 TRANSJ CARE MGMT MOD F2F 14D: CPT | Performed by: FAMILY MEDICINE

## 2024-04-10 PROCEDURE — 1036F TOBACCO NON-USER: CPT | Performed by: FAMILY MEDICINE

## 2024-04-10 PROCEDURE — 1090000002 HH PPS REVENUE DEBIT

## 2024-04-10 PROCEDURE — 69210 REMOVE IMPACTED EAR WAX UNI: CPT | Performed by: FAMILY MEDICINE

## 2024-04-10 PROCEDURE — 1160F RVW MEDS BY RX/DR IN RCRD: CPT | Performed by: FAMILY MEDICINE

## 2024-04-10 PROCEDURE — 93000 ELECTROCARDIOGRAM COMPLETE: CPT | Performed by: FAMILY MEDICINE

## 2024-04-10 PROCEDURE — 3078F DIAST BP <80 MM HG: CPT | Performed by: FAMILY MEDICINE

## 2024-04-10 PROCEDURE — 1159F MED LIST DOCD IN RCRD: CPT | Performed by: FAMILY MEDICINE

## 2024-04-10 PROCEDURE — 3074F SYST BP LT 130 MM HG: CPT | Performed by: FAMILY MEDICINE

## 2024-04-10 RX ORDER — FUROSEMIDE 20 MG/1
20 TABLET ORAL DAILY
Qty: 30 TABLET | Refills: 0 | Status: SHIPPED | OUTPATIENT
Start: 2024-04-10 | End: 2024-05-21 | Stop reason: SDUPTHER

## 2024-04-10 RX ORDER — ALPRAZOLAM 0.25 MG/1
0.25 TABLET ORAL DAILY PRN
Qty: 7 TABLET | Refills: 0 | Status: SHIPPED | OUTPATIENT
Start: 2024-04-10 | End: 2024-04-17

## 2024-04-10 RX ORDER — FUROSEMIDE 20 MG/1
20 TABLET ORAL DAILY
Qty: 30 TABLET | Refills: 1 | Status: SHIPPED | OUTPATIENT
Start: 2024-04-10 | End: 2024-04-10 | Stop reason: SDUPTHER

## 2024-04-10 ASSESSMENT — ENCOUNTER SYMPTOMS
SHORTNESS OF BREATH: 1
ARTHRALGIAS: 1
PALPITATIONS: 0
BACK PAIN: 1
FATIGUE: 1
WEAKNESS: 1

## 2024-04-10 NOTE — PROGRESS NOTES
"Patient: Rosalba Salcedo  : 1945  PCP: Henry Leroy DO  MRN: [id number]  Program: Transitional Care Management  Status: Enrolled  Effective Dates: 3/29/2024 - present  Responsible Staff: Candy Nixon CMA  Social Determinants to be Addressed: Physical Activity, Stress         Rosalba Salcedo is a 79 y.o. female presenting today for follow-up after being discharged from the hospital 13 days ago. The main problem requiring admission was   patient had right hip total arthroplasty Ilana on 3/11/2024.  Aseptic necrosis of bone of the right hip was found.  She had uneventful postop course.  Was discharged to [facility] for aftercare following joint replacement.  Admission date was from 3/13/2024 to 3/28/2024... She was seen postoperatively on 2024 and was orthopedically healing well.  X-rays were obtained without problems.  She has maintained her postop meds and has been doing outpatient physical therapy.  Yesterday she noticed that she was having lower extremity swelling bilaterally.  In addition more weakness and perhaps some slight shortness of breath with pain in the right breast location/axillary area as well.  She came in the office today for recheck and to be sure no other issues.. The discharge summary and/or Transitional Care Management documentation was reviewed. Medication reconciliation was performed as indicated via the \"Rubén as Reviewed\" timestamp.     Rosalba Salcedo was contacted by Transitional Care Management services two days after her discharge. This encounter and supporting documentation was reviewed.    Review of Systems   Constitutional:  Positive for fatigue.   Respiratory:  Positive for shortness of breath.    Cardiovascular:  Positive for chest pain and leg swelling. Negative for palpitations.   Musculoskeletal:  Positive for arthralgias and back pain.   Neurological:  Positive for weakness.   HEENT:..  Hearing loss rightPatient ID: Rosalba Salcedo " "is a 79 y.o. female.    Ear Cerumen Removal    Date/Time: 4/10/2024 7:36 PM    Performed by: Henry Leroy DO  Authorized by: Henry Leroy DO    Consent:     Consent obtained:  Verbal    Consent given by:  Patient    Risks, benefits, and alternatives were discussed: yes      Risks discussed:  Pain and dizziness  Procedure details:     Location:  R ear    Procedure type: curette      Procedure outcomes: cerumen removed    Post-procedure details:     Inspection:  TM intact    Procedure completion:  Tolerated well, no immediate complications  Comments:      using bionix disposable ear curettes #6333  the cerumen was carefully removed opening a partially/fully occluded ear canal...it  was/ necessary to irrigate the ear canal at time of procedure.... patient tolerated /found painful procedure... there was clear ear canal at end of procedure and instructions were given       /62   Pulse 62   Temp 36.1 °C (97 °F)   Resp 18   Ht 1.626 m (5' 4\")   Wt 86.6 kg (191 lb)   SpO2 97%   BMI 32.79 kg/m²     Physical Exam  Vitals: I have reviewed the vitals  General: Well-developed.  In no acute distress.  Eyes:   sclera nonicteric.  Conjunctiva not injected.  No discharge.   HEAD: Normocephalic, atraumatic.   Ears with cerumen  of right ear   HEENT   Mucous membranes moist.  Posterior oropharynx nonerythematous, no tonsillar exudates.      No cervical lymphadenopathy.  Cardio: Regular rate and rhythm.  No murmur, rub or gallop.  Pulmonary: Lungs clear to auscultation in all fields.  No accessory muscle use.  GI/: Normal active bowel sounds.  Soft, nontender.  No masses or organomegaly appreciated.  Musculoskeletal: No gross deformities appreciated.   No homans sign  x two   ++   one pedal edema   Neuro: Alert, age-appropriate.  Normal muscle tone.  Moving all extremities.  Skin: No rash, bruises or lesions.   The complexity of medical decision making for this patient's transitional care is " moderate.    Assessment/Plan   Problem List Items Addressed This Visit             ICD-10-CM    Anemia D64.9     Revealed hemoglobin dated 3/22 showed 10.6 hematocrit 34.0 with increased RDW at 15.3 status post surgery and will check repeat blood count in light of weakness along with iron level         Other chest pain R07.89     EKG done  and  suspect     noncardiac  ..  If persist  with activity  to er          Relevant Medications    furosemide (Lasix) 20 mg tablet    Other Relevant Orders    ECG 12 Lead    Dyspnea on exertion - Primary R06.09     EKG  reviewed  and yesterday   did  a lot of walking   ???    Conditioning          Relevant Orders    CBC and Auto Differential    Iron and TIBC    Comprehensive Metabolic Panel    Localized edema R60.0     Discussed and advised to take 1 daily with banana for severe swelling.         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 patient tolerated /found painful procedure... there was clear ear canal at end of procedure and instructions were given          Relevant Orders    Ear Cerumen Removal    Anxiety F41.9     Grieving over recent death of relatives will give Xanax and advised how to take precautions given         Relevant Medications    ALPRAZolam (Xanax) 0.25 mg tablet     Other Visit Diagnoses         Codes    Other abnormality of red blood cells     R71.8    Relevant Orders    Iron and TIBC        EKG..  Normal sinus rhythm  Poor R wave progression  Nonspecific ST segment changes  Right bundle branch block  Axis horizontal  WV interval 0.16   ms  QTc 474 ms  Heart rate 52 bpm

## 2024-04-10 NOTE — ASSESSMENT & PLAN NOTE
using bioniBeanup disposable ear curettes #9624  the cerumen was carefully removed opening a partially/fully occluded ear canal...it  was/was not necessary to irrigate the ear canal at time of procedure.... patient tolerated /found painful procedure... there was clear ear canal at end of procedure and instructions were given

## 2024-04-10 NOTE — HOME HEALTH
reports cont to feel very tired, sleeps alot. has lbp 2 to 7, r hip pain 2 to 3. has been compliant with hep.

## 2024-04-10 NOTE — ASSESSMENT & PLAN NOTE
Revealed hemoglobin dated 3/22 showed 10.6 hematocrit 34.0 with increased RDW at 15.3 status post surgery and will check repeat blood count in light of weakness along with iron level

## 2024-04-11 ENCOUNTER — PATIENT OUTREACH (OUTPATIENT)
Dept: PRIMARY CARE | Facility: CLINIC | Age: 79
End: 2024-04-11
Payer: MEDICARE

## 2024-04-11 ENCOUNTER — LAB (OUTPATIENT)
Dept: LAB | Facility: LAB | Age: 79
End: 2024-04-11
Payer: MEDICARE

## 2024-04-11 DIAGNOSIS — R06.09 DYSPNEA ON EXERTION: ICD-10-CM

## 2024-04-11 DIAGNOSIS — R71.8 OTHER ABNORMALITY OF RED BLOOD CELLS: ICD-10-CM

## 2024-04-11 LAB
ALBUMIN SERPL BCP-MCNC: 4.2 G/DL (ref 3.4–5)
ALP SERPL-CCNC: 102 U/L (ref 33–136)
ALT SERPL W P-5'-P-CCNC: 13 U/L (ref 7–45)
ANION GAP SERPL CALC-SCNC: 14 MMOL/L (ref 10–20)
AST SERPL W P-5'-P-CCNC: 15 U/L (ref 9–39)
BASOPHILS # BLD AUTO: 0.06 X10*3/UL (ref 0–0.1)
BASOPHILS NFR BLD AUTO: 0.8 %
BILIRUB SERPL-MCNC: 0.5 MG/DL (ref 0–1.2)
BUN SERPL-MCNC: 18 MG/DL (ref 6–23)
CALCIUM SERPL-MCNC: 9.1 MG/DL (ref 8.6–10.3)
CHLORIDE SERPL-SCNC: 106 MMOL/L (ref 98–107)
CO2 SERPL-SCNC: 25 MMOL/L (ref 21–32)
CREAT SERPL-MCNC: 1.11 MG/DL (ref 0.5–1.05)
EGFRCR SERPLBLD CKD-EPI 2021: 51 ML/MIN/1.73M*2
EOSINOPHIL # BLD AUTO: 0.12 X10*3/UL (ref 0–0.4)
EOSINOPHIL NFR BLD AUTO: 1.5 %
ERYTHROCYTE [DISTWIDTH] IN BLOOD BY AUTOMATED COUNT: 15.5 % (ref 11.5–14.5)
GLUCOSE SERPL-MCNC: 88 MG/DL (ref 74–99)
HCT VFR BLD AUTO: 38.6 % (ref 36–46)
HGB BLD-MCNC: 12 G/DL (ref 12–16)
IMM GRANULOCYTES # BLD AUTO: 0.06 X10*3/UL (ref 0–0.5)
IMM GRANULOCYTES NFR BLD AUTO: 0.8 % (ref 0–0.9)
IRON SATN MFR SERPL: 13 % (ref 25–45)
IRON SERPL-MCNC: 48 UG/DL (ref 35–150)
LYMPHOCYTES # BLD AUTO: 1.61 X10*3/UL (ref 0.8–3)
LYMPHOCYTES NFR BLD AUTO: 20.3 %
MCH RBC QN AUTO: 28.2 PG (ref 26–34)
MCHC RBC AUTO-ENTMCNC: 31.1 G/DL (ref 32–36)
MCV RBC AUTO: 91 FL (ref 80–100)
MONOCYTES # BLD AUTO: 0.84 X10*3/UL (ref 0.05–0.8)
MONOCYTES NFR BLD AUTO: 10.6 %
NEUTROPHILS # BLD AUTO: 5.24 X10*3/UL (ref 1.6–5.5)
NEUTROPHILS NFR BLD AUTO: 66 %
NRBC BLD-RTO: 0 /100 WBCS (ref 0–0)
PLATELET # BLD AUTO: 294 X10*3/UL (ref 150–450)
POTASSIUM SERPL-SCNC: 4.3 MMOL/L (ref 3.5–5.3)
PROT SERPL-MCNC: 7.2 G/DL (ref 6.4–8.2)
RBC # BLD AUTO: 4.25 X10*6/UL (ref 4–5.2)
SODIUM SERPL-SCNC: 141 MMOL/L (ref 136–145)
TIBC SERPL-MCNC: 366 UG/DL (ref 240–445)
UIBC SERPL-MCNC: 318 UG/DL (ref 110–370)
WBC # BLD AUTO: 7.9 X10*3/UL (ref 4.4–11.3)

## 2024-04-11 PROCEDURE — 36415 COLL VENOUS BLD VENIPUNCTURE: CPT

## 2024-04-11 PROCEDURE — 1090000001 HH PPS REVENUE CREDIT

## 2024-04-11 PROCEDURE — 83540 ASSAY OF IRON: CPT

## 2024-04-11 PROCEDURE — 80053 COMPREHEN METABOLIC PANEL: CPT

## 2024-04-11 PROCEDURE — 85025 COMPLETE CBC W/AUTO DIFF WBC: CPT

## 2024-04-11 PROCEDURE — 83550 IRON BINDING TEST: CPT

## 2024-04-11 PROCEDURE — 1090000002 HH PPS REVENUE DEBIT

## 2024-04-11 NOTE — PROGRESS NOTES
Call regarding appt. with PCP on 4/10/2024 after hospitalization.  At time of outreach call the patient feels as if their condition has stayed about the same since last visit. She is on a water pill for leg swelling from her PCP. Still feeling tired.   Reviewed the PCP appointment with the pt and addressed any questions or concerns.

## 2024-04-12 ENCOUNTER — HOME CARE VISIT (OUTPATIENT)
Dept: HOME HEALTH SERVICES | Facility: HOME HEALTH | Age: 79
End: 2024-04-12
Payer: MEDICARE

## 2024-04-12 PROCEDURE — 1090000001 HH PPS REVENUE CREDIT

## 2024-04-12 PROCEDURE — 1090000002 HH PPS REVENUE DEBIT

## 2024-04-12 PROCEDURE — G0151 HHCP-SERV OF PT,EA 15 MIN: HCPCS | Mod: HHH

## 2024-04-12 SDOH — HEALTH STABILITY: PHYSICAL HEALTH: EXERCISE COMMENTS: 1X10 REPS STAND AT SINK MARCH, HEEL RAISES, PARTIAL SQUATS

## 2024-04-12 ASSESSMENT — ENCOUNTER SYMPTOMS
PERSON REPORTING PAIN: PATIENT
HIGHEST PAIN SEVERITY IN PAST 24 HOURS: 8/10
PAIN LOCATION: BACK
PAIN LOCATION: RIGHT HIP
PAIN: 1
PAIN SEVERITY GOAL: 1/10
SUBJECTIVE PAIN PROGRESSION: UNCHANGED

## 2024-04-12 ASSESSMENT — ACTIVITIES OF DAILY LIVING (ADL): AMBULATION ASSISTANCE ON FLAT SURFACES: 1

## 2024-04-12 NOTE — HOME HEALTH
states is having difficulty sleeping. has been compliant with hep. does alot of walking in house. uses walker, cane and sometimes no device. saw pcp [id number]. got rx for furosemide for lower leg, foot edema, alprazocam for anxiety

## 2024-04-13 PROCEDURE — 1090000002 HH PPS REVENUE DEBIT

## 2024-04-13 PROCEDURE — 1090000001 HH PPS REVENUE CREDIT

## 2024-04-14 PROCEDURE — 1090000001 HH PPS REVENUE CREDIT

## 2024-04-14 PROCEDURE — 1090000002 HH PPS REVENUE DEBIT

## 2024-04-15 PROCEDURE — 1090000002 HH PPS REVENUE DEBIT

## 2024-04-15 PROCEDURE — 1090000001 HH PPS REVENUE CREDIT

## 2024-04-16 ENCOUNTER — HOME CARE VISIT (OUTPATIENT)
Dept: HOME HEALTH SERVICES | Facility: HOME HEALTH | Age: 79
End: 2024-04-16
Payer: MEDICARE

## 2024-04-16 PROCEDURE — 1090000002 HH PPS REVENUE DEBIT

## 2024-04-16 PROCEDURE — 1090000001 HH PPS REVENUE CREDIT

## 2024-04-17 PROCEDURE — 1090000001 HH PPS REVENUE CREDIT

## 2024-04-17 PROCEDURE — 1090000002 HH PPS REVENUE DEBIT

## 2024-04-18 PROCEDURE — 1090000002 HH PPS REVENUE DEBIT

## 2024-04-18 PROCEDURE — 1090000001 HH PPS REVENUE CREDIT

## 2024-04-19 ENCOUNTER — HOME CARE VISIT (OUTPATIENT)
Dept: HOME HEALTH SERVICES | Facility: HOME HEALTH | Age: 79
End: 2024-04-19
Payer: MEDICARE

## 2024-04-19 PROCEDURE — 1090000002 HH PPS REVENUE DEBIT

## 2024-04-19 PROCEDURE — G0151 HHCP-SERV OF PT,EA 15 MIN: HCPCS | Mod: HHH

## 2024-04-19 PROCEDURE — 1090000001 HH PPS REVENUE CREDIT

## 2024-04-19 SDOH — HEALTH STABILITY: PHYSICAL HEALTH: EXERCISE COMMENTS: IS INDEP WITH HEP STAND AT SINK MARCH, HEEL RAISES, PARTIAL SQUATS

## 2024-04-19 ASSESSMENT — ACTIVITIES OF DAILY LIVING (ADL)
OASIS_M1830: 01
AMBULATION ASSISTANCE ON FLAT SURFACES: 1
HOME_HEALTH_OASIS: 00

## 2024-04-19 ASSESSMENT — ENCOUNTER SYMPTOMS
LOWEST PAIN SEVERITY IN PAST 24 HOURS: 5/10
PERSON REPORTING PAIN: PATIENT
PAIN LOCATION: RIGHT HIP
PAIN SEVERITY GOAL: 2/10
PAIN LOCATION: BACK
SUBJECTIVE PAIN PROGRESSION: GRADUALLY IMPROVING
PAIN: 1
HIGHEST PAIN SEVERITY IN PAST 24 HOURS: 6/10

## 2024-04-22 DIAGNOSIS — E78.49 OTHER HYPERLIPIDEMIA: ICD-10-CM

## 2024-04-22 DIAGNOSIS — I10 ESSENTIAL HYPERTENSION: ICD-10-CM

## 2024-04-22 RX ORDER — CARVEDILOL 6.25 MG/1
6.25 TABLET ORAL
Qty: 180 TABLET | Refills: 1 | Status: SHIPPED | OUTPATIENT
Start: 2024-04-22 | End: 2024-05-21 | Stop reason: SDUPTHER

## 2024-04-22 RX ORDER — ROSUVASTATIN CALCIUM 40 MG/1
TABLET, COATED ORAL
Qty: 90 TABLET | Refills: 1 | Status: SHIPPED | OUTPATIENT
Start: 2024-04-22 | End: 2024-05-21 | Stop reason: SDUPTHER

## 2024-04-24 PROBLEM — J02.9 SORE THROAT: Status: RESOLVED | Noted: 2024-03-25 | Resolved: 2024-04-24

## 2024-04-24 NOTE — PROGRESS NOTES
"  Physical Therapy  Physical Therapy Evaluation and Treatment    Patient Name: Rosalba Salcedo  MRN: [id number]  Today's Date: 4/25/2024  Time Calculation  Start Time: 0900  Stop Time: 0946  Time Calculation (min): 46 min    Insurance:  Visit number: 1  Authorization info: No auth required  Insurance Type: A[employer]na Medicare    General:  Reason for visit: s/p R ANGELINA 03/11/24 (6 weeks)  Referred by: Rian Mckoy PA-C    Current Problem:  1. Weakness of right hip  Follow Up In Physical Therapy      2. Right hip pain  Referral to Physical Therapy          Precautions:   S/P R ANGELINA 6 weeks (3/11/24)    No hip flexion > 90  No crossing legs  No twisting or pivoting         Medical History Form: Reviewed (scanned into chart)    Subjective:     HPI: Patient presents to PT with c/o R hip pain and weakness s/p R ANGELINA 03/11/24. Reports pain/problem began when she fell in August of 2022. Reports she had back surgery in August 2022 and again in October of 2023. States she still was having difficulty walking and having hip pain so she consulted Dr. Arizmendi. Reports Dr. 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 Had surgery 03/11/24. Denies any complications post operation. Reports she was attending physical therapy elsewhere but had a bad experience so she was recommended by Dr. Travis to attend PT here.       Chief Complaint: Patient presents to clinic s/p R ANGELINA 03/11/24  Onset Date: 03/11/24  LUZ ELENA: surgical    Current Condition:   Better    Pertinent PMH includes: Heart disease, HTN  PSHx: Back surgery  08/2022 and 10/2023    Pain:     Location: R hip   Description: sharp  Worst: 10/10  Best: 1/10   Current: 0/10  Aggravating Factors:  sleeping on back/laying bed, walking ,steps  Relieving Factors:   tylenol, rest      Relevant Information (PMH & Previous Tests/Imaging):     XR 04/02/24: \"FINDINGS:  Right hip films show a total hip arthroplasty in satisfactory  position. The hip is " "reduced. No fracture is identified. No obvious  loosening or wear is appreciated. 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 Uses Step to pattern for stair navigation.     Primary Language: English    Patient's Goal(s) for Therapy: be able to walk without walker    Red Flags: Do you have any of the following? No  Fever/chills, unexplained weight changes, dizziness/fainting, unexplained change in bowel or bladder functions, unexplained malaise or muscle weakness, night pain/sweats, numbness or tingling    Objective:        Gait Assessment - antalgic with FWW, decreased stance time on R LE    AROM  Right hip flexion: 74 degrees     Left hip flexion:90 degrees   Right hip ER seated: 11 degrees     Left hip ER seated: 12 degrees   Right hip IR seated:  22 degrees   Left hip IR seated: 25 degrees     MMT  Right hip ER: 4/5    Left hip ER: 4/5  Right hip IR: 4/5   Left hip IR: 4/5  Right quadriceps:  4+/5   Left quadriceps: 4+/5   Right iliopsoas: 3/5   Left iliopsoas:   Right hamstrin-/5    Left hamstrin-/5      Special Tests  5x STS: 17.67 sec with U UE support      Outcome Measures:         EDUCATION: Pt educated in HEP, PT POC, anatomy, activity avoidance, proper positioning/posture, use of FWW , pt demonstrates understanding of PT info, all questions answered.    HEP:  SLR 2x10  Bridges 2x10  STS 2x5  Standing hip ABD 2x10      Goals: Set and discussed today    1.) Independent with HEP to expedite progress and promote goal achievement.  2.) Reduce worst reported pain within last 48 hours to < 3/10 in order to allow patient " to perform daily tasks without limitation/with decreased discomfort.  3.) Improve R hip flexion ROM to >/= 90 degrees in order to allow patient to be able to step over objects with reduced risk of tripping and falling  4.) Improve R hip strength to >/= 4/5 in order to allow patient to return to PLOF  5.) Add LEFS goal next visit.         Plan of care was developed with input and agreement by the patient.    Treatment Performed:    Therapeutic Exercise:    10 min  SLR 2x5  Bridges 1x10  STS 1x5  Standing hip ABD 1x10          Assessment: 80 y/o F presents with c/o R hip pain and weakness s/p R ANGELINA 03/11/24. Upon examination patient demonstrates pain, decreased R hip AROM, and decreased R hip strength limiting overall functional mobility including walking. Activity limitations and participations restrictions include navigating home and community environments. Pt presentation consistent with dx of R hip pain and weakness 2/2 ANGELINA. Pt to benefit from outpatient PT to address deficits, maximize functional mobility and improve QOL.  The clinical presentation of this patient is stable and their history and examination findings are consistent with a low complexity evaluation with good rehab potential.           Plan:     Planned Interventions include: therapeutic exercise, self-care home management, manual therapy, therapeutic activities, gait training, neuromuscular coordination, vasopneumatic, dry needling, aquatic therapy  Frequency: 1-2 x Week  Duration: 8 Weeks      Dean Tran, PT

## 2024-04-25 ENCOUNTER — EVALUATION (OUTPATIENT)
Dept: PHYSICAL THERAPY | Facility: CLINIC | Age: 79
End: 2024-04-25
Payer: MEDICARE

## 2024-04-25 DIAGNOSIS — M25.551 RIGHT HIP PAIN: ICD-10-CM

## 2024-04-25 DIAGNOSIS — R29.898 WEAKNESS OF RIGHT HIP: Primary | ICD-10-CM

## 2024-04-25 PROCEDURE — 97161 PT EVAL LOW COMPLEX 20 MIN: CPT | Mod: GP

## 2024-04-25 PROCEDURE — 97110 THERAPEUTIC EXERCISES: CPT | Mod: GP

## 2024-04-26 ENCOUNTER — TELEPHONE (OUTPATIENT)
Dept: ORTHOPEDIC SURGERY | Facility: CLINIC | Age: 79
End: 2024-04-26
Payer: MEDICARE

## 2024-04-26 NOTE — TELEPHONE ENCOUNTER
Called patient and left a voicemail with instructions that she is released to drive again. Told patient to call the office back if she has anymore questions.

## 2024-04-30 ENCOUNTER — TELEPHONE (OUTPATIENT)
Dept: CARDIOLOGY | Facility: CLINIC | Age: 79
End: 2024-04-30
Payer: MEDICARE

## 2024-04-30 DIAGNOSIS — R60.0 BILATERAL LEG EDEMA: ICD-10-CM

## 2024-04-30 NOTE — TELEPHONE ENCOUNTER
Pt states that she is not using Furosemide that often. She does not know if provider ordered labs. Pended orders for approval. Jose

## 2024-04-30 NOTE — TELEPHONE ENCOUNTER
Patient states she had hip surgery 3/11/24.  She states lately she is having swelling in both her legs.  She states she wears support stockings during the day.  Patient states her PCP gave her Furosemide 20 mg daily for leg the swelling.  She wants to be sure from a heart standpoint this is ok.  She states she is having labs done in May ordered by Dr. Rocha.  Patient pending appointment with Dr. Hoang Ho F.A.C.C. 5/21/24 and instructed to keep appointment.  Patient verbalized understanding.  Laura Veras CMA

## 2024-05-06 ENCOUNTER — HOSPITAL ENCOUNTER (OUTPATIENT)
Dept: RADIOLOGY | Facility: HOSPITAL | Age: 79
Discharge: HOME | End: 2024-05-06
Payer: MEDICARE

## 2024-05-06 DIAGNOSIS — R60.0 BILATERAL LEG EDEMA: ICD-10-CM

## 2024-05-06 PROCEDURE — 93970 EXTREMITY STUDY: CPT | Performed by: RADIOLOGY

## 2024-05-06 PROCEDURE — 93970 EXTREMITY STUDY: CPT

## 2024-05-07 ENCOUNTER — OFFICE VISIT (OUTPATIENT)
Dept: ORTHOPEDIC SURGERY | Facility: CLINIC | Age: 79
End: 2024-05-07
Payer: MEDICARE

## 2024-05-07 ENCOUNTER — HOSPITAL ENCOUNTER (OUTPATIENT)
Dept: RADIOLOGY | Facility: CLINIC | Age: 79
Discharge: HOME | End: 2024-05-07
Payer: MEDICARE

## 2024-05-07 DIAGNOSIS — M54.50 LOW BACK PAIN, UNSPECIFIED BACK PAIN LATERALITY, UNSPECIFIED CHRONICITY, UNSPECIFIED WHETHER SCIATICA PRESENT: ICD-10-CM

## 2024-05-07 DIAGNOSIS — M54.50 LOW BACK PAIN, UNSPECIFIED BACK PAIN LATERALITY, UNSPECIFIED CHRONICITY, UNSPECIFIED WHETHER SCIATICA PRESENT: Primary | ICD-10-CM

## 2024-05-07 PROCEDURE — 1160F RVW MEDS BY RX/DR IN RCRD: CPT | Performed by: ORTHOPAEDIC SURGERY

## 2024-05-07 PROCEDURE — 72100 X-RAY EXAM L-S SPINE 2/3 VWS: CPT | Performed by: ORTHOPAEDIC SURGERY

## 2024-05-07 PROCEDURE — 72100 X-RAY EXAM L-S SPINE 2/3 VWS: CPT

## 2024-05-07 PROCEDURE — 99213 OFFICE O/P EST LOW 20 MIN: CPT | Performed by: ORTHOPAEDIC SURGERY

## 2024-05-07 PROCEDURE — 1159F MED LIST DOCD IN RCRD: CPT | Performed by: ORTHOPAEDIC SURGERY

## 2024-05-07 NOTE — PROGRESS NOTES
Chief complaint: 7 months of L2-3 laminectomy L4-5 excision of facet cyst.  Exploration of prior L3-4 L4-5 fusion.    HPI: 7 months out status post above described surgery.  All in all she is 60% or even better than that improved compared to before surgery.  She has a lot of pain when she sleeps at night.  When she wakes up in the morning and sits for about 1/2-hour she does pretty good for the rest the day.  She had a right hip replacements giving her some issues as she is just healing from that.  No fevers chills nausea vomiting.  No bowel or bladder changes.    Physical exam: Incisions well-healed.  Appropriate back range of motion.  Good strength in her legs.  She walks with a bit of a slow waddling gait but pretty steady on her feet.    X-rays look good with good positioning of hardware.  No instability.    Assessment/plan: 7 months out from the above described surgery.  Will let her engage in activities as tolerated.  Will get her into some physical therapy for her back achiness and stiffness that she gets when she sleeps.  She will do that while she is doing her hip therapy as she has had her hip replaced recently.  Will see her back in 5 months for AP lateral x-rays of the lumbar spine.

## 2024-05-07 NOTE — PROGRESS NOTES
Rosalba Harmonchanceaniskcraig is a 79 y.o. female who presents for Follow-up of the Lower Back (L2-3 Lami/L4-5 Interbody Fusion 10/18/23/Almost 7 months out/X-rays today).    HPI:  79-year-old female here for follow-up of low back.  Just short of 7 months out from an L2- 3 laminectomy with facet cyst excision with an L4-5 TLIF.  She denies any fever chills nausea vomiting night sweats.  She has no bowel or bladder complaints.    Physical exam:

## 2024-05-10 ENCOUNTER — APPOINTMENT (OUTPATIENT)
Dept: PHYSICAL THERAPY | Facility: CLINIC | Age: 79
End: 2024-05-10
Payer: MEDICARE

## 2024-05-13 ENCOUNTER — APPOINTMENT (OUTPATIENT)
Dept: PHYSICAL THERAPY | Facility: CLINIC | Age: 79
End: 2024-05-13
Payer: MEDICARE

## 2024-05-15 ENCOUNTER — PATIENT OUTREACH (OUTPATIENT)
Dept: PRIMARY CARE | Facility: CLINIC | Age: 79
End: 2024-05-15

## 2024-05-15 ENCOUNTER — TREATMENT (OUTPATIENT)
Dept: PHYSICAL THERAPY | Facility: CLINIC | Age: 79
End: 2024-05-15
Payer: MEDICARE

## 2024-05-15 DIAGNOSIS — M25.551 RIGHT HIP PAIN: Primary | ICD-10-CM

## 2024-05-15 DIAGNOSIS — R29.898 WEAKNESS OF RIGHT HIP: ICD-10-CM

## 2024-05-15 PROCEDURE — 97110 THERAPEUTIC EXERCISES: CPT | Mod: GP,CQ

## 2024-05-15 ASSESSMENT — PAIN DESCRIPTION - DESCRIPTORS: DESCRIPTORS: SORE

## 2024-05-15 ASSESSMENT — PAIN SCALES - GENERAL: PAINLEVEL_OUTOF10: 2

## 2024-05-15 ASSESSMENT — PAIN - FUNCTIONAL ASSESSMENT: PAIN_FUNCTIONAL_ASSESSMENT: 0-10

## 2024-05-15 NOTE — PROGRESS NOTES
Physical Therapy Treatment    Patient Name: Rosalba Salcedo  MRN: [id number]  Today's Date: 5/15/2024  Time Calculation  Start Time: 1128  Stop Time: 1210  Time Calculation (min): 42 min  PT Therapeutic Procedures Time Entry  Therapeutic Exercise Time Entry: 39       Current Problem  1. Right hip pain        2. Weakness of right hip            Subjective   General   Pt stating persistent LBP and R hip pain. States some good days and some bad days, today being somewhere in the middle.   Insurance   Visit number: 2  Authorization info: No auth required  Insurance Type: A[employer]na Medicare  Precautions  Precautions  Post-Surgical Precautions: Right hip precautions (anterior)  Pain  Pain Assessment: 0-10  Pain Score: 2  Pain Location: Hip  Pain Orientation: Right, Posterior  Pain Descriptors: Sore    Objective   Treatments:  Thanh 6 min  Bridges 2x10  SLR flex/AB 2x10  Clamshells 2x10  LAQ 2x10  Mini squats 2x10  HR/TR 2x10  Standing hip 3-way x15  Assessment   Pt ambulating with antalgic gait pattern and SPC. Limited in tolerance to activity secdonary to R hip and low back pain and stiffness entering clinic. Some reduction in R hip pain with exercise, but with slight increase in LBP. Updated HEP and reviewed.   Plan:  Pt to transfer to [place] location, IE for LBP.     OP EDUCATION:       Goals:

## 2024-05-21 ENCOUNTER — OFFICE VISIT (OUTPATIENT)
Dept: CARDIOLOGY | Facility: CLINIC | Age: 79
End: 2024-05-21
Payer: MEDICARE

## 2024-05-21 VITALS
BODY MASS INDEX: 33.16 KG/M2 | DIASTOLIC BLOOD PRESSURE: 64 MMHG | HEART RATE: 62 BPM | HEIGHT: 64 IN | SYSTOLIC BLOOD PRESSURE: 112 MMHG | WEIGHT: 194.2 LBS

## 2024-05-21 DIAGNOSIS — R60.0 LOCALIZED EDEMA: Primary | ICD-10-CM

## 2024-05-21 DIAGNOSIS — I25.10 CORONARY ARTERY DISEASE INVOLVING NATIVE HEART WITHOUT ANGINA PECTORIS, UNSPECIFIED VESSEL OR LESION TYPE: ICD-10-CM

## 2024-05-21 DIAGNOSIS — E78.2 MIXED HYPERLIPIDEMIA: ICD-10-CM

## 2024-05-21 DIAGNOSIS — I10 ESSENTIAL HYPERTENSION: ICD-10-CM

## 2024-05-21 PROCEDURE — 1160F RVW MEDS BY RX/DR IN RCRD: CPT | Performed by: INTERNAL MEDICINE

## 2024-05-21 PROCEDURE — 1159F MED LIST DOCD IN RCRD: CPT | Performed by: INTERNAL MEDICINE

## 2024-05-21 PROCEDURE — 3074F SYST BP LT 130 MM HG: CPT | Performed by: INTERNAL MEDICINE

## 2024-05-21 PROCEDURE — 3078F DIAST BP <80 MM HG: CPT | Performed by: INTERNAL MEDICINE

## 2024-05-21 PROCEDURE — 99214 OFFICE O/P EST MOD 30 MIN: CPT | Performed by: INTERNAL MEDICINE

## 2024-05-21 RX ORDER — ASPIRIN 81 MG/1
81 TABLET ORAL DAILY
COMMUNITY

## 2024-05-21 RX ORDER — ROSUVASTATIN CALCIUM 40 MG/1
TABLET, COATED ORAL
Qty: 90 TABLET | Refills: 3 | Status: SHIPPED | OUTPATIENT
Start: 2024-05-21

## 2024-05-21 RX ORDER — FUROSEMIDE 20 MG/1
TABLET ORAL
Qty: 90 TABLET | Refills: 3 | Status: SHIPPED | OUTPATIENT
Start: 2024-05-21 | End: 2024-05-28 | Stop reason: SDUPTHER

## 2024-05-21 RX ORDER — CARVEDILOL 6.25 MG/1
6.25 TABLET ORAL
Qty: 180 TABLET | Refills: 1 | Status: SHIPPED | OUTPATIENT
Start: 2024-05-21

## 2024-05-21 NOTE — PROGRESS NOTES
Patient:  Rosalba Salcedo  YOB: 1945  MRN: [id number]       Impression/Plan:     Diagnoses and all orders for this visit:  Localized edema  -    She has venous insufficiency that is being exacerbated by the use of Norvasc.  Current blood pressure 112 would suggest she may not need Norvasc at all.  She is encouraged to avoid sodium and processed foods.  Regular walking also will help reduce the edema.  Also with component of venous insufficiency may benefit from mild compression stockings.    Coronary artery disease involving native heart without angina pectoris, unspecified vessel or lesion type  -     No angina after 2 major surgeries and no evidence of CHF continue statin aspirin    Essential hypertension  -     Somewhat excessively controlled hence discontinuing Norvasc in hopes of also reducing edema  Mixed hyperlipidemia  -     rosuvastatin (Crestor) 40 mg tablet; TAKE 1 TABLET ONCE EVERY 24HOURS      Chief Complaint/Active Symptoms:       Rosalba Salcedo is a 79 y.o. female who presents with  coronary artery disease with coronary bypass graft surgery 2009 (L-LAD, 2 vein grafts).  Normal Lexiscan perfusion with 75% ejection fraction 10/5/2020.  Also history hypertension, hyperlipidemia, esophageal reflux and osteoarthritis.     10/18/2023 L2-3 LAMINECTOMY, L4-5 INSTRUMENTATION, POSTERIOR INTERBODY FUSION, POSTERIOR LATERAL FUSION AND L4-5 RIGHT EXCISION OF FACET CYST, EXPLORATION SPINAL FUSION L3-4 without cardiac complications    2/20/2024 seen by Savanna Chahal NP in anticipation of right hip replacement surgery at that time patient free of cardiovascular symptoms ECG without acute changes   uncomplicated from a cardiovascular standpoint    3/11/2024 right total hip arthroplasty no cardiovascular complication.    Lab work 4/11/2024 sodium 141 potassium 4.3 BUN of 18 creatinine 1.11 liver function study normal iron studies unremarkable CBC normal    5/6/2024 venous duplex study no evidence  of DVT    this study had been ordered after patient called to state she was having increased lower extremity edema.    She denies angina, dyspnea, palpitation, lightheadedness or syncope.  She has had no symptoms of claudication or neurologic deterioration.  There have been no hospitalizations or emergency room visits since last office visit.    Tolerated complex back surgery and hip replacement without cardiovascular compromise.  Remains free of chest pain or shortness of breath however does have considerable edema that she did not note particularly prior to hip replacement.  The edema is much worse when she is out in the heat.  Is uncomfortable to walk.  Venous duplex have not shown evidence of DVT.  Sometimes the skin feels rather irritated.  No lesions however.                       Review of Systems: Unremarkable except as noted above    Meds     Current Outpatient Medications   Medication Instructions    acetaminophen (TYLENOL) 650 mg, oral, Every 6 hours PRN    ALPRAZolam (XANAX) 0.25 mg, oral, Daily PRN    aspirin 81 mg, oral, Daily    carvedilol (COREG) 6.25 mg, oral, 2 times daily (morning and late afternoon)    diclofenac sodium (Voltaren) 1 % gel gel APPLY TO AFFECTED FOOT UP TO 3 TIMES DAILY AS NEEDED FOR PAIN    famotidine (PEPCID) 20 mg, oral, 2 times daily    fluticasone (Flonase) 50 mcg/actuation nasal spray 1 spray, Each Nostril, Daily PRN    furosemide (Lasix) 20 mg tablet Take one tab daily as needed for leg swelling    levothyroxine (SYNTHROID, LEVOXYL) 25 mcg, oral, Daily    loratadine 10 mg capsule Take 1 capsule by mouth once daily as needed.    nitroglycerin (NITROSTAT) 0.4 mg, sublingual, Every 5 min PRN    pantoprazole (PROTONIX) 40 mg, oral, Daily    rosuvastatin (Crestor) 40 mg tablet TAKE 1 TABLET ONCE EVERY 24HOURS        Allergies     Allergies   Allergen Reactions    Morphine Hives, Itching, Rash and Unknown     Drowsiness with Percocet and Vicodin         Annotated Problems      Specialty Problems          Cardiology Problems    CAD (coronary artery disease)     2020 Normal Lexiscan perfusion with 75% EF   2009 CABG (L-LAD, 2 vein grafts)         Carotid bruit    Easy bruising    Essential hypertension    Hyperlipidemia    Right bundle branch block (RBBB)    S/P CABG (coronary artery bypass graft)      coronary bypass graft surgery 2009 (L-LAD, 2 vein grafts)             Problem List     Patient Active Problem List    Diagnosis Date Noted    Hearing loss of right ear 04/10/2024    Anxiety 04/10/2024    PONV (postoperative nausea and vomiting) 03/12/2024    S/P total right hip arthroplasty 03/11/2024    Cup to disc asymmetry, bilateral 03/01/2024    Other specified diseases of spinal cord (Multi) 02/27/2024    Stage 3a chronic kidney disease (Multi) 02/27/2024    Avascular necrosis of bone of hip, right (Multi) 02/27/2024    Back pain of lumbar region with sciatica 10/17/2023    Hx of degenerative disc disease 09/05/2023    Status post total left knee replacement 09/05/2023    Pain in joint, lower leg 09/05/2023    Hip pain, left 09/05/2023    Back pain 09/05/2023    Knee osteoarthritis 09/05/2023    Class 2 obesity with body mass index (BMI) of 35.0 to 35.9 in adult 09/05/2023    S/P CABG (coronary artery bypass graft) 09/05/2023    History of traumatic fracture 09/05/2023    Abnormal mammogram 05/22/2023    Allergic rhinitis 05/22/2023    Anemia 05/22/2023    Angioedema 05/22/2023    Ankle pain 05/22/2023    Foot pain 05/22/2023    Ankle strain 05/22/2023    Acute bronchitis 05/22/2023    Arthritis 05/22/2023    Bronchitis 05/22/2023    COPD with acute exacerbation (Multi) 05/22/2023    Aseptic loosening of prosthetic knee (CMS-HCC) 05/22/2023    Osteoarthritis of knee 05/22/2023    CAD (coronary artery disease) 05/22/2023    Carotid bruit 05/22/2023    Abdominal pain 05/22/2023    Constipation 05/22/2023    COVID-19 05/22/2023    Dizziness 05/22/2023    Dry skin 05/22/2023    Limb  pain 05/22/2023    Easy bruising 05/22/2023    Localized edema 05/22/2023    Erythrasma 05/22/2023    Essential hypertension 05/22/2023    External hemorrhoid 05/22/2023    Other fatigue 05/22/2023    Food allergy 05/22/2023    Foot sprain 05/22/2023    Fracture of radius, distal, closed 05/22/2023    Fracture of unspecified part of neck of right femur, subsequent encounter for closed fracture with routine healing 05/22/2023    GERD (gastroesophageal reflux disease) 05/22/2023    Hair loss 05/22/2023    Hematuria 05/22/2023    Hip bursitis, left 05/22/2023    Hip fracture, right (Multi) 05/22/2023    Post-traumatic osteoarthritis of right hip 05/22/2023    Hyperlipidemia 05/22/2023    Hypothyroidism 05/22/2023    Impaired gait and mobility 05/22/2023    Influenza A 05/22/2023    Keloid 05/22/2023    Left knee pain 05/22/2023    Leg edema, left 05/22/2023    Low back pain 05/22/2023    Lumbar radicular pain 05/22/2023    Lumbar sprain 05/22/2023    Lung nodule 05/22/2023    Menopausal osteoporosis 05/22/2023    Muscle spasms of lower extremity 05/22/2023    Olecranon bursitis of right elbow 05/22/2023    Olecranon bursitis 05/22/2023    Osteopenia 05/22/2023    Post-op pain 05/22/2023    Renal insufficiency 05/22/2023    Restless legs syndrome 05/22/2023    Rib pain on left side 05/22/2023    Right bundle branch block (RBBB) 05/22/2023    Iliotibial band syndrome of right side 05/22/2023    Right hip pain 05/22/2023    Rosacea 05/22/2023    Scar 05/22/2023    Sinobronchitis 05/22/2023    Sinusitis 05/22/2023    Shoulder impingement 05/22/2023    Tibial tendinitis, posterior 05/22/2023    Trochanteric bursitis of right hip 05/22/2023    Urinary incontinence 05/22/2023    Urinary tract infection 05/22/2023    Leukocytes in urine 05/22/2023    Urine ketones 05/22/2023    Benign neoplasm of sigmoid colon 05/22/2023    Diabetes mellitus without complication (Multi) 05/22/2023    Diverticulosis of large intestine without  "diverticulitis 05/22/2023    Dysphagia 05/22/2023    Gastric polyp 05/22/2023    Vaginal vault prolapse 05/22/2023    Vitamin D deficiency 05/22/2023    Muscle weakness (generalized) 08/26/2022    Major depressive disorder, recurrent, unspecified (CMS-HCC) 08/26/2022    Difficulty in walking, not elsewhere classified 08/26/2022    Abnormal posture 08/26/2022    Depressive disorder 08/26/2022    Closed displaced fracture of right femoral neck (Multi) 12/09/2021    Vestibular hypofunction, bilateral 06/11/2019    Chronic nonintractable headache 06/11/2019    Cervicalgia 06/11/2019    Ataxic gait 06/11/2019    Urge incontinence of urine 05/14/2019    Ataxia 05/04/2019    [place]-Walker grade 2 rectocele 01/07/2019    OAB (overactive bladder) 12/18/2018    Vaginal enterocele 12/18/2018    Vaginal irritation 12/18/2018    Presence of artificial knee joint, bilateral 01/29/2018    Pseudophakia of both eyes 01/22/2016    Dry eye syndrome, bilateral 12/21/2015    Dermatochalasis of both eyelids 12/21/2015       Objective:     Vitals:    05/21/24 1351   BP: 112/64   BP Location: Left arm   Patient Position: Sitting   Pulse: 62   Weight: 88.1 kg (194 lb 3.2 oz)   Height: 1.626 m (5' 4\")      Wt Readings from Last 4 Encounters:   05/21/24 88.1 kg (194 lb 3.2 oz)   04/10/24 86.6 kg (191 lb)   02/27/24 84.4 kg (186 lb)   02/20/24 85.3 kg (188 lb)           LAB:     Lab Results   Component Value Date    WBC 7.9 04/11/2024    HGB 12.0 04/11/2024    HCT 38.6 04/11/2024     04/11/2024    CHOL 133 03/08/2023    TRIG 96 03/08/2023    HDL 47.9 03/08/2023    ALT 13 04/11/2024    AST 15 04/11/2024     04/11/2024    K 4.3 04/11/2024     04/11/2024    CREATININE 1.11 (H) 04/11/2024    BUN 18 04/11/2024    CO2 25 04/11/2024    TSH 1.52 03/08/2023    INR 1.0 10/06/2023    HGBA1C 5.7 02/27/2024       Diagnostic Studies:     XR lumbar spine 2-3 views    Result Date: 5/7/2024  Interpreted By:  Adalberto Arizmendi, STUDY: XR " LUMBAR SPINE 2-3 VIEWS; 5/7/2024 9:09 am   INDICATION: Signs/Symptoms:low back pain.   ACCESSION NUMBER(S): [id number]   ORDERING CLINICIAN: ADALBERTO ARIZMENDI   FINDINGS: AP lateral x-rays of the lumbar spine show midline laminectomy from L3 down through S1 with interbody cages at L3-4 and L4-5 and instrumentation at L3-4 and L4-5. Hardware is in good position without any evidence of hardware failure. Lumbar lordosis is maintained. There is moderate degenerative changes above and below the level of the fusion. There is no fractures. There is calcification of the anterior vasculature. There is a scoliosis 5 degrees above the level of the fusion. Pedicles are visualized above the level of fusion. Surgical clips are present consistent with cholecystectomy. Bony pelvis and hips are partially visualized on show a right hip replacement and mild-to-moderate left hip degenerative changes.     Signed by: Adalberto Arizmendi 5/7/2024 9:34 AM Dictation workstation:   [id number]        Radiology:     No orders to display       Physical Exam     General Appearance: alert and oriented to person, place and time, in no acute distress  Cardiovascular: normal rate, regular rhythm, normal S1 and S2, no murmurs, rubs, clicks, or gallops,  no JVD  Pulmonary/Chest: clear to auscultation bilaterally- no wheezes, rales or rhonchi, normal air movement, no respiratory distress  Abdomen: soft, non-tender, non-distended, normal bowel sounds, no masses   Extremities: no cyanosis, clubbing, 2+ edema bilateral surgical leg somewhat worse than the other  Skin: warm and dry, no rash or erythema  Eyes: EOMI  Neck: supple and non-tender without mass, no thyromegaly   Neurological: alert, oriented, normal speech, no focal findings or movement disorder noted

## 2024-05-22 PROBLEM — M71.50 TRAUMATIC BURSITIS: Status: RESOLVED | Noted: 2023-05-22 | Resolved: 2024-05-22

## 2024-05-22 PROBLEM — H92.09 EARACHE: Status: RESOLVED | Noted: 2023-05-22 | Resolved: 2024-05-22

## 2024-05-22 PROBLEM — M25.69 STIFFNESS OF OTHER SPECIFIED JOINT, NOT ELSEWHERE CLASSIFIED: Status: RESOLVED | Noted: 2023-10-06 | Resolved: 2024-05-22

## 2024-05-22 PROBLEM — R79.89 ELEVATED D-DIMER: Status: RESOLVED | Noted: 2023-05-22 | Resolved: 2024-05-22

## 2024-05-22 PROBLEM — I34.0 MITRAL REGURGITATION: Status: RESOLVED | Noted: 2023-05-22 | Resolved: 2024-05-22

## 2024-05-22 PROBLEM — R07.89 OTHER CHEST PAIN: Status: RESOLVED | Noted: 2023-05-22 | Resolved: 2024-05-22

## 2024-05-22 PROBLEM — R00.1 SINUS BRADYCARDIA: Status: RESOLVED | Noted: 2023-05-22 | Resolved: 2024-05-22

## 2024-05-22 PROBLEM — H60.509 ACUTE OTITIS EXTERNA: Status: RESOLVED | Noted: 2023-05-22 | Resolved: 2024-05-22

## 2024-05-22 PROBLEM — R01.1 CARDIAC MURMUR: Status: RESOLVED | Noted: 2023-05-22 | Resolved: 2024-05-22

## 2024-05-22 PROBLEM — R42 VERTIGO: Status: RESOLVED | Noted: 2023-05-22 | Resolved: 2024-05-22

## 2024-05-22 PROBLEM — H66.92 OTITIS MEDIA, LEFT: Status: RESOLVED | Noted: 2023-05-22 | Resolved: 2024-05-22

## 2024-05-22 PROBLEM — Z95.1 HISTORY OF CORONARY ARTERY BYPASS SURGERY: Status: RESOLVED | Noted: 2023-05-22 | Resolved: 2024-05-22

## 2024-05-22 PROBLEM — S46.919A: Status: RESOLVED | Noted: 2023-05-22 | Resolved: 2024-05-22

## 2024-05-22 PROBLEM — H61.21 IMPACTED CERUMEN, RIGHT EAR: Status: RESOLVED | Noted: 2023-05-22 | Resolved: 2024-05-22

## 2024-05-22 PROBLEM — R06.09 DYSPNEA ON EXERTION: Status: RESOLVED | Noted: 2023-05-22 | Resolved: 2024-05-22

## 2024-05-22 PROBLEM — R68.89 FLU-LIKE SYMPTOMS: Status: RESOLVED | Noted: 2023-05-22 | Resolved: 2024-05-22

## 2024-05-22 PROBLEM — H60.90 OTITIS EXTERNA: Status: RESOLVED | Noted: 2023-05-22 | Resolved: 2024-05-22

## 2024-05-22 PROBLEM — J02.9 SORE THROAT: Status: RESOLVED | Noted: 2024-03-25 | Resolved: 2024-05-22

## 2024-05-23 ENCOUNTER — TELEPHONE (OUTPATIENT)
Dept: ORTHOPEDIC SURGERY | Facility: CLINIC | Age: 79
End: 2024-05-23

## 2024-05-23 ENCOUNTER — LAB (OUTPATIENT)
Dept: LAB | Facility: LAB | Age: 79
End: 2024-05-23
Payer: MEDICARE

## 2024-05-23 ENCOUNTER — EVALUATION (OUTPATIENT)
Dept: PHYSICAL THERAPY | Facility: HOSPITAL | Age: 79
End: 2024-05-23
Payer: MEDICARE

## 2024-05-23 ENCOUNTER — APPOINTMENT (OUTPATIENT)
Dept: PHYSICAL THERAPY | Facility: HOSPITAL | Age: 79
End: 2024-05-23
Payer: MEDICARE

## 2024-05-23 DIAGNOSIS — M54.50 LOW BACK PAIN, UNSPECIFIED BACK PAIN LATERALITY, UNSPECIFIED CHRONICITY, UNSPECIFIED WHETHER SCIATICA PRESENT: ICD-10-CM

## 2024-05-23 DIAGNOSIS — E11.9 TYPE 2 DIABETES MELLITUS WITHOUT COMPLICATIONS (MULTI): ICD-10-CM

## 2024-05-23 DIAGNOSIS — R94.6 ABNORMAL RESULTS OF THYROID FUNCTION STUDIES: ICD-10-CM

## 2024-05-23 DIAGNOSIS — N18.9 CHRONIC KIDNEY DISEASE, UNSPECIFIED: ICD-10-CM

## 2024-05-23 DIAGNOSIS — E78.5 HYPERLIPIDEMIA, UNSPECIFIED: ICD-10-CM

## 2024-05-23 DIAGNOSIS — E03.9 HYPOTHYROIDISM, UNSPECIFIED: ICD-10-CM

## 2024-05-23 DIAGNOSIS — E11.21 TYPE 2 DIABETES MELLITUS WITH DIABETIC NEPHROPATHY (MULTI): ICD-10-CM

## 2024-05-23 DIAGNOSIS — E61.0 COPPER DEFICIENCY: ICD-10-CM

## 2024-05-23 DIAGNOSIS — E55.9 VITAMIN D DEFICIENCY, UNSPECIFIED: ICD-10-CM

## 2024-05-23 DIAGNOSIS — D64.9 ANEMIA, UNSPECIFIED: Primary | ICD-10-CM

## 2024-05-23 DIAGNOSIS — R29.898 WEAKNESS OF RIGHT LOWER EXTREMITY: Primary | ICD-10-CM

## 2024-05-23 DIAGNOSIS — I51.9 HEART DISEASE, UNSPECIFIED: ICD-10-CM

## 2024-05-23 LAB
ALBUMIN SERPL BCP-MCNC: 3.9 G/DL (ref 3.4–5)
ALP SERPL-CCNC: 72 U/L (ref 33–136)
ALT SERPL W P-5'-P-CCNC: 11 U/L (ref 7–45)
ANION GAP SERPL CALC-SCNC: 12 MMOL/L (ref 10–20)
AST SERPL W P-5'-P-CCNC: 15 U/L (ref 9–39)
BASOPHILS # BLD AUTO: 0.05 X10*3/UL (ref 0–0.1)
BASOPHILS NFR BLD AUTO: 0.9 %
BILIRUB SERPL-MCNC: 0.7 MG/DL (ref 0–1.2)
BUN SERPL-MCNC: 21 MG/DL (ref 6–23)
CALCIUM SERPL-MCNC: 9 MG/DL (ref 8.6–10.3)
CHLORIDE SERPL-SCNC: 106 MMOL/L (ref 98–107)
CHOLEST SERPL-MCNC: 140 MG/DL (ref 0–199)
CHOLESTEROL/HDL RATIO: 3
CO2 SERPL-SCNC: 25 MMOL/L (ref 21–32)
CREAT SERPL-MCNC: 1.09 MG/DL (ref 0.5–1.05)
EGFRCR SERPLBLD CKD-EPI 2021: 52 ML/MIN/1.73M*2
EOSINOPHIL # BLD AUTO: 0.09 X10*3/UL (ref 0–0.4)
EOSINOPHIL NFR BLD AUTO: 1.7 %
ERYTHROCYTE [DISTWIDTH] IN BLOOD BY AUTOMATED COUNT: 13.8 % (ref 11.5–14.5)
EST. AVERAGE GLUCOSE BLD GHB EST-MCNC: 111 MG/DL
GLUCOSE SERPL-MCNC: 87 MG/DL (ref 74–99)
HBA1C MFR BLD: 5.5 %
HCT VFR BLD AUTO: 34.7 % (ref 36–46)
HDLC SERPL-MCNC: 46.5 MG/DL
HGB BLD-MCNC: 10.9 G/DL (ref 12–16)
IMM GRANULOCYTES # BLD AUTO: 0.03 X10*3/UL (ref 0–0.5)
IMM GRANULOCYTES NFR BLD AUTO: 0.6 % (ref 0–0.9)
LDLC SERPL CALC-MCNC: 68 MG/DL
LYMPHOCYTES # BLD AUTO: 1.27 X10*3/UL (ref 0.8–3)
LYMPHOCYTES NFR BLD AUTO: 23.6 %
MAGNESIUM SERPL-MCNC: 2.11 MG/DL (ref 1.6–2.4)
MCH RBC QN AUTO: 28.1 PG (ref 26–34)
MCHC RBC AUTO-ENTMCNC: 31.4 G/DL (ref 32–36)
MCV RBC AUTO: 89 FL (ref 80–100)
MONOCYTES # BLD AUTO: 0.55 X10*3/UL (ref 0.05–0.8)
MONOCYTES NFR BLD AUTO: 10.2 %
NEUTROPHILS # BLD AUTO: 3.4 X10*3/UL (ref 1.6–5.5)
NEUTROPHILS NFR BLD AUTO: 63 %
NON HDL CHOLESTEROL: 94 MG/DL (ref 0–149)
NRBC BLD-RTO: 0 /100 WBCS (ref 0–0)
PLATELET # BLD AUTO: 249 X10*3/UL (ref 150–450)
POTASSIUM SERPL-SCNC: 4.3 MMOL/L (ref 3.5–5.3)
PROT SERPL-MCNC: 7 G/DL (ref 6.4–8.2)
RBC # BLD AUTO: 3.88 X10*6/UL (ref 4–5.2)
SODIUM SERPL-SCNC: 139 MMOL/L (ref 136–145)
TRIGL SERPL-MCNC: 129 MG/DL (ref 0–149)
TSH SERPL-ACNC: 1.67 MIU/L (ref 0.44–3.98)
VLDL: 26 MG/DL (ref 0–40)
WBC # BLD AUTO: 5.4 X10*3/UL (ref 4.4–11.3)

## 2024-05-23 PROCEDURE — 36415 COLL VENOUS BLD VENIPUNCTURE: CPT

## 2024-05-23 PROCEDURE — 83036 HEMOGLOBIN GLYCOSYLATED A1C: CPT

## 2024-05-23 PROCEDURE — 80053 COMPREHEN METABOLIC PANEL: CPT

## 2024-05-23 PROCEDURE — 97110 THERAPEUTIC EXERCISES: CPT | Mod: GP | Performed by: PHYSICAL THERAPIST

## 2024-05-23 PROCEDURE — 85025 COMPLETE CBC W/AUTO DIFF WBC: CPT

## 2024-05-23 PROCEDURE — 84443 ASSAY THYROID STIM HORMONE: CPT

## 2024-05-23 PROCEDURE — 83735 ASSAY OF MAGNESIUM: CPT

## 2024-05-23 PROCEDURE — 80061 LIPID PANEL: CPT

## 2024-05-23 PROCEDURE — 97161 PT EVAL LOW COMPLEX 20 MIN: CPT | Mod: GP | Performed by: PHYSICAL THERAPIST

## 2024-05-23 ASSESSMENT — PAIN SCALES - GENERAL: PAINLEVEL_OUTOF10: 2

## 2024-05-23 ASSESSMENT — PAIN - FUNCTIONAL ASSESSMENT: PAIN_FUNCTIONAL_ASSESSMENT: 0-10

## 2024-05-23 NOTE — PROGRESS NOTES
Physical Therapy    Physical Therapy Evaluation and Treatment      Patient Name: Rosalba Salcedo  MRN: [id number]  Today's Date: 5/23/2024  Time Calculation  Start Time: 1016  Stop Time: 1100  Time Calculation (min): 44 min    Assessment:  This 78 yo pleasant female is present today with the diagnosis of low back pain.  She is 7 months out from L2-3 laminectomy, L4-5 excision of facet cyst and exploration of prior L3-4, L4-5 fusion by Dr. Arizmendi.  She also had Rt THR on 3-11-24, anterior approach by Dr. Travis and has hip precautions for 6 months.  Pt walking into the clinic today with her st cane with mild antalgia.  She has (B) LE edema.  She reports difficulty sleeping at night and unable to get comfortable.  Her lumbar AROM is restricted secondary to having 2 lumbar surgeries and pain noted with all motions.  She has some tenderness to her Rt lumbar paraspinals.  Prone positioning on our plinth abolished her lumbar pain.  Her Rt hip AROM is WFL's and relatively pain free.  Rt hip flexion 4/5 and abduction 3+-4-/5.  She reports good and bad days and struggles some days with her instrumental ADL's and other days has no problem with cooking and cleaning but states vacuuming can be painful and difficult.  She wants to continue with her strengthening and endurance training, balance, gait and overall function.  She doesn't maintain her hip precautions at this time and is confused about what she can and can't do.  I gave her a sheet on anterior hip precautions and she states she has it at home, but also states Dr. Travis told her she can't bend forward.  Pt given HEP on prone positioning for LBP control and sitting with proper lumbar alignment.  All questions answered.        Plan:  Continued with skilled PT 1-2x/week for 10 PT treatments      Current Problem:   1. Weakness of right lower extremity  Follow Up In Physical Therapy      2. Low back pain, unspecified back pain laterality, unspecified chronicity,  unspecified whether sciatica present  Referral to Physical Therapy    Follow Up In Physical Therapy          Subjective  Pt reports no Rt hip pain just some mild LBP, more so when walking.      Precautions:  Precautions  Post-Surgical Precautions: Right hip precautions  Pain:  Pain Assessment  Pain Assessment: 0-10  Pain Score: 2  Pain Type: Chronic pain  Pain Location: Back    Objective   AROM:  50% limited painful lumbar AROM  Rt hip AROM WFL's within her precautions      Strength  Fait core strength  Rt hip flexion 4/5, abduction 3+4-/5      Posture:  Decreased lumbar lordosis in standing and sitting.       Palpation:  Tenderness to Rt lumbar paraspinals      Functional Mobility:  Independent with all her transfers      Balance:  Good standing balance and fair walking balance with st cane      Special Tests: NT    Outcome Measures:  Other Measures  Oswestry Disablity Index (FRANKLIN): 27%     Treatments:    Prone lying - 2-3 minutes *  Sitting with lumbar roll *  NuStep, seat 9, L3, 10 minutes      Goals:    Abolish lumbar symptoms  Pain free lumbar AROM  No tenderness to her lumbar paraspinals  Rt hip strength 4-4+/5 grossly throughout  No difficulty or pain doing her instrumental ADL's and work activities around her home  6.  Independent with her HEP

## 2024-05-23 NOTE — TELEPHONE ENCOUNTER
Please reach out to patient she has questions about if she is able to bend at this time or not pt is saying its ok to do but she wants to make sure all is well

## 2024-05-24 NOTE — TELEPHONE ENCOUNTER
Pt aware that she is not to bend past 90 degrees, and I also told her that if the therapist has questions that he can call the office

## 2024-05-24 NOTE — TELEPHONE ENCOUNTER
Per Dr. Travis no bending past 90 degrees for 3 months post op.     Called and left message yesterday per rubio         Patient called back and has more questions about what she should be doing.

## 2024-05-27 NOTE — ED PROVIDER NOTES
[address] [facility] ED  eMERGENCY dEPARTMENT eNCOUnter      Pt Name: Carlee Webb  MRN: [id number]  [place] 1945  Date of evaluation: 5/4/2019  Provider: Ghassan Martinez DO    CHIEF COMPLAINT       Chief Complaint   Patient presents with    Hypertension         HISTORY OF PRESENT ILLNESS   (Location/Symptom, Timing/Onset, Context/Setting, Quality, Duration, Modifying Factors, Severity)  Note limiting factors. Carlee Webb is a 69 y.o. female who presents to the emergency department with a headache and high blood pressure for the past 3 weeks. She states she's been to her physician several times nothing is been done to her satisfaction for her headache or hypertension. Location symptoms are in her head timing and onset 2-3 weeks context and setting: This happened at home. Quality pain: Dull headache all over. Duration 2-3 weeks. Modifying factors: Patient has had medical care but she is dissatisfied with her physician so she has come to the emergency department at 4 AM for definitive treatment of her headache and high blood pressure. Severity: Mild. Modifying factors patient has had blood pressure treatment by her physician. Nothing seems to make this problem better or worse for her. HPI    Nursing Notes were reviewed. REVIEW OF SYSTEMS    (2-9 systems for level 4, 10 or more for level 5)     Review of Systems   Constitutional: Negative for chills, diaphoresis and fever. HENT: Negative for congestion, ear discharge, ear pain, hearing loss, nosebleeds, sore throat and tinnitus. Eyes: Negative for photophobia, pain, discharge and redness. Respiratory: Negative for cough, shortness of breath, wheezing and stridor. Cardiovascular: Negative for chest pain, palpitations and leg swelling. Gastrointestinal: Negative for abdominal pain, blood in stool, constipation, diarrhea, nausea and vomiting. Endocrine: Negative for polydipsia.    Genitourinary: Negative for dysuria, BG goal: 140-180    - Novolog 4 units TIDWM ((20% increase due to prandial blood glucose  above goal)  - LDC (150/50) prn   - POCT glucose ac/hs  - POCT Glucose before meals and at bedtime  - Hypoglycemia protocol in place      ** Please notify Endocrine for any change and/or advance in diet**  ** Please call Endocrine for any BG related issues **     Discharge Planning:   TBD. Please notify endocrinology prior to discharge.       ALLERGIES     Codeine and Morphine    FAMILY HISTORY       Family History   Problem Relation Age of Onset    Colon Cancer Father     Other Father         polio    Heart Disease Mother         pacemaker    Cancer Mother         stomach cancer    High Blood Pressure Mother     High Cholesterol Mother     Other Brother          at age 27 / incident in care facility / pt was mentally challenged    Heart Disease Daughter     Other Son         blood dyscrasia          SOCIAL HISTORY       Social History     Socioeconomic History    Marital status:      Spouse name: None    Number of children: None    Years of education: None    Highest education level: None   Occupational History    None   Social Needs    Financial resource strain: None    Food insecurity:     Worry: None     Inability: None    Transportation needs:     Medical: None     Non-medical: None   Tobacco Use    Smoking status: Never Smoker    Smokeless tobacco: Never Used   Substance and Sexual Activity    Alcohol use:  Yes     Alcohol/week: 0.0 oz     Comment: rare social    Drug use: No    Sexual activity: None   Lifestyle    Physical activity:     Days per week: None     Minutes per session: None    Stress: None   Relationships    Social connections:     Talks on phone: None     Gets together: None     Attends Nondenominational service: None     Active member of club or organization: None     Attends meetings of clubs or organizations: None     Relationship status: None    Intimate partner violence:     Fear of current or ex partner: None     Emotionally abused: None     Physically abused: None     Forced sexual activity: None   Other Topics Concern    None   Social History Narrative    None       SCREENINGS             PHYSICAL EXAM    (up to 7 for level 4, 8 or more for level 5)     ED Triage Vitals [19 0433]   BP Temp Temp Source Pulse Resp SpO2 Height Weight   (!) 174/93 98.3 °F (36.8 °C) Oral 60 18 96 % 5' 4\" (1.626 m) 216 lb (98 kg)       Physical Exam   Constitutional: She is oriented to person, place, and time. She appears well-developed and well-nourished. No distress. HENT:   Head: Normocephalic and atraumatic. Right Ear: External ear normal.   Left Ear: External ear normal.   Nose: Nose normal.   Mouth/Throat: Oropharynx is clear and moist. No oropharyngeal exudate. Eyes: Pupils are equal, round, and reactive to light. Conjunctivae and EOM are normal. Right eye exhibits no discharge. Left eye exhibits no discharge. No scleral icterus. Neck: Normal range of motion. Neck supple. No JVD present. No tracheal deviation present. No thyromegaly present. Cardiovascular: Normal rate, regular rhythm, normal heart sounds and intact distal pulses. Exam reveals no gallop and no friction rub. No murmur heard. Pulmonary/Chest: Effort normal and breath sounds normal. No stridor. No respiratory distress. She has no wheezes. She has no rales. She exhibits no tenderness. Abdominal: Soft. Bowel sounds are normal. She exhibits no distension and no mass. There is no tenderness. There is no rebound and no guarding. Musculoskeletal: Normal range of motion. She exhibits edema. She exhibits no tenderness. Lymphadenopathy:     She has no cervical adenopathy. Neurological: She is alert and oriented to person, place, and time. She has normal reflexes. She displays normal reflexes. No cranial nerve deficit. She exhibits normal muscle tone. Coordination normal.   Skin: Skin is warm and dry. No rash noted. She is not diaphoretic. No erythema. No pallor. Psychiatric: She has a normal mood and affect. Her behavior is normal. Judgment and thought content normal.   Nursing note and vitals reviewed.         DIAGNOSTIC RESULTS     EKG: All EKG's are interpreted by the Emergency Department Physician who either signs or Co-signs this chart in the absence of a cardiologist.    12-lead EKG was performed which shows sinus bradycardia with a rate of 53 bpm.  There is a right bundle branch block. There is no ectopy. There is no ST segment elevation or depression in any limb later precordial lead. Summary abnormal EKG without acute findings of the STEMI MI.    RADIOLOGY:   Non-plain film images such as CT, Ultrasound and MRI are read by the radiologist. Plain radiographic images are visualized and preliminarily interpreted by the emergency physician with the below findings:    CT head: no bleed, no mass, no mid-line shift. Interpretation per the Radiologist below, if available at the time of this note:    CT Head WO Contrast    (Results Pending)   XR CHEST PORTABLE    (Results Pending)     CXR: no infiltrate, no ptx, no free. ED BEDSIDE ULTRASOUND:   Performed by ED Physician - none    LABS:  Labs Reviewed   COMPREHENSIVE METABOLIC PANEL - Abnormal; Notable for the following components:       Result Value    Glucose 113 (*)     CREATININE 1.01 (*)     GFR Non- 53.7 (*)     All other components within normal limits   CBC WITH AUTO DIFFERENTIAL - Abnormal; Notable for the following components:    Hematocrit 36.7 (*)     RDW 15.1 (*)     All other components within normal limits   URINE RT REFLEX TO CULTURE       All other labs were within normal range or not returned as of this dictation. EMERGENCY DEPARTMENT COURSE and DIFFERENTIAL DIAGNOSIS/MDM:   Vitals:    Vitals:    05/04/19 0433 05/04/19 0624 05/04/19 0715   BP: (!) 174/93 116/69 121/86   Pulse: 60 50 52   Resp: 18 18 20   Temp: 98.3 °F (36.8 °C)     TempSrc: Oral     SpO2: 96% 97% 100%   Weight: 216 lb (98 kg)     Height: 5' 4\" (1.626 m)             MDM  Patient has had a complaints of 3-4 weeks of a headache that is all over and dull in quality. It started out gradually and has worsened. The patient states that her blood pressure has remained high at most times occasionally it will come down but she states the pressure is above 170.   The patient states when she

## 2024-05-28 ENCOUNTER — TELEPHONE (OUTPATIENT)
Dept: PRIMARY CARE | Facility: CLINIC | Age: 79
End: 2024-05-28

## 2024-05-28 ENCOUNTER — OFFICE VISIT (OUTPATIENT)
Dept: PRIMARY CARE | Facility: CLINIC | Age: 79
End: 2024-05-28
Payer: MEDICARE

## 2024-05-28 VITALS
SYSTOLIC BLOOD PRESSURE: 140 MMHG | DIASTOLIC BLOOD PRESSURE: 84 MMHG | TEMPERATURE: 98.2 F | HEIGHT: 64 IN | RESPIRATION RATE: 16 BRPM | BODY MASS INDEX: 32.95 KG/M2 | WEIGHT: 193 LBS | OXYGEN SATURATION: 97 % | HEART RATE: 64 BPM

## 2024-05-28 DIAGNOSIS — K21.9 GASTROESOPHAGEAL REFLUX DISEASE, UNSPECIFIED WHETHER ESOPHAGITIS PRESENT: ICD-10-CM

## 2024-05-28 DIAGNOSIS — M85.80 OSTEOPENIA, UNSPECIFIED LOCATION: ICD-10-CM

## 2024-05-28 DIAGNOSIS — M81.0 OSTEOPOROSIS, UNSPECIFIED OSTEOPOROSIS TYPE, UNSPECIFIED PATHOLOGICAL FRACTURE PRESENCE: ICD-10-CM

## 2024-05-28 DIAGNOSIS — R53.83 OTHER FATIGUE: ICD-10-CM

## 2024-05-28 DIAGNOSIS — Z00.00 ROUTINE GENERAL MEDICAL EXAMINATION AT HEALTH CARE FACILITY: Primary | ICD-10-CM

## 2024-05-28 DIAGNOSIS — F41.9 ANXIETY: ICD-10-CM

## 2024-05-28 DIAGNOSIS — R60.0 LOCALIZED EDEMA: ICD-10-CM

## 2024-05-28 DIAGNOSIS — Z78.0 ASYMPTOMATIC MENOPAUSAL STATE: ICD-10-CM

## 2024-05-28 DIAGNOSIS — Z12.31 ENCOUNTER FOR SCREENING MAMMOGRAM FOR BREAST CANCER: ICD-10-CM

## 2024-05-28 DIAGNOSIS — E03.9 HYPOTHYROIDISM, UNSPECIFIED TYPE: ICD-10-CM

## 2024-05-28 DIAGNOSIS — M81.0 OSTEOPOROSIS, UNSPECIFIED OSTEOPOROSIS TYPE, UNSPECIFIED PATHOLOGICAL FRACTURE PRESENCE: Primary | ICD-10-CM

## 2024-05-28 DIAGNOSIS — K21.9 GASTRO-ESOPHAGEAL REFLUX DISEASE WITHOUT ESOPHAGITIS: ICD-10-CM

## 2024-05-28 DIAGNOSIS — J30.9 ALLERGIC RHINITIS, UNSPECIFIED SEASONALITY, UNSPECIFIED TRIGGER: ICD-10-CM

## 2024-05-28 DIAGNOSIS — N28.9 RENAL INSUFFICIENCY: ICD-10-CM

## 2024-05-28 PROCEDURE — 1160F RVW MEDS BY RX/DR IN RCRD: CPT | Performed by: FAMILY MEDICINE

## 2024-05-28 PROCEDURE — 3079F DIAST BP 80-89 MM HG: CPT | Performed by: FAMILY MEDICINE

## 2024-05-28 PROCEDURE — 3077F SYST BP >= 140 MM HG: CPT | Performed by: FAMILY MEDICINE

## 2024-05-28 PROCEDURE — 1170F FXNL STATUS ASSESSED: CPT | Performed by: FAMILY MEDICINE

## 2024-05-28 PROCEDURE — 1159F MED LIST DOCD IN RCRD: CPT | Performed by: FAMILY MEDICINE

## 2024-05-28 PROCEDURE — G0439 PPPS, SUBSEQ VISIT: HCPCS | Performed by: FAMILY MEDICINE

## 2024-05-28 PROCEDURE — 1124F ACP DISCUSS-NO DSCNMKR DOCD: CPT | Performed by: FAMILY MEDICINE

## 2024-05-28 RX ORDER — ALENDRONATE SODIUM 70 MG/1
70 TABLET ORAL
Qty: 12 TABLET | Refills: 3 | Status: SHIPPED | OUTPATIENT
Start: 2024-05-28 | End: 2025-05-28

## 2024-05-28 RX ORDER — LEVOTHYROXINE SODIUM 25 UG/1
25 TABLET ORAL DAILY
Qty: 90 TABLET | Refills: 3 | Status: SHIPPED | OUTPATIENT
Start: 2024-05-28 | End: 2024-05-28 | Stop reason: SDUPTHER

## 2024-05-28 RX ORDER — ALENDRONATE SODIUM 70 MG/1
70 TABLET ORAL
Qty: 12 TABLET | Refills: 3 | Status: SHIPPED | OUTPATIENT
Start: 2024-05-28 | End: 2024-05-28 | Stop reason: SDUPTHER

## 2024-05-28 RX ORDER — PANTOPRAZOLE SODIUM 40 MG/1
40 TABLET, DELAYED RELEASE ORAL DAILY
Qty: 90 TABLET | Refills: 3 | Status: SHIPPED | OUTPATIENT
Start: 2024-05-28 | End: 2024-05-28 | Stop reason: SDUPTHER

## 2024-05-28 RX ORDER — FLUTICASONE PROPIONATE 50 MCG
1 SPRAY, SUSPENSION (ML) NASAL DAILY PRN
Qty: 16 G | Refills: 3 | Status: SHIPPED | OUTPATIENT
Start: 2024-05-28

## 2024-05-28 RX ORDER — PANTOPRAZOLE SODIUM 40 MG/1
40 TABLET, DELAYED RELEASE ORAL DAILY
Qty: 90 TABLET | Refills: 3 | Status: SHIPPED | OUTPATIENT
Start: 2024-05-28

## 2024-05-28 RX ORDER — FAMOTIDINE 20 MG/1
20 TABLET, FILM COATED ORAL 2 TIMES DAILY
Qty: 180 TABLET | Refills: 3 | Status: SHIPPED | OUTPATIENT
Start: 2024-05-28

## 2024-05-28 RX ORDER — LEVOTHYROXINE SODIUM 25 UG/1
25 TABLET ORAL DAILY
Qty: 90 TABLET | Refills: 3 | Status: SHIPPED | OUTPATIENT
Start: 2024-05-28

## 2024-05-28 RX ORDER — FUROSEMIDE 20 MG/1
TABLET ORAL
Qty: 90 TABLET | Refills: 3 | Status: SHIPPED | OUTPATIENT
Start: 2024-05-28

## 2024-05-28 RX ORDER — FAMOTIDINE 20 MG/1
20 TABLET, FILM COATED ORAL 2 TIMES DAILY
Qty: 180 TABLET | Refills: 3 | Status: SHIPPED | OUTPATIENT
Start: 2024-05-28 | End: 2024-05-28 | Stop reason: SDUPTHER

## 2024-05-28 ASSESSMENT — PATIENT HEALTH QUESTIONNAIRE - PHQ9
1. LITTLE INTEREST OR PLEASURE IN DOING THINGS: NOT AT ALL
SUM OF ALL RESPONSES TO PHQ9 QUESTIONS 1 AND 2: 0
2. FEELING DOWN, DEPRESSED OR HOPELESS: NOT AT ALL

## 2024-05-28 ASSESSMENT — ENCOUNTER SYMPTOMS
LIGHT-HEADEDNESS: 0
CONFUSION: 0
DEPRESSION: 0
BACK PAIN: 1
DIZZINESS: 0
FATIGUE: 0
LOSS OF SENSATION IN FEET: 1
OCCASIONAL FEELINGS OF UNSTEADINESS: 1
ARTHRALGIAS: 1

## 2024-05-28 ASSESSMENT — ACTIVITIES OF DAILY LIVING (ADL)
DOING_HOUSEWORK: INDEPENDENT
DRESSING: INDEPENDENT
GROCERY_SHOPPING: INDEPENDENT
MANAGING_FINANCES: INDEPENDENT
BATHING: INDEPENDENT
TAKING_MEDICATION: INDEPENDENT

## 2024-05-28 NOTE — PROGRESS NOTES
Subjective   Patient ID: Rosalba Salcedo is a 79 y.o. female who presents for Medicare Annual Wellness Visit Subsequent.  HPI  Want to   ??? Prediabetic     Check  ears  No  incontinence    The reflux esophagitis he has been occurring in a recurrent pattern for years. The course has been without change. The reflux is described as   moderate. The patient remains compliant on meds.. The patient takes medications in a  daily fashion. Denies dysphagia hoarseness or odynophagia...   Osteoporosis  .. Stopped  taking  fosamax  forgot ...    Not    Taking  vitamin d  or calcium...  /taking  aspirin   HERE FOR RECHK OF THYROIDLast clinic visit was month (S) ago. Symptoms do not include weight gain, cold intolerance, fatigue, weakness, appetite, , dry skin  .. Yes hair  loss  ..   There is no known event that preceded symptom onset.  . Current treatment includes levothyroxine. Report there is good compliance with medical treatment.   Review of Systems   Constitutional:  Negative for fatigue.   HENT:  Positive for hearing loss.    Cardiovascular:  Positive for leg swelling.   Musculoskeletal:  Positive for arthralgias and back pain.   Neurological:  Negative for dizziness and light-headedness.   Psychiatric/Behavioral:  Negative for confusion.        Objective   Physical Exam  Vitals: I have reviewed the vitals  General: Well-developed.  In no acute distress.  Eyes:   sclera nonicteric.  Conjunctiva not injected.  No discharge.   HEAD: Normocephalic, atraumatic.  HEENT   Mucous membranes moist.  Posterior oropharynx nonerythematous, no tonsillar exudates.      No cervical lymphadenopathy.  Cardio: Regular rate and rhythm.  No murmur, rub or gallop.  Pulmonary: Lungs clear to auscultation in all fields.  No accessory muscle use.  GI/: Normal active bowel sounds.  Soft, nontender.  No masses or organomegaly appreciated.  Musculoskeletal: No gross deformities appreciated.  Neuro: Alert, age-appropriate.  Normal muscle  tone.  Moving all extremities.  Skin: No rash, bruises or lesions.   Labs    on [facility]  Outside Information      suggestion  Information displayed in this report may not trend or trigger automated decision support.      Contains abnormal data Basic Metabolic Panel  Order: [id number]  Component  Ref Range & Units 2 mo ago   Sodium  135 - 144 mEq/L 138   Potassium  3.4 - 4.9 mEq/L 4.6   Chloride  95 - 107 mEq/L 105   CO2  20 - 31 mEq/L 24   Anion Gap  9 - 15 mEq/L 9   Glucose  70 - 99 mg/dL 89   BUN  8 - 23 mg/dL 20   Creatinine  0.50 - 0.90 mg/dL 1.06 High    Est, Glom Filt Rate  >60 53.7 Low       Contains abnormal data CBC and Auto Differential  Order: [id number]   Status: Final result       Visible to patient: Yes (seen)       Dx: Dyspnea on exertion    2 Result Notes       1 Patient Communication   important suggestion  Newer results are available. Click to view them now.               Component  Ref Range & Units 1 mo ago  (4/11/24) 6 mo ago  (11/22/23) 7 mo ago  (10/21/23) 7 mo ago  (10/20/23) 7 mo ago  (10/19/23) 7 mo ago  (10/5/23) 10 mo ago  (7/28/23)   WBC  4.4 - 11.3 x10*3/uL 7.9 8.1 7.2 7.3 11.2 7.5 CANCELED R, CM   nRBC  0.0 - 0.0 /100 WBCs 0.0 0.0 0.0 0.0 0.0 0.0 CANCELED R, CM   RBC  4.00 - 5.20 x10*6/uL 4.25 4.14 3.38 Low  3.60 Low  3.52 Low  4.53 CANCELED R, CM   Hemoglobin  12.0 - 16.0 g/dL 12.0 11.9 Low  9.8 Low  10.5 Low  10.4 Low  13.3 CANCELED R, CM   Hematocrit  36.0 - 46.0 % 38.6 37.6 31.1 Low  33.1 Low  31.3 Low  42.0 CANCELED R, CM   MCV  80 - 100 fL 91 91 92 92 89 93 CANCELED R, CM   MCH  26.0 - 34.0 pg 28.2 28.7 29.0 29.2 29.5 29.4    MCHC  32.0 - 36.0 g/dL 31.1 Low  31.6 Low  31.5 Low  31.7 Low  33.2 31.7 Low  CANCELED R, CM   RDW  11.5 - 14.5 % 15.5 High  13.2 14.0 13.8 13.2 13.8 CANCELED R, CM   Platelets  150 - 450 x10*3/uL 294 248 157 163 163 243 CANCELED R, CM   Neutrophils %  40.0 - 80.0 % 66.0 74.6     CANCELED R, CM   Immature Granulocytes %, Automated  0.0 -  0.9 % 0.8 0.4 CM     CANCELED R, CM   Comment: Immature Granulocyte Count (IG) includes promyelocytes, myelocytes and metamyelocytes but does not include bands. Percent differential counts (%) should be interpreted in the context of the absolute cell counts (cells/UL).   Lymphocytes %  13.0 - 44.0 % 20.3 12.3     CANCELED R, CM   Monocytes %  2.0 - 10.0 % 10.6 10.2     CANCELED R, CM   Eosinophils %  0.0 - 6.0 % 1.5 1.8     CANCELED R, CM   Basophils %  0.0 - 2.0 % 0.8 0.7     CANCELED R, CM   Neutrophils Absolute  1.60 - 5.50 x10*3/uL 5.24 6.07 High  CM     CANCELED R, CM   Comment: Percent differential counts (%) should be interpreted in the context of the absolute cell counts (cells/uL).   Immature Granulocytes Absolute, Automated  0.00 - 0.50 x10*3/uL 0.06 0.03        Lymphocytes Absolute  0.80 - 3.00 x10*3/uL 1.61 1.00     CANCELED R, CM   Monocytes Absolute  0.05 - 0.80 x10*3/uL 0.84 High  0.83 High      CANCELED R, CM   Eosinophils Absolute  0.00 - 0.40 x10*3/uL 0.12 0.15     CANCELED R, CM   Basophils Absolute  0.00 - 0.10 x10*3/uL 0.06 0.06     CANCELED R, CM   MPV   10.6 R 10.4 R 10.2 R 10.0 R    MANUAL DIFFERENTIAL Y/N       CANCELED CM   Resulting Agency [facility]                   review of cardiology notes dated 5/21/2024 localized edema and avoid sodium and processed foods compression stockings recommended    CAD with no angina after 2 major surgeries and elements of CHF continue aspirin and statin    Essential hypertension: Successfully controlled we will discontinue Norvasc with attempt to reduce edema    Normal Lexiscan perfusion with 75% ejection fraction dated 10/20    Electrolytes sodium 141 potassium 4.3 creatinine 1.11  TSH 3/8/2023 1.5   Assessment/Plan   Problem List Items Addressed This Visit       Allergic rhinitis    Relevant Medications    loratadine 10 mg capsule    fluticasone (Flonase) 50 mcg/actuation nasal spray    Localized edema     Relevant Medications    furosemide (Lasix) 20 mg tablet    Other fatigue    Relevant Medications    levothyroxine (Synthroid, Levoxyl) 25 mcg tablet    GERD (gastroesophageal reflux disease)      The reflux esophagitis he has been occurring in a recurrent pattern for years. The course has been without change. The reflux is described as   moderate. The patient remains compliant on meds.. The patient takes medications in a  daily fashion. Denies dysphagia hoarseness or odynophagia... stable and continue meds             Relevant Medications    famotidine (Pepcid) 20 mg tablet    Hypothyroidism     /taking  aspirin   HERE FOR RECHK OF THYROIDLast clinic visit was month (S) ago. Symptoms do not include weight gain, cold intolerance, fatigue, weakness, appetite, , dry skin  .. Yes hair  loss  ..   There is no known event that preceded symptom onset.  . Current treatment includes levothyroxine. Report there is good compliance with medical treatment.  Check   labs    stable and continue meds b         Relevant Orders    Thyroxine, Free    Thyroid Stimulating Hormone    Osteopenia     Osteoporosis  .. Stopped  taking  fosamax  forgot ...    Not    Taking  vitamin d  or calcium...  rechk  bone density    .. Continue  meds      check  bone    density             Renal insufficiency    Anxiety     Other Visit Diagnoses       Routine general medical examination at health care facility    -  Primary    Gastro-esophageal reflux disease without esophagitis        Relevant Medications    pantoprazole (ProtoNix) 40 mg EC tablet    Other Relevant Orders    Vitamin B12    Asymptomatic menopausal state        Relevant Orders    XR DEXA bone density    Encounter for screening mammogram for breast cancer        Relevant Orders    BI mammo bilateral screening tomosynthesis         MEDICARE SUMMARY  Cervical cancer: Screening --women aged 21 to  65..  . It is no longer advisable in light of your history of normal Paps  HIV infection:  Screening:  Those adults at risk H 15-65 years  Not indicated   High blood pressure: Screening  and home monitoring... Adults with history and at risk      Monitor your blood pressure at home and notify if blood pressure consistently over 140 systolic 90 diastolic   BRCA 1/2- related cancer, screening, and counseling--- women with a personal or family history of breast, ovarian, tubal, or peritoneal cancer or and he has history associated with BRCA 1/2 gene mutation  Breast cancer: Preventative medications--women at increased risk for breast cancer  Breast cancer: Screening with   mammography..--Women aged 50-74 years..ordered   Diabetes mellitus (type II (and abnormal blood glucose: Screening--adults H 40-70 years who are overweight or obese 5/23/24  5.5  Falls prevention in community swelling older adults:.. Interventions--- adults 65 or older  Healthful diet and physical activity for C VD disease prevention: Counseling--adults with CVD risk factors  Hepatitis C virus infection: Screening--adults at high risk and adults born between 1945 and 1965 Not indicated   Lung cancer: Screening --adults ages 55-80 with a 30 pack year smoking history and currently smoke or have quit within the past 15 years  Osteoporosis to prevent fractures: Screening close post menopausal women  younger than 65 years at increased risk of osteoporosis  Osteoporosis to prevent fractures: Screening women 65 and older  Statin use for the primary prevention of CVD: Preventative   discussed meds   .. Ordered medicine--adults  40-75 years with no history of CVD, one or more CVD risk factors, and a calculated 10 year CVD event risk of 10% or greater   on med  lipid 5/24 vdeone cholesterol      140  Immunization  updates... p  20  given 7/23

## 2024-05-28 NOTE — ASSESSMENT & PLAN NOTE
The reflux esophagitis he has been occurring in a recurrent pattern for years. The course has been without change. The reflux is described as   moderate. The patient remains compliant on meds.. The patient takes medications in a  daily fashion. Denies dysphagia hoarseness or odynophagia... stable and continue meds

## 2024-05-28 NOTE — ASSESSMENT & PLAN NOTE
/taking  aspirin   HERE FOR RECHK OF THYROIDLast clinic visit was month (S) ago. Symptoms do not include weight gain, cold intolerance, fatigue, weakness, appetite, , dry skin  .. Yes hair  loss  ..   There is no known event that preceded symptom onset.  . Current treatment includes levothyroxine. Report there is good compliance with medical treatment.  Check   labs    stable and continue meds b

## 2024-05-28 NOTE — ASSESSMENT & PLAN NOTE
Osteoporosis  .. Stopped  taking  fosamax  forgot ...    Not    Taking  vitamin d  or calcium...  rechk  bone density    .. Continue  meds      check  bone    density

## 2024-05-29 ENCOUNTER — TELEPHONE (OUTPATIENT)
Dept: PHYSICAL THERAPY | Facility: CLINIC | Age: 79
End: 2024-05-29
Payer: MEDICARE

## 2024-06-03 ENCOUNTER — TELEPHONE (OUTPATIENT)
Dept: PRIMARY CARE | Facility: CLINIC | Age: 79
End: 2024-06-03
Payer: MEDICARE

## 2024-06-03 DIAGNOSIS — M25.50 ARTHRALGIA, UNSPECIFIED JOINT: Primary | ICD-10-CM

## 2024-06-03 NOTE — TELEPHONE ENCOUNTER
Patient called stating her toes are painful.  She states her left foot her big toe is red like a cherry. It comes & goes & can be painful. She is wondering if this could be gout.     She would like to know what you think, and what she should do?     Please advise, Thanks!

## 2024-06-04 NOTE — TELEPHONE ENCOUNTER
Sisi said toes are back to normal today  They were cherry red yesterday, but fine today  Declined to schedule an appt for now

## 2024-06-05 DIAGNOSIS — Z96.641 S/P TOTAL RIGHT HIP ARTHROPLASTY: Primary | ICD-10-CM

## 2024-06-06 ENCOUNTER — OFFICE VISIT (OUTPATIENT)
Dept: ORTHOPEDIC SURGERY | Facility: CLINIC | Age: 79
End: 2024-06-06
Payer: MEDICARE

## 2024-06-06 ENCOUNTER — HOSPITAL ENCOUNTER (OUTPATIENT)
Dept: RADIOLOGY | Facility: CLINIC | Age: 79
Discharge: HOME | End: 2024-06-06
Payer: MEDICARE

## 2024-06-06 DIAGNOSIS — Z96.641 S/P TOTAL RIGHT HIP ARTHROPLASTY: ICD-10-CM

## 2024-06-06 DIAGNOSIS — Z96.641 S/P TOTAL RIGHT HIP ARTHROPLASTY: Primary | ICD-10-CM

## 2024-06-06 PROCEDURE — 73502 X-RAY EXAM HIP UNI 2-3 VIEWS: CPT | Mod: RIGHT SIDE | Performed by: ORTHOPAEDIC SURGERY

## 2024-06-06 PROCEDURE — 73502 X-RAY EXAM HIP UNI 2-3 VIEWS: CPT | Mod: RT

## 2024-06-06 PROCEDURE — 99024 POSTOP FOLLOW-UP VISIT: CPT | Performed by: ORTHOPAEDIC SURGERY

## 2024-06-06 NOTE — PROGRESS NOTES
Chief Complaint   Patient presents with    Right Hip - Follow-up     ANGELINA 03-11-24  xray       The patient is here for follow-up of their side: right hip arthroplasty.  The patient has moderate hip pain.  The patient has no mechanical symptoms.  The patient is approximately 3 months postop.    Physical examination:    Examination of the side: right hip  The incision is  healed.  There is tenderness to palpation over the incision.  No erythema or warmth.  Range of motion: Forward Flexion: 110 Internal Rotation: 20 External Rotation: 30 Abduction 20  She has no pain with hip flexion, internal or external rotation.  The Patient can single leg stand and actively abduct  Calf is soft, Homans negative  The patient has intact ankle dorsiflexion and plantarflexion.    Radiographs:  XR hip right with pelvis when performed 2 or 3 views  Interpreted By:  Jamar Travis,   STUDY:  XR HIP RIGHT WITH PELVIS WHEN PERFORMED 2 OR 3 VIEWS; ; 6/6/2024 9:14  am      INDICATION:  Signs/Symptoms:pain.      ACCESSION NUMBER(S):  [id number]      ORDERING CLINICIAN:  JAMAR TRAVIS      FINDINGS:  Right hip films show a total hip arthroplasty in satisfactory  position. The hip is reduced. No fracture is identified. No obvious  loosening or wear is appreciated. There is hardware seen in the lower  lumbar spine. There are ghost tracks seen in the greater trochanter  consistent with prior hardware removal.          Signed by: Jamar Travis 6/6/2024 9:19 AM  Dictation workstation:   [id number]        Impression:  Status post side: right total hip arthroplasty  History of chronic back pain    Plan:  Discussed the continued importance of prophylactic dental antibiotics  Follow-up with spine surgery  Follow up in 3 months  All questions answered

## 2024-06-10 ENCOUNTER — LAB (OUTPATIENT)
Dept: LAB | Facility: LAB | Age: 79
End: 2024-06-10
Payer: MEDICARE

## 2024-06-10 ENCOUNTER — TREATMENT (OUTPATIENT)
Dept: PHYSICAL THERAPY | Facility: HOSPITAL | Age: 79
End: 2024-06-10
Payer: MEDICARE

## 2024-06-10 ENCOUNTER — PATIENT OUTREACH (OUTPATIENT)
Dept: PRIMARY CARE | Facility: CLINIC | Age: 79
End: 2024-06-10

## 2024-06-10 DIAGNOSIS — M25.50 ARTHRALGIA, UNSPECIFIED JOINT: ICD-10-CM

## 2024-06-10 DIAGNOSIS — M54.50 CHRONIC BILATERAL LOW BACK PAIN, UNSPECIFIED WHETHER SCIATICA PRESENT: Primary | ICD-10-CM

## 2024-06-10 DIAGNOSIS — G89.29 CHRONIC BILATERAL LOW BACK PAIN, UNSPECIFIED WHETHER SCIATICA PRESENT: Primary | ICD-10-CM

## 2024-06-10 DIAGNOSIS — E03.9 HYPOTHYROIDISM, UNSPECIFIED TYPE: ICD-10-CM

## 2024-06-10 DIAGNOSIS — R29.898 WEAKNESS OF RIGHT LOWER EXTREMITY: ICD-10-CM

## 2024-06-10 DIAGNOSIS — K21.9 GASTRO-ESOPHAGEAL REFLUX DISEASE WITHOUT ESOPHAGITIS: ICD-10-CM

## 2024-06-10 DIAGNOSIS — R29.898 WEAKNESS OF RIGHT HIP: ICD-10-CM

## 2024-06-10 LAB
ERYTHROCYTE [SEDIMENTATION RATE] IN BLOOD BY WESTERGREN METHOD: 24 MM/H (ref 0–30)
T4 FREE SERPL-MCNC: 1.26 NG/DL (ref 0.61–1.12)
TSH SERPL-ACNC: 1.95 MIU/L (ref 0.44–3.98)
URATE SERPL-MCNC: 5.4 MG/DL (ref 2.3–6.7)
VIT B12 SERPL-MCNC: 179 PG/ML (ref 211–911)

## 2024-06-10 PROCEDURE — 84443 ASSAY THYROID STIM HORMONE: CPT

## 2024-06-10 PROCEDURE — 84439 ASSAY OF FREE THYROXINE: CPT

## 2024-06-10 PROCEDURE — 84550 ASSAY OF BLOOD/URIC ACID: CPT

## 2024-06-10 PROCEDURE — 97110 THERAPEUTIC EXERCISES: CPT | Mod: GP | Performed by: PHYSICAL THERAPIST

## 2024-06-10 PROCEDURE — 36415 COLL VENOUS BLD VENIPUNCTURE: CPT

## 2024-06-10 PROCEDURE — 85652 RBC SED RATE AUTOMATED: CPT

## 2024-06-10 PROCEDURE — 82607 VITAMIN B-12: CPT

## 2024-06-10 ASSESSMENT — PAIN - FUNCTIONAL ASSESSMENT: PAIN_FUNCTIONAL_ASSESSMENT: 0-10

## 2024-06-10 ASSESSMENT — PAIN SCALES - GENERAL: PAINLEVEL_OUTOF10: 6

## 2024-06-10 NOTE — PROGRESS NOTES
Physical Therapy    Physical Therapy Treatment    Patient Name: Rosalba Salcedo  MRN: [id number]    Today's Date: 6/10/2024  Time Calculation  Start Time: 1100  Stop Time: 1140  Time Calculation (min): 40 min     PT Therapeutic Procedures Time Entry  Therapeutic Exercise Time Entry: 40                   Assessment:  Pt walking into the clinic today with st cane and minimal antalgia.  She walked short distances in the clinic without her st cane with mild antalgia.  Added standing hip 3-way with RTB and light standing lumbar extensions which felt pretty good to her lower back.  She's having difficulty laying on her stomach during the day.  Decreased lower back pain after treatment today, 1/10 pain ranking.      Plan:  Continue skilled PT 1-2x/week.      Current Problem  1. Chronic bilateral low back pain, unspecified whether sciatica present        2. Weakness of right hip  Follow Up In Physical Therapy      3. Weakness of right lower extremity            Subjective  Pt states she has another problem stating she twisted last Wednesday and some increased lower back pain and states her fingers started going asleep last Thursday or Friday.      Precautions  Precautions  Post-Surgical Precautions: Right hip precautions    Pain  Pain Assessment  Pain Assessment: 0-10  Pain Score: 6  Pain Type: Chronic pain  Pain Location: Back    Objective     Her lumbar AROM painful and 50% limited in all directions.  Rt hip flexion 4/5 and abduction 3+-4-/5  Fair core strength    Treatments:    Prone lying - 2-3 minutes *  Sitting with lumbar roll *  NuStep, seat 9, L3, 10 minutes  Standing hip 3-way, RTB, 30 reps *  Standing lumbar extensions, light, 30 reps *         Goals:    Abolish lumbar symptoms  Pain free lumbar AROM  No tenderness to her lumbar paraspinals  Rt hip strength 4-4+/5 grossly throughout  No difficulty or pain doing her instrumental ADL's and work activities around her home  6.  Independent with her HEP

## 2024-06-14 ENCOUNTER — TREATMENT (OUTPATIENT)
Dept: PHYSICAL THERAPY | Facility: HOSPITAL | Age: 79
End: 2024-06-14
Payer: MEDICARE

## 2024-06-14 DIAGNOSIS — M54.50 LOW BACK PAIN, UNSPECIFIED BACK PAIN LATERALITY, UNSPECIFIED CHRONICITY, UNSPECIFIED WHETHER SCIATICA PRESENT: ICD-10-CM

## 2024-06-14 DIAGNOSIS — R29.898 WEAKNESS OF RIGHT LOWER EXTREMITY: ICD-10-CM

## 2024-06-14 PROCEDURE — 97110 THERAPEUTIC EXERCISES: CPT | Mod: GP | Performed by: PHYSICAL THERAPIST

## 2024-06-14 ASSESSMENT — PAIN SCALES - GENERAL: PAINLEVEL_OUTOF10: 10 - WORST POSSIBLE PAIN

## 2024-06-14 ASSESSMENT — PAIN - FUNCTIONAL ASSESSMENT: PAIN_FUNCTIONAL_ASSESSMENT: 0-10

## 2024-06-14 NOTE — PROGRESS NOTES
Physical Therapy    Physical Therapy Treatment    Patient Name: Rosalba Salcedo  MRN: [id number]    Today's Date: 6/14/2024  Time Calculation  Start Time: 1001  Stop Time: 1042  Time Calculation (min): 41 min     PT Therapeutic Procedures Time Entry  Therapeutic Exercise Time Entry: 40                   Assessment:  Pt walking into the clinic today with her Aspen brace on using her st cane, mild antalgia noted.  She's off her hip precautions.  Initiated lumbar ex's in hook lying which she tolerated better secondary to having diffuse body pain and increased in weight bearing.  Pt given HEP and all questions answered.      Plan:  Continue skilled PT 1-2x/week.       Current Problem  1. Low back pain, unspecified back pain laterality, unspecified chronicity, unspecified whether sciatica present  Follow Up In Physical Therapy      2. Weakness of right lower extremity  Follow Up In Physical Therapy            Subjective  Pt states she's sore all over this morning and her back is really bad.    Precautions  Precautions  Post-Surgical Precautions:  (Rt anterior hip precautions)    Pain  Pain Assessment  Pain Assessment: 0-10  Pain Score: 10 - Worst possible pain  Pain Type: Chronic pain  Pain Location: Back    Objective     Her lumbar AROM painful and 50% limited in all directions.  Rt hip flexion 4/5 and abduction 3+-4-/5  Fair core strength     Treatments:    Prone lying - 2-3 minutes *  LTR's, 10-2, 5 second holds, 20 reps *  SKTC, 10 reps x 2 *  TA with marching, 10 reps x 3 *  Sitting with lumbar roll *  NuStep, seat 9, L3, 10 minutes  Standing hip 3-way, RTB, 30 reps *  Standing lumbar extensions, light, 30 reps *  Hook lying clam shells, GTB, 30 reps *  Hook lying marching, GTB, 30 reps *    Goals:    Abolish lumbar symptoms  Pain free lumbar AROM  No tenderness to her lumbar paraspinals  Rt hip strength 4-4+/5 grossly throughout  No difficulty or pain doing her instrumental ADL's and work activities around her  home  6.  Independent with her HEP

## 2024-06-17 ENCOUNTER — TELEPHONE (OUTPATIENT)
Dept: ORTHOPEDIC SURGERY | Facility: CLINIC | Age: 79
End: 2024-06-17

## 2024-06-17 NOTE — TELEPHONE ENCOUNTER
Pt called and said she is in a lot of pain  in her leg and back, I talked to the Dr Travis team , and they said to have her follow up with the spine team, I left a voicemail for the pt to let her know

## 2024-06-18 ENCOUNTER — OFFICE VISIT (OUTPATIENT)
Dept: ORTHOPEDIC SURGERY | Facility: CLINIC | Age: 79
End: 2024-06-18
Payer: MEDICARE

## 2024-06-18 ENCOUNTER — HOSPITAL ENCOUNTER (OUTPATIENT)
Dept: RADIOLOGY | Facility: CLINIC | Age: 79
Discharge: HOME | End: 2024-06-18
Payer: MEDICARE

## 2024-06-18 DIAGNOSIS — M54.30 SCIATICA WITHOUT BACK PAIN, UNSPECIFIED LATERALITY: ICD-10-CM

## 2024-06-18 DIAGNOSIS — M54.50 LOW BACK PAIN, UNSPECIFIED BACK PAIN LATERALITY, UNSPECIFIED CHRONICITY, UNSPECIFIED WHETHER SCIATICA PRESENT: Primary | ICD-10-CM

## 2024-06-18 PROCEDURE — 99214 OFFICE O/P EST MOD 30 MIN: CPT | Performed by: ORTHOPAEDIC SURGERY

## 2024-06-18 PROCEDURE — 72100 X-RAY EXAM L-S SPINE 2/3 VWS: CPT

## 2024-06-18 PROCEDURE — 72100 X-RAY EXAM L-S SPINE 2/3 VWS: CPT | Performed by: ORTHOPAEDIC SURGERY

## 2024-06-18 NOTE — PROGRESS NOTES
Rosalba Salcedo is a 79 y.o. female who presents for Follow-up of the Lower Back (L2-3 Lami/L4-5 Interbody Fusion 10/18/23/X-rays today).    HPI:  79-year-old female here for follow-up of low back pain.  She is 9 months out from an L2-3 laminectomy and L4-5 TLIF.  She denies any fever chills nausea vomiting night sweats.  She has no bowel or bladder complaints.    Physical exam:  Well-nourished, well kept.No lymphangitis or lymphadenopathy in the examined extremities.  Gait normal, ambulating with a cane.  Can rise from a seated position, sit from a standing position with a little bit of discomfort and difficulty.   examination of the back shows tenderness in the paraspinous musculature.  There is decreased range of motion in all directions due to guarding/muscle spasms and pain at extremes.  There is good strength and no instability.  Examination of the lower extremities reveals no point tenderness, swelling, or deformity.  Range of motion of the hips, knees, and ankles are full without crepitance, instability, or exacerbation of pain.  Strength is 5/5 throughout.  No redness, abrasions, or lesions on extremities  Gross sensation intact in the extremities.  Affect normal.  Alert and oriented ×3.  Coordination normal.    Imaging studies:  AP lateral plain films of the lumbar spine were ordered and reviewed today.    Assessment:  Patient is here for acute onset of low back pain.  She is 9 months out from an L2-3 laminectomy and L4-5 excision of facet cyst and L4-5 TLIF above a prior laminectomy and L3-4 fusion.  She was doing fine up until about 4 days ago, she was doing physical therapy exercises and felt a sudden severe sharp pain centrally in the lower lumbosacral area.  She is not describing any radiating pain.  Any activity or inactivity makes this worse.  It is affecting her bodily function, she is unable to sleep it is very difficult for her to walk or stand.  This is an acute onset of central low back  pain without radicular symptoms.    For complete plan and/or surgical details, please refer to Dr. Arizmendi's portion of this split dictation.    -Jordy Wolf PA-C    In a face-to-face encounter, I performed a history and physical examination, discussed pertinent diagnostic studies if indicated, and discussed diagnosis and management strategies with both the patient and the midlevel provider.  I reviewed the midlevel's note and agree with the documented findings and plan of care.    Patient with a flareup of back pain.  We did surgery on her 9 months ago which was an L2-3 laminectomy and an L4-5 excision facet cyst.  She had a recent back pain flareup because the physical therapist was too aggressive with her.  Working to have her stop therapy and get her into pain management and she can follow-up with us as she is previously scheduled to see us.  She is having no neurologic symptoms.  Her x-rays look good today.  This is a new acute problem of uncertain prognosis.  We have ordered and reviewed x-rays today.  I discussed this case personally with Dr. Jacques.    Adalberto Arizmendi MD  Orthopedic surgery

## 2024-06-28 ENCOUNTER — TELEPHONE (OUTPATIENT)
Dept: PRIMARY CARE | Facility: CLINIC | Age: 79
End: 2024-06-28
Payer: MEDICARE

## 2024-06-28 DIAGNOSIS — J30.1 ALLERGIC RHINITIS DUE TO POLLEN, UNSPECIFIED SEASONALITY: Primary | ICD-10-CM

## 2024-06-28 RX ORDER — CETIRIZINE HYDROCHLORIDE 10 MG/1
10 TABLET ORAL DAILY
Qty: 30 TABLET | Refills: 11 | Status: SHIPPED | OUTPATIENT
Start: 2024-06-28 | End: 2024-09-26

## 2024-06-28 NOTE — TELEPHONE ENCOUNTER
Patient called, found  medication in cabinet  she is- wanting to know why she is not taking cetirizine 10 mg generic for zyrtec.   Wants to know if she should be taking it?   And needs refills if she should be taking it.

## 2024-07-02 ENCOUNTER — HOSPITAL ENCOUNTER (OUTPATIENT)
Dept: RADIOLOGY | Facility: CLINIC | Age: 79
Discharge: HOME | End: 2024-07-02
Payer: MEDICARE

## 2024-07-02 ENCOUNTER — OFFICE VISIT (OUTPATIENT)
Dept: ORTHOPEDIC SURGERY | Facility: CLINIC | Age: 79
End: 2024-07-02
Payer: MEDICARE

## 2024-07-02 DIAGNOSIS — M54.2 NECK PAIN: ICD-10-CM

## 2024-07-02 DIAGNOSIS — M54.50 LOW BACK PAIN, UNSPECIFIED BACK PAIN LATERALITY, UNSPECIFIED CHRONICITY, UNSPECIFIED WHETHER SCIATICA PRESENT: ICD-10-CM

## 2024-07-02 DIAGNOSIS — M54.12 CERVICAL RADICULOPATHY: ICD-10-CM

## 2024-07-02 DIAGNOSIS — M54.2 NECK PAIN: Primary | ICD-10-CM

## 2024-07-02 PROCEDURE — 72050 X-RAY EXAM NECK SPINE 4/5VWS: CPT

## 2024-07-02 PROCEDURE — 99214 OFFICE O/P EST MOD 30 MIN: CPT | Performed by: ORTHOPAEDIC SURGERY

## 2024-07-02 PROCEDURE — 72050 X-RAY EXAM NECK SPINE 4/5VWS: CPT | Performed by: ORTHOPAEDIC SURGERY

## 2024-07-02 NOTE — PROGRESS NOTES
Rosalba Salcedo is a 79 y.o. female who presents for Follow-up of the Neck (New prob./Xray and numbness and tingling up the arms ).    HPI:  79-year-old female who we have seen for her back is here for evaluation of neck pain.  She denies any fever chills nausea vomiting night sweats.  She has no bowel or bladder complaints.    Physical exam:  Well-nourished, well kept.  No lymphangitis or lymphadenopathy in the examined extremities. Affect normal.  Alert and oriented X 3.  Coordination normal.  Patient can rise from a seated position, can sit from a standing position. Can stand on heels and toes with a little difficulty.  Patient is mildly tender in the paraspinal musculature of the cervical spine. range of motion is mildly decreased secondary to some pain and stiffness no weakness no instability to muscle strength. examination of the upper extremities reveals no point tenderness, swelling, or deformity.  Range of motion of the shoulders, elbows, wrists, and fingers are full without crepitance, instability, or exacerbation of pain. Strength is 5/5 throughout. no redness, abrasions, or lesions on the upper extremities bilaterally.  Gross sensation intact to the extremities.  Deep tendon reflexes absent in the upper extremity bilaterally.  Leary negative.  She has a positive Tinel's and Phalen's bilaterally.    Imaging studies:  AP lateral flexion-extension plain films of the cervical spine were ordered and reviewed today.    Assessment:  79-year-old female known to Dr. Arizmendi for low back surgery is here for evaluation of mostly upper extremities radicular symptoms.  She is not having much in the way of neck pain.  She does have chronic off-and-on neck pain, and she has had these episodes with her arms in the past.  This current episode is been going on for about 2 weeks.  She is getting pain numbness and tingling down into both of her arms left is greater than right.  She is getting numbness and tingling  out into her hands again, left is greater than right.  This has only been going on for a couple of weeks, she has not done any conservative treatment for this recent exacerbation.  I do get bilateral Tinel's and Phalen's signs, there may be a component of carpal tunnel to this.    We have ordered and reviewed tests today, x-rays.  In anticipation of surgical intervention if needed we reviewed hemoglobin A1c from 1 month ago which is 5.5.  This is an exacerbation of her chronic problem that is affecting her bodily function.    For complete plan and/or surgical details, please refer to Dr. Arizmendi's portion of this split dictation.    -Jordy Wolf PA-C      In a face-to-face encounter, I performed a history and physical examination, discussed pertinent diagnostic studies if indicated, and discussed diagnosis and management strategies with both the patient and the midlevel provider.  I reviewed the midlevel's note and agree with the documented findings and plan of care.    Patient known to us because we did lumbar surgery on her.  She now has neck pain and left greater than right arm radiculopathy going down to her hand.  This is been going on for a couple weeks but she has had episodes in the past for this.  She does have a positive Tinel's and Phalen's so there could be a carpal tunnel component but I think there is also a radicular component going on.  She has degenerative disc at C5-6 and C6-7.  Organ to workup this new acute problem of uncertain prognosis further with an MRI of her cervical spine, EMG nerve conduction study of her bilateral extremities and get her into physical therapy.  We have ordered and reviewed x-rays today.    Adalberto Arizmendi MD  Orthopedic surgery

## 2024-07-10 ENCOUNTER — APPOINTMENT (OUTPATIENT)
Dept: PHYSICAL THERAPY | Facility: HOSPITAL | Age: 79
End: 2024-07-10
Payer: MEDICARE

## 2024-07-12 ENCOUNTER — HOSPITAL ENCOUNTER (OUTPATIENT)
Dept: RADIOLOGY | Facility: HOSPITAL | Age: 79
Discharge: HOME | End: 2024-07-12
Payer: MEDICARE

## 2024-07-12 DIAGNOSIS — M54.2 NECK PAIN: ICD-10-CM

## 2024-07-12 DIAGNOSIS — M54.12 CERVICAL RADICULOPATHY: ICD-10-CM

## 2024-07-12 PROCEDURE — 72141 MRI NECK SPINE W/O DYE: CPT

## 2024-07-16 ENCOUNTER — APPOINTMENT (OUTPATIENT)
Dept: PHYSICAL THERAPY | Facility: HOSPITAL | Age: 79
End: 2024-07-16
Payer: MEDICARE

## 2024-07-17 ENCOUNTER — HOSPITAL ENCOUNTER (OUTPATIENT)
Dept: RADIOLOGY | Facility: HOSPITAL | Age: 79
Discharge: HOME | End: 2024-07-17
Payer: MEDICARE

## 2024-07-17 ENCOUNTER — APPOINTMENT (OUTPATIENT)
Dept: PHYSICAL THERAPY | Facility: HOSPITAL | Age: 79
End: 2024-07-17
Payer: MEDICARE

## 2024-07-17 VITALS — WEIGHT: 189 LBS | HEIGHT: 64 IN | BODY MASS INDEX: 32.27 KG/M2

## 2024-07-17 DIAGNOSIS — Z12.31 ENCOUNTER FOR SCREENING MAMMOGRAM FOR BREAST CANCER: ICD-10-CM

## 2024-07-17 PROCEDURE — 77063 BREAST TOMOSYNTHESIS BI: CPT

## 2024-07-17 PROCEDURE — 77063 BREAST TOMOSYNTHESIS BI: CPT | Performed by: RADIOLOGY

## 2024-07-17 PROCEDURE — 77067 SCR MAMMO BI INCL CAD: CPT | Performed by: RADIOLOGY

## 2024-07-18 NOTE — RESULT ENCOUNTER NOTE
PLEASE CALL Rosalba Salcedo AND LET HER KNOW THE MAMMOGRAM IS NORMAL.  PLEASE PLAN ON REPEATING IN ONE YEAR.

## 2024-07-22 ENCOUNTER — APPOINTMENT (OUTPATIENT)
Dept: PRIMARY CARE | Facility: CLINIC | Age: 79
End: 2024-07-22
Payer: MEDICARE

## 2024-07-22 ENCOUNTER — TELEPHONE (OUTPATIENT)
Dept: PRIMARY CARE | Facility: CLINIC | Age: 79
End: 2024-07-22

## 2024-07-22 ENCOUNTER — HOSPITAL ENCOUNTER (OUTPATIENT)
Dept: NEUROLOGY | Facility: HOSPITAL | Age: 79
Discharge: HOME | End: 2024-07-22
Payer: MEDICARE

## 2024-07-22 DIAGNOSIS — M54.2 NECK PAIN: ICD-10-CM

## 2024-07-22 DIAGNOSIS — M54.12 CERVICAL RADICULOPATHY: ICD-10-CM

## 2024-07-22 PROCEDURE — 95913 NRV CNDJ TEST 13/> STUDIES: CPT

## 2024-07-22 NOTE — TELEPHONE ENCOUNTER
Patient came into the office today requesting results of testing that Dr. Arizmendi ordered. I offered her an appt for today at noon; however patient declined. She wanted to be seen NOW. I told her there were several patients ahead of her as Dr. Leroy had just started. She declined this appt & demanded to see Dr. Leroy NOW.   I also informed patient that she could contact Dr. Arizmendi's office to request the results.     Patient did not want to wait, or a virtual visit tonight (because it's Dr. Leroy's late night)    Patient walked out of the office. -AH

## 2024-07-22 NOTE — PROCEDURES
Electromyography Laboratory Report       Accreditation with Exemplary Status       Name Rosalba Salcedo MRN [id number] Ref Provider Adalberto Arizmendi Technologist LOU Franco    Gender Female Age 79 Years [facility] Physician Rubén Garcia MD  Temp ºC 34    1945 Height 5 feet 4 inch Order No [id number] Study Date 2024 9:33 AM      Reason for Referral:   Cervical Radiculopathy, Pain L (upper) arm to elbow. Numbness, paraesthesiae L hand all digits, less in R hand.         Motor Nerve Conduction Study   Nerve/Sites Muscle Amplitude Latency Velocity F min     mV ms m/s ms     Right Left Ref. Right Left Ref. Right Left Ref. Right Left Ref.   Median - APB      Wrist APB 6.3 5.2 >=5.0 4.8 4.8 <=4.0    26.9 28.4 <=30.3      AC Fossa APB 5.8 4.6  8.0 9.5  66.8 46.3 >=50.0      Ulnar - ADM      Wrist ADM 5.6 4.4 >=7.0 3.5 2.7 <=3.1    29.0 29.8 <=30.9      B. Elbow ADM 5.0 4.0  6.6 6.1  50.9 50.1 >=50.0         A. Elbow ADM 4.9 4.4  8.0 7.8  75.0 58.5 >=50.0          Sensory Nerve Conduction Study   Nerve/Sites Rec. Site Amplitude Peak Lat Velocity     µV ms m/s     Right Left Ref. Right Left Ref. Right Left Ref.   Median      Wrist Index 6.2 4.0 >=10.0 4.7 5.1 <=3.8 40.1 35.9 >=50.0   Ulnar      Wrist Little 3.1 2.2 >=5.0 4.1 3.8 <=3.2 43.0 44.6 >=50.0   Radial      Forearm Snuff Box 9.9 7.9 >=10.0 3.4 3.0 <=2.7 41.9 48.0 >=50.0   Median, Ulnar - Palmar Mixed      Median  Palm Med Wr 16.5 10.8  2.0 2.9 <=2.2 56.9 38.4 >=49.0      Ulnar Palm Uln Wr 1.3 NR  1.7 NR <=2.2 59.1 NR >=49.0                        EMG Summary Table     Spontaneous Voluntary Activity   Muscle Nerve Roots Ins Act Fibs/PSW Fasc Other Amp Dur Phases Recruitment Activation Notes   R. APB - Abd Poll Brev Median C8-T1 Incr 0 0 - NL +1 NL MO Fair -   R. PT - Pron Teres Median C6-C7 NL 0 0 - NL NL NL NL NL -   R. FDI - First Dors Int Ulnar C8-T1 NL 0 0 - NL NL NL MO NL -   R. FCU - Flex Carp Uln Ulnar C7-T1 NL 0 0 - NL NL NL NL NL -   R. TB - Triceps Brach  Radial C6-C8 NL 0 0 - NL NL NL NL NL -   R. BB - Biceps Brach Musculocutaneous C5-C6 NL 0 0 - NL NL NL NL NL -   R. MD - Deltoid (med) Axillary C5-C6 NL 0 0 - NL NL NL NL NL -   R. Cervical psp (low)  - NL 0 0 - NL NL NL NL NL -   R. Cervical psp (mid)  - NL 0 0 - NL NL NL NL NL -   R. Cervical psp (up)  - NL 0 0 - NL NL NL NL NL -   L. APB - Abd Poll Brev Median C8-T1 NL 0 0 - NL +2 N/+1 MO NL -   L. PT - Pron Teres Median C6-C7 NL 0 0 - NL NL NL NL NL -   L. FDI - First Dors Int Ulnar C8-T1 NL 0 0 - NL +1 NL MO NL -   L. FCU - Flex Carp Uln Ulnar C7-T1 NL 0 0 - NL NL NL NL Fair -   L. TB - Triceps Brach Radial C6-C8 NL 0 0 - NL NL NL NL NL -   L. BB - Biceps Brach Musculocutaneous C5-C6 NL 0 0 - NL NL NL NL NL -   L. MD - Deltoid (med) Axillary C5-C6 NL 0 0 - NL NL NL NL NL -   L. Cervical psp (low)  - NL 0 0 - NL NL NL NL NL -   L. Cervical psp (mid)  - NL 0 0 - NL NL NL NL NL -   L. Cervical psp (up)  - NL 0 0 - NL NL NL NL NL -       NL = Normal, Inc = Increased, Dec = Decreased, CRD = Complex Repetitive Discharge; SL = Slightly Decreased; MO = Moderately Decreased; MK = Markedly Decreased; N/+1, +1, +2, +3 = Borderline, Slightly, Moderately, Markedly Increased; N/-1, -1, -2, -3 = Borderline, Slightly, Moderately, Markedly Decreased, N/A = Not Applicable    Summary:     Impression:  This study reveals ENMG evidence of chronic, neuropathic, axonal, sensorimotor processes affecting both median nerves at the wrists, consistent with the clinical diagnosis of bilateral carpal tunnel syndrome. These are of mild degree bilaterally by electrical criteria. Note that bilateral CTS can result from diabetes mellitus or hypothyroidism.  A similar process, though very mild, affects the ulnar nerves at the wrists (Guyon's canals).  There is also a hint on this study of a generalized, sensory > motor process affecting all nerves studied, i.e. suggesting a peripheral neuropathy. This cannot be concluded with certainty, however,  due to the degree of other findings on this study. If confirmation be desired, study of the lower extremities could be undertaken.   There is no suggestion by this study of more proximal processes e.g. plexopathy or radiculopathy.

## 2024-07-22 NOTE — LETTER
Rosalba Salcedo  1945    Dr. Leroy,    ERICK, EMG+NCV of hands was done today, ordered by Dr. Arizmendi. Full report in progress notes.      Regards,    Rubén Garcia

## 2024-07-24 ENCOUNTER — APPOINTMENT (OUTPATIENT)
Dept: PHYSICAL THERAPY | Facility: HOSPITAL | Age: 79
End: 2024-07-24
Payer: MEDICARE

## 2024-07-26 ENCOUNTER — OFFICE VISIT (OUTPATIENT)
Dept: ORTHOPEDIC SURGERY | Facility: CLINIC | Age: 79
End: 2024-07-26
Payer: MEDICARE

## 2024-07-26 DIAGNOSIS — M54.12 CERVICAL RADICULOPATHY: Primary | ICD-10-CM

## 2024-07-26 PROCEDURE — 99215 OFFICE O/P EST HI 40 MIN: CPT | Performed by: ORTHOPAEDIC SURGERY

## 2024-07-26 NOTE — PROGRESS NOTES
Rosalba Salcedo is a 79 y.o. female who presents for Follow-up of the Neck (Mri and emg review).    HPI:  79-year-old female here for follow-up of the neck MRI and EMG results.  She denies any fever chills nausea vomiting night sweats she has no bowel or bladder complaints.    Physical exam:  Well-nourished, well kept.  No lymphangitis or lymphadenopathy in the examined extremities. Affect normal.  Alert and oriented X 3.  Coordination normal.  Patient can rise from a seated position, can sit from a standing position. Can stand on heels and toes.  Patient is tender in the paraspinal musculature of the cervical spine. range of motion is mildly decreased secondary to some pain and stiffness no weakness no instability to muscle strength. examination of the upper extremities reveals no point tenderness, swelling, or deformity.  Range of motion of the shoulders, elbows, wrists, and fingers are full without crepitance, instability, or exacerbation of pain. Strength is 5/5 throughout, except left bicep is about 4+/5, left  strength is slightly weaker compared to right. no redness, abrasions, or lesions on the upper extremities bilaterally.  Gross sensation intact to the extremities.  Deep tendon reflexes 1+ and symmetric bilaterally.  Leary negative.  Positive Tinel and Phalen's.    Imaging studies:  An MRI from July 12, 2024 was reviewed today.  An EMG from July 22, 2024 was reviewed today.    Assessment:  Arvin 9-year-old female here for follow-up neck MRI and EMG results.  Her EMG shows bilateral carpal tunnel and possible neuropathy secondary to diabetes.  It did not show any radiculopathy.  She has been doing therapy at [facility], she feels like it is helping although it is hard for her to put out percentage of improvement on it.  She is noticing that her left arm is now bothering her more than it was at her last visit, I get just a little bit of weakness in her  and bicep on the left.  Overall she  thinks its 80% left arm versus 20% neck, her range of motion in the neck has improved with physical therapy.    We have reviewed tests today, MRI, EMG.  This is a patient with a exacerbation of her chronic problem neck pain, upper extremity radicular symptoms left greater than right.  We did discuss and consider surgery today.    For complete plan and/or surgical details, please refer to Dr. Arizmendi's portion of this split dictation.    -Jordy Wolf PA-C    In a face-to-face encounter, I performed a history and physical examination, discussed pertinent diagnostic studies if indicated, and discussed diagnosis and management strategies with both the patient and the midlevel provider.  I reviewed the midlevel's note and agree with the documented findings and plan of care.    Patient here for follow-up of an EMG and MRI.  She has bilateral arm radiculopathy with numbness and tingling in her hands.  EMG shows mild bilateral carpal tunnel syndrome and some early onset neuropathy.  MRI shows stenosis C3-4, C4-5, C5-6, C6-7 and even a little bit on the right at C7-T1.  She has central stenosis which is moderate at C3-4.  On exam she is not myelopathic.  There is no hyperreflexia.  Jareth's is negative.  She is really just having bilateral arm radiculopathy from the bilateral stenosis at 4 levels in her neck as well as carpal tunnel syndrome.  I will get her in to see Dr. Nafisa Leroy for her carpal tunnel syndrome.  She will continue with physical therapy for her neck.  She does have a severe threat to inhibition of bodily function.  We have given her options of physical therapy, pain management, living with it or surgery for her radiculopathy.  She wants to just do therapy for now.  She can follow-up with me on a as needed basis.  I explained to her if she is ever can have neck surgery she would need at least a 4 level laminoplasty.  I told her I would send her to one of my colleagues for that.    Adalberto  MD Ugo  Orthopedic surgery

## 2024-07-30 ENCOUNTER — APPOINTMENT (OUTPATIENT)
Dept: ORTHOPEDIC SURGERY | Facility: CLINIC | Age: 79
End: 2024-07-30
Payer: MEDICARE

## 2024-07-30 ENCOUNTER — TELEPHONE (OUTPATIENT)
Dept: ORTHOPEDIC SURGERY | Facility: CLINIC | Age: 79
End: 2024-07-30
Payer: MEDICARE

## 2024-07-30 DIAGNOSIS — M54.2 NECK PAIN: ICD-10-CM

## 2024-07-30 DIAGNOSIS — M54.12 CERVICAL RADICULOPATHY: ICD-10-CM

## 2024-07-30 NOTE — TELEPHONE ENCOUNTER
Rosalba called and wants to be seen by the neck specialist. She stated that she will continue going to PT until she is able to see the Neck specialist

## 2024-07-31 ENCOUNTER — APPOINTMENT (OUTPATIENT)
Dept: PHYSICAL THERAPY | Facility: HOSPITAL | Age: 79
End: 2024-07-31
Payer: MEDICARE

## 2024-08-01 ENCOUNTER — HOSPITAL ENCOUNTER (OUTPATIENT)
Dept: RADIOLOGY | Facility: CLINIC | Age: 79
Discharge: HOME | End: 2024-08-01
Payer: MEDICARE

## 2024-08-01 ENCOUNTER — OFFICE VISIT (OUTPATIENT)
Dept: ORTHOPEDIC SURGERY | Facility: CLINIC | Age: 79
End: 2024-08-01
Payer: MEDICARE

## 2024-08-01 DIAGNOSIS — Z96.641 S/P TOTAL RIGHT HIP ARTHROPLASTY: Primary | ICD-10-CM

## 2024-08-01 DIAGNOSIS — M25.561 RIGHT KNEE PAIN, UNSPECIFIED CHRONICITY: ICD-10-CM

## 2024-08-01 DIAGNOSIS — Z96.641 S/P TOTAL RIGHT HIP ARTHROPLASTY: ICD-10-CM

## 2024-08-01 DIAGNOSIS — M17.11 PRIMARY OSTEOARTHRITIS OF RIGHT KNEE: ICD-10-CM

## 2024-08-01 PROCEDURE — 73502 X-RAY EXAM HIP UNI 2-3 VIEWS: CPT | Mod: RT

## 2024-08-01 PROCEDURE — 99213 OFFICE O/P EST LOW 20 MIN: CPT | Performed by: ORTHOPAEDIC SURGERY

## 2024-08-01 PROCEDURE — 73564 X-RAY EXAM KNEE 4 OR MORE: CPT | Mod: RT

## 2024-08-01 PROCEDURE — 1159F MED LIST DOCD IN RCRD: CPT | Performed by: ORTHOPAEDIC SURGERY

## 2024-08-01 PROCEDURE — 99214 OFFICE O/P EST MOD 30 MIN: CPT | Performed by: ORTHOPAEDIC SURGERY

## 2024-08-01 PROCEDURE — 1036F TOBACCO NON-USER: CPT | Performed by: ORTHOPAEDIC SURGERY

## 2024-08-01 NOTE — PROGRESS NOTES
Chief Complaint   Patient presents with    Right Hip - Follow-up     ANGELINA 03-11-24  xray    Right Knee - Pain     Xray today       The patient is here for follow-up of their side: right hip arthroplasty.  The patient has no hip pain.  The patient has no mechanical symptoms.  The patient is approximately  5 months  postop.  She also complains of right knee pain.  Her knee pain is anterior and lateral.  She has intermittent pain if she twists.  She occasionally gets instability when she has pain in her knee.  She describes it as sharp.  She complains of numbness in her hands and neck pain as well.    Physical examination:    Examination of the side: right hip  Range of motion: Forward Flexion: 120 Internal Rotation: 20 External Rotation: 30 Abduction 30  Examination of the right knee:  Skin is intact, the extensor mechanism is intact  There is no erythema or warmth  Range of motion: 0-120 degrees  There is lateral joint line pain on palpation.  There is pain with patellar compression  There is patellar crepitus through the arc of motion  Calf is soft, Homans negative  The patient has intact ankle dorsiflexion and plantarflexion.    Radiographs:  XR hip right with pelvis when performed 2 or 3 views  Interpreted By:  Jamar Travis,   STUDY:  XR HIP RIGHT WITH PELVIS WHEN PERFORMED 2 OR 3 VIEWS; ; 8/1/2024 1:57  pm      INDICATION:  Signs/Symptoms:pain.      ACCESSION NUMBER(S):  [id number]      ORDERING CLINICIAN:  JAMAR TRAVIS      FINDINGS:  Right hip films show a total hip arthroplasty in satisfactory  position. The hip is reduced. No fracture is identified. No obvious  loosening or wear is appreciated.          Signed by: Jamar Travis 8/1/2024 2:07 PM  Dictation workstation:   [id number]  XR knee right 4+ views  Interpreted By:  Jamar Travis,   STUDY:  XR KNEE RIGHT 4+ VIEWS; ; 8/1/2024 1:58 pm      INDICATION:  Signs/Symptoms:pain.      ACCESSION NUMBER(S):  [id number]      ORDERING CLINICIAN:  JAMAR TRAVIS       FINDINGS:  Right knee films are negative for fracture, dislocation or  destructive lesion. There is severe tricompartmental arthrosis of the  knee with loss of joint space and spurring. There are surgical clips  seen in the leg.          Signed by: Jamar Travis 8/1/2024 2:06 PM  Dictation workstation:   [id number]        Impression:  Status post side: right total hip arthroplasty  Osteoarthrosis right knee    Plan:  For the hip I would see her in 6 months for recheck and x-rays, sooner if there is any problems  For her right knee since her pain is intermittent she declines a corticosteroid injection  We will just observe this as well.  If she has a flareup or problems she may call or return.  I defer her carpal tunnel to Dr. Nafisa Deleon  I defer her neck to Dr. Arizmendi  All questions answered

## 2024-08-01 NOTE — PROGRESS NOTES
No chief complaint on file.      The patient is here for follow-up of their {side:03078} hip arthroplasty.  The patient has {severity:20267} hip pain.  The patient has {severity:90251} mechanical symptoms.  The patient is approximately {Time; 1 week to 1 year:79978} postop.    Physical examination:    Examination of the {side:44281} hip  The incision is {Incision status:49014}  No erythema or warmth.  Range of motion: Forward Flexion: {egrees of flexion to 180:12429} Internal Rotation: {degrees of flexion to 90:68895} External Rotation: {degrees of flexion to 90:48039} Abduction {degrees of flexion to 90:05647}  The Patient {can/cannot:94759} single leg stand and actively abduct  Calf is soft, Homans negative  The patient has intact ankle dorsiflexion and plantarflexion.    Radiographs:  EMG & nerve conduction  Impression:  This study reveals [facility] evidence of chronic, neuropathic, axonal,   sensorimotor processes affecting both median nerves at the wrists,   consistent with the clinical diagnosis of bilateral carpal tunnel   syndrome. These are of mild degree bilaterally by electrical criteria.   Note that bilateral CTS can result from diabetes mellitus or   hypothyroidism.  A similar process, though very mild, affects the ulnar nerves at the   wrists (Guyon's canals).  There is also a hint on this study of a generalized, sensory > motor   process affecting all nerves studied, i.e. suggesting a peripheral   neuropathy. This cannot be concluded with certainty, however, due to the   degree of other findings on this study. If confirmation be desired, study   of the lower extremities could be undertaken.   There is no suggestion by this study of more proximal processes e.g.   plexopathy or radiculopathy.        Impression:  Status post {side:48666} total hip arthroplasty    Plan:  Discussed the continued importance of prophylactic dental antibiotics  Outpatient physical therapy  Follow up in {Time; 1 week to 1  year:10400}  All questions answered

## 2024-08-12 ENCOUNTER — OFFICE VISIT (OUTPATIENT)
Dept: ORTHOPEDIC SURGERY | Facility: CLINIC | Age: 79
End: 2024-08-12
Payer: MEDICARE

## 2024-08-12 DIAGNOSIS — G56.01 CARPAL TUNNEL SYNDROME OF RIGHT WRIST: Primary | ICD-10-CM

## 2024-08-12 DIAGNOSIS — G56.02 CARPAL TUNNEL SYNDROME OF LEFT WRIST: ICD-10-CM

## 2024-08-12 PROCEDURE — 20526 THER INJECTION CARP TUNNEL: CPT | Mod: LT | Performed by: STUDENT IN AN ORGANIZED HEALTH CARE EDUCATION/TRAINING PROGRAM

## 2024-08-12 PROCEDURE — 2500000005 HC RX 250 GENERAL PHARMACY W/O HCPCS: Performed by: STUDENT IN AN ORGANIZED HEALTH CARE EDUCATION/TRAINING PROGRAM

## 2024-08-12 PROCEDURE — 99214 OFFICE O/P EST MOD 30 MIN: CPT | Performed by: STUDENT IN AN ORGANIZED HEALTH CARE EDUCATION/TRAINING PROGRAM

## 2024-08-12 PROCEDURE — 1159F MED LIST DOCD IN RCRD: CPT | Performed by: STUDENT IN AN ORGANIZED HEALTH CARE EDUCATION/TRAINING PROGRAM

## 2024-08-12 PROCEDURE — 2500000004 HC RX 250 GENERAL PHARMACY W/ HCPCS (ALT 636 FOR OP/ED): Performed by: STUDENT IN AN ORGANIZED HEALTH CARE EDUCATION/TRAINING PROGRAM

## 2024-08-12 PROCEDURE — 20526 THER INJECTION CARP TUNNEL: CPT | Mod: RT | Performed by: STUDENT IN AN ORGANIZED HEALTH CARE EDUCATION/TRAINING PROGRAM

## 2024-08-12 PROCEDURE — 1036F TOBACCO NON-USER: CPT | Performed by: STUDENT IN AN ORGANIZED HEALTH CARE EDUCATION/TRAINING PROGRAM

## 2024-08-12 RX ORDER — LIDOCAINE HYDROCHLORIDE 10 MG/ML
1 INJECTION INFILTRATION; PERINEURAL
Status: COMPLETED | OUTPATIENT
Start: 2024-08-12 | End: 2024-08-12

## 2024-08-12 RX ORDER — LIDOCAINE HYDROCHLORIDE 10 MG/ML
0.5 INJECTION INFILTRATION; PERINEURAL
Status: COMPLETED | OUTPATIENT
Start: 2024-08-12 | End: 2024-08-12

## 2024-08-12 NOTE — PROGRESS NOTES
History of Present Illness:  Presents with  bilateral hand numbness. The symptoms have been present for months. The patient denies any inciting trauma. The numbness primarily involves the thumb, index finger, and long finger. There is minimal numbness in the small finger. The patient complains of intermittant, moderate hand pain which is worse at night. It causes frequent night awakenings. The patient presents due to worsening symptoms.  They have tried nighttime bracing without improvement in sxs.         Review of Systems   GENERAL: Negative for malaise, significant weight loss, fever  MUSCULOSKELETAL: see HPI  NEURO:  Negative    The patient's past medical history, family history, social history, and review of systems were reviewed. History is otherwise negative except as stated in the HPI.    Physical Examination:  General: Alert and oriented to person, place, and time.  No acute distress and breathing comfortably: Pleasant and cooperative with examination.  HEENT: Head is normocephalic and atraumatic.  Neck: Supple, no visible swelling.  Cardiovascular: No palpable tachycardia  Lungs: No audible wheezing or labored breathing  Abdomen: Nondistended.  On musculoskeletal examination, the patient has full elbow range of motion. There is no tenderness to palpation about the lateral epicondyle. Tinels at the cubital tunnel and elbow flexion/compression are negative for ulnar nerve symptoms. In regards to the wrist, there is no obvious deformity. Range of motion is full in flexion, extension, pronation, and supination. Strength is 5/5 in flexion and extension. There is no tenderness to palpation about the 1st dorsal compartment, the 1st CMC joint, or the TFCC. Tinels at the carpal tunnel and Durkins compression test are positive. The patient has subjective paresthesias in the median nerve distribution. Sensation and motor function are intact in the radial, and ulnar nerve distribution. There is no obvious thenar  atrophy, and thenar strength is 5/5. There is no intrinsic atrophy, and intrinsic strength is 5/5. All fingers are without triggering and are without pain over the A1 pulley. The patient can make a full composite fist. The hand itself is warm and well perfused. The skin is intact throughout. The contralateral hand/wrist are normal to inspection, range of motion, stability, and strength.    Imaging:  EMG with bilateral mild carpal tunnel    Hand / UE Inj/Asp: L carpal tunnel for carpal tunnel syndrome on 8/12/2024 5:16 PM  Indications: pain and diagnostic  Details: 24 G needle, volar approach  Medications: 10 mg triamcinolone acetonide 10 mg/mL; 1 mL lidocaine 10 mg/mL (1 %)  Outcome: tolerated well, no immediate complications  Procedure, treatment alternatives, risks and benefits explained, specific risks discussed. Immediately prior to procedure a time out was called to verify the correct patient, procedure, equipment, support staff and site/side marked as required. Patient was prepped and draped in the usual sterile fashion.       Hand / UE Inj/Asp: R carpal tunnel for carpal tunnel syndrome on 8/12/2024 5:16 PM  Indications: pain and diagnostic  Details: 24 G needle, volar approach  Medications: 10 mg triamcinolone acetonide 10 mg/mL; 0.5 mL lidocaine 10 mg/mL (1 %)  Outcome: tolerated well, no immediate complications  Procedure, treatment alternatives, risks and benefits explained, specific risks discussed. Immediately prior to procedure a time out was called to verify the correct patient, procedure, equipment, support staff and site/side marked as required. Patient was prepped and draped in the usual sterile fashion.             Assessment:  Patient with bilateral mild carpal tunnel syndrome.     Plan:      Bilateral carpal tunnel injection. I believe that the patient may be symptomatic from carpal tunnel syndrome. In order to determine the proportion of symptoms that are attributable to CTS, I discussed giving  a corticosteroid injection. I explained the risks and benefits of an injection. Specifically, I reviewed the risks of injection, which include, but are not limited to, infection, bleeding, nerve injury, pain, steroid flare, glycemic alteration, subcutaneous fat atrophy, skin hypopigmentation, soft tissue damage, and incomplete symptom relief. At this time, the patient would like to proceed with an injection. After obtaining consent, I injected a 1mL combination of Kenalog and 1% lidocaine into the carpal tunnel using standard, sterile technique. The injection site was dressed, and the patient tolerated the injection well. I am hopeful that this injection will serve diagnostic, prognostic, and therapeutic purposes. Finally, I have emphasized patience, as any benefit may take several weeks to manifest. Depending on the success of the injection, I will see them back on an as needed basis.        Nafisa Leroy MD  Orthopaedic Surgeon

## 2024-08-19 ENCOUNTER — TELEPHONE (OUTPATIENT)
Dept: PRIMARY CARE | Facility: CLINIC | Age: 79
End: 2024-08-19

## 2024-08-19 ENCOUNTER — TELEMEDICINE (OUTPATIENT)
Dept: PRIMARY CARE | Facility: CLINIC | Age: 79
End: 2024-08-19
Payer: MEDICARE

## 2024-08-19 DIAGNOSIS — U07.1 COVID-19: Primary | ICD-10-CM

## 2024-08-19 DIAGNOSIS — K59.00 CONSTIPATION, UNSPECIFIED CONSTIPATION TYPE: ICD-10-CM

## 2024-08-19 PROCEDURE — 99442 PR PHYS/QHP TELEPHONE EVALUATION 11-20 MIN: CPT | Performed by: FAMILY MEDICINE

## 2024-08-19 RX ORDER — DOCUSATE SODIUM 100 MG/1
100 CAPSULE, LIQUID FILLED ORAL NIGHTLY
Qty: 90 CAPSULE | Refills: 1 | Status: SHIPPED | OUTPATIENT
Start: 2024-08-19 | End: 2024-11-17

## 2024-08-19 ASSESSMENT — ENCOUNTER SYMPTOMS
CHEST TIGHTNESS: 0
HEADACHES: 1
COUGH: 1
SORE THROAT: 1
APNEA: 0
FEVER: 1
EYE REDNESS: 0
DIARRHEA: 0
FATIGUE: 1
CHILLS: 0
SINUS PRESSURE: 1
SHORTNESS OF BREATH: 0

## 2024-08-19 NOTE — TELEPHONE ENCOUNTER
Pt has COVID, on for Virtual to discuss.  In the meantime, she states she went to a  Urgent care yesterday that didn't use [facility] but they did give her Benzonatate as well as Dexamethasone & Mucinex.,  She also states she has been taking Corinna McKinnon but isn't sure if anything else she can take without causing an issue mixing her meds .  (The NP she saw was Inna Zaragoza)   Just making you aware

## 2024-08-19 NOTE — PROGRESS NOTES
Answers submitted by the patient for this visit:      Subjective   Patient ID: Rosalba Salcedo is a 79 y.o. female who presents for No chief complaint on file..  Fever   This is a new problem. The current episode started in the past 7 days. The problem occurs 2 to 4 times per day. The problem has been gradually improving. Her temperature was unmeasured prior to arrival. The temperature was taken using an oral thermometer. Associated symptoms include congestion, coughing, ear pain, headaches, muscle aches and a sore throat. Pertinent negatives include no diarrhea.   Risk factors: hx of cancer, immunosuppression and recent sickness    Risk factors: no contaminated food, no contaminated water, no occupational exposure, no recent travel and no sick contacts      Chief Complaint: Fever  Chronicity: new  Onset: in the past 7 days  contaminated food: No  contaminated water: No  history of cancer: Yes  occupational exposure: No  recent travel: No  immunosuppression: Yes  recent illness: Yes  sick contacts: No  Frequency: 2 to 4 times per day  Progression since onset: gradually improving  Max temp prior to arrival: unmeasured  Temperature source: an oral thermometer  congestion: Yes  cough: Yes  ear pain: Yes  headaches: Yes  muscle aches: Yes  Patientadmits to  cough or shortness of breath... symptoms began  last Friday  Patient admits to the following    ,   fever .. Not sure if   greater than 100 ... Admits to chills/shaking no loss of taste or smell. The patient does not have to of the following symptoms; +sore throat, -diarrhea, +body aches/+malaise, +headaches,+ nausea/-vomiting, or +  nasal ongestion.   Review of Systems   Constitutional:  Positive for fatigue and fever. Negative for chills.   HENT:  Positive for congestion, ear pain, sinus pressure and sore throat.    Eyes:  Negative for redness.   Respiratory:  Positive for cough. Negative for apnea, chest tightness and shortness of breath.    Gastrointestinal:   Negative for diarrhea.   Neurological:  Positive for headaches.       Objective   Physical Exam  Physical examination the  l auditory observations  Vital signs none provided by patient  General no acute distress alert and oriented     Labs        Assessment/Plan   Problem List Items Addressed This Visit       Constipation    Relevant Medications    docusate sodium (Colace) 100 mg capsule    COVID-19 - Primary     See wrap  up      Feeling better  and  symptomatic care    See wrap up              In order to see the patient ,preparation to see the patient ,that is review of tests, obtaining and/or reviewing separately obtained history, performing a medically appropriate examination and/or evaluation, counseling and educating the patient/family/caregiver, and ordering medications, tests or procedures was performed.  In addition referring and communicating with other healthcare professionals (without separately reported) and documenting clinical information in the electronic or other health record, and finally independently interpreting results (not separately reported) and communicated results of the patient/family/caregiver required  15 minutes...

## 2024-08-19 NOTE — TELEPHONE ENCOUNTER
Patient is covid (+); I have placed her on for a virtual visit tonight 08/19 (Monday) per her request. However she has some questions/concerns.     Please assist, Thanks!

## 2024-08-19 NOTE — PATIENT INSTRUCTIONS
Wear a mask when you are out in public and or in crowded places  Avoid going to places such as nursing homes, hospitals if at all possible  Stay safe  WHAT TO DO IF YOU ARE SICK WITH CORONAVIRUS DISEASE 2019 (COVID-19)    Mucinex  dm    Corcidin  hbp  If you are sick with COVID-19 or suspect you are infected with the virus that causes COVID-19, the Centers for Disease Control and Prevention reccomends you follow the steps below to help prevent the disease from spreading to people in your home and community.    Stay Home Except to Get Medical Care  You should restrict activities outside your home, except for getting urgent medical care.  Please call ahead before going to any medical facility. Do not go to work, school or public areas. Avoid using public transportation, ride-sharing or taxis.    Separate Yourself from Other People and Animals in Your Home    People: As much as possible, you should stay in a specific room and away from other people in your home. Also, you should use a separate bathroom, if available.    Animals: Although there have not been reports of pets becoming sick with COVID-19, it is still recommended that you avoid contact with your pets while you are sick. If another member of your household is not able to care for them, wash your hands before and after you interact with pets and wear a face mask.    Call Ahead Before Visiting Your Doctor  If you have a medical appointment, call the healthcare provider and tell them that you have or may have COVID-19. This will help the healthcare provider’s office take steps to keep other people from getting infected or exposed.    Wear a Face Mask  You should wear a face mask when you are around other people (e.g., sharing a room or vehicle) or pets and before you enter a healthcare provider’s office. If you are not able to wear a face mask (for example, because it causes trouble breathing), then people who live with you should not stay in the same room with  you, or they should wear a face mask if they enter your room.    Cover Your Coughs and Sneezes  Cover your mouth and nose with a tissue when you cough or sneeze. Throw used tissues in a lined trash can, then wash your hands with soap and water for at least 20 seconds or use an alcohol-based hand  that contains at least 60% alcohol.    Clean Your Hands Often  Wash your hands often with soap and water for at least 20 seconds. If soap and water are not available, clean your hands with an alcohol-based hand  that contains at least 60% alcohol, covering all surfaces of your hands and rubbing them together until they feel dry. Soap and water should be used preferentially if hands are visibly dirty. Avoid touching your eyes, nose and mouth with unwashed hands.    Avoid Sharing Personal Household Items  You should not share dishes, drinking glasses, cups, eating utensils, towels or bedding with other people or pets in your home. After using these items, they should be washed thoroughly with soap and water.    Clean All High-Touch Surfaces Every Day  High-touch surfaces include counters, tabletops, doorknobs, bathroom fixtures, toilets, phones, keyboards, tablets and bedside tables. Also, clean any surfaces that may have blood, stool or body fluids on them. Use a household cleaning spray or wipe, according to the label instructions. Labels contain instructions for safe and effective use of the cleaning product including precautions you should take when applying the product, such as wearing gloves and making sure you have good ventilation during use of the product.    Monitor Your Symptoms  Seek prompt medical attention if your illness is worsening (e.g., difficulty breathing). Before seeking care, call your healthcare provider and tell them that you have, or are being evaluated for, COVID-19. Put on a face mask before you enter the facility. These steps will help the healthcare provider’s office to keep  other people in the office or waiting room from getting infected or exposed. Ask your healthcare provider to call the local or state health department. Persons who are placed under active monitoring or facilitated self-monitoring should follow instructions provided by their local health department or occupational health professionals, as appropriate. If you have a medical emergency and need to call 911, notify the dispatch personnel that you have, or are  being evaluated for COVID-19. If possible, put on a face mask before emergency medical services arrive.    Discontinuing Home Isolation  Patients with confirmed COVID-19 should remain under home isolation precautions until the risk of secondary transmission to others is thought to be low. The decision to discontinue home isolation precautions should be made on a case-by-case basis, in consultation with healthcare providers and state and local health departments.    RECOMMENDED PRECAUTIONS FOR HOUSEHOLD MEMBERS, INTIMATE PARTNERS AND CAREGIVERS  Make sure that you understand and can help the patient follow their healthcare provider’s instructions for medication(s) and care. You should help the patient with basic needs in the home and provide support for getting groceries, prescriptions, and other personal needs.  Monitor the patient’s symptoms. If the patient is getting sicker, call his or her healthcare provider and tell them that the patient has laboratory-confirmed COVID-19. This will help the healthcare provider’s office take steps to keep other people in the office or waiting room from getting infected. Ask the healthcare provider to call the local or [facility] health department for additional guidance. If the patient has a medical emergency and you need to call 911, notify the dispatch personnel that the patient has, or is being evaluated for COVID-19.  Household members should stay in another room or be  from the patient as much as possible. Household  members should use a separate bedroom and bathroom, if available.  Prohibit visitors who do not have an essential need to be in the home.  Household members should care for any pets in the home. Do not handle pets or other animals while sick.  Make sure that shared spaces in the home have good air flow, such as by an air conditioner or an opened window, weather permitting.  Perform hand hygiene frequently. Wash your hands often with soap and water for at least 20 seconds or use an alcohol-based hand  that contains 60% to 95% alcohol, covering all surfaces of your hands and rubbing them together until they feel dry. Soap and water should be used preferentially if hands are visibly dirty.  Avoid touching your eyes, nose and mouth with unwashed hands.  The patient should wear a face mask when you are around other people. If the patient is not able to wear a face mask (for example, because it causes trouble breathing), you, as the caregiver, should wear a mask when you are in the same room as the patient.  Wear a disposable face mask and gloves when you touch or have contact with the patient’s blood, stool or body fluids, such as saliva, sputum, nasal mucus, vomit or urine.  Throw out disposable face masks and gloves after using them. Do not reuse.  When removing personal protective equipment, first remove and dispose of gloves.  Then, immediately clean your hands with soap and water or alcohol-based hand .  Next, remove and dispose of face mask, and immediately clean your hands again with soap and water or alcohol-based hand .  Avoid sharing household items with the patient. You should not share dishes, drinking glasses, cups, eating utensils, towels, bedding or other items. After the patient uses these items, you should wash them thoroughly (see below “Wash laundry thoroughly”).  Clean all high-touch surfaces, such as counters, tabletops, doorknobs, bathroom fixtures, toilets, phones,  keyboards, tablets and bedside tables, every day. Also, clean any surfaces that may have blood, stool or body fluids on them.  Use a household cleaning spray or wipe, according to the label instructions. Labels contain instructions for safe and effective use of the cleaning product including precautions you should take when applying the product, such as wearing gloves and making sure you have good ventilation during use of the product.  Wash laundry thoroughly.  Immediately remove and wash clothes or bedding that have blood, stool or body fluids on them.  Wear disposable gloves while handling soiled items and keep soiled items away from your body. Clean your hands (with soap and water or an alcohol-based hand ) immediately after removing your gloves.  Read and follow directions on labels of laundry or clothing items and detergent. In general, using a normal laundry detergent according to washing machine instructions and dry thoroughly using the warmest temperatures recommended on the clothing label.  Place all used disposable gloves, face masks and other contaminated items in a lined container before disposing of them with other household waste. Clean your hands (with soap and water or an alcohol-based hand ) immediately after handling these items. Soap and water should be used preferentially if hands are visibly dirty.  Discuss any additional questions with your or local health department or healthcare provider. Check available hours when contacting your local health department.    Source: www.cdc.gov/coronavirus/2019-ncov  Updated 3.15.2020 4:40p  [id number]       Please consider exercise program involving walking or some other form of aerobic activity 5 days weekly for 30 minutes... Let's also consider strengthening of large muscle groups like the abdominal muscles or shoulder muscles... Twice weekly with reps of 5/10/15 exercises and gradually increase strength.. This is not heavy strength  training but light weight training... Sit ups or back exercise routine.. Please ask for handout if uncertain how to do..This  will help to strengthen your muscles which in turn will help you to lose weight.... You might ask what is the best diet available.. I would strongly encourage you to consider  Weight Watchers.. And as  your  fellow on  Weight Watchers physician attempting to  live this  LIFE  style  choice with you....  I will be glad to give you recommendations on what to eat.. Consider buying Yajaira bread.., alexandro bagle thin bread.. oikos yogurt... eggs  to eat as hard-boiled... Halo top ice cream for snack... All these are delicious foods which.. when eaten and  being compliant eating three  meals daily  breakfast lunch and dinner, drinking  64 ounces of water daily we will all win together !!!!!!!. This will be a means for you to lose weight... Consider also the smart phone lianna ... My plate.. Or My  fitness  pal..,  as additional possibilities for weight loss... Jose L Leroy!    Discussed medication side effects.  The  risk benefits and treatment options  discussed with patient.       Please schedule follow-up appointment based upon your improvement/failure to improve/chronic medical conditions and physician recommendations during office appointment at the .       Patient advised to go to er if symptoms worsen or to call answering service, or to return to office for additional evaluation    This note was partially  generated using Dragon voice recognition and there may be incorrect words, wording, spelling, or pronunciation errors that were not corrected prior to committing the note to the medical record.

## 2024-09-05 ENCOUNTER — APPOINTMENT (OUTPATIENT)
Dept: ORTHOPEDIC SURGERY | Facility: CLINIC | Age: 79
End: 2024-09-05
Payer: MEDICARE

## 2024-09-09 ENCOUNTER — APPOINTMENT (OUTPATIENT)
Dept: PRIMARY CARE | Facility: CLINIC | Age: 79
End: 2024-09-09
Payer: MEDICARE

## 2024-09-09 VITALS
HEIGHT: 64 IN | TEMPERATURE: 97 F | OXYGEN SATURATION: 98 % | RESPIRATION RATE: 18 BRPM | SYSTOLIC BLOOD PRESSURE: 148 MMHG | DIASTOLIC BLOOD PRESSURE: 89 MMHG | BODY MASS INDEX: 31.76 KG/M2 | WEIGHT: 186 LBS

## 2024-09-09 DIAGNOSIS — E78.2 MIXED HYPERLIPIDEMIA: ICD-10-CM

## 2024-09-09 DIAGNOSIS — N19 RENAL FAILURE, UNSPECIFIED CHRONICITY: ICD-10-CM

## 2024-09-09 DIAGNOSIS — D64.9 ANEMIA, UNSPECIFIED TYPE: ICD-10-CM

## 2024-09-09 DIAGNOSIS — I25.10 CORONARY ARTERY DISEASE INVOLVING NATIVE HEART WITHOUT ANGINA PECTORIS, UNSPECIFIED VESSEL OR LESION TYPE: ICD-10-CM

## 2024-09-09 DIAGNOSIS — I10 ESSENTIAL HYPERTENSION: ICD-10-CM

## 2024-09-09 DIAGNOSIS — N18.9 CHRONIC KIDNEY DISEASE, UNSPECIFIED CKD STAGE: ICD-10-CM

## 2024-09-09 DIAGNOSIS — E11.9 DIABETES MELLITUS WITHOUT COMPLICATION (MULTI): Primary | ICD-10-CM

## 2024-09-09 DIAGNOSIS — M48.02 CERVICAL SPINAL STENOSIS: ICD-10-CM

## 2024-09-09 LAB — POC HEMOGLOBIN A1C: 6 % (ref 4.2–6.5)

## 2024-09-09 PROCEDURE — 3079F DIAST BP 80-89 MM HG: CPT | Performed by: FAMILY MEDICINE

## 2024-09-09 PROCEDURE — 99214 OFFICE O/P EST MOD 30 MIN: CPT | Performed by: FAMILY MEDICINE

## 2024-09-09 PROCEDURE — 1159F MED LIST DOCD IN RCRD: CPT | Performed by: FAMILY MEDICINE

## 2024-09-09 PROCEDURE — 83036 HEMOGLOBIN GLYCOSYLATED A1C: CPT | Performed by: FAMILY MEDICINE

## 2024-09-09 PROCEDURE — 3077F SYST BP >= 140 MM HG: CPT | Performed by: FAMILY MEDICINE

## 2024-09-09 NOTE — PROGRESS NOTES
Subjective   Patient ID: Rosalba Salcedo is a 79 y.o. female who presents for Pre-op Exam (Pt seeing Cardiology 9/12/24 as well ).  HPI     patient is being seen for a preoperative visit.  The procedure is     for   possible 4 level cervical laminoplasty  at [facility]  .  The indication for surgery is due bilateral arm radiculopathy with numbness and tingling in her hands.  MRI shows stenosis at C3-C4, C4-5, C5-6, C6-7 and a little bit on the right at C7-T1.  She has central stenosis which is moderate at C3 and 4.  In light of the findings surgical treatment for radiculopathy has been discussed and is here for preoperative visit.  Patient was seen by orthopedics and results of evaluation of MRI showed bilateral arm radiculopathy from bilateral stenosis at 4 levels in her neck along with carpal tunnel syndrome.  She was given the option of physical therapy pain management and possible surgery.  In addition she was referred to neck surgeon at [facility] for possible 4 level cervical laminoplasty.  Today she is here for possible surgical clearance along with med refills.  Patient is also here for follow-up of hypertension.. Symptoms do not include headache confusion visual disturbance dizziness shortness of breath chest pain syncope or palpitations. Recent blood pressure patient has not been checking. By report there is good compliance with treatment. Pertinent medical history includes  /hyperlipidemia ..  Patient is also here for follow-up of hyperlipidemia. The most recent measurements for this patient would take it on.. 5/24 At that time.. Total cholesterol.140... HDL 46   LDL 68   triglycerides 129      .... Associated symptoms do not include chest pain, Cramps with exertion, myalgias or nausea. Current treatment includes statins. By report there is good compliance with treatment. Pertinent medical history includes   hypertension    The reflux esophagitis he has been occurring in a recurrent pattern for years.  The course has been without change. The reflux is described as  moderate. The patient remains compliant on meds.. The patient takes medications in a  daily  fashion. Denies dysphagia hoarseness or odynophagia...   Allergies   Allergen Reactions    Morphine Hives, Itching, Rash and Unknown     Drowsiness with Percocet and Vicodin      Past Medical History:   Diagnosis Date    Other bursitis of elbow, left elbow 05/27/2020    Bursitis of left elbow    Other specified anxiety disorders 07/27/2022    Depression with anxiety    Pain in unspecified elbow 08/05/2020    Elbow pain    Personal history of other mental and behavioral disorders 04/02/2022    History of depression   Coronary artery disease  Essential hypertension  Hyperlipidemia  Right bundle branch block  Status post coronary artery bypass graft  Allergic rhinitis  Bilateral vestibular hypofunction  Class II obesity BMI 31  Hypothyroidism  Vitamin D deficiency  Diabetes mellitus without complication  Diverticulosis   Gastric polyp  Reflux esophagitis  Renal insufficiency  Stage IIIa chronic kidney disease    Social History     Socioeconomic History    Marital status:      Spouse name: Not on file    Number of children: Not on file    Years of education: Not on file    Highest education level: Not on file   Occupational History    Not on file   Tobacco Use    Smoking status: Never     Passive exposure: Never    Smokeless tobacco: Never   Vaping Use    Vaping status: Never Used   Substance and Sexual Activity    Alcohol use: Yes     Comment: occasional    Drug use: Never    Sexual activity: Defer   Other Topics Concern    Not on file   Social History Narrative    Not on file     Social Determinants of Health     Financial Resource Strain: Low Risk  (10/18/2023)    Overall Financial Resource Strain (CARDIA)     Difficulty of Paying Living Expenses: Not very hard   Food Insecurity: No Food Insecurity (3/11/2024)    [facility]    Hunger Vital Sign     Worried About Running Out of Food in the Last Year: Never true     Ran Out of Food in the Last Year: Never true   Transportation Needs: No Transportation Needs (4/19/2024)    OASIS : Transportation     Lack of Transportation (Medical): No     Lack of Transportation (Non-Medical): No     Patient Unable or Declines to Respond: No   Physical Activity: Not on file   Stress: Not on file   Social Connections: Feeling Socially Integrated (4/19/2024)    OASIS : Social Isolation     Frequency of experiencing loneliness or isolation: Never   Intimate Partner Violence: Not on file   Housing Stability: Low Risk  (3/11/2024)    Received from [facility]    Housing Stability Vital Sign     Unable to Pay for Housing in the Last Year: No     Number of Places Lived in the Last Year: 1     Unstable Housing in the Last Year: No      Family History   Problem Relation Name Age of Onset    Heart disease Mother      Hypertension Mother      Stomach cancer Mother      Other (cardiac disorder) Mother      Hypertension Father      Cancer Father      Prostate cancer Father      Other (cardiac disorder) Father      No Known Problems Sibling        FUNCTIONAL CAPACITY:   - using Duke Activity Status Index score: 5.07 mets     Are you able to:  Take care of self (eating, dressing, bathing, using the toilet): yes   Walk indoors: yes   Walk 1-2 blocks on level ground: yes   Climb a flight of stairs or walk up a hill: yes   Run a short distance:NO   Do light work around the house (dusting, washing dishes): yes   Do moderate work around the house (vacuuming, sweeping floors, carrying in groceries): yes   Do heavy work around the house (scrubbing floors, lifting or moving heavy furniture): NO  Do yardwork (raking leaves, weeding, pushing a power mower): NO   Have sexual relations: yNO   Participate in moderate  recreational activities (golf, bowling, dancing, doubles tennis, throwing a baseball or football):NO  Participate in strenuous sports (swimming, singles tennis, football, basketball, skiing):  NO     REVISED CARDIAC RISK INDEX  0.9%     - Elevated-risk surgery (Intraperitoneal; intrathoracic; suprainguinal vascular): no   - History of ischemic heart disease (History of myocardial infarction (MI); history of positive exercise test; current chest pain considered due to myocardial ischemia; use of nitrate therapy or ECG with pathological Q waves):YES  - History of congestive heart failure (Pulmonary edema, bilateral rales or S3 gallop; paroxysmal nocturnal dyspnea; chest x-ray (CXR) showing pulmonary vascular redistribution): no  History of cerebrovascular disease (Prior transient ischemic attack (TIA) or stroke): no  - Pre-operative treatment with insulin: no  - Pre-operative creatinine >2 mg/dL / 176.8 µmol/L: no  Total Score is: 1     STOP-BANG SCORE  Do you snore loudly? (Louder than talking or loud enough to be heard through closed doors): no  Do you often feel tired, fatigued, or sleepy during the daytime?: no   Has anyone observed you stop breathing during sleep?: no  Do you have (or are you being treated for) high blood pressure?: no  Objective measures:  BMI > 35kg/m2: no  Age > 50 years: ys   Neck circumference > 40: no  Male gender: no  Total Scroe: 1 which correlates with low risk of moderate to severe ANAT     Surgical risk assessment:  Prior anesthesia: SHE  had prior anesthesia.DENIES   prior adverse reaction to epidural anesthesia and no prior adverse reaction to general anesthesia.         Lifestyle factors: Denies tobacco use, denies heavy alcohol consumption, and denies illegal drug use.         Living situation: No.  Concerns with his living situation and living independently with his family.    Review of systems  Review of systems: No frequent nosebleeds.  Patient denies dyspnea, denies symptoms of  respiratory infection, denies chest pain,  Constitutional: Not feeling tired and no fever  Eyes: No eyesight problems, no redness and no pain.  ENT: No ear pain, no swollen lymph nodes, no sore throat, no nasal discharge and nasal congestion.  Cardiovascular: No chest pain, no palpitations and no lower extremity edema.  Respiratory: No cough dyspnea with exertion, no dyspnea at rest and no wheezing.  Gastrointestinal: No abdominal pain, no constipation, no diarrhea, no heartburn had no nausea.  Genitourinary: No dysuria, no hesitancy, normal urinary frequency.  Musculoskeletal: Arthralgias but no myalgias and no joint swelling  Integumentary: No skin lesions and no rashes.  Neurologic:   NO Dizziness   no headache no numbness or tingling, no seizures,SOME limb weakness, no memory changes and had no speech difficulty  Psychiatric: No mood changes, no sleep disturbances, no substance use and no feelings of anxiety.  Endocrine: No weight change and no temperature intolerance.  Hematologic/lymphatic: YES  easy bruising and no swollen glands            Review of Systems  All other  pertinent  systems reviewed and are negative except  those  mentioned  in HPI   Objective   Physical Exam  Vitals: I have reviewed the vitals  General: Well-developed.  In no acute distress.  Eyes:   sclera nonicteric.  Conjunctiva not injected.  No discharge.   HEAD: Normocephalic, atraumatic.  HEENT   Mucous membranes moist.  Posterior oropharynx nonerythematous, no tonsillar exudates.      No cervical lymphadenopathy.  Cardio: Regular rate and rhythm.  No murmur, rub or gallop.  Pulmonary: Lungs clear to auscultation in all fields.  No accessory muscle use.  GI/: Normal active bowel sounds.  Soft, nontender.  No masses or organomegaly appreciated.  Musculoskeletal: No gross deformities appreciated.... decrease cervical  spine range  of  motion    tender in the paraspinal musculature of the cervical spine. C  7 range of motion is mildly  decreased secondary to some pain   no weakness no instability to muscle strength. examination of the upper extremities reveals point tenderness,leftt  posterior  shoulder   Neuro: Alert, age-appropriate.  Normal muscle tone.  Moving all extremities.  Skin: No rash, bruises or lesions.   Labs        Assessment/Plan   Problem List Items Addressed This Visit       Anemia    Relevant Orders    Protime-INR    aPTT    CAD (coronary artery disease)       cardiology to do cardiac preop clearance         Relevant Orders    CBC and Auto Differential    Essential hypertension     Take  coreg  am of  surgery   Monitor your blood pressure at home and notify if blood pressure consistently over 140 systolic 90 diastolic   stable and continue meds          Relevant Orders    Comprehensive Metabolic Panel    Hyperlipidemia     Surgery has advised to dc  12 days prior to surgery    rosuvastin  5/24  cholesterol  140     ldl  68   stable and continue meds          Diabetes mellitus without complication (Multi) - Primary     Stable hgba1c 6.0 ..   Continue diet controlled .   stable           Relevant Orders    Albumin-Creatinine Ratio, Urine Random    POCT glycosylated hemoglobin (Hb A1C) manually resulted (Completed)    Cervical spinal stenosis     Low risk for surgery pending cardiology consultation          Other Visit Diagnoses       Chronic kidney disease, unspecified CKD stage        Relevant Orders    Protime-INR    Renal failure, unspecified chronicity        Relevant Orders    aPTT

## 2024-09-09 NOTE — ASSESSMENT & PLAN NOTE
Surgery has advised to dc  12 days prior to surgery    rosuvastin  5/24  cholesterol  140     ldl  68   stable and continue meds

## 2024-09-10 NOTE — ASSESSMENT & PLAN NOTE
Take  coreg  am of  surgery   Monitor your blood pressure at home and notify if blood pressure consistently over 140 systolic 90 diastolic   stable and continue meds

## 2024-09-12 ENCOUNTER — APPOINTMENT (OUTPATIENT)
Dept: CARDIOLOGY | Facility: CLINIC | Age: 79
End: 2024-09-12
Payer: MEDICARE

## 2024-09-12 VITALS
DIASTOLIC BLOOD PRESSURE: 68 MMHG | WEIGHT: 193 LBS | HEART RATE: 64 BPM | BODY MASS INDEX: 33.11 KG/M2 | SYSTOLIC BLOOD PRESSURE: 120 MMHG

## 2024-09-12 DIAGNOSIS — I45.10 RIGHT BUNDLE BRANCH BLOCK (RBBB): ICD-10-CM

## 2024-09-12 DIAGNOSIS — I25.10 CORONARY ARTERY DISEASE INVOLVING NATIVE HEART WITHOUT ANGINA PECTORIS, UNSPECIFIED VESSEL OR LESION TYPE: ICD-10-CM

## 2024-09-12 DIAGNOSIS — E78.2 MIXED HYPERLIPIDEMIA: ICD-10-CM

## 2024-09-12 DIAGNOSIS — Z01.818 ENCOUNTER FOR PREOPERATIVE ASSESSMENT: Primary | ICD-10-CM

## 2024-09-12 DIAGNOSIS — I10 ESSENTIAL HYPERTENSION: ICD-10-CM

## 2024-09-12 DIAGNOSIS — Z95.1 S/P CABG (CORONARY ARTERY BYPASS GRAFT): ICD-10-CM

## 2024-09-12 PROCEDURE — 3078F DIAST BP <80 MM HG: CPT | Performed by: NURSE PRACTITIONER

## 2024-09-12 PROCEDURE — 3074F SYST BP LT 130 MM HG: CPT | Performed by: NURSE PRACTITIONER

## 2024-09-12 PROCEDURE — 1159F MED LIST DOCD IN RCRD: CPT | Performed by: NURSE PRACTITIONER

## 2024-09-12 PROCEDURE — 1160F RVW MEDS BY RX/DR IN RCRD: CPT | Performed by: NURSE PRACTITIONER

## 2024-09-12 PROCEDURE — 99214 OFFICE O/P EST MOD 30 MIN: CPT | Performed by: NURSE PRACTITIONER

## 2024-09-12 PROCEDURE — 1036F TOBACCO NON-USER: CPT | Performed by: NURSE PRACTITIONER

## 2024-09-12 PROCEDURE — 93000 ELECTROCARDIOGRAM COMPLETE: CPT | Performed by: NURSE PRACTITIONER

## 2024-09-12 NOTE — PROGRESS NOTES
Rosalba Salcedo is a 79 y.o. female that presents to the office today for  pre-operative cardiac evaluation.  She follows with her  primary cardiologist Dr. Ho and was added to my schedule today.      Per Dr. Ho office notes, she has a PMH of coronary artery disease with coronary bypass graft surgery 2009 (L-LAD, 2 vein grafts).  Normal Lexiscan perfusion with 75% ejection fraction 10/5/2020.  Also history hypertension, hyperlipidemia, esophageal reflux and osteoarthritis.      10/18/2023 L2-3 LAMINECTOMY, L4-5 INSTRUMENTATION, POSTERIOR INTERBODY FUSION, POSTERIOR LATERAL FUSION AND L4-5 RIGHT EXCISION OF FACET CYST, EXPLORATION SPINAL FUSION L3-4 without cardiac complications     2/20/2024 seen by Savanna Chahal NP in anticipation of right hip replacement surgery at that time patient free of cardiovascular symptoms ECG without acute changes   uncomplicated from a cardiovascular standpoint     3/11/2024 right total hip arthroplasty no cardiovascular complication.     Lab work 4/11/2024 sodium 141 potassium 4.3 BUN of 18 creatinine 1.11 liver function study normal iron studies unremarkable CBC normal     5/6/2024 venous duplex study no evidence of DVT    this study had been ordered after patient called to state she was having increased lower extremity edema.     Seen in office with Dr. Ho 5/21/2024, Tolerated complex back surgery and hip replacement without cardiovascular compromise.  Remains free of chest pain or shortness of breath however does have considerable edema that she did not note particularly prior to hip replacement.  The edema is much worse when she is out in the heat.  Is uncomfortable to walk.  Venous duplex have not shown evidence of DVT.  Sometimes the skin feels rather irritated.  Amlodipine was discontinued with improvement in edema.    She is planned for cervical spine surgery with Dr. Laurent Chapman at [facility] 9/30/2024 with anticipated hospital stay of 1-3 days.  From a cardiac  standpoint she is doing well, she denies chest pain, chest pressure, chest tightness, shortness of breath, palpitations, lightheadedness or dizziness. She is able to complete greater than 4 METS on a daily basis without cardiac symptoms.  She continues to be independent at home.     Testing Reviewed  EKG obtained in the office today and verified with Dr. Ho  shows NSR, RBBB  HR  64 bpm, QT/Qtc 432/445.    Assessment/Plan  CAD:  Denies angina. EKG as noted above.    Hypertension:  Well controlled   Hyperlipidemia:  Continue statin therapy  Edema:  Improved with discontinuing amlodipine  She is at moderate risk for cardiovascular events during surgical procedure, but at acceptable risk to proceed with surgery.  Follow-up in office with Dr. Ho as previously scheduled or sooner if needed      Patient Active Problem List   Diagnosis    Abnormal mammogram    Allergic rhinitis    Anemia    Angioedema    Ankle pain    Foot pain    Ankle strain    Acute bronchitis    Arthritis    Bronchitis    COPD with acute exacerbation (Multi)    Aseptic loosening of prosthetic knee (CMS-HCC)    Osteoarthritis of knee    CAD (coronary artery disease)    Carotid bruit    Abdominal pain    Constipation    COVID-19    Dizziness    Dry skin    Limb pain    Easy bruising    Localized edema    Erythrasma    Essential hypertension    External hemorrhoid    Other fatigue    Food allergy    Foot sprain    Fracture of radius, distal, closed    Fracture of unspecified part of neck of right femur, subsequent encounter for closed fracture with routine healing    GERD (gastroesophageal reflux disease)    Hair loss    Hematuria    Hip bursitis, left    Hip fracture, right (Multi)    Post-traumatic osteoarthritis of right hip    Hyperlipidemia    Hypothyroidism    Impaired gait and mobility    Influenza A    Keloid    Left knee pain    Leg edema, left    Low back pain    Lumbar radicular pain    Lumbar sprain    Lung nodule    Menopausal  osteoporosis    Muscle spasms of lower extremity    Olecranon bursitis of right elbow    Olecranon bursitis    Osteopenia    Post-op pain    Renal insufficiency    Restless legs syndrome    Rib pain on left side    Right bundle branch block (RBBB)    Iliotibial band syndrome of right side    Right hip pain    Rosacea    Scar    Sinobronchitis    Sinusitis    Shoulder impingement    Tibial tendinitis, posterior    Trochanteric bursitis of right hip    Urinary incontinence    Urinary tract infection    Leukocytes in urine    Urine ketones    Ataxia    Benign neoplasm of sigmoid colon    Diabetes mellitus without complication (Multi)    Diverticulosis of large intestine without diverticulitis    Dysphagia    Gastric polyp    OAB (overactive bladder)    Urge incontinence of urine    Manson-Walker grade 2 rectocele    Vaginal enterocele    Vaginal irritation    Vaginal vault prolapse    Vitamin D deficiency    Vestibular hypofunction, bilateral    Pseudophakia of both eyes    Presence of artificial knee joint, bilateral    Dry eye syndrome, bilateral    Dermatochalasis of both eyelids    Hx of degenerative disc disease    Status post total left knee replacement    Pain in joint, lower leg    Hip pain, left    Chronic nonintractable headache    Cervicalgia    Back pain    Closed displaced fracture of right femoral neck (Multi)    Ataxic gait    Knee osteoarthritis    Class 2 obesity with body mass index (BMI) of 35.0 to 35.9 in adult    S/P CABG (coronary artery bypass graft)    History of traumatic fracture    Muscle weakness (generalized)    Major depressive disorder, recurrent, unspecified (CMS-HCC)    Difficulty in walking, not elsewhere classified    Abnormal posture    Back pain of lumbar region with sciatica    Depressive disorder    Other specified diseases of spinal cord (Multi)    Stage 3a chronic kidney disease (Multi)    Avascular necrosis of bone of hip, right (Multi)    Cup to disc asymmetry, bilateral     PONV (postoperative nausea and vomiting)    S/P total right hip arthroplasty    Hearing loss of right ear    Anxiety    Weakness of right lower extremity    Cervical spinal stenosis    Encounter for preoperative assessment       Social History     Tobacco Use    Smoking status: Never     Passive exposure: Never    Smokeless tobacco: Never   Vaping Use    Vaping status: Never Used   Substance Use Topics    Alcohol use: Yes     Comment: occasional    Drug use: Never       Past Medical History:   Diagnosis Date    Other bursitis of elbow, left elbow 05/27/2020    Bursitis of left elbow    Other specified anxiety disorders 07/27/2022    Depression with anxiety    Pain in unspecified elbow 08/05/2020    Elbow pain    Personal history of other mental and behavioral disorders 04/02/2022    History of depression         Current Outpatient Medications:     acetaminophen (Tylenol) 325 mg tablet, Take 2 tablets (650 mg) by mouth every 6 hours if needed (pain). (Patient taking differently: Take 2 tablets (650 mg) by mouth every 6 hours if needed (pain). Pt is taking 500mg), Disp: 180 tablet, Rfl: 0    alendronate (Fosamax) 70 mg tablet, Take 1 tablet (70 mg) by mouth every 7 days. Take in the morning with a full glass of water, on an empty stomach, and do not take anything else by mouth or lie down for the next 30 min., Disp: 12 tablet, Rfl: 3    aspirin 81 mg EC tablet, Take 1 tablet (81 mg) by mouth once daily., Disp: , Rfl:     carvedilol (Coreg) 6.25 mg tablet, Take 1 tablet (6.25 mg) by mouth 2 times daily (morning and late afternoon)., Disp: 180 tablet, Rfl: 1    cetirizine (ZyrTEC) 10 mg tablet, Take 1 tablet (10 mg) by mouth once daily., Disp: 30 tablet, Rfl: 11    diclofenac sodium (Voltaren) 1 % gel gel, APPLY TO AFFECTED FOOT UP TO 3 TIMES DAILY AS NEEDED FOR PAIN, Disp: , Rfl:     docusate sodium (Colace) 100 mg capsule, Take 1 capsule (100 mg) by mouth at 3:00 in the morning., Disp: 90 capsule, Rfl: 1    famotidine  (Pepcid) 20 mg tablet, Take 1 tablet (20 mg) by mouth 2 times a day., Disp: 180 tablet, Rfl: 3    fluticasone (Flonase) 50 mcg/actuation nasal spray, Administer 1 spray into each nostril once daily as needed for rhinitis or allergies., Disp: 16 g, Rfl: 3    furosemide (Lasix) 20 mg tablet, Take one tab daily as needed for leg swelling, Disp: 90 tablet, Rfl: 3    levothyroxine (Synthroid, Levoxyl) 25 mcg tablet, Take 1 tablet (25 mcg) by mouth once daily., Disp: 90 tablet, Rfl: 3    loratadine 10 mg capsule, Take 1 capsule by mouth once daily as needed., Disp: 90 capsule, Rfl: 3    nitroglycerin (Nitrostat) 0.4 mg SL tablet, Place 1 tablet (0.4 mg) under the tongue every 5 minutes if needed for chest pain., Disp: 90 tablet, Rfl: 1    pantoprazole (ProtoNix) 40 mg EC tablet, Take 1 tablet (40 mg) by mouth once daily., Disp: 90 tablet, Rfl: 3    rosuvastatin (Crestor) 40 mg tablet, TAKE 1 TABLET ONCE EVERY 24HOURS, Disp: 90 tablet, Rfl: 3    Morphine    Family History   Problem Relation Name Age of Onset    Heart disease Mother      Hypertension Mother      Stomach cancer Mother      Other (cardiac disorder) Mother      Hypertension Father      Cancer Father      Prostate cancer Father      Other (cardiac disorder) Father      No Known Problems Sibling         Past Surgical History:   Procedure Laterality Date    CT GUIDED ABSCESS FLUID COLLECTION DRAINAGE  8/23/2022    CT GUIDED ABSCESS FLUID COLLECTION DRAINAGE 8/23/2022 [facility]    OTHER SURGICAL HISTORY  08/14/2019    Wrist surgery    OTHER SURGICAL HISTORY  08/14/2019    Hysterectomy    OTHER SURGICAL HISTORY  08/14/2019    Breast reduction    OTHER SURGICAL HISTORY  08/14/2019    Cholecystectomy    OTHER SURGICAL HISTORY  04/04/2022    Vaginoplasty    OTHER SURGICAL HISTORY  04/04/2022    Bladder surgery    OTHER SURGICAL HISTORY  04/04/2022    Complete colonoscopy    OTHER SURGICAL HISTORY  04/04/2022    Esophagogastroduodenoscopy    OTHER SURGICAL  HISTORY  04/04/2022    Elbow surgery    OTHER SURGICAL HISTORY  04/04/2022    Cataract surgery    OTHER SURGICAL HISTORY  05/11/2021    Breast biopsy    OTHER SURGICAL HISTORY  04/04/2022    Knee surgery    OTHER SURGICAL HISTORY  03/11/2020    Coronary artery bypass graft          Review of systems  Constitutional: No weight loss, fever, chills, weakness or fatigue  HEENT: No visual loss, blurred vision, double vision or yellow sclerae  Skin: No rash or itching  Cardiovascular: No chest pain, pressure or discomfort, No palpitations or edema.  Respiratory: No shortness of breath, cough or sputum  Gastrointestinal: No nausea, vomiting or diarrhea. No bloody or dark tarry stools.  Neurological: No headache, lightheadedness, dizziness, syncope.   Musculoskeletal: Positive for chronic back/leg pain  Hematologic: No anemia, bleeding or bruising.    /68 (BP Location: Right arm, Patient Position: Sitting)   Pulse 64   Wt 87.5 kg (193 lb)   BMI 33.11 kg/m²     Patient Active Problem List   Diagnosis    Abnormal mammogram    Allergic rhinitis    Anemia    Angioedema    Ankle pain    Foot pain    Ankle strain    Acute bronchitis    Arthritis    Bronchitis    COPD with acute exacerbation (Multi)    Aseptic loosening of prosthetic knee (CMS-HCC)    Osteoarthritis of knee    CAD (coronary artery disease)    Carotid bruit    Abdominal pain    Constipation    COVID-19    Dizziness    Dry skin    Limb pain    Easy bruising    Localized edema    Erythrasma    Essential hypertension    External hemorrhoid    Other fatigue    Food allergy    Foot sprain    Fracture of radius, distal, closed    Fracture of unspecified part of neck of right femur, subsequent encounter for closed fracture with routine healing    GERD (gastroesophageal reflux disease)    Hair loss    Hematuria    Hip bursitis, left    Hip fracture, right (Multi)    Post-traumatic osteoarthritis of right hip    Hyperlipidemia    Hypothyroidism    Impaired gait and  mobility    Influenza A    Keloid    Left knee pain    Leg edema, left    Low back pain    Lumbar radicular pain    Lumbar sprain    Lung nodule    Menopausal osteoporosis    Muscle spasms of lower extremity    Olecranon bursitis of right elbow    Olecranon bursitis    Osteopenia    Post-op pain    Renal insufficiency    Restless legs syndrome    Rib pain on left side    Right bundle branch block (RBBB)    Iliotibial band syndrome of right side    Right hip pain    Rosacea    Scar    Sinobronchitis    Sinusitis    Shoulder impingement    Tibial tendinitis, posterior    Trochanteric bursitis of right hip    Urinary incontinence    Urinary tract infection    Leukocytes in urine    Urine ketones    Ataxia    Benign neoplasm of sigmoid colon    Diabetes mellitus without complication (Multi)    Diverticulosis of large intestine without diverticulitis    Dysphagia    Gastric polyp    OAB (overactive bladder)    Urge incontinence of urine    Charlotte-Walker grade 2 rectocele    Vaginal enterocele    Vaginal irritation    Vaginal vault prolapse    Vitamin D deficiency    Vestibular hypofunction, bilateral    Pseudophakia of both eyes    Presence of artificial knee joint, bilateral    Dry eye syndrome, bilateral    Dermatochalasis of both eyelids    Hx of degenerative disc disease    Status post total left knee replacement    Pain in joint, lower leg    Hip pain, left    Chronic nonintractable headache    Cervicalgia    Back pain    Closed displaced fracture of right femoral neck (Multi)    Ataxic gait    Knee osteoarthritis    Class 2 obesity with body mass index (BMI) of 35.0 to 35.9 in adult    S/P CABG (coronary artery bypass graft)    History of traumatic fracture    Muscle weakness (generalized)    Major depressive disorder, recurrent, unspecified (CMS-HCC)    Difficulty in walking, not elsewhere classified    Abnormal posture    Back pain of lumbar region with sciatica    Depressive disorder    Other specified diseases  of spinal cord (Multi)    Stage 3a chronic kidney disease (Multi)    Avascular necrosis of bone of hip, right (Multi)    Cup to disc asymmetry, bilateral    PONV (postoperative nausea and vomiting)    S/P total right hip arthroplasty    Hearing loss of right ear    Anxiety    Weakness of right lower extremity    Cervical spinal stenosis    Encounter for preoperative assessment         Physical Exam  Constitutional: Well developed, awake/alert x 3, no distress.  Head/Neck: No JVD, No bruits  Respiratory/Thorax: patent airways, CTAB, normal breath sounds with good expansion.  Cardiovascular: Regular rate and rhythm, no murmurs, normal S1 and S2,   Gastrointestinal: Non distended, soft, non-tender, no rebound tenderness or guarding.  Extremities: No cyanosis, edema.    Neurological: Alert and oriented x 3. Moves extremities spontaneous with purpose.  Psychological: Appropriate mood and behavior  Skin: Warm and Dry. No lesions or rashes.         Please excuse any errors in grammar or translation related to dictation, voice recognition software was used to prepare this document.

## 2024-09-24 ENCOUNTER — TELEPHONE (OUTPATIENT)
Dept: PRIMARY CARE | Facility: CLINIC | Age: 79
End: 2024-09-24
Payer: MEDICARE

## 2024-09-24 NOTE — TELEPHONE ENCOUNTER
Pt is having issues with Constipation  She has stopped the stool softners ,   Nothing was helping   Now doing Miralax,  is helping some but she is scheduled for Surgery on Monday and can't be constipated. Also her legs have been swelling, she is taking her water pill which does help to bring them down but she wants you to be aware because she is not missing this surgery   Please Advise

## 2024-10-02 ENCOUNTER — TELEPHONE (OUTPATIENT)
Dept: PRIMARY CARE | Facility: CLINIC | Age: 79
End: 2024-10-02
Payer: MEDICARE

## 2024-10-02 NOTE — TELEPHONE ENCOUNTER
Pt called office 10/02/2024 @ 1447  She is home now after her surgery. She is wondering when she can start back on her tylenol and cholesterol medication  Please advise, thanks!

## 2024-10-08 ENCOUNTER — APPOINTMENT (OUTPATIENT)
Dept: ORTHOPEDIC SURGERY | Facility: CLINIC | Age: 79
End: 2024-10-08
Payer: MEDICARE

## 2024-10-18 DIAGNOSIS — E78.2 MIXED HYPERLIPIDEMIA: ICD-10-CM

## 2024-10-18 DIAGNOSIS — I10 ESSENTIAL HYPERTENSION: ICD-10-CM

## 2024-10-18 RX ORDER — ROSUVASTATIN CALCIUM 40 MG/1
TABLET, COATED ORAL
Qty: 90 TABLET | Refills: 0 | Status: SHIPPED | OUTPATIENT
Start: 2024-10-18

## 2024-10-18 RX ORDER — CARVEDILOL 6.25 MG/1
6.25 TABLET ORAL
Qty: 180 TABLET | Refills: 0 | Status: SHIPPED | OUTPATIENT
Start: 2024-10-18

## 2024-10-18 NOTE — TELEPHONE ENCOUNTER
Pt called the office requesting refill of Rosuvastatin 40 mg daily and Carvedilol 6.25 mg bid to [facility].     Pending appt with Dr. Ho 11/19/25. Short term script e-scribed.

## 2024-10-24 ENCOUNTER — OFFICE VISIT (OUTPATIENT)
Dept: PRIMARY CARE | Facility: CLINIC | Age: 79
End: 2024-10-24
Payer: MEDICARE

## 2024-10-24 VITALS
TEMPERATURE: 97 F | WEIGHT: 193 LBS | SYSTOLIC BLOOD PRESSURE: 141 MMHG | BODY MASS INDEX: 32.95 KG/M2 | RESPIRATION RATE: 18 BRPM | HEIGHT: 64 IN | DIASTOLIC BLOOD PRESSURE: 84 MMHG | HEART RATE: 72 BPM | OXYGEN SATURATION: 96 %

## 2024-10-24 DIAGNOSIS — I10 ESSENTIAL HYPERTENSION: ICD-10-CM

## 2024-10-24 DIAGNOSIS — H91.91 HEARING LOSS OF RIGHT EAR, UNSPECIFIED HEARING LOSS TYPE: ICD-10-CM

## 2024-10-24 DIAGNOSIS — E11.9 DIABETES MELLITUS WITHOUT COMPLICATION (MULTI): Primary | ICD-10-CM

## 2024-10-24 DIAGNOSIS — H61.21 IMPACTED CERUMEN OF RIGHT EAR: ICD-10-CM

## 2024-10-24 PROCEDURE — 69210 REMOVE IMPACTED EAR WAX UNI: CPT | Performed by: FAMILY MEDICINE

## 2024-10-24 PROCEDURE — 99214 OFFICE O/P EST MOD 30 MIN: CPT | Performed by: FAMILY MEDICINE

## 2024-10-24 PROCEDURE — 1159F MED LIST DOCD IN RCRD: CPT | Performed by: FAMILY MEDICINE

## 2024-10-24 PROCEDURE — 2033F EYE IMG VALID W/O RTNOPTHY: CPT | Performed by: FAMILY MEDICINE

## 2024-10-24 PROCEDURE — 3077F SYST BP >= 140 MM HG: CPT | Performed by: FAMILY MEDICINE

## 2024-10-24 PROCEDURE — 3079F DIAST BP 80-89 MM HG: CPT | Performed by: FAMILY MEDICINE

## 2024-10-24 PROCEDURE — G2211 COMPLEX E/M VISIT ADD ON: HCPCS | Performed by: FAMILY MEDICINE

## 2024-10-24 PROCEDURE — 92229 IMG RTA DETC/MNTR DS POC ALY: CPT | Performed by: FAMILY MEDICINE

## 2024-10-24 RX ORDER — AMLODIPINE BESYLATE 2.5 MG/1
2.5 TABLET ORAL DAILY
Qty: 90 TABLET | Refills: 1 | Status: SHIPPED | OUTPATIENT
Start: 2024-10-24 | End: 2025-04-22

## 2024-10-24 NOTE — ASSESSMENT & PLAN NOTE
using [employer] disposable ear curettes #6252  the cerumen was carefully removed opening a partially/fully occluded ear canal...it  was necessary to irrigate the ear canal at time of procedure.... patient tolerated  procedure... there was clear ear canal at end of procedure and instructions were given

## 2024-10-24 NOTE — PROGRESS NOTES
Subjective   Patient ID: Rosalba Salcedo is a 79 y.o. female who presents for Ear Fullness.  HPI  Has feeling  like sounds in ear   Used debrox    Here for wax removal     Patient is also here for follow-up of hypertension.. Symptoms do not include headache confusion visual disturbance dizziness shortness of breath chest pain syncope or palpitations. Recent blood pressure patient has 157/81 been checking. By report there is good compliance with treatment.  . Will take amlodipine if bp is high Pertinent medical history includes  hyperlipidemia   Review of Systems  All other  pertinent  systems reviewed and are negative except  those  mentioned  in HPI   Objective   Physical Exam  general: alert oriented x three  HEENT hearing normal to voice  Neck supple  Lungs respirations non-labored.  Cardiovascular: no peripheral edema  Skin: warm and dry without rash  Psych: judgement and insight normal  Musculoskeletal:  ambulation normal,    lymph:negative cervical  LYMPADENOPATHY  thyroid: non palpable enlargement Patient ID: Rosalba Salcedo is a 79 y.o. female.    Ear Cerumen Removal    Date/Time: 10/24/2024 4:26 PM    Performed by: Henry Leroy DO  Authorized by: Henry Leroy DO    Consent:     Consent obtained:  Verbal    Consent given by:  Patient    Risks discussed:  Pain  Universal protocol:     Patient identity confirmed:  Verbally with patient  Procedure details:     Location:  R ear    Procedure type: curette      Procedure outcomes: cerumen removed    Post-procedure details:     Inspection:  Some cerumen remaining    Procedure completion:  Tolerated  Comments:      using [employer] disposable ear curettes #6613  the cerumen was carefully removed opening a partially/fully occluded ear canal...it  was/was not necessary to irrigate the ear canal at time of procedure.... patient tolerated /found painful procedure... there was clear ear canal at end of procedure and instructions were given      Labs        Assessment/Plan   Problem List Items Addressed This Visit       Essential hypertension     Patient is also here for follow-up of hypertension.. Symptoms do not include headache confusion visual disturbance dizziness shortness of breath chest pain syncope or palpitations. Recent blood pressure patient has 157/81 been checking. By report there is good compliance with treatment.  . Will take amlodipine if bp is high Pertinent medical history includes  hyperlipidemia uncontrolled add amlodipine 2.5 mg get BMP before next visit         Relevant Medications    amLODIPine (Norvasc) 2.5 mg tablet    Other Relevant Orders    Basic Metabolic Panel    Impacted cerumen of right ear     using [employer] disposable ear curettes #[id number]  the cerumen was carefully removed opening a partially/fully occluded ear canal...it  was necessary to irrigate the ear canal at time of procedure.... patient tolerated  procedure... there was clear ear canal at end of procedure and instructions were given          Diabetes mellitus without complication (Multi) - Primary     Patient with last A1c was9/9/2024 with results 6.0.  She currently is prediabetic and states she is not diabetic however do not have access at this time to prior A1c's and will assume no prediabetic with most likely formal diabetes in the past thus eye examination performed today with normal results.  Encouraged diet choices and lifestyle choices i.e. exercise routine         Relevant Orders    Diabetic Retinopathy Luminetics (Completed)    Hearing loss of right ear     Examination shows cerumen impaction without difficulty removal and discussed

## 2024-10-24 NOTE — ASSESSMENT & PLAN NOTE
Patient is also here for follow-up of hypertension.. Symptoms do not include headache confusion visual disturbance dizziness shortness of breath chest pain syncope or palpitations. Recent blood pressure patient has 157/81 been checking. By report there is good compliance with treatment.  . Will take amlodipine if bp is high Pertinent medical history includes  hyperlipidemia uncontrolled add amlodipine 2.5 mg get BMP before next visit

## 2024-10-24 NOTE — ASSESSMENT & PLAN NOTE
Patient with last A1c was9/9/2024 with results 6.0.  She currently is prediabetic and states she is not diabetic however do not have access at this time to prior A1c's and will assume no prediabetic with most likely formal diabetes in the past thus eye examination performed today with normal results.  Encouraged diet choices and lifestyle choices i.e. exercise routine

## 2024-10-30 ENCOUNTER — TELEPHONE (OUTPATIENT)
Dept: PRIMARY CARE | Facility: CLINIC | Age: 79
End: 2024-10-30
Payer: MEDICARE

## 2024-10-30 NOTE — TELEPHONE ENCOUNTER
Patient called and wants to know if her, issues with her hearing is due to the surgery or is it still healing , but having pain in back of ear head area ,   And has not hear from ear doctor yet.   Wants to know your opinion? CB Patient..

## 2024-10-31 ENCOUNTER — TELEMEDICINE (OUTPATIENT)
Dept: PRIMARY CARE | Facility: CLINIC | Age: 79
End: 2024-10-31
Payer: MEDICARE

## 2024-10-31 DIAGNOSIS — H93.13 TINNITUS OF BOTH EARS: ICD-10-CM

## 2024-10-31 DIAGNOSIS — M54.2 CERVICALGIA: Primary | ICD-10-CM

## 2024-10-31 PROCEDURE — 99212 OFFICE O/P EST SF 10 MIN: CPT | Performed by: FAMILY MEDICINE

## 2024-11-04 NOTE — TELEPHONE ENCOUNTER
Dr. Rowley is scheduling two months out until January. Patient would like another recommendation of who she should see. She is going to try Dr. Chapman's office. As of right now she is scheduled with Dr. Rowley 01/06/2024. -HERNAN

## 2024-11-05 ENCOUNTER — OFFICE VISIT (OUTPATIENT)
Dept: ORTHOPEDIC SURGERY | Facility: CLINIC | Age: 79
End: 2024-11-05
Payer: MEDICARE

## 2024-11-05 ENCOUNTER — HOSPITAL ENCOUNTER (OUTPATIENT)
Dept: RADIOLOGY | Facility: CLINIC | Age: 79
Discharge: HOME | End: 2024-11-05
Payer: MEDICARE

## 2024-11-05 DIAGNOSIS — M54.50 LOW BACK PAIN, UNSPECIFIED BACK PAIN LATERALITY, UNSPECIFIED CHRONICITY, UNSPECIFIED WHETHER SCIATICA PRESENT: Primary | ICD-10-CM

## 2024-11-05 DIAGNOSIS — M54.50 LOW BACK PAIN, UNSPECIFIED BACK PAIN LATERALITY, UNSPECIFIED CHRONICITY, UNSPECIFIED WHETHER SCIATICA PRESENT: ICD-10-CM

## 2024-11-05 PROCEDURE — 72100 X-RAY EXAM L-S SPINE 2/3 VWS: CPT | Performed by: ORTHOPAEDIC SURGERY

## 2024-11-05 PROCEDURE — 72100 X-RAY EXAM L-S SPINE 2/3 VWS: CPT

## 2024-11-05 PROCEDURE — 99214 OFFICE O/P EST MOD 30 MIN: CPT | Performed by: ORTHOPAEDIC SURGERY

## 2024-11-05 PROCEDURE — 99213 OFFICE O/P EST LOW 20 MIN: CPT | Performed by: ORTHOPAEDIC SURGERY

## 2024-11-05 NOTE — PROGRESS NOTES
Rosalba Salcedo is a 79 y.o. female who presents for Follow-up of the Lower Back (L2-3 laminectomy for facet cyst and L4-5 interbody fusion. 10/18/23/1 year out/X-rays today/).    HPI:  79-year-old female here for surgical follow-up.  She is 1 year out from an L2-3-L4-5 laminectomy, cyst removal and L3-4,  L4-5 TLIF.  She denies any fever chills nausea vomiting night sweats.  She has no bowel or bladder complaints.    Physical exam:  Well-nourished, well kept.No lymphangitis or lymphadenopathy in the examined extremities.  Gait normal.  Can stand on heels and toes.   Examination of the back shows minimal tenderness in the paraspinous musculature.  There is mildly decreased range of motion in all directions due to guarding/muscle spasms and pain at extremes.  There is good strength and no instability.  Examination of the lower extremities reveals no point tenderness, swelling, or deformity.  Range of motion of the hips, knees, and ankles are full without crepitance, instability, or exacerbation of pain.  Strength is 5/5 throughout.  No redness, abrasions, or lesions on extremities  Gross sensation intact in the extremities.  Affect normal.  Alert and oriented ×3.  Coordination normal.    Imaging studies:  AP lateral plain films of the lumbar spine were ordered and reviewed today.    Assessment:  79-year-old female here for surgical follow-up.  Overall she feels anywhere between about 85 and 100% better since before her surgery.  She has good days and bad days but she definitely notices an improvement with her walking.  She is not walking with any ambulation devices, she still does get some stiff back if she sits in 1 position or holds in one position for too long but otherwise she is very happy with the outcome of the procedure.  Her x-rays look good today.  This is her 1 year follow-up from her surgery.    For complete plan and/or surgical details, please refer to Dr. Arizmendi's portion of this split  dictation.    -Jordy Wolf PA-C    In a face-to-face encounter, I performed a history and physical examination, discussed pertinent diagnostic studies if indicated, and discussed diagnosis and management strategies with both the patient and the midlevel provider.  I reviewed the midlevel's note and agree with the documented findings and plan of care.    1 year out 3-5 Lami TLIF at 3 4 and 4 5.  Patient doing very well.  She has walked normally now for the first time in 4 years.  She saw Dr. Chapman who did a cervical operation on her as well.  All in all she is at least 85% better most days and sometimes to 100% better.  She is happy with how she is doing.  X-rays were ordered and reviewed today and they look normal.  She occasionally gets some back and leg symptoms.  She has 2 chronic stable problems of the occasional back and leg symptoms.  I reviewed the notes of Dr. Chapman and things went well with regards to that.  She will follow-up with me on a as needed basis.    Adalberto Arizmendi MD  Orthopedic surgery

## 2024-11-07 ENCOUNTER — TELEPHONE (OUTPATIENT)
Dept: PRIMARY CARE | Facility: CLINIC | Age: 79
End: 2024-11-07
Payer: MEDICARE

## 2024-11-12 ENCOUNTER — LAB (OUTPATIENT)
Dept: LAB | Facility: LAB | Age: 79
End: 2024-11-12
Payer: MEDICARE

## 2024-11-12 DIAGNOSIS — I10 ESSENTIAL HYPERTENSION: ICD-10-CM

## 2024-11-12 DIAGNOSIS — E55.9 VITAMIN D DEFICIENCY, UNSPECIFIED: ICD-10-CM

## 2024-11-12 DIAGNOSIS — E03.9 HYPOTHYROIDISM, UNSPECIFIED: ICD-10-CM

## 2024-11-12 DIAGNOSIS — E11.9 TYPE 2 DIABETES MELLITUS WITHOUT COMPLICATIONS (MULTI): ICD-10-CM

## 2024-11-12 DIAGNOSIS — N18.9 CHRONIC KIDNEY DISEASE, UNSPECIFIED: ICD-10-CM

## 2024-11-12 DIAGNOSIS — E61.2 MAGNESIUM DEFICIENCY: ICD-10-CM

## 2024-11-12 DIAGNOSIS — I51.9 HEART DISEASE, UNSPECIFIED: ICD-10-CM

## 2024-11-12 DIAGNOSIS — E78.5 HYPERLIPIDEMIA, UNSPECIFIED: Primary | ICD-10-CM

## 2024-11-12 DIAGNOSIS — D64.9 ANEMIA, UNSPECIFIED: ICD-10-CM

## 2024-11-12 LAB
ALBUMIN SERPL BCP-MCNC: 4.1 G/DL (ref 3.4–5)
ALP SERPL-CCNC: 82 U/L (ref 33–136)
ALT SERPL W P-5'-P-CCNC: 12 U/L (ref 7–45)
ANION GAP SERPL CALC-SCNC: 12 MMOL/L (ref 10–20)
APPEARANCE UR: ABNORMAL
AST SERPL W P-5'-P-CCNC: 14 U/L (ref 9–39)
BACTERIA #/AREA URNS AUTO: ABNORMAL /HPF
BASOPHILS # BLD AUTO: 0.05 X10*3/UL (ref 0–0.1)
BASOPHILS NFR BLD AUTO: 0.7 %
BILIRUB SERPL-MCNC: 0.6 MG/DL (ref 0–1.2)
BILIRUB UR STRIP.AUTO-MCNC: NEGATIVE MG/DL
BUN SERPL-MCNC: 14 MG/DL (ref 6–23)
CALCIUM SERPL-MCNC: 9.1 MG/DL (ref 8.6–10.3)
CAOX CRY #/AREA UR COMP ASSIST: ABNORMAL /HPF
CHLORIDE SERPL-SCNC: 109 MMOL/L (ref 98–107)
CHOLEST SERPL-MCNC: 145 MG/DL (ref 0–199)
CHOLESTEROL/HDL RATIO: 2.6
CO2 SERPL-SCNC: 25 MMOL/L (ref 21–32)
COLOR UR: YELLOW
CREAT SERPL-MCNC: 1.08 MG/DL (ref 0.5–1.05)
EGFRCR SERPLBLD CKD-EPI 2021: 53 ML/MIN/1.73M*2
EOSINOPHIL # BLD AUTO: 0.1 X10*3/UL (ref 0–0.4)
EOSINOPHIL NFR BLD AUTO: 1.3 %
ERYTHROCYTE [DISTWIDTH] IN BLOOD BY AUTOMATED COUNT: 15.3 % (ref 11.5–14.5)
GLUCOSE SERPL-MCNC: 103 MG/DL (ref 74–99)
GLUCOSE UR STRIP.AUTO-MCNC: NORMAL MG/DL
HCT VFR BLD AUTO: 38 % (ref 36–46)
HDLC SERPL-MCNC: 56.2 MG/DL
HGB BLD-MCNC: 12.1 G/DL (ref 12–16)
IMM GRANULOCYTES # BLD AUTO: 0.05 X10*3/UL (ref 0–0.5)
IMM GRANULOCYTES NFR BLD AUTO: 0.7 % (ref 0–0.9)
IRON SATN MFR SERPL: 16 % (ref 25–45)
IRON SERPL-MCNC: 60 UG/DL (ref 35–150)
KETONES UR STRIP.AUTO-MCNC: NEGATIVE MG/DL
LDLC SERPL CALC-MCNC: 63 MG/DL
LEUKOCYTE ESTERASE UR QL STRIP.AUTO: NEGATIVE
LYMPHOCYTES # BLD AUTO: 1.42 X10*3/UL (ref 0.8–3)
LYMPHOCYTES NFR BLD AUTO: 18.6 %
MAGNESIUM SERPL-MCNC: 1.94 MG/DL (ref 1.6–2.4)
MCH RBC QN AUTO: 26.5 PG (ref 26–34)
MCHC RBC AUTO-ENTMCNC: 31.8 G/DL (ref 32–36)
MCV RBC AUTO: 83 FL (ref 80–100)
MONOCYTES # BLD AUTO: 0.59 X10*3/UL (ref 0.05–0.8)
MONOCYTES NFR BLD AUTO: 7.7 %
MUCOUS THREADS #/AREA URNS AUTO: ABNORMAL /LPF
NEUTROPHILS # BLD AUTO: 5.41 X10*3/UL (ref 1.6–5.5)
NEUTROPHILS NFR BLD AUTO: 71 %
NITRITE UR QL STRIP.AUTO: NEGATIVE
NON HDL CHOLESTEROL: 89 MG/DL (ref 0–149)
NRBC BLD-RTO: 0 /100 WBCS (ref 0–0)
PH UR STRIP.AUTO: 5.5 [PH]
PLATELET # BLD AUTO: 278 X10*3/UL (ref 150–450)
POTASSIUM SERPL-SCNC: 4.3 MMOL/L (ref 3.5–5.3)
PROT SERPL-MCNC: 7.2 G/DL (ref 6.4–8.2)
PROT UR STRIP.AUTO-MCNC: ABNORMAL MG/DL
RBC # BLD AUTO: 4.57 X10*6/UL (ref 4–5.2)
RBC # UR STRIP.AUTO: NEGATIVE /UL
RBC #/AREA URNS AUTO: ABNORMAL /HPF
SODIUM SERPL-SCNC: 142 MMOL/L (ref 136–145)
SP GR UR STRIP.AUTO: 1.02
SQUAMOUS #/AREA URNS AUTO: ABNORMAL /HPF
TIBC SERPL-MCNC: 382 UG/DL (ref 240–445)
TRIGL SERPL-MCNC: 131 MG/DL (ref 0–149)
UIBC SERPL-MCNC: 322 UG/DL (ref 110–370)
UROBILINOGEN UR STRIP.AUTO-MCNC: NORMAL MG/DL
VLDL: 26 MG/DL (ref 0–40)
WBC # BLD AUTO: 7.6 X10*3/UL (ref 4.4–11.3)
WBC #/AREA URNS AUTO: ABNORMAL /HPF

## 2024-11-12 PROCEDURE — 83540 ASSAY OF IRON: CPT

## 2024-11-12 PROCEDURE — 83735 ASSAY OF MAGNESIUM: CPT

## 2024-11-12 PROCEDURE — 80053 COMPREHEN METABOLIC PANEL: CPT

## 2024-11-12 PROCEDURE — 81001 URINALYSIS AUTO W/SCOPE: CPT

## 2024-11-12 PROCEDURE — 80061 LIPID PANEL: CPT

## 2024-11-12 PROCEDURE — 85025 COMPLETE CBC W/AUTO DIFF WBC: CPT

## 2024-11-12 PROCEDURE — 36415 COLL VENOUS BLD VENIPUNCTURE: CPT

## 2024-11-12 PROCEDURE — 83550 IRON BINDING TEST: CPT

## 2024-11-14 ENCOUNTER — TELEPHONE (OUTPATIENT)
Dept: PRIMARY CARE | Facility: CLINIC | Age: 79
End: 2024-11-14
Payer: MEDICARE

## 2024-11-14 NOTE — TELEPHONE ENCOUNTER
Pt. Has pain in head behind ear and is similar to pounding headache.  Could this be from her surgery or an ear problem.  Please advise  Phone: [phone]

## 2024-11-19 ENCOUNTER — APPOINTMENT (OUTPATIENT)
Dept: CARDIOLOGY | Facility: CLINIC | Age: 79
End: 2024-11-19
Payer: MEDICARE

## 2024-11-19 VITALS
WEIGHT: 196.8 LBS | HEIGHT: 64 IN | HEART RATE: 65 BPM | SYSTOLIC BLOOD PRESSURE: 116 MMHG | BODY MASS INDEX: 33.6 KG/M2 | DIASTOLIC BLOOD PRESSURE: 68 MMHG

## 2024-11-19 DIAGNOSIS — R60.0 LOCALIZED EDEMA: ICD-10-CM

## 2024-11-19 DIAGNOSIS — E78.2 MIXED HYPERLIPIDEMIA: ICD-10-CM

## 2024-11-19 DIAGNOSIS — I10 ESSENTIAL HYPERTENSION: ICD-10-CM

## 2024-11-19 DIAGNOSIS — I25.10 CORONARY ARTERY DISEASE INVOLVING NATIVE HEART WITHOUT ANGINA PECTORIS, UNSPECIFIED VESSEL OR LESION TYPE: ICD-10-CM

## 2024-11-19 PROCEDURE — G2211 COMPLEX E/M VISIT ADD ON: HCPCS | Performed by: INTERNAL MEDICINE

## 2024-11-19 PROCEDURE — 3074F SYST BP LT 130 MM HG: CPT | Performed by: INTERNAL MEDICINE

## 2024-11-19 PROCEDURE — 3078F DIAST BP <80 MM HG: CPT | Performed by: INTERNAL MEDICINE

## 2024-11-19 PROCEDURE — 99214 OFFICE O/P EST MOD 30 MIN: CPT | Performed by: INTERNAL MEDICINE

## 2024-11-19 PROCEDURE — 1036F TOBACCO NON-USER: CPT | Performed by: INTERNAL MEDICINE

## 2024-11-19 PROCEDURE — 1159F MED LIST DOCD IN RCRD: CPT | Performed by: INTERNAL MEDICINE

## 2024-11-19 RX ORDER — ROSUVASTATIN CALCIUM 40 MG/1
TABLET, COATED ORAL
Qty: 90 TABLET | Refills: 1 | Status: SHIPPED | OUTPATIENT
Start: 2024-11-19

## 2024-11-19 RX ORDER — CARVEDILOL 6.25 MG/1
6.25 TABLET ORAL
Qty: 180 TABLET | Refills: 1 | Status: SHIPPED | OUTPATIENT
Start: 2024-11-19

## 2024-11-19 NOTE — PROGRESS NOTES
Patient:  Rosalba Salcedo  YOB: 1945  MRN: [id number]       Impression/Plan:     Diagnoses and all orders for this visit:  Essential hypertension  -    Excessively controlled  -    Certainly will be high if she is particularly stressed and there are some home social stressors present but primary  of her high blood pressure and recent doctor office visits is the orthopedic pain from her recent procedures and that seems to be improving  -     We discontinue amlodipine at this time as it improves the edema when she stops it and her blood pressure is such that she should not need that low-dose as long as the pain is well-controlled   Localized edema  -    secondary to amlodipine  Coronary artery disease involving native heart without angina pectoris, unspecified vessel or lesion type  -    No angina despite multiple stressors in the last 6 months  Mixed hyperlipidemia  -     rosuvastatin (Crestor) 40 mg tablet; TAKE 1 TABLET ONCE EVERY 24HOURS      Chief Complaint/Active Symptoms:       Rosalba Salcedo is a 79 y.o. female who presents with coronary artery disease with coronary bypass graft surgery 2009 (L-LAD, 2 vein grafts).  Normal Lexiscan perfusion with 75% ejection fraction 10/5/2020.  Also history hypertension, hyperlipidemia, esophageal reflux and osteoarthritis.     Significant osteoarthritis October 2023 extensive lumbar spine surgery/March 2024 right total hip replacement both uncomplicated from a cardiovascular standpoint    Last in this office 5/21/2024 which time she was asymptomatic from a cardiovascular standpoint though she does have venous insufficiency exacerbated by Norvasc.  No angina Norvasc was discontinued at that time as blood pressure on the low side.    Seen by orthopedics cervical spine specialist Dr. Chapman at [facility] in September with CT 3-4 moderate cord compression and early signs of myelopathy.  Surgery recommended at that time 9/10/2024.  9/30/2024 underwent  posterior cervical laminoplasty C3-C7 with globus canopy laminoplasty system.  No cardiac complications described.    Lab work 11/12/2024 normal magnesium cholesterol 145 HDL 56 LDL 63 triglycerides 131 CMP unremarkable creatinine 1.08 CBC unremarkable    She denies angina, dyspnea, palpitation lightheadedness or syncope.  She has had no symptoms of claudication or neurologic deterioration.  There have been no hospitalizations or emergency room visits since last office visit.    She continues to have postoperative pain but no particular cardiovascular symptoms.  She is back on the amlodipine and she notes when she was off the amlodipine her edema almost resolved.  She has had no angina despite several surgeries.  Overall from a cardiac standpoint she is feeling well                 Review of Systems: Unremarkable except as noted above    Meds     Current Outpatient Medications   Medication Instructions    acetaminophen (TYLENOL) 650 mg, oral, Every 6 hours PRN    alendronate (FOSAMAX) 70 mg, oral, Every 7 days, Take in the morning with a full glass of water, on an empty stomach, and do not take anything else by mouth or lie down for the next 30 min.    amLODIPine (NORVASC) 2.5 mg, oral, Daily    aspirin 81 mg, Daily    carvedilol (COREG) 6.25 mg, oral, 2 times daily (morning and late afternoon)    cetirizine (ZYRTEC) 10 mg, oral, Daily    diclofenac sodium (Voltaren) 1 % gel gel APPLY TO AFFECTED FOOT UP TO 3 TIMES DAILY AS NEEDED FOR PAIN    famotidine (PEPCID) 20 mg, oral, 2 times daily    fluticasone (Flonase) 50 mcg/actuation nasal spray 1 spray, Each Nostril, Daily PRN    furosemide (Lasix) 20 mg tablet Take one tab daily as needed for leg swelling    levothyroxine (SYNTHROID, LEVOXYL) 25 mcg, oral, Daily    loratadine 10 mg capsule Take 1 capsule by mouth once daily as needed.    nitroglycerin (NITROSTAT) 0.4 mg, sublingual, Every 5 min PRN    pantoprazole (PROTONIX) 40 mg, oral, Daily    rosuvastatin  (Crestor) 40 mg tablet TAKE 1 TABLET ONCE EVERY 24HOURS        Allergies     Allergies   Allergen Reactions    Morphine Hives, Itching, Rash and Unknown     Drowsiness with Percocet and Vicodin         Annotated Problems     Specialty Problems          Cardiology Problems    CAD (coronary artery disease)     2020 Normal Lexiscan perfusion with 75% EF   2009 CABG (L-LAD, 2 vein grafts)         Carotid bruit    Easy bruising    Essential hypertension    Hyperlipidemia    Right bundle branch block (RBBB)    S/P CABG (coronary artery bypass graft)      coronary bypass graft surgery 2009 (L-LAD, 2 vein grafts)             Problem List     Patient Active Problem List    Diagnosis Date Noted    Tinnitus of both ears 10/31/2024    Encounter for preoperative assessment 09/12/2024    Cervical spinal stenosis 09/09/2024    Weakness of right lower extremity 05/23/2024    Hearing loss of right ear 04/10/2024    Anxiety 04/10/2024    PONV (postoperative nausea and vomiting) 03/12/2024    S/P total right hip arthroplasty 03/11/2024    Cup to disc asymmetry, bilateral 03/01/2024    Other specified diseases of spinal cord 02/27/2024    Stage 3a chronic kidney disease (Multi) 02/27/2024    Avascular necrosis of bone of hip, right (Multi) 02/27/2024    Back pain of lumbar region with sciatica 10/17/2023    Hx of degenerative disc disease 09/05/2023    Status post total left knee replacement 09/05/2023    Pain in joint, lower leg 09/05/2023    Hip pain, left 09/05/2023    Back pain 09/05/2023    Knee osteoarthritis 09/05/2023    Class 2 obesity with body mass index (BMI) of 35.0 to 35.9 in adult 09/05/2023    S/P CABG (coronary artery bypass graft) 09/05/2023    History of traumatic fracture 09/05/2023    Abnormal mammogram 05/22/2023    Allergic rhinitis 05/22/2023    Anemia 05/22/2023    Angioedema 05/22/2023    Ankle pain 05/22/2023    Foot pain 05/22/2023    Ankle strain 05/22/2023    Acute bronchitis 05/22/2023    Arthritis  05/22/2023    Bronchitis 05/22/2023    COPD with acute exacerbation (Multi) 05/22/2023    Aseptic loosening of prosthetic knee (CMS-HCC) 05/22/2023    Osteoarthritis of knee 05/22/2023    CAD (coronary artery disease) 05/22/2023    Carotid bruit 05/22/2023    Abdominal pain 05/22/2023    Constipation 05/22/2023    COVID-19 05/22/2023    Dizziness 05/22/2023    Dry skin 05/22/2023    Limb pain 05/22/2023    Easy bruising 05/22/2023    Localized edema 05/22/2023    Erythrasma 05/22/2023    Essential hypertension 05/22/2023    External hemorrhoid 05/22/2023    Other fatigue 05/22/2023    Food allergy 05/22/2023    Foot sprain 05/22/2023    Fracture of radius, distal, closed 05/22/2023    Fracture of unspecified part of neck of right femur, subsequent encounter for closed fracture with routine healing 05/22/2023    GERD (gastroesophageal reflux disease) 05/22/2023    Hair loss 05/22/2023    Hematuria 05/22/2023    Hip bursitis, left 05/22/2023    Hip fracture, right 05/22/2023    Post-traumatic osteoarthritis of right hip 05/22/2023    Hyperlipidemia 05/22/2023    Hypothyroidism 05/22/2023    Impacted cerumen of right ear 05/22/2023    Impaired gait and mobility 05/22/2023    Influenza A 05/22/2023    Keloid 05/22/2023    Left knee pain 05/22/2023    Leg edema, left 05/22/2023    Low back pain 05/22/2023    Lumbar radicular pain 05/22/2023    Lumbar sprain 05/22/2023    Lung nodule 05/22/2023    Menopausal osteoporosis 05/22/2023    Muscle spasms of lower extremity 05/22/2023    Olecranon bursitis of right elbow 05/22/2023    Olecranon bursitis 05/22/2023    Osteopenia 05/22/2023    Post-op pain 05/22/2023    Renal insufficiency 05/22/2023    Restless legs syndrome 05/22/2023    Rib pain on left side 05/22/2023    Right bundle branch block (RBBB) 05/22/2023    Iliotibial band syndrome of right side 05/22/2023    Right hip pain 05/22/2023    Rosacea 05/22/2023    Scar 05/22/2023    Sinobronchitis 05/22/2023    Sinusitis  "05/22/2023    Shoulder impingement 05/22/2023    Tibial tendinitis, posterior 05/22/2023    Trochanteric bursitis of right hip 05/22/2023    Urinary incontinence 05/22/2023    Urinary tract infection 05/22/2023    Leukocytes in urine 05/22/2023    Urine ketones 05/22/2023    Benign neoplasm of sigmoid colon 05/22/2023    Diabetes mellitus without complication (Multi) 05/22/2023    Diverticulosis of large intestine without diverticulitis 05/22/2023    Dysphagia 05/22/2023    Gastric polyp 05/22/2023    Vaginal vault prolapse 05/22/2023    Vitamin D deficiency 05/22/2023    Muscle weakness (generalized) 08/26/2022    Major depressive disorder, recurrent, unspecified 08/26/2022    Difficulty in walking, not elsewhere classified 08/26/2022    Abnormal posture 08/26/2022    Depressive disorder 08/26/2022    Closed displaced fracture of right femoral neck 12/09/2021    Vestibular hypofunction, bilateral 06/11/2019    Chronic nonintractable headache 06/11/2019    Cervicalgia 06/11/2019    Ataxic gait 06/11/2019    Urge incontinence of urine 05/14/2019    Ataxia 05/04/2019    Reading-Walker grade 2 rectocele 01/07/2019    OAB (overactive bladder) 12/18/2018    Vaginal enterocele 12/18/2018    Vaginal irritation 12/18/2018    Presence of artificial knee joint, bilateral 01/29/2018    Pseudophakia of both eyes 01/22/2016    Dry eye syndrome, bilateral 12/21/2015    Dermatochalasis of both eyelids 12/21/2015       Objective:     Vitals:    11/19/24 1258   BP: 116/68   BP Location: Left arm   Patient Position: Sitting   Pulse: 65   Weight: 89.3 kg (196 lb 12.8 oz)   Height: 1.626 m (5' 4\")      Wt Readings from Last 4 Encounters:   11/19/24 89.3 kg (196 lb 12.8 oz)   10/24/24 87.5 kg (193 lb)   09/12/24 87.5 kg (193 lb)   09/09/24 84.4 kg (186 lb)           LAB:     Lab Results   Component Value Date    WBC 7.6 11/12/2024    HGB 12.1 11/12/2024    HCT 38.0 11/12/2024     11/12/2024    CHOL 145 11/12/2024    TRIG 131 " 11/12/2024    HDL 56.2 11/12/2024    ALT 12 11/12/2024    AST 14 11/12/2024     11/12/2024    K 4.3 11/12/2024     (H) 11/12/2024    CREATININE 1.08 (H) 11/12/2024    BUN 14 11/12/2024    CO2 25 11/12/2024    TSH 1.95 06/10/2024    INR 1.0 10/06/2023    HGBA1C 6.0 09/09/2024       Diagnostic Studies:     XR lumbar spine 2-3 views    Result Date: 11/5/2024  Interpreted By:  Adalberto Arizmendi, STUDY: XR LUMBAR SPINE 2-3 VIEWS; 11/5/2024 9:40 am   INDICATION: Signs/Symptoms:low back pain.   ACCESSION NUMBER(S): [id number]   ORDERING CLINICIAN: ADALBERTO ARIZMENDI   FINDINGS: AP lateral x-rays of the lumbar spine shows a midline laminectomy from L3 down through L5. There is interbody cage instrumentation at L3-4 and L4-5. Hardware is in good position without any evidence of failure. Posterolateral fusion masses present. There is moderate degenerative changes above and below the level of fusion. There is no spondylolisthesis. There is no spondylolysis. There is no fractures. Lumbar lordosis is maintained. There is calcification of the anterior vasculature. There is no scoliosis. Surgical clips are present consistent with cholecystectomy. Bony pelvis and hips are partially visualized on show a right total hip arthroplasty and left mild-to-moderate hip degenerative changes.     Signed by: Adalberto Arizmendi 11/5/2024 10:03 AM Dictation workstation:   [id number]        Radiology:     No orders to display       Physical Exam     General Appearance: alert and oriented to person, place and time, in no acute distress  Cardiovascular: normal rate, regular rhythm, normal S1 and S2, no murmurs, rubs, clicks, or gallops,  no JVD  Pulmonary/Chest: clear to auscultation bilaterally- no wheezes, rales or rhonchi, normal air movement, no respiratory distress  Abdomen: soft, non-tender, non-distended, normal bowel sounds, no masses   Extremities: no cyanosis, clubbing .  1-2+ edema  Skin: warm and dry, no rash or  erythema  Eyes: EOMI  Neck: supple and non-tender without mass, no thyromegaly   Neurological: alert, oriented, normal speech, no focal findings or movement disorder noted  Vascular:                 Scribe Attestation  By signing my name below, ISalome MA  , Scribe   attest that this documentation has been prepared under the direction and in the presence of Hoang Ho MD.

## 2024-11-26 ENCOUNTER — APPOINTMENT (OUTPATIENT)
Dept: PRIMARY CARE | Facility: CLINIC | Age: 79
End: 2024-11-26
Payer: MEDICARE

## 2024-12-02 DIAGNOSIS — Z96.641 S/P TOTAL RIGHT HIP ARTHROPLASTY: Primary | ICD-10-CM

## 2024-12-03 ENCOUNTER — OFFICE VISIT (OUTPATIENT)
Dept: PRIMARY CARE | Facility: CLINIC | Age: 79
End: 2024-12-03
Payer: MEDICARE

## 2024-12-03 ENCOUNTER — HOSPITAL ENCOUNTER (OUTPATIENT)
Dept: RADIOLOGY | Facility: HOSPITAL | Age: 79
Discharge: HOME | End: 2024-12-03
Payer: MEDICARE

## 2024-12-03 ENCOUNTER — APPOINTMENT (OUTPATIENT)
Dept: ORTHOPEDIC SURGERY | Facility: CLINIC | Age: 79
End: 2024-12-03
Payer: MEDICARE

## 2024-12-03 VITALS
HEIGHT: 64 IN | SYSTOLIC BLOOD PRESSURE: 127 MMHG | BODY MASS INDEX: 33.8 KG/M2 | WEIGHT: 198 LBS | RESPIRATION RATE: 18 BRPM | OXYGEN SATURATION: 97 % | TEMPERATURE: 97 F | DIASTOLIC BLOOD PRESSURE: 78 MMHG | HEART RATE: 60 BPM

## 2024-12-03 DIAGNOSIS — M81.0 OSTEOPOROSIS, UNSPECIFIED OSTEOPOROSIS TYPE, UNSPECIFIED PATHOLOGICAL FRACTURE PRESENCE: ICD-10-CM

## 2024-12-03 DIAGNOSIS — Z96.641 S/P TOTAL RIGHT HIP ARTHROPLASTY: ICD-10-CM

## 2024-12-03 DIAGNOSIS — H60.509 ACUTE OTITIS EXTERNA, UNSPECIFIED LATERALITY, UNSPECIFIED TYPE: ICD-10-CM

## 2024-12-03 DIAGNOSIS — Z96.641 S/P TOTAL RIGHT HIP ARTHROPLASTY: Primary | ICD-10-CM

## 2024-12-03 DIAGNOSIS — H60.551 ACUTE REACTIVE OTITIS EXTERNA OF RIGHT EAR: Primary | ICD-10-CM

## 2024-12-03 DIAGNOSIS — E55.9 VITAMIN D DEFICIENCY: ICD-10-CM

## 2024-12-03 PROCEDURE — 1036F TOBACCO NON-USER: CPT | Performed by: FAMILY MEDICINE

## 2024-12-03 PROCEDURE — 99214 OFFICE O/P EST MOD 30 MIN: CPT | Performed by: FAMILY MEDICINE

## 2024-12-03 PROCEDURE — 73502 X-RAY EXAM HIP UNI 2-3 VIEWS: CPT | Mod: RT

## 2024-12-03 PROCEDURE — 99213 OFFICE O/P EST LOW 20 MIN: CPT | Performed by: ORTHOPAEDIC SURGERY

## 2024-12-03 PROCEDURE — 3078F DIAST BP <80 MM HG: CPT | Performed by: FAMILY MEDICINE

## 2024-12-03 PROCEDURE — 1159F MED LIST DOCD IN RCRD: CPT | Performed by: FAMILY MEDICINE

## 2024-12-03 PROCEDURE — 73502 X-RAY EXAM HIP UNI 2-3 VIEWS: CPT | Mod: RIGHT SIDE | Performed by: RADIOLOGY

## 2024-12-03 PROCEDURE — 3074F SYST BP LT 130 MM HG: CPT | Performed by: FAMILY MEDICINE

## 2024-12-03 RX ORDER — ACETIC ACID 20.65 MG/ML
SOLUTION AURICULAR (OTIC)
Qty: 15 ML | Refills: 11 | Status: SHIPPED | OUTPATIENT
Start: 2024-12-03

## 2024-12-03 RX ORDER — ALENDRONATE SODIUM 70 MG/1
70 TABLET ORAL
Qty: 12 TABLET | Refills: 3 | Status: SHIPPED | OUTPATIENT
Start: 2024-12-03 | End: 2025-12-03

## 2024-12-03 RX ORDER — OFLOXACIN 3 MG/ML
SOLUTION AURICULAR (OTIC)
Qty: 10 ML | Refills: 0 | Status: SHIPPED | OUTPATIENT
Start: 2024-12-03

## 2024-12-03 ASSESSMENT — ENCOUNTER SYMPTOMS
SORE THROAT: 1
NECK PAIN: 1

## 2024-12-03 NOTE — PROGRESS NOTES
Subjective   Patient ID: Rosalba Salcedo is a 79 y.o. female who presents for Earache.  Earache   There is pain in the right ear. This is a recurrent problem. The current episode started 1 to 4 weeks ago. The problem has been waxing and waning. There has been no fever. The pain is at a severity of 1/10. Associated symptoms include neck pain and a sore throat. She has tried ear drops for the symptoms. Her past medical history is significant for a chronic ear infection.     Ear  pain   right  and sometimes  left  Decrease opening of right   Hearing  loss  No  fever  No   sore throat  Admits to head congestion    Prediabetic  last hgba1c  .. Eating  a lot  of sweets     Bone  density     Review of Systems   HENT:  Positive for ear pain and sore throat.    Musculoskeletal:  Positive for neck pain.       Objective   Physical Exam  Vitals: I have reviewed the vitals  General: Well-developed.  In no acute distress.  Eyes:   sclera nonicteric.  Conjunctiva not injected.  No discharge.  Right ear  with erythema  of external auditory  canal    HEAD: Normocephalic, atraumatic.  HEENT   Mucous membranes moist.  Posterior oropharynx nonerythematous, no tonsillar exudates.      No cervical lymphadenopathy.  Cardio: Regular rate and rhythm.  No murmur, rub or gallop.  Pulmonary: Lungs clear to auscultation in all fields.  No accessory muscle use.  GI/: Normal active bowel sounds.  Soft, nontender.  No masses or organomegaly appreciated.  Musculoskeletal: No gross deformities appreciated.  Neuro: Alert, age-appropriate.  Normal muscle tone.  Moving all extremities.  Skin: No rash, bruises or lesions.   Labs        Assessment/Plan   Problem List Items Addressed This Visit       Vitamin D deficiency    Relevant Orders    Vitamin D 25-Hydroxy,Total (for eval of Vitamin D levels)    Acute reactive otitis externa of right ear - Primary     Rx   given  for itching  .. Infection           Other Visit Diagnoses       Osteoporosis,  unspecified osteoporosis type, unspecified pathological fracture presence        Relevant Medications    alendronate (Fosamax) 70 mg tablet    Other Relevant Orders    XR DEXA bone density    Acute otitis externa, unspecified laterality, unspecified type        Relevant Medications    ofloxacin (Floxin) 0.3 % otic solution    acetic acid (Vosol) 2 % otic solution

## 2024-12-03 NOTE — PROGRESS NOTES
No chief complaint on file.      The patient is here for follow-up of their side: right hip arthroplasty.  The patient has no hip pain.  The patient has no mechanical symptoms.  The patient is approximately  9 months  postop.  She is finally off a walker but is noting balance issues and states she has an appointment with the ENT coming up in January    Physical examination:    Examination of the side: right hip  The incision is healing well  No erythema or warmth.  Range of motion: Forward Flexion: 120 Internal Rotation: 30 External Rotation: 30 Abduction 30  The Patient can single leg stand and actively abduct  Calf is soft, Homans negative  The patient has intact ankle dorsiflexion and plantarflexion.    Radiographs:  See dictated report      Impression:  Status post side: right total hip arthroplasty    Plan:  Discussed the continued importance of prophylactic dental antibiotics  Follow up in  March 2025 with x-rays  All questions answered

## 2024-12-03 NOTE — PATIENT INSTRUCTIONS
You can continue on the additional vitamin D supplement of 5000 international units until the end of May of this calendar year .  Starting June.. Please continue with the vitamin D with your multivitamin and calcium supplement, this will be from June to October of ths calendar year    So you can take the 5000 international units of vitamin D from November to March of every year.  The other months you will get vitamin D is you have before with a multivitamin and calcium supplement.  Please take your vitamin D with a handful of water unsalted almonds while with the meal.  Vitamin D is a fat-soluble vitamin that requires fat in the food to be well absorbed.  We recommend healthy fats such as with nuts, seeds, avocados, olives or with a healthy meal.  Also you may notice a multivitamin has some vitamin D.  Spending up to 30 minutes in the sun during the summer in late spring can provide natural vitamin D to the uncovered skin (arm, legs).    Calcium citrate 600 mg once daily and drink 1 cup of plant-based milk twice daily.  The plan based milk can be almond milk, soy milk, etc.    continue  diet with healthy calcium much foods such as:  Healthy foods that are rich in calcium include: Nuts, seeds, legumes/beans, peas, dark green leafy vegetables, plant-based milk  Additional calcium rich foods listed below  -Soaking almonds  overnight in the fridge with drinking water, can help with softening the almonds and improved absorption of the almonds.  If your lab work shows insufficient calcium or you are unable to consume the foods rich in calcium  ...... some supplement is recommended, such as calcium citrate.    Please continue following with your dentist every 6 months  If you are on an osteoporosis medication, please inform a dentist that invasive dental work with these medications can increase once risk for osteonecrosis of the jaw.  Please continue with good oral hygiene and dental care.    Review of osteoporosis  medications  Medications to prevent bone loss and osteoporosis fractures:  -Oral biphosphonate's (such as Actonel, Boniva, Fosamax/alendronate (these are taken either once a week or once a month depending on med dose chosen, first thing in the morning with special instructions to follow.    The duration should be limited to 5 years, to prevent increased risk for atypical fracture of femur.     Please consider exercise program involving walking or some other form of aerobic activity 5 days weekly for 30 minutes... Let's also consider strengthening of large muscle groups like the abdominal muscles or shoulder muscles... Twice weekly with reps of 5/10/15 exercises and gradually increase strength.. This is not heavy strength training but light weight training... Sit ups or back exercise routine.. Please ask for handout if uncertain how to do..This  will help to strengthen your muscles which in turn will help you to lose weight.... You might ask what is the best diet available.. I would strongly encourage you to consider  Weight Watchers.. And as  your  fellow on  Weight Watchers physician attempting to  live this  LIFE  style  choice with you....  I will be glad to give you recommendations on what to eat.. Consider buying Yajaira bread.., alexandro bagle thin bread.. oikos yogurt... eggs  to eat as hard-boiled... Halo top ice cream for snack... All these are delicious foods which.. when eaten and  being compliant eating three  meals daily  breakfast lunch and dinner, drinking  64 ounces of water daily we will all win together !!!!!!!. This will be a means for you to lose weight... Consider also the smart phone lianna ... My plate.. Or My  fitness  pal..,  as additional possibilities for weight loss... Jose L Leroy!    Discussed medication side effects.  The  risk benefits and treatment options  discussed with patient.       Please schedule follow-up appointment based upon your improvement/failure to  improve/chronic medical conditions and physician recommendations during office appointment at the .       Patient advised to go to er if symptoms worsen or to call answering service, or to return to office for additional evaluation    This note was partially  generated using Dragon voice recognition and there may be incorrect words, wording, spelling, or pronunciation errors that were not corrected prior to committing the note to the medical record.

## 2024-12-18 ENCOUNTER — TELEPHONE (OUTPATIENT)
Dept: PRIMARY CARE | Facility: CLINIC | Age: 79
End: 2024-12-18
Payer: MEDICARE

## 2024-12-18 NOTE — TELEPHONE ENCOUNTER
FYI   Patient just wanted to let you know that she is still not feeling well. She has a really bad cough and gets tired easily

## 2024-12-22 ENCOUNTER — ANCILLARY PROCEDURE (OUTPATIENT)
Dept: URGENT CARE | Age: 79
End: 2024-12-22
Payer: MEDICARE

## 2024-12-22 ENCOUNTER — OFFICE VISIT (OUTPATIENT)
Dept: URGENT CARE | Age: 79
End: 2024-12-22
Payer: MEDICARE

## 2024-12-22 VITALS
OXYGEN SATURATION: 96 % | HEIGHT: 64 IN | HEART RATE: 58 BPM | RESPIRATION RATE: 18 BRPM | BODY MASS INDEX: 32.1 KG/M2 | TEMPERATURE: 98.2 F | SYSTOLIC BLOOD PRESSURE: 127 MMHG | WEIGHT: 188 LBS | DIASTOLIC BLOOD PRESSURE: 79 MMHG

## 2024-12-22 DIAGNOSIS — R06.2 WHEEZING: ICD-10-CM

## 2024-12-22 DIAGNOSIS — R05.3 PERSISTENT COUGH: Primary | ICD-10-CM

## 2024-12-22 DIAGNOSIS — J20.9 ACUTE BRONCHITIS, UNSPECIFIED ORGANISM: ICD-10-CM

## 2024-12-22 DIAGNOSIS — R05.3 PERSISTENT COUGH: ICD-10-CM

## 2024-12-22 PROCEDURE — 99214 OFFICE O/P EST MOD 30 MIN: CPT | Performed by: NURSE PRACTITIONER

## 2024-12-22 PROCEDURE — 1160F RVW MEDS BY RX/DR IN RCRD: CPT | Performed by: NURSE PRACTITIONER

## 2024-12-22 PROCEDURE — 3078F DIAST BP <80 MM HG: CPT | Performed by: NURSE PRACTITIONER

## 2024-12-22 PROCEDURE — 1125F AMNT PAIN NOTED PAIN PRSNT: CPT | Performed by: NURSE PRACTITIONER

## 2024-12-22 PROCEDURE — 1036F TOBACCO NON-USER: CPT | Performed by: NURSE PRACTITIONER

## 2024-12-22 PROCEDURE — 3074F SYST BP LT 130 MM HG: CPT | Performed by: NURSE PRACTITIONER

## 2024-12-22 PROCEDURE — 71046 X-RAY EXAM CHEST 2 VIEWS: CPT | Performed by: NURSE PRACTITIONER

## 2024-12-22 PROCEDURE — 1159F MED LIST DOCD IN RCRD: CPT | Performed by: NURSE PRACTITIONER

## 2024-12-22 RX ORDER — BROMPHENIRAMINE MALEATE, PSEUDOEPHEDRINE HYDROCHLORIDE, AND DEXTROMETHORPHAN HYDROBROMIDE 2; 30; 10 MG/5ML; MG/5ML; MG/5ML
10 SYRUP ORAL 4 TIMES DAILY PRN
Qty: 200 ML | Refills: 0 | Status: SHIPPED | OUTPATIENT
Start: 2024-12-22 | End: 2024-12-27

## 2024-12-22 RX ORDER — PREDNISONE 20 MG/1
20 TABLET ORAL DAILY
Qty: 7 TABLET | Refills: 0 | Status: SHIPPED | OUTPATIENT
Start: 2024-12-22 | End: 2024-12-29

## 2024-12-22 RX ORDER — DOXYCYCLINE 100 MG/1
100 CAPSULE ORAL 2 TIMES DAILY
Qty: 20 CAPSULE | Refills: 0 | Status: SHIPPED | OUTPATIENT
Start: 2024-12-22 | End: 2025-01-01

## 2024-12-22 ASSESSMENT — ENCOUNTER SYMPTOMS: COUGH: 1

## 2024-12-22 ASSESSMENT — PAIN SCALES - GENERAL: PAINLEVEL_OUTOF10: 6

## 2024-12-22 NOTE — PATIENT INSTRUCTIONS
Acute Bronchitis/Wheezing:  - Chest x-ray negative for pneumonia  - Good oral hydration; avoid milk products  - Joel's Vapor rub; humidifier; warm showers  - Take medications as prescribed  - Advised on s/s to seek emergent care for  - f/u with PCP in the next 3-5 days if no better

## 2024-12-22 NOTE — PROGRESS NOTES
Subjective   Patient ID: Rosalba Salcedo is a 79 y.o. female. They present today with a chief complaint of Cough (X 10 days).    History of Present Illness  Pt presents with cough x 10 days. No fever, sob, cp or pain with deep inspiration. Hx of pneumonia. VS are stable. She is able to talk in full sentences.       Cough        Past Medical History  Allergies as of 12/22/2024 - Reviewed 12/22/2024   Allergen Reaction Noted    Morphine Hives, Itching, Rash, and Unknown 03/29/2016       (Not in a hospital admission)       Past Medical History:   Diagnosis Date    Other bursitis of elbow, left elbow 05/27/2020    Bursitis of left elbow    Other specified anxiety disorders 07/27/2022    Depression with anxiety    Pain in unspecified elbow 08/05/2020    Elbow pain    Personal history of other mental and behavioral disorders 04/02/2022    History of depression       Past Surgical History:   Procedure Laterality Date    CT GUIDED ABSCESS FLUID COLLECTION DRAINAGE  08/23/2022    CT GUIDED ABSCESS FLUID COLLECTION DRAINAGE 8/23/2022 [facility] CLINICAL LEGACY    NECK SURGERY N/A 09/2024    OTHER SURGICAL HISTORY  08/14/2019    Wrist surgery    OTHER SURGICAL HISTORY  08/14/2019    Hysterectomy    OTHER SURGICAL HISTORY  08/14/2019    Breast reduction    OTHER SURGICAL HISTORY  08/14/2019    Cholecystectomy    OTHER SURGICAL HISTORY  04/04/2022    Vaginoplasty    OTHER SURGICAL HISTORY  04/04/2022    Bladder surgery    OTHER SURGICAL HISTORY  04/04/2022    Complete colonoscopy    OTHER SURGICAL HISTORY  04/04/2022    Esophagogastroduodenoscopy    OTHER SURGICAL HISTORY  04/04/2022    Elbow surgery    OTHER SURGICAL HISTORY  04/04/2022    Cataract surgery    OTHER SURGICAL HISTORY  05/11/2021    Breast biopsy    OTHER SURGICAL HISTORY  04/04/2022    Knee surgery    OTHER SURGICAL HISTORY  03/11/2020    Coronary artery bypass graft        reports that she has never smoked. She has never been exposed to tobacco smoke. She has  "never used smokeless tobacco. She reports current alcohol use. She reports that she does not use drugs.    Review of Systems  Review of Systems   Respiratory:  Positive for cough.      10 point ROS completed and all are negative other than what is stated in the current HPI                               Objective    Vitals:    12/22/24 1126   BP: 127/79   BP Location: Left arm   Patient Position: Sitting   Pulse: 58   Resp: 18   Temp: 36.8 °C (98.2 °F)   TempSrc: Oral   SpO2: 96%   Weight: 85.3 kg (188 lb)   Height: 1.626 m (5' 4\")     No LMP recorded. Patient is postmenopausal.    Physical Exam  Vitals and nursing note reviewed.   Constitutional:       General: She is not in acute distress.     Appearance: Normal appearance. She is not ill-appearing or toxic-appearing.   HENT:      Head: Normocephalic and atraumatic.      Right Ear: Tympanic membrane, ear canal and external ear normal.      Left Ear: Tympanic membrane, ear canal and external ear normal.      Nose: Congestion present.      Mouth/Throat:      Mouth: Mucous membranes are moist.      Comments: (+) postnasal discharge  Cardiovascular:      Rate and Rhythm: Normal rate and regular rhythm.      Heart sounds: Normal heart sounds.   Pulmonary:      Effort: Pulmonary effort is normal.      Comments: (+) exp wheezing posteriorly  Musculoskeletal:      Cervical back: No tenderness.   Lymphadenopathy:      Cervical: No cervical adenopathy.   Skin:     General: Skin is warm and dry.      Findings: No rash.   Neurological:      Mental Status: She is alert and oriented to person, place, and time.   Psychiatric:         Behavior: Behavior normal.         Procedures    Point of Care Test & Imaging Results from this visit  No results found for this visit on 12/22/24.   XR chest 2 views    Result Date: 12/22/2024  Interpreted By:  Ira Davey, STUDY: XR CHEST 2 VIEWS;  12/22/2024 11:48 am   INDICATION: Signs/Symptoms:Sub acute cough; hx of pneumonia.   COMPARISON: " 08/24/2016   ACCESSION NUMBER(S): [id number]   ORDERING CLINICIAN: AQUILINO TIRADO   FINDINGS: Heart is normal in size.   There is no consolidation or pleural fluid.   There are numerous mediastinal clips. There is surgical clips in the upper abdomen.   Sternal wires are present. The patient is status post cervical spine fusion.   There are degenerative changes of the spine.   COMPARISON OF FINDING: The chest is similar.       No acute cardiopulmonary disease.   MACRO: none   Signed by: Ira Davey 12/22/2024 12:00 PM Dictation workstation:   [id number]     Diagnostic study results (if any) were reviewed by DANNIE Javier.    Assessment/Plan   Allergies, medications, history, and pertinent labs/EKGs/Imaging reviewed by DANNIE Javier.     Medical Decision Making  Acute Bronchitis/Wheezing:  - Chest x-ray negative for pneumonia  - Good oral hydration; avoid milk products  - Joel's Vapor rub; humidifier; warm showers  - Take medications as prescribed  - Advised on s/s to seek emergent care for  - f/u with PCP in the next 3-5 days if no better    Orders and Diagnoses  Diagnoses and all orders for this visit:  Persistent cough  -     XR chest 2 views; Future  -     brompheniramine-pseudoeph-DM 2-30-10 mg/5 mL syrup; Take 10 mL by mouth 4 times a day as needed for congestion or cough for up to 5 days.  -     doxycycline (Vibramycin) 100 mg capsule; Take 1 capsule (100 mg) by mouth 2 times a day for 10 days. Take with at least 8 ounces (large glass) of water, do not lie down for 30 minutes after  -     predniSONE (Deltasone) 20 mg tablet; Take 1 tablet (20 mg) by mouth once daily for 7 days.  Acute bronchitis, unspecified organism  -     brompheniramine-pseudoeph-DM 2-30-10 mg/5 mL syrup; Take 10 mL by mouth 4 times a day as needed for congestion or cough for up to 5 days.  -     doxycycline (Vibramycin) 100 mg capsule; Take 1 capsule (100 mg) by mouth 2 times a day for 10 days. Take  with at least 8 ounces (large glass) of water, do not lie down for 30 minutes after  -     predniSONE (Deltasone) 20 mg tablet; Take 1 tablet (20 mg) by mouth once daily for 7 days.  Wheezing  -     brompheniramine-pseudoeph-DM 2-30-10 mg/5 mL syrup; Take 10 mL by mouth 4 times a day as needed for congestion or cough for up to 5 days.  -     doxycycline (Vibramycin) 100 mg capsule; Take 1 capsule (100 mg) by mouth 2 times a day for 10 days. Take with at least 8 ounces (large glass) of water, do not lie down for 30 minutes after  -     predniSONE (Deltasone) 20 mg tablet; Take 1 tablet (20 mg) by mouth once daily for 7 days.      Medical Admin Record      Patient disposition: Home    Electronically signed by DANNIE Javier  12:34 PM

## 2024-12-24 ENCOUNTER — HOSPITAL ENCOUNTER (OUTPATIENT)
Dept: RADIOLOGY | Facility: HOSPITAL | Age: 79
Discharge: HOME | End: 2024-12-24
Payer: MEDICARE

## 2024-12-24 DIAGNOSIS — M81.0 OSTEOPOROSIS, UNSPECIFIED OSTEOPOROSIS TYPE, UNSPECIFIED PATHOLOGICAL FRACTURE PRESENCE: ICD-10-CM

## 2024-12-24 PROCEDURE — 77080 DXA BONE DENSITY AXIAL: CPT | Performed by: RADIOLOGY

## 2024-12-24 PROCEDURE — 77081 DXA BONE DENSITY APPENDICULR: CPT

## 2024-12-30 ENCOUNTER — TELEPHONE (OUTPATIENT)
Dept: PRIMARY CARE | Facility: CLINIC | Age: 79
End: 2024-12-30
Payer: MEDICARE

## 2024-12-31 ENCOUNTER — TELEPHONE (OUTPATIENT)
Dept: PRIMARY CARE | Facility: CLINIC | Age: 79
End: 2024-12-31

## 2024-12-31 ENCOUNTER — APPOINTMENT (OUTPATIENT)
Dept: PRIMARY CARE | Facility: CLINIC | Age: 79
End: 2024-12-31
Payer: MEDICARE

## 2024-12-31 VITALS
TEMPERATURE: 97 F | SYSTOLIC BLOOD PRESSURE: 125 MMHG | WEIGHT: 199.4 LBS | HEART RATE: 68 BPM | OXYGEN SATURATION: 97 % | HEIGHT: 64 IN | BODY MASS INDEX: 34.04 KG/M2 | DIASTOLIC BLOOD PRESSURE: 78 MMHG | RESPIRATION RATE: 18 BRPM

## 2024-12-31 DIAGNOSIS — J40 SINOBRONCHITIS: ICD-10-CM

## 2024-12-31 DIAGNOSIS — J32.9 SINOBRONCHITIS: ICD-10-CM

## 2024-12-31 DIAGNOSIS — I10 ESSENTIAL HYPERTENSION: Primary | ICD-10-CM

## 2024-12-31 DIAGNOSIS — E11.9 TYPE 2 DIABETES MELLITUS WITHOUT COMPLICATION, UNSPECIFIED WHETHER LONG TERM INSULIN USE (MULTI): ICD-10-CM

## 2024-12-31 DIAGNOSIS — R05.3 PERSISTENT COUGH: ICD-10-CM

## 2024-12-31 DIAGNOSIS — J20.9 ACUTE BRONCHITIS, UNSPECIFIED ORGANISM: ICD-10-CM

## 2024-12-31 DIAGNOSIS — R06.2 WHEEZING: ICD-10-CM

## 2024-12-31 PROCEDURE — 3074F SYST BP LT 130 MM HG: CPT | Performed by: FAMILY MEDICINE

## 2024-12-31 PROCEDURE — 1170F FXNL STATUS ASSESSED: CPT | Performed by: FAMILY MEDICINE

## 2024-12-31 PROCEDURE — 99213 OFFICE O/P EST LOW 20 MIN: CPT | Performed by: FAMILY MEDICINE

## 2024-12-31 PROCEDURE — 1123F ACP DISCUSS/DSCN MKR DOCD: CPT | Performed by: FAMILY MEDICINE

## 2024-12-31 PROCEDURE — 1159F MED LIST DOCD IN RCRD: CPT | Performed by: FAMILY MEDICINE

## 2024-12-31 PROCEDURE — 3078F DIAST BP <80 MM HG: CPT | Performed by: FAMILY MEDICINE

## 2024-12-31 RX ORDER — BROMPHENIRAMINE MALEATE, PSEUDOEPHEDRINE HYDROCHLORIDE, AND DEXTROMETHORPHAN HYDROBROMIDE 2; 30; 10 MG/5ML; MG/5ML; MG/5ML
10 SYRUP ORAL 4 TIMES DAILY PRN
Qty: 200 ML | Refills: 0 | Status: SHIPPED | OUTPATIENT
Start: 2024-12-31 | End: 2025-01-05

## 2024-12-31 ASSESSMENT — ACTIVITIES OF DAILY LIVING (ADL)
DOING_HOUSEWORK: INDEPENDENT
BATHING: INDEPENDENT
MANAGING_FINANCES: INDEPENDENT
MANAGING_FINANCES: INDEPENDENT
GROCERY_SHOPPING: INDEPENDENT
DOING_HOUSEWORK: INDEPENDENT
GROCERY_SHOPPING: INDEPENDENT
TAKING_MEDICATION: INDEPENDENT
TAKING_MEDICATION: INDEPENDENT
DRESSING: INDEPENDENT

## 2024-12-31 ASSESSMENT — ENCOUNTER SYMPTOMS
RHINORRHEA: 0
SINUS PRESSURE: 0
LOSS OF SENSATION IN FEET: 0
COUGH: 0
SORE THROAT: 0
OCCASIONAL FEELINGS OF UNSTEADINESS: 1
SHORTNESS OF BREATH: 0
DEPRESSION: 0

## 2024-12-31 ASSESSMENT — PATIENT HEALTH QUESTIONNAIRE - PHQ9
SUM OF ALL RESPONSES TO PHQ9 QUESTIONS 1 AND 2: 0
2. FEELING DOWN, DEPRESSED OR HOPELESS: NOT AT ALL
1. LITTLE INTEREST OR PLEASURE IN DOING THINGS: NOT AT ALL

## 2024-12-31 NOTE — ASSESSMENT & PLAN NOTE
Patient is also here for follow-up of hypertension.. Symptoms do not include headache confusion visual disturbance dizziness shortness of breath chest pain syncope or palpitations. Recent blood pressure patient has not been checking. By report there is good compliance with treatment. Pertinent medical history includes d hyperlipidemia  stable and continue meds

## 2024-12-31 NOTE — PROGRESS NOTES
" Subjective   Patient ID: Rosalba Salcedo is a 79 y.o. female who presents for  .  Follow-up she is not interested in doing Medicare physical at this time    HPI   Patient is also here for follow-up of hypertension.. Symptoms do not include headache confusion visual disturbance dizziness shortness of breath chest pain syncope or palpitations. Recent blood pressure patient has not been checking. By report there is good compliance with treatment. Pertinent medical history includes d hyperlipidemia   Reviewed dr kevin felix   Seen  in urgent care   .. Not reviewed  bronchitis  is  improved   .. Cough  is way down   Review of Systems   HENT:  Negative for rhinorrhea, sinus pressure and sore throat.    Respiratory:  Negative for cough and shortness of breath.    Cardiovascular:  Negative for chest pain.       Objective   /78   Pulse 68   Temp 36.1 °C (97 °F)   Resp 18   Ht 1.626 m (5' 4\")   Wt 90.4 kg (199 lb 6.4 oz)   SpO2 97%   BMI 34.23 kg/m²     Physical Exam  Vitals: I have reviewed the vitals  General: Well-developed.  In no acute distress.  Eyes:   sclera nonicteric.  Conjunctiva not injected.  No discharge.   HEAD: Normocephalic, atraumatic.  HEENT   Mucous membranes moist.  Posterior oropharynx nonerythematous, no tonsillar exudates.      No cervical lymphadenopathy.  Cardio: Regular rate and rhythm.  No murmur, rub or gallop.  Pulmonary: Lungs clear to auscultation in all fields.  No accessory muscle use.  GI/: Normal active bowel sounds.  Soft, nontender.  No masses or organomegaly appreciated.  Musculoskeletal: No gross deformities appreciated.  Neuro: Alert, age-appropriate.  Normal muscle tone.  Moving all extremities.  Skin: No rash, bruises or lesions.   Assessment/Plan   Problem List Items Addressed This Visit             ICD-10-CM    Acute bronchitis J20.9    Relevant Medications    brompheniramine-pseudoeph-DM 2-30-10 mg/5 mL syrup    Essential hypertension - Primary I10     Patient " is also here for follow-up of hypertension.. Symptoms do not include headache confusion visual disturbance dizziness shortness of breath chest pain syncope or palpitations. Recent blood pressure patient has not been checking. By report there is good compliance with treatment. Pertinent medical history includes d hyperlipidemia  stable and continue meds           Sinobronchitis J32.9, J40     Reviewed dr brown note   Seen  in urgent care   .. Not reviewed  bronchitis  is  improved   .. Cough  is way down reviewed chest x-ray from urgent care patient is improving requesting additional prescription for brompheniramine and will refill however discussed pseudoephedrine causing elevation of blood pressure adequate joint to take 5 mL at bedtime sparingly.  Improved chest x-ray improved discussed          Other Visit Diagnoses         Codes    Type 2 diabetes mellitus without complication, unspecified whether long term insulin use (Multi)     E11.9    Persistent cough     R05.3    Relevant Medications    brompheniramine-pseudoeph-DM 2-30-10 mg/5 mL syrup    Wheezing     R06.2    Relevant Medications    brompheniramine-pseudoeph-DM 2-30-10 mg/5 mL syrup

## 2024-12-31 NOTE — ASSESSMENT & PLAN NOTE
Reviewed dr brown note   Seen  in urgent care   .. Not reviewed  bronchitis  is  improved   .. Cough  is way down reviewed chest x-ray from urgent care patient is improving requesting additional prescription for brompheniramine and will refill however discussed pseudoephedrine causing elevation of blood pressure adequate joint to take 5 mL at bedtime sparingly.  Improved chest x-ray improved discussed

## 2024-12-31 NOTE — TELEPHONE ENCOUNTER
Rx Refill Request Telephone Encounter    Name:  Rosalba Harmonchancejamil  :  [id number]  Medication Name:  levothyroxine (Synthroid, Levoxyl) 25 mcg tablet             Specific Pharmacy location:  [facility] - [place], [place] - [address]  [address], [place] [address]  Phone: [phone]  Fax: [phone]   Best number to reach patient:  [phone]

## 2025-01-06 ENCOUNTER — CLINICAL SUPPORT (OUTPATIENT)
Dept: AUDIOLOGY | Facility: CLINIC | Age: 80
End: 2025-01-06
Payer: MEDICARE

## 2025-01-06 ENCOUNTER — APPOINTMENT (OUTPATIENT)
Dept: OTOLARYNGOLOGY | Facility: CLINIC | Age: 80
End: 2025-01-06
Payer: MEDICARE

## 2025-01-06 DIAGNOSIS — H93.13 TINNITUS OF BOTH EARS: ICD-10-CM

## 2025-01-06 DIAGNOSIS — H90.3 BILATERAL SENSORINEURAL HEARING LOSS: Primary | ICD-10-CM

## 2025-01-06 PROCEDURE — 1123F ACP DISCUSS/DSCN MKR DOCD: CPT | Performed by: OTOLARYNGOLOGY

## 2025-01-06 PROCEDURE — 1160F RVW MEDS BY RX/DR IN RCRD: CPT | Performed by: OTOLARYNGOLOGY

## 2025-01-06 PROCEDURE — 99203 OFFICE O/P NEW LOW 30 MIN: CPT | Performed by: OTOLARYNGOLOGY

## 2025-01-06 PROCEDURE — 92557 COMPREHENSIVE HEARING TEST: CPT | Performed by: AUDIOLOGIST

## 2025-01-06 PROCEDURE — 1159F MED LIST DOCD IN RCRD: CPT | Performed by: OTOLARYNGOLOGY

## 2025-01-06 PROCEDURE — 92567 TYMPANOMETRY: CPT | Performed by: AUDIOLOGIST

## 2025-01-06 ASSESSMENT — ENCOUNTER SYMPTOMS
CARDIOVASCULAR NEGATIVE: 1
RESPIRATORY NEGATIVE: 1
CONSTITUTIONAL NEGATIVE: 1
NEUROLOGICAL NEGATIVE: 1

## 2025-01-06 NOTE — PROGRESS NOTES
Subjective   Patient ID: Rosalba Salcedo is a 79 y.o. female who presents for Tinnitus.    HPI  Patient presents today for evaluation of bilateral tinnitus.  She does wear bilateral hearing aids for the last 3 to 4 years but does not personally satisfied with her functionality.  She stipulates tinnitus now for greater than 3 months with a motor humming/buzzing type sound.  All remaining ENT inquiry clear.  No evidence of room spinning vertigo dizziness etc.      Review of Systems   Constitutional: Negative.    HENT: Negative.     Respiratory: Negative.     Cardiovascular: Negative.    Neurological: Negative.        Physical Exam  General appearance: No acute distress. Normal facies. Symmetric facial movement. No gross lesions of the face are noted.  The external ear structures appear normal. The ear canals patent and the tympanic membranes are intact without evidence of air-fluid levels, retraction, or congenital defects.  Anterior rhinoscopy notes essentially a midline nasal septum. Examination is noted for normal healthy mucosal membranes without any evidence of lesions, polyps, or exudate. The tongue is normally mobile. There are no lesions on the gingiva, buccal, or oral mucosa. There are no oral cavity masses.  The neck is negative for mass lymphadenopathy. The trachea and parotid are clear. The thyroid bed is grossly unremarkable. The salivary gland structures are grossly unremarkable.    Audiogram: Bilateral symmetric loss starting at 40 dB in the lower frequencies and as high as 70 dB in the higher frequencies with word discrimination 80 bilateral        Assessment/Plan     79-year-old female with bilateral hearing loss and associated tinnitus.  We did discuss various coping strategies for the tinnitus.  Would also recommend that she get back in touch with her audiologist to optimize functionality of the hearing aids as best able.  She appears to understand will follow through accordingly.  I will see  her in a year if all goes well sooner with any major issues.  All questions were answered in this regard accordingly.

## 2025-01-06 NOTE — PROGRESS NOTES
Ms. Salcedo, age 79, was seen today for a hearing evaluation during her ENT visit with Dr. Rowley.  She has a known history of bilateral hearing loss with binaural amplification use for the past 3-4 years.  She reported dissatisfaction with their function overall.  She also reported concern for tinnitus which has been present for the past three months which she describes as a motor running or buzzing sound.    Results:  Otoscopy revealed mild to moderate non-occluding cerumen bilaterally.  Tympanometry revealed normal, Type A tympanograms, indicating normal ear canal volume, peak pressure and compliance bilaterally.  Audiometric thresholds revealed a mild to moderate sensorineural hearing loss bilaterally.  Word recognition scores were good bilaterally.    Recommendations:  Follow-up with PCP, Dr. Leroy, as medically directed.  Follow-up with ENT, Dr. Rowley, as medically directed.  Use and optimization of binaural amplification.  Retest hearing annually to monitor.

## 2025-02-06 ENCOUNTER — APPOINTMENT (OUTPATIENT)
Dept: ORTHOPEDIC SURGERY | Facility: CLINIC | Age: 80
End: 2025-02-06
Payer: MEDICARE

## 2025-02-26 DIAGNOSIS — R53.83 OTHER FATIGUE: ICD-10-CM

## 2025-02-26 RX ORDER — LEVOTHYROXINE SODIUM 25 UG/1
25 TABLET ORAL DAILY
Qty: 90 TABLET | Refills: 3 | Status: SHIPPED | OUTPATIENT
Start: 2025-02-26

## 2025-02-26 NOTE — TELEPHONE ENCOUNTER
Rx Refill Request Telephone Encounter    Name:  Rosalba Salcedo  :  000000  Medication Name:  levothyroxine (Synthroid, Levoxyl) 25 mcg tablet   Dose: 25 mcg     Route: oral     Frequency: Daily   Dispense Quantity: 90 tablet   Refills: 3           Sig: Take 1 tablet (25 mcg) by mouth once daily.     Specific Pharmacy location:  [facility] - [place], [place] - [address] (Pharmacy)   Date of last appointment:  2024  Date of next appointment:  06/10/2025  Best number to reach patient:  [phone]

## 2025-02-27 DIAGNOSIS — Z96.641 S/P TOTAL RIGHT HIP ARTHROPLASTY: Primary | ICD-10-CM

## 2025-03-03 NOTE — PROGRESS NOTES
No chief complaint on file.      The patient is here for follow-up of their side: right hip arthroplasty.  The patient has no hip pain.  The patient has no mechanical symptoms.  The patient is approximately 1 year postop.    She is also getting some left buttock pain.  On x-ray she has moderate left hip osteoarthritis.  She also has a history of a lumbar fusion with Dr. Arizmendi.  She was seen and evaluated for this and he does not believe her pain in her buttock is coming from her spine and believes it is coming from her hip.    She states she is using a walker at times due to her buttock pain on the left    Physical examination:    Examination of the side: right hip  The incision is healing well  No erythema or warmth.  Range of motion: Forward Flexion: 120 Internal Rotation: 30 External Rotation: 30 Abduction 30  The Patient can single leg stand and actively abduct  Calf is soft, Homans negative  The patient has intact ankle dorsiflexion and plantarflexion.    Left hip  Skin is intact without any evidence of erythema ecchymosis or effusion  No tenderness to palpation over bony landmarks  Range of motion is forward flexion 120 degrees internal/external rotation at 30 degrees abduction of 30 degrees no pain with rotation of the hip  She is distally neurovascularly intact    Radiographs:  See dictated report      Impression:  Status post side: right total hip arthroplasty  Left hip osteoarthritis    Plan:  For her right hip she may follow-up as needed  We will set her up for a left hip ultrasound-guided intra-articular corticosteroid injection.  Follow up in  6 weeks after the corticosteroid injection to the left hip  All questions answered

## 2025-03-04 ENCOUNTER — HOSPITAL ENCOUNTER (OUTPATIENT)
Dept: RADIOLOGY | Facility: HOSPITAL | Age: 80
Discharge: HOME | End: 2025-03-04
Payer: MEDICARE

## 2025-03-04 ENCOUNTER — APPOINTMENT (OUTPATIENT)
Dept: ORTHOPEDIC SURGERY | Facility: CLINIC | Age: 80
End: 2025-03-04
Payer: MEDICARE

## 2025-03-04 DIAGNOSIS — Z96.641 S/P TOTAL RIGHT HIP ARTHROPLASTY: Primary | ICD-10-CM

## 2025-03-04 DIAGNOSIS — Z96.641 S/P TOTAL RIGHT HIP ARTHROPLASTY: ICD-10-CM

## 2025-03-04 DIAGNOSIS — M16.12 PRIMARY OSTEOARTHRITIS OF LEFT HIP: ICD-10-CM

## 2025-03-04 PROCEDURE — 99213 OFFICE O/P EST LOW 20 MIN: CPT | Performed by: ORTHOPAEDIC SURGERY

## 2025-03-04 PROCEDURE — 73502 X-RAY EXAM HIP UNI 2-3 VIEWS: CPT | Mod: RIGHT SIDE | Performed by: RADIOLOGY

## 2025-03-04 PROCEDURE — 73502 X-RAY EXAM HIP UNI 2-3 VIEWS: CPT | Mod: RT

## 2025-03-04 PROCEDURE — 1123F ACP DISCUSS/DSCN MKR DOCD: CPT | Performed by: ORTHOPAEDIC SURGERY

## 2025-03-07 ENCOUNTER — OFFICE VISIT (OUTPATIENT)
Dept: ORTHOPEDIC SURGERY | Facility: CLINIC | Age: 80
End: 2025-03-07
Payer: MEDICARE

## 2025-03-07 ENCOUNTER — HOSPITAL ENCOUNTER (OUTPATIENT)
Dept: RADIOLOGY | Facility: EXTERNAL LOCATION | Age: 80
Discharge: HOME | End: 2025-03-07

## 2025-03-07 DIAGNOSIS — M54.30 BACK PAIN WITH SCIATICA: Primary | ICD-10-CM

## 2025-03-07 DIAGNOSIS — M16.12 PRIMARY OSTEOARTHRITIS OF LEFT HIP: ICD-10-CM

## 2025-03-07 DIAGNOSIS — M54.9 BACK PAIN WITH SCIATICA: Primary | ICD-10-CM

## 2025-03-07 DIAGNOSIS — M54.30 SCIATICA WITHOUT BACK PAIN, UNSPECIFIED LATERALITY: ICD-10-CM

## 2025-03-07 PROCEDURE — 20552 NJX 1/MLT TRIGGER POINT 1/2: CPT | Performed by: FAMILY MEDICINE

## 2025-03-07 PROCEDURE — 76942 ECHO GUIDE FOR BIOPSY: CPT | Performed by: FAMILY MEDICINE

## 2025-03-07 PROCEDURE — 99213 OFFICE O/P EST LOW 20 MIN: CPT | Performed by: FAMILY MEDICINE

## 2025-03-07 RX ADMIN — LIDOCAINE HYDROCHLORIDE 6 ML: 10 INJECTION, SOLUTION INFILTRATION; PERINEURAL at 11:29

## 2025-03-07 RX ADMIN — TRIAMCINOLONE ACETONIDE 40 MG: 40 INJECTION, SUSPENSION INTRA-ARTICULAR; INTRAMUSCULAR at 11:29

## 2025-03-07 NOTE — PROGRESS NOTES
Established Patient Follow-Up Visit    CC:   Chief Complaint   Patient presents with    Left Hip - Injections     Intra articular injection        HPI:  Rosalba is a 80 y.o. female returns here today for follow-up visit regarding: Left-sided low back pain  Piriformis discomfort.  Patient presents here today [facility] of Dr. Murcia for injection.  Patient was initially booked for an intra-articular injection to the left hip.  Patient however denies any pain or discomfort into the groin she has no real pain moving the leg or the hip around.  She states everything is in her lower back and radiates downward.  She would like to try an injection into 1 of those areas first before considering the possible intra-articular injection in the hip where again she does not feel that she is having any pain or discomfort.          REVIEW OF SYSTEMS:  GENERAL: Negative for malaise, significant weight loss, fever  MUSCULOSKELETAL: See HPI  NEURO: Positive for numbness / tingling       PHYSICAL EXAM:  -Neuro: Gross sensation intact to the lower extremities bilaterally.  -Extremity: Low back exam demonstrates tenderness down the midline of the spine with no step-offs he has left greater than right SI joint pain and piriformis discomfort.  There is no greater trochanter bursa pain.  There are no open cuts wounds or sores.  She is able to stand on her own gently climb up on the exam table under her own power.    IMAGING: No new imaging today  Point of Care Ultrasound  These images are not reportable by radiology and will not be interpreted   by  Radiologists.      PROCEDURE: Left SI joint injection as below  Trigger Point Injection on 3/7/2025 11:29 AM  Indications: pain and therapeutic benefit  Details: 22 G needle, ultrasound-guided  Medications: 6 mL lidocaine 10 mg/mL (1 %); 40 mg triamcinolone acetonide 40 mg/mL  Outcome: tolerated well, no immediate complications  Procedure, treatment alternatives, risks and benefits explained,  specific risks discussed. Consent was given by the patient. Immediately prior to procedure a time out was called to verify the correct patient, procedure, equipment, support staff and site/side marked as required. Patient was prepped and draped in the usual sterile fashion.            ASSESSMENT:   Follow-up visit for:  Problem List Items Addressed This Visit    None  Visit Diagnoses       Back pain with sciatica    -  Primary    Primary osteoarthritis of left hip        Relevant Orders    Point of Care Ultrasound (Completed)    Sciatica without back pain, unspecified laterality        Relevant Medications    lidocaine (Xylocaine) 10 mg/mL (1 %) injection 6 mL (Completed)    triamcinolone acetonide (Kenalog-40) injection 40 mg (Completed)    Other Relevant Orders    Trigger Point Injection (Completed)             PLAN: Patient was provided with a left SI joint injection here today.  She tolerated this well.  Will recommend she call or return for repeat soft tissue injection or repeat SI joint injection if it has been close to 3 months or longer.  Additionally should she have any deep hip pain or inguinal pain we will go forward with the intra-articular hip injection which was deferred today.  Patient was agreeable to this plan.  She denied any further issues or concerns and felt some modest immediate symptomatic relief upon discharge from the office today  Orders Placed This Encounter    Trigger Point Injection    Point of Care Ultrasound    lidocaine (Xylocaine) 10 mg/mL (1 %) injection 6 mL    triamcinolone acetonide (Kenalog-40) injection 40 mg           At the conclusion of the visit there were no further questions by the patient/family regarding their plan of care.  Patient was instructed to call or return with any issues, questions, or concerns regarding their injury and/or treatment plan described above.     03/08/25 at 2:38 PM - Cole C Budinsky, MD    Office: [phone]    This note was prepared using  voice recognition software.  The details of this note are correct and have been reviewed, and corrected to the best of my ability.  Some grammatical errors may persist related to the Dragon software.

## 2025-03-08 RX ORDER — LIDOCAINE HYDROCHLORIDE 10 MG/ML
6 INJECTION, SOLUTION INFILTRATION; PERINEURAL
Status: COMPLETED | OUTPATIENT
Start: 2025-03-07 | End: 2025-03-07

## 2025-03-08 RX ORDER — TRIAMCINOLONE ACETONIDE 40 MG/ML
40 INJECTION, SUSPENSION INTRA-ARTICULAR; INTRAMUSCULAR
Status: COMPLETED | OUTPATIENT
Start: 2025-03-07 | End: 2025-03-07

## 2025-03-11 ENCOUNTER — OFFICE VISIT (OUTPATIENT)
Dept: PRIMARY CARE | Facility: CLINIC | Age: 80
End: 2025-03-11
Payer: MEDICARE

## 2025-03-11 VITALS
BODY MASS INDEX: 33.63 KG/M2 | WEIGHT: 197 LBS | TEMPERATURE: 98.7 F | DIASTOLIC BLOOD PRESSURE: 80 MMHG | OXYGEN SATURATION: 97 % | SYSTOLIC BLOOD PRESSURE: 130 MMHG | HEIGHT: 64 IN | HEART RATE: 72 BPM | RESPIRATION RATE: 18 BRPM

## 2025-03-11 DIAGNOSIS — H60.509 ACUTE OTITIS EXTERNA, UNSPECIFIED LATERALITY, UNSPECIFIED TYPE: ICD-10-CM

## 2025-03-11 DIAGNOSIS — I10 ESSENTIAL HYPERTENSION: ICD-10-CM

## 2025-03-11 DIAGNOSIS — G95.89 OTHER SPECIFIED DISEASES OF SPINAL CORD: ICD-10-CM

## 2025-03-11 DIAGNOSIS — H61.21 IMPACTED CERUMEN OF RIGHT EAR: ICD-10-CM

## 2025-03-11 DIAGNOSIS — E78.2 MIXED HYPERLIPIDEMIA: Primary | ICD-10-CM

## 2025-03-11 DIAGNOSIS — E66.811 OBESITY (BMI 30.0-34.9): ICD-10-CM

## 2025-03-11 DIAGNOSIS — N18.31 STAGE 3A CHRONIC KIDNEY DISEASE (MULTI): ICD-10-CM

## 2025-03-11 DIAGNOSIS — E11.9 DIABETES MELLITUS WITHOUT COMPLICATION (MULTI): ICD-10-CM

## 2025-03-11 DIAGNOSIS — J44.1 COPD WITH ACUTE EXACERBATION (MULTI): ICD-10-CM

## 2025-03-11 DIAGNOSIS — E03.9 HYPOTHYROIDISM, UNSPECIFIED TYPE: ICD-10-CM

## 2025-03-11 PROCEDURE — 3075F SYST BP GE 130 - 139MM HG: CPT | Performed by: FAMILY MEDICINE

## 2025-03-11 PROCEDURE — G2211 COMPLEX E/M VISIT ADD ON: HCPCS | Performed by: FAMILY MEDICINE

## 2025-03-11 PROCEDURE — 99213 OFFICE O/P EST LOW 20 MIN: CPT | Performed by: FAMILY MEDICINE

## 2025-03-11 PROCEDURE — 69210 REMOVE IMPACTED EAR WAX UNI: CPT | Performed by: FAMILY MEDICINE

## 2025-03-11 PROCEDURE — 3079F DIAST BP 80-89 MM HG: CPT | Performed by: FAMILY MEDICINE

## 2025-03-11 PROCEDURE — 1123F ACP DISCUSS/DSCN MKR DOCD: CPT | Performed by: FAMILY MEDICINE

## 2025-03-11 PROCEDURE — 1036F TOBACCO NON-USER: CPT | Performed by: FAMILY MEDICINE

## 2025-03-11 PROCEDURE — 1159F MED LIST DOCD IN RCRD: CPT | Performed by: FAMILY MEDICINE

## 2025-03-11 RX ORDER — OFLOXACIN 3 MG/ML
5 SOLUTION AURICULAR (OTIC) 2 TIMES DAILY
Qty: 10 ML | Refills: 3 | Status: SHIPPED | OUTPATIENT
Start: 2025-03-11

## 2025-03-11 ASSESSMENT — ENCOUNTER SYMPTOMS
COLOR CHANGE: 0
ENDOCRINE NEGATIVE: 1
HYPERTENSION: 1
NAUSEA: 0
DIFFICULTY URINATING: 0
DIARRHEA: 0
ALLERGIC/IMMUNOLOGIC NEGATIVE: 1
APPETITE CHANGE: 1
PALPITATIONS: 0
ABDOMINAL PAIN: 0
COUGH: 0
LIGHT-HEADEDNESS: 0
DIZZINESS: 0
EYES NEGATIVE: 1
SLEEP DISTURBANCE: 0
SHORTNESS OF BREATH: 0
FEVER: 0
MYALGIAS: 0
ARTHRALGIAS: 0
DYSPHORIC MOOD: 0
CONSTIPATION: 1

## 2025-03-11 ASSESSMENT — PATIENT HEALTH QUESTIONNAIRE - PHQ9
2. FEELING DOWN, DEPRESSED OR HOPELESS: NOT AT ALL
SUM OF ALL RESPONSES TO PHQ9 QUESTIONS 1 AND 2: 0
1. LITTLE INTEREST OR PLEASURE IN DOING THINGS: NOT AT ALL

## 2025-03-11 NOTE — PATIENT INSTRUCTIONS

## 2025-03-11 NOTE — ASSESSMENT & PLAN NOTE
using [employer] disposable ear curettes #7219  the cerumen was carefully removed opening a partially/fully occluded ear canal...it  was/was not necessary to irrigate the ear canal at time of procedure.... patient tolerated /found painful procedure... there was clear ear canal at end of procedure and instructions were given

## 2025-03-11 NOTE — PROGRESS NOTES
Subjective   Patient ID: Rosalba Salcedo is a 80 y.o. female who presents for Hypertension.  Ear  is  painful  on   left      and  feels blocked  Hypertension  Pertinent negatives include no chest pain, palpitations or shortness of breath.    Patient is also here for follow-up of hypertension.. Symptoms do include headache   denies..confusion visual disturbance dizziness shortness of breath chest pain syncope or palpitations. Recent blood pressure patient has not been checking...  properly .. By report there is good compliance with treatment. Pertinent medical history includes d Patient ID: Rosalba Salcedo is a 80 y.o. female.    Ear Cerumen Removal    Date/Time: 3/11/2025 2:14 PM    Performed by: Henry Leroy DO  Authorized by: Henry Leroy DO    Consent:     Consent obtained:  Verbal    Risks discussed:  Pain    Alternatives discussed:  No treatment  Universal protocol:     Patient identity confirmed:  Verbally with patient  Procedure details:     Location:  R ear    Procedure type: curette      Procedure outcomes: cerumen removed    Post-procedure details:     Inspection:  Ear canal clear    Hearing quality:  Improved    Procedure completion:  Tolerated  hyperlipidemia     Review of Systems   Constitutional:  Positive for appetite change. Negative for fever.        Increased snacking   HENT: Negative.     Eyes: Negative.    Respiratory:  Negative for cough and shortness of breath.    Cardiovascular:  Negative for chest pain, palpitations and leg swelling.   Gastrointestinal:  Positive for constipation. Negative for abdominal pain, diarrhea and nausea.   Endocrine: Negative.    Genitourinary:  Negative for difficulty urinating.   Musculoskeletal:  Negative for arthralgias and myalgias.   Skin:  Negative for color change, pallor and rash.   Allergic/Immunologic: Negative.    Neurological:  Negative for dizziness and light-headedness.   Psychiatric/Behavioral:  Negative for dysphoric mood, sleep  "disturbance and suicidal ideas.        Objective   /80 (Patient Position: Sitting)   Pulse 72   Temp 37.1 °C (98.7 °F)   Resp 18   Ht 1.626 m (5' 4\")   Wt 89.4 kg (197 lb)   SpO2 97%   BMI 33.81 kg/m²     Physical Exam  Constitutional:       General: She is not in acute distress.     Appearance: Normal appearance. She is obese. She is not ill-appearing, toxic-appearing or diaphoretic.   HENT:      Head: Normocephalic.      Right Ear: External ear normal. There is impacted cerumen.      Left Ear: External ear normal. Tenderness present. There is impacted cerumen.      Ears:      Comments: Wax removed with        ear currette   left     Nose: Nose normal. No congestion or rhinorrhea.      Mouth/Throat:      Mouth: Mucous membranes are moist.      Pharynx: Oropharynx is clear. No oropharyngeal exudate or posterior oropharyngeal erythema.   Eyes:      General:         Right eye: No discharge.         Left eye: No discharge.      Extraocular Movements: Extraocular movements intact.      Conjunctiva/sclera: Conjunctivae normal.      Pupils: Pupils are equal, round, and reactive to light.   Neck:      Vascular: No carotid bruit.   Cardiovascular:      Rate and Rhythm: Normal rate and regular rhythm.      Pulses: Normal pulses.      Heart sounds: Murmur heard.      No friction rub. No gallop.   Pulmonary:      Effort: Pulmonary effort is normal. No respiratory distress.      Breath sounds: Normal breath sounds. No stridor. No wheezing, rhonchi or rales.   Chest:      Chest wall: No tenderness.   Abdominal:      General: Bowel sounds are normal. There is no distension.      Palpations: Abdomen is soft. There is no mass.      Tenderness: There is no abdominal tenderness. There is no guarding or rebound.      Hernia: No hernia is present.   Genitourinary:     General: Normal vulva.      Rectum: Normal.   Musculoskeletal:         General: No swelling or tenderness. Normal range of motion.      Cervical back: Normal " range of motion. No rigidity or tenderness.      Right lower leg: No edema.      Left lower leg: No edema.   Lymphadenopathy:      Cervical: No cervical adenopathy.   Skin:     General: Skin is warm and dry.      Capillary Refill: Capillary refill takes less than 2 seconds.      Coloration: Skin is not jaundiced or pale.      Findings: No bruising, erythema, lesion or rash.   Neurological:      General: No focal deficit present.      Mental Status: She is alert and oriented to person, place, and time. Mental status is at baseline.      Cranial Nerves: No cranial nerve deficit.      Sensory: No sensory deficit.      Motor: No weakness.      Coordination: Coordination normal.   Psychiatric:         Mood and Affect: Mood normal.         Behavior: Behavior normal.         Thought Content: Thought content normal.         Judgment: Judgment normal.         Assessment/Plan   Problem List Items Addressed This Visit             ICD-10-CM    COPD with acute exacerbation (Multi) J44.1    Essential hypertension I10     Take carvedilol  twice    daily   if elevated take 1-1/2 twice daily carvedilol         Hyperlipidemia - Primary E78.5    Hypothyroidism E03.9    Impacted cerumen of right ear H61.21     using bionix disposable ear curettes #6333  the cerumen was carefully removed opening a partially/fully occluded ear canal...it  was/was not necessary to irrigate the ear canal at time of procedure.... patient tolerated /found painful procedure... 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

## 2025-03-27 ENCOUNTER — TELEPHONE (OUTPATIENT)
Dept: ORTHOPEDIC SURGERY | Facility: CLINIC | Age: 80
End: 2025-03-27
Payer: MEDICARE

## 2025-03-27 DIAGNOSIS — M17.11 PRIMARY OSTEOARTHRITIS OF RIGHT KNEE: Primary | ICD-10-CM

## 2025-03-27 NOTE — TELEPHONE ENCOUNTER
WOULD LIKE A PHONE CALL IS HAVING A LOT OF PAIN IN HER KNEE AND SHE IS NOT SURE WHAT SHE SHOULD DO

## 2025-03-27 NOTE — TELEPHONE ENCOUNTER
Patient was called and advised she will need to make an appointment to come in and be seen and evaluated.

## 2025-03-28 ENCOUNTER — OFFICE VISIT (OUTPATIENT)
Dept: ORTHOPEDIC SURGERY | Facility: CLINIC | Age: 80
End: 2025-03-28
Payer: MEDICARE

## 2025-03-28 ENCOUNTER — HOSPITAL ENCOUNTER (OUTPATIENT)
Dept: RADIOLOGY | Facility: CLINIC | Age: 80
Discharge: HOME | End: 2025-03-28
Payer: MEDICARE

## 2025-03-28 DIAGNOSIS — M17.11 PRIMARY OSTEOARTHRITIS OF RIGHT KNEE: Primary | ICD-10-CM

## 2025-03-28 DIAGNOSIS — M17.11 PRIMARY OSTEOARTHRITIS OF RIGHT KNEE: ICD-10-CM

## 2025-03-28 DIAGNOSIS — Z96.652 STATUS POST TOTAL LEFT KNEE REPLACEMENT: ICD-10-CM

## 2025-03-28 PROCEDURE — 99214 OFFICE O/P EST MOD 30 MIN: CPT | Mod: 25 | Performed by: ORTHOPAEDIC SURGERY

## 2025-03-28 PROCEDURE — 73564 X-RAY EXAM KNEE 4 OR MORE: CPT | Mod: 50

## 2025-03-28 PROCEDURE — 2500000004 HC RX 250 GENERAL PHARMACY W/ HCPCS (ALT 636 FOR OP/ED): Performed by: ORTHOPAEDIC SURGERY

## 2025-03-28 PROCEDURE — 20610 DRAIN/INJ JOINT/BURSA W/O US: CPT | Mod: RT | Performed by: ORTHOPAEDIC SURGERY

## 2025-03-28 RX ORDER — BETAMETHASONE SODIUM PHOSPHATE AND BETAMETHASONE ACETATE 3; 3 MG/ML; MG/ML
6 INJECTION, SUSPENSION INTRA-ARTICULAR; INTRALESIONAL; INTRAMUSCULAR; SOFT TISSUE
Status: COMPLETED | OUTPATIENT
Start: 2025-03-28 | End: 2025-03-28

## 2025-03-28 RX ORDER — LIDOCAINE HYDROCHLORIDE 10 MG/ML
1 INJECTION, SOLUTION INFILTRATION; PERINEURAL
Status: COMPLETED | OUTPATIENT
Start: 2025-03-28 | End: 2025-03-28

## 2025-03-28 RX ADMIN — LIDOCAINE HYDROCHLORIDE 1 ML: 10 INJECTION, SOLUTION INFILTRATION; PERINEURAL at 12:31

## 2025-03-28 RX ADMIN — BETAMETHASONE SODIUM PHOSPHATE AND BETAMETHASONE ACETATE 6 MG: 3; 3 INJECTION, SUSPENSION INTRA-ARTICULAR; INTRALESIONAL; INTRAMUSCULAR at 12:31

## 2025-03-28 ASSESSMENT — PAIN SCALES - GENERAL: PAINLEVEL_OUTOF10: 7

## 2025-03-28 ASSESSMENT — PAIN - FUNCTIONAL ASSESSMENT: PAIN_FUNCTIONAL_ASSESSMENT: 0-10

## 2025-03-28 NOTE — PROGRESS NOTES
History of Present Illness   Patient is here for her right knee.  She complains  of 1 week of pain.  She denies any trauma.  She complains of a global pain.  She rates her pain a level 7.  She has pain walking.  She has been using a walker.  She also has a history of a left total knee arthroplasty.  This occasionally bothers her.     Review of Systems   GENERAL: Negative for malaise, significant weight loss, fever  MUSCULOSKELETAL: see HPI  NEURO:  Negative     Past Medical History:   Diagnosis Date    Other bursitis of elbow, left elbow 2020    Bursitis of left elbow    Other specified anxiety disorders 2022    Depression with anxiety    Pain in unspecified elbow 2020    Elbow pain    Personal history of other mental and behavioral disorders 2022    History of depression        Medication Documentation Review Audit       Reviewed by Will Mark MA (Medical Assistant) on 25 at 1148      Medication Order Taking? Sig Documenting Provider Last Dose Status   acetaminophen (Tylenol) 325 mg tablet [id number]  Take 2 tablets (650 mg) by mouth every 6 hours if needed (pain). Rufina Lee PA-C  Active   acetic acid (Vosol) 2 % otic solution [id number]  5 drops 3 times daily in affected ear for itching Henry Leroy DO  Active   alendronate (Fosamax) 70 mg tablet [id number]  Take 1 tablet (70 mg) by mouth every 7 days. Take in the morning with a full glass of water, on an empty stomach, and do not take anything else by mouth or lie down for the next 30 min. Henry Leroy DO  Active   aspirin 81 mg EC tablet [id number]  Take 1 tablet (81 mg) by mouth once daily. Historical Provider, MD  Active   carvedilol (Coreg) 6.25 mg tablet [id number]  Take 1 tablet (6.25 mg) by mouth 2 times daily (morning and late afternoon). Hoang Ho MD  Active   cetirizine (ZyrTEC) 10 mg tablet [id number]  Take 1 tablet (10 mg) by mouth once daily. Henry Leroy DO   24 0485    diclofenac sodium (Voltaren) 1 % gel gel [id number]  APPLY TO AFFECTED FOOT UP TO 3 TIMES DAILY AS NEEDED FOR PAIN Historical Provider, MD  Active   famotidine (Pepcid) 20 mg tablet   Take 1 tablet (20 mg) by mouth 2 times a day. Henry Leroy DO  Active   fluticasone (Flonase) 50 mcg/actuation nasal spray   Administer 1 spray into each nostril once daily as needed for rhinitis or allergies. Henry Leroy DO  Active   furosemide (Lasix) 20 mg tablet   Take one tab daily as needed for leg swelling Henry Leroy DO  Active   levothyroxine (Synthroid, Levoxyl) 25 mcg tablet [id number]  Take 1 tablet (25 mcg) by mouth once daily. Henry Leroy DO  Active   loratadine 10 mg capsule   Take 1 capsule by mouth once daily as needed. Henry Leroy DO  Active   nitroglycerin (Nitrostat) 0.4 mg SL tablet [id number]  Place 1 tablet (0.4 mg) under the tongue every 5 minutes if needed for chest pain. Hoang Ho MD   25 2250   ofloxacin (Floxin) 0.3 % otic solution [id number]  5 drops twice daily for 7 days for infection that is pain in the affected ear Henry Leroy DO  Active   pantoprazole (ProtoNix) 40 mg EC tablet   Take 1 tablet (40 mg) by mouth once daily. Henry Leroy DO  Active   rosuvastatin (Crestor) 40 mg tablet [id number]  TAKE 1 TABLET ONCE EVERY 24HOURS Hoang Ho MD  Active                     Physical Exam  This is a female in no acute distress  Bilateral knees:  She has a well-healed surgical incision on the left.  The skin is intact about the right knee.  The extensor mechanisms are intact  There is no erythema or warmth  There is a mild effusion on the right.  None on the left.  Range of motion: 0-120 degrees on the left, 0-110 degrees on the right  There is a valgus deformity about the right knee that is correctable.  There is tenderness to palpation laterally as well as with patellar compression on the right.  There is no  tenderness to palpation about the left knee  Kenji's was negative stressing the medial and lateral compartments on the right.     Imaging  XR knee 4+ views bilateral  Interpreted By:  Jamar Travis,   STUDY:  XR KNEE 4+ VIEWS BILATERAL; ; 3/28/2025 10:50 am      INDICATION:  Signs/Symptoms:pain.      ACCESSION NUMBER(S):  [id number]      ORDERING CLINICIAN:  JAMAR TRAVIS      FINDINGS:  Bilateral knee films are negative for fracture, dislocation or  destructive lesion. The right knee shows moderate to severe  tricompartmental degenerative arthrosis with loss of joint space and  spurring. There is a valgus deformity. The left knee shows a total  knee arthroplasty in satisfactory position. The patella is well  positioned. No fracture is identified. No obvious loosening or wear  is appreciated.          Signed by: Jamar Travis 3/28/2025 10:57 AM  Dictation workstation:   [id number]       Assessment   Osteoarthrosis right knee  Status post left total knee arthroplasty      Plan  For the right knee I offered a corticosteroid injection today which she wished to try.  I would recommend observation of the left knee  Follow-up as needed  All questions answered    L Inj/Asp: R knee on 3/28/2025 12:31 PM  Indications: pain  Details: 21 G needle, anterolateral approach  Medications: 1 mL lidocaine 10 mg/mL (1 %); 6 mg betamethasone acet,sod phos 6 mg/mL  Outcome: tolerated well, no immediate complications  Consent was given by the patient. Patient was prepped and draped in the usual sterile fashion.

## 2025-04-08 ENCOUNTER — OFFICE VISIT (OUTPATIENT)
Dept: PRIMARY CARE | Facility: CLINIC | Age: 80
End: 2025-04-08
Payer: MEDICARE

## 2025-04-08 VITALS
RESPIRATION RATE: 18 BRPM | HEART RATE: 66 BPM | SYSTOLIC BLOOD PRESSURE: 152 MMHG | DIASTOLIC BLOOD PRESSURE: 83 MMHG | OXYGEN SATURATION: 98 % | BODY MASS INDEX: 33.97 KG/M2 | HEIGHT: 64 IN | WEIGHT: 199 LBS | TEMPERATURE: 97 F

## 2025-04-08 DIAGNOSIS — E78.2 MIXED HYPERLIPIDEMIA: ICD-10-CM

## 2025-04-08 DIAGNOSIS — I73.00 RAYNAUD'S DISEASE WITHOUT GANGRENE: Primary | ICD-10-CM

## 2025-04-08 PROBLEM — I25.10 ATHEROSCLEROSIS OF CORONARY ARTERY WITHOUT ANGINA PECTORIS: Status: ACTIVE | Noted: 2024-03-13

## 2025-04-08 PROCEDURE — 99212 OFFICE O/P EST SF 10 MIN: CPT | Performed by: FAMILY MEDICINE

## 2025-04-08 PROCEDURE — G2211 COMPLEX E/M VISIT ADD ON: HCPCS | Performed by: FAMILY MEDICINE

## 2025-04-08 PROCEDURE — 1036F TOBACCO NON-USER: CPT | Performed by: FAMILY MEDICINE

## 2025-04-08 PROCEDURE — 1159F MED LIST DOCD IN RCRD: CPT | Performed by: FAMILY MEDICINE

## 2025-04-08 PROCEDURE — 3077F SYST BP >= 140 MM HG: CPT | Performed by: FAMILY MEDICINE

## 2025-04-08 PROCEDURE — 3079F DIAST BP 80-89 MM HG: CPT | Performed by: FAMILY MEDICINE

## 2025-04-08 PROCEDURE — 1123F ACP DISCUSS/DSCN MKR DOCD: CPT | Performed by: FAMILY MEDICINE

## 2025-04-08 NOTE — PROGRESS NOTES
Subjective   Patient ID: Rosalba Salcedo is a 80 y.o. female who presents for feet discolored.  HPI  Notices blue and cold    Denies  claudication  Just  feel   cold and  looks  blue    Review of Systems  All other  pertinent  systems reviewed and are negative except  those  mentioned  in HPI   Objective   Physical Exam  general: alert oriented x three  HEENT hearing normal to voice  Neck supple  Lungs respirations non-labored.  Cardiovascular: no peripheral edema  Skin: warm and dry without rash  Psych: judgement and insight normal  Musculoskeletal:  ambulation normal,   feet   with good  pulses  lower extremity   Cold to touch     lymph:negative cervical  LYMPADENOPATHY  thyroid: non palpable enlargement   Labs        Assessment/Plan   Problem List Items Addressed This Visit       Raynaud's disease without gangrene - Primary     Remi  feet warm  Wool socks  If claudication  lets  get   pvr

## 2025-04-08 NOTE — PATIENT INSTRUCTIONS
Exacerbation of peripheral artery disease -- Initial studies with nonselective beta blockers (eg, propranolol) in patients with severe peripheral artery disease described a variety of complications including worsening claudication, cold extremities, absent pulses, and, in some cases, cyanosis and impending gangrene [17]. Raynaud's phenomenon can also be a manifestation of nonselective beta blockade [18,19]. It was thought that both the reduction in cardiac output and blockade of beta-2-receptor-mediated vasodilation of vessels within skeletal muscle contribute to the vascular insufficiency [20]. Beta blockers with beta-1 selectivity or PHAN do not affect the peripheral vessels to the same degree as the nonselective drugs.

## 2025-04-09 RX ORDER — ROSUVASTATIN CALCIUM 40 MG/1
TABLET, COATED ORAL
Qty: 90 TABLET | Refills: 1 | Status: SHIPPED | OUTPATIENT
Start: 2025-04-09

## 2025-05-02 ENCOUNTER — APPOINTMENT (OUTPATIENT)
Dept: PRIMARY CARE | Facility: CLINIC | Age: 80
End: 2025-05-02
Payer: MEDICARE

## 2025-05-02 ENCOUNTER — TELEPHONE (OUTPATIENT)
Dept: PRIMARY CARE | Facility: CLINIC | Age: 80
End: 2025-05-02
Payer: MEDICARE

## 2025-05-02 DIAGNOSIS — L71.9 ACNE ROSACEA: Primary | ICD-10-CM

## 2025-05-02 RX ORDER — METRONIDAZOLE 7.5 MG/G
CREAM TOPICAL 2 TIMES DAILY
Qty: 45 G | Refills: 11 | Status: SHIPPED | OUTPATIENT
Start: 2025-05-02 | End: 2026-05-02

## 2025-05-02 NOTE — TELEPHONE ENCOUNTER
Pt has a rash on face and and is looking to see what you would like her to do. I offered for her to come in next week but she does not want to come in. Please advise

## 2025-05-05 NOTE — HOME HEALTH
Patient underwent robotic prostatectomy at [facility] on March 28, 2025.  Getting better and improving.  Orders:  •  Comprehensive metabolic panel; Future  •  CBC and differential; Future   reports compliance with hep. is walking with a combination of cane, walker. uses rollator if exits house and walks on sidewalk down the street, cane indoors. to see dr degroot in may, dr bergeron in june.

## 2025-05-20 ENCOUNTER — APPOINTMENT (OUTPATIENT)
Dept: CARDIOLOGY | Facility: CLINIC | Age: 80
End: 2025-05-20
Payer: MEDICARE

## 2025-05-20 VITALS
WEIGHT: 201.4 LBS | SYSTOLIC BLOOD PRESSURE: 124 MMHG | HEART RATE: 65 BPM | DIASTOLIC BLOOD PRESSURE: 66 MMHG | HEIGHT: 64 IN | BODY MASS INDEX: 34.38 KG/M2

## 2025-05-20 DIAGNOSIS — I25.10 CORONARY ARTERY DISEASE INVOLVING NATIVE HEART WITHOUT ANGINA PECTORIS, UNSPECIFIED VESSEL OR LESION TYPE: Primary | ICD-10-CM

## 2025-05-20 DIAGNOSIS — I10 PRIMARY HYPERTENSION: ICD-10-CM

## 2025-05-20 DIAGNOSIS — E78.2 MIXED HYPERLIPIDEMIA: ICD-10-CM

## 2025-05-20 PROCEDURE — 1036F TOBACCO NON-USER: CPT | Performed by: INTERNAL MEDICINE

## 2025-05-20 PROCEDURE — 3074F SYST BP LT 130 MM HG: CPT | Performed by: INTERNAL MEDICINE

## 2025-05-20 PROCEDURE — 99213 OFFICE O/P EST LOW 20 MIN: CPT | Performed by: INTERNAL MEDICINE

## 2025-05-20 PROCEDURE — 1159F MED LIST DOCD IN RCRD: CPT | Performed by: INTERNAL MEDICINE

## 2025-05-20 PROCEDURE — 3078F DIAST BP <80 MM HG: CPT | Performed by: INTERNAL MEDICINE

## 2025-05-20 PROCEDURE — G2211 COMPLEX E/M VISIT ADD ON: HCPCS | Performed by: INTERNAL MEDICINE

## 2025-05-20 RX ORDER — CARVEDILOL 6.25 MG/1
6.25 TABLET ORAL
Qty: 180 TABLET | Refills: 3 | Status: SHIPPED | OUTPATIENT
Start: 2025-05-20

## 2025-05-20 RX ORDER — ROSUVASTATIN CALCIUM 40 MG/1
TABLET, COATED ORAL
Qty: 90 TABLET | Refills: 3 | Status: SHIPPED | OUTPATIENT
Start: 2025-05-20

## 2025-05-20 RX ORDER — NITROGLYCERIN 0.4 MG/1
0.4 TABLET SUBLINGUAL EVERY 5 MIN PRN
Qty: 25 TABLET | Refills: 5 | Status: SHIPPED | OUTPATIENT
Start: 2025-05-20 | End: 2026-05-20

## 2025-05-20 NOTE — PROGRESS NOTES
Patient:  Rosalba Salcedo  YOB: 1945  MRN: [id number]       Impression/Plan:     Diagnoses and all orders for this visit:  Coronary artery disease involving native heart without angina pectoris, unspecified vessel or lesion type  -     No angina normal functional capacity for age  -     carvedilol (Coreg) 6.25 mg tablet; Take 1 tablet (6.25 mg) by mouth 2 times daily (morning and late afternoon).  -     nitroglycerin (Nitrostat) 0.4 mg SL tablet; Place 1 tablet (0.4 mg) under the tongue every 5 minutes if needed for chest pain.  Primary hypertension  -     Normal blood pressure  -     carvedilol (Coreg) 6.25 mg tablet; Take 1 tablet (6.25 mg) by mouth 2 times daily (morning and late afternoon).  Mixed hyperlipidemia  -     rosuvastatin (Crestor) 40 mg tablet; TAKE 1 TABLET ONCE EVERY 24HOURS  -     Excellent control of lipids without side effect continue same      Chief Complaint/Active Symptoms:      Chief Complaint   Patient presents with    Follow-up     Presents today for follow up of HTN and CAD         Rosalba Salcedo is a 80 y.o. female who presents with coronary artery disease with coronary bypass graft surgery 2009 (L-LAD, 2 vein grafts).  Normal Lexiscan perfusion with 75% ejection fraction 10/5/2020.  Also history hypertension, hyperlipidemia, esophageal reflux and osteoarthritis.    October 2023 extensive lumbar spine surgery/March 2024 right total hip replacement both uncomplicated from a cardiovascular standpoint    9/30/2024 underwent posterior cervical laminoplasty C3-C7 with globus canopy laminoplasty system.  No cardiac complications described.     I had seen her last 11/19/2024 blood pressure was excessively controlled amlodipine was discontinued.  Furthermore she was having lower extremity edema which was problematic.  She had no angina despite multiple stressors prior to that including several surgeries and chronic pain in her neck.  Tolerating rosuvastatin well for  management of her hyperlipidemia    November 2024 magnesium normal, CMP unremarkable with creatinine 1.08 normal liver function, cholesterol 145 HDL 56 LDL 63    She denies angina, dyspnea, palpitation, edema, lightheadedness or syncope.  She has had no symptoms of claudication or neurologic deterioration.  There have been no hospitalizations or emergency room visits since last office visit.    She complains of some postop pain particularly in her neck but much better than before surgery.  She has no cardiovascular issues at this time without chest tightness or pressure she is not particular short of breath cholesterol has been quite well-controlled on rosuvastatin without side effects thereof.               Review of Systems: Unremarkable except as noted above    Meds     Current Outpatient Medications   Medication Instructions    acetaminophen (TYLENOL) 650 mg, oral, Every 6 hours PRN    acetic acid (Vosol) 2 % otic solution 5 drops 3 times daily in affected ear for itching    alendronate (FOSAMAX) 70 mg, oral, Every 7 days, Take in the morning with a full glass of water, on an empty stomach, and do not take anything else by mouth or lie down for the next 30 min.    aspirin 81 mg, Daily    carvedilol (COREG) 6.25 mg, oral, 2 times daily (morning and late afternoon)    cetirizine (ZYRTEC) 10 mg, oral, Daily    diclofenac sodium (Voltaren) 1 % gel gel APPLY TO AFFECTED FOOT UP TO 3 TIMES DAILY AS NEEDED FOR PAIN    famotidine (PEPCID) 20 mg, oral, 2 times daily    fluticasone (Flonase) 50 mcg/actuation nasal spray 1 spray, Each Nostril, Daily PRN    furosemide (Lasix) 20 mg tablet Take one tab daily as needed for leg swelling    levothyroxine (SYNTHROID, LEVOXYL) 25 mcg, oral, Daily    loratadine 10 mg capsule Take 1 capsule by mouth once daily as needed.    metroNIDAZOLE (Metrocream) 0.75 % cream Topical, 2 times daily, Apply twice daily to rosacea    nitroglycerin (NITROSTAT) 0.4 mg, sublingual, Every 5 min PRN     ofloxacin (Floxin) 0.3 % otic solution 5 drops, Each Ear, 2 times daily, 5 drops twice daily for 7 days for infection that is pain in the affected ear    pantoprazole (PROTONIX) 40 mg, oral, Daily    rosuvastatin (Crestor) 40 mg tablet TAKE 1 TABLET ONCE EVERY 24HOURS        Allergies   Allergies[1]      Annotated Problems     Specialty Problems          Cardiology Problems    CAD (coronary artery disease)    2020 Normal Lexiscan perfusion with 75% EF   2009 CABG (L-LAD, 2 vein grafts)         Carotid bruit    Easy bruising    Essential hypertension    Hyperlipidemia    Right bundle branch block (RBBB)    S/P CABG (coronary artery bypass graft)     coronary bypass graft surgery 2009 (L-LAD, 2 vein grafts)         Atherosclerosis of coronary artery without angina pectoris    Raynaud's disease without gangrene        Problem List     Patient Active Problem List    Diagnosis Date Noted    Raynaud's disease without gangrene 04/08/2025    Obesity (BMI 30.0-34.9) 03/11/2025    Bilateral sensorineural hearing loss 01/06/2025    Acute reactive otitis externa of right ear 12/03/2024    Tinnitus of both ears 10/31/2024    Encounter for preoperative assessment 09/12/2024    Cervical spinal stenosis 09/09/2024    Weakness of right lower extremity 05/23/2024    Hearing loss of right ear 04/10/2024    Anxiety 04/10/2024    Atherosclerosis of coronary artery without angina pectoris 03/13/2024    PONV (postoperative nausea and vomiting) 03/12/2024    S/P total right hip arthroplasty 03/11/2024    Cup to disc asymmetry, bilateral 03/01/2024    Other specified diseases of spinal cord 02/27/2024    Stage 3a chronic kidney disease (Multi) 02/27/2024    Avascular necrosis of bone of hip, right (Multi) 02/27/2024    Back pain of lumbar region with sciatica 10/17/2023    Hx of degenerative disc disease 09/05/2023    Status post total left knee replacement 09/05/2023    Pain in joint, lower leg 09/05/2023    Hip pain, left 09/05/2023     Back pain 09/05/2023    Knee osteoarthritis 09/05/2023    Class 2 obesity with body mass index (BMI) of 35.0 to 35.9 in adult 09/05/2023    S/P CABG (coronary artery bypass graft) 09/05/2023    History of traumatic fracture 09/05/2023    Abnormal mammogram 05/22/2023    Allergic rhinitis 05/22/2023    Anemia 05/22/2023    Angioedema 05/22/2023    Ankle pain 05/22/2023    Foot pain 05/22/2023    Ankle strain 05/22/2023    Acute bronchitis 05/22/2023    Arthritis 05/22/2023    Bronchitis 05/22/2023    COPD with acute exacerbation (Multi) 05/22/2023    Aseptic loosening of prosthetic knee (CMS-HCC) 05/22/2023    Osteoarthritis of knee 05/22/2023    CAD (coronary artery disease) 05/22/2023    Carotid bruit 05/22/2023    Abdominal pain 05/22/2023    Constipation 05/22/2023    COVID-19 05/22/2023    Dizziness 05/22/2023    Dry skin 05/22/2023    Limb pain 05/22/2023    Easy bruising 05/22/2023    Localized edema 05/22/2023    Erythrasma 05/22/2023    Essential hypertension 05/22/2023    External hemorrhoid 05/22/2023    Other fatigue 05/22/2023    Food allergy 05/22/2023    Foot sprain 05/22/2023    Fracture of radius, distal, closed 05/22/2023    Fracture of unspecified part of neck of right femur, subsequent encounter for closed fracture with routine healing 05/22/2023    GERD (gastroesophageal reflux disease) 05/22/2023    Hair loss 05/22/2023    Hematuria 05/22/2023    Hip bursitis, left 05/22/2023    Hip fracture, right 05/22/2023    Post-traumatic osteoarthritis of right hip 05/22/2023    Hyperlipidemia 05/22/2023    Hypothyroidism 05/22/2023    Impacted cerumen of right ear 05/22/2023    Impaired gait and mobility 05/22/2023    Influenza A 05/22/2023    Keloid 05/22/2023    Left knee pain 05/22/2023    Leg edema, left 05/22/2023    Low back pain 05/22/2023    Lumbar radicular pain 05/22/2023    Lumbar sprain 05/22/2023    Lung nodule 05/22/2023    Menopausal osteoporosis 05/22/2023    Muscle spasms of lower  extremity 05/22/2023    Olecranon bursitis of right elbow 05/22/2023    Olecranon bursitis 05/22/2023    Osteopenia 05/22/2023    Post-op pain 05/22/2023    Renal insufficiency 05/22/2023    Restless legs syndrome 05/22/2023    Rib pain on left side 05/22/2023    Right bundle branch block (RBBB) 05/22/2023    Iliotibial band syndrome of right side 05/22/2023    Right hip pain 05/22/2023    Rosacea 05/22/2023    Scar 05/22/2023    Sinobronchitis 05/22/2023    Sinusitis 05/22/2023    Shoulder impingement 05/22/2023    Tibial tendinitis, posterior 05/22/2023    Trochanteric bursitis of right hip 05/22/2023    Urinary incontinence 05/22/2023    Urinary tract infection 05/22/2023    Leukocytes in urine 05/22/2023    Urine ketones 05/22/2023    Benign neoplasm of sigmoid colon 05/22/2023    Diabetes mellitus without complication 05/22/2023    Diverticulosis of large intestine without diverticulitis 05/22/2023    Dysphagia 05/22/2023    Gastric polyp 05/22/2023    Vaginal vault prolapse 05/22/2023    Vitamin D deficiency 05/22/2023    Muscle weakness (generalized) 08/26/2022    Major depressive disorder, recurrent, unspecified 08/26/2022    Difficulty in walking, not elsewhere classified 08/26/2022    Abnormal posture 08/26/2022    Depressive disorder 08/26/2022    Closed displaced fracture of right femoral neck 12/09/2021    Vestibular hypofunction, bilateral 06/11/2019    Chronic nonintractable headache 06/11/2019    Cervicalgia 06/11/2019    Ataxic gait 06/11/2019    Urge incontinence of urine 05/14/2019    Ataxia 05/04/2019    [place] grade 2 rectocele 01/07/2019    OAB (overactive bladder) 12/18/2018    Vaginal enterocele 12/18/2018    Vaginal irritation 12/18/2018    Presence of artificial knee joint, bilateral 01/29/2018    Pseudophakia of both eyes 01/22/2016    Dry eye syndrome, bilateral 12/21/2015    Dermatochalasis of both eyelids 12/21/2015       Objective:     Vitals:    05/20/25 1302   BP: 124/66   BP  "Location: Left arm   Patient Position: Sitting   Pulse: 65   Weight: 91.4 kg (201 lb 6.4 oz)   Height: 1.626 m (5' 4\")      Wt Readings from Last 4 Encounters:   05/20/25 91.4 kg (201 lb 6.4 oz)   04/08/25 90.3 kg (199 lb)   03/11/25 89.4 kg (197 lb)   12/31/24 90.4 kg (199 lb 6.4 oz)           LAB:     Lab Results   Component Value Date    WBC 7.6 11/12/2024    HGB 12.1 11/12/2024    HCT 38.0 11/12/2024     11/12/2024    CHOL 145 11/12/2024    TRIG 131 11/12/2024    HDL 56.2 11/12/2024    ALT 12 11/12/2024    AST 14 11/12/2024     11/12/2024    K 4.3 11/12/2024     (H) 11/12/2024    CREATININE 1.08 (H) 11/12/2024    BUN 14 11/12/2024    CO2 25 11/12/2024    TSH 1.95 06/10/2024    INR 1.0 10/06/2023    HGBA1C 6.0 09/09/2024         Physical Exam     General Appearance: alert and oriented to person, place and time, in no acute distress  Cardiovascular: normal rate, regular rhythm, normal S1 and S2, no murmurs, rubs, clicks, or gallops,  no JVD  Pulmonary/Chest: clear to auscultation bilaterally- no wheezes, rales or rhonchi, normal air movement, no respiratory distress  Abdomen: soft, non-tender, non-distended, normal bowel sounds, no masses   Extremities: no cyanosis, clubbing or edema  Skin: warm and dry, no rash or erythema  Eyes: EOMI  Neck: supple and non-tender without mass, no thyromegaly   Neurological: alert, oriented, normal speech, no focal findings or movement disorder noted      Scribe Attestation  By signing my name below, Laura ALBA CMA   , Scribe   attest that this documentation has been prepared under the direction and in the presence of Hoang Ho MD.    Provider attestation-scribe documentation  Any medical record entries made by the scribe were at my discretion and personally dictated by me.  I have reviewed the chart and agree that the record accurately reflects my personal performance of the history, physical exam, discussion and plan.                 [1] "   Allergies  Allergen Reactions    Codeine Unknown    Morphine Hives, Itching, Rash and Unknown     Drowsiness with Percocet and Vicodin

## 2025-05-20 NOTE — PATIENT INSTRUCTIONS
Please bring all medicines, vitamins, and herbal supplements with you in original bottles to every appointment! This is the best way to ensure your medication list in your chart is accurate.    Prescriptions will not be filled unless you are compliant with your follow up appointments or have a follow up appointment scheduled as per instruction of your physician. Refills should be requested at the time of your visit.    DID YOU KNOW  We have a pharmacy here in the [facility].  They can fill all prescriptions, not just cardiac medications.  Prescriptions from other pharmacies can easily be transferred to the  pharmacy by the  pharmacist on site.   pharmacies offer FREE HOME DELIVERY on medications to anywhere in [place]. They can sync your medications. Typically prescriptions can be ready in 10 - 15 minutes. If pharmacy is unable to fill your  prescription or if cost is more than your paying now the Pharmacist can easily transfer back to your Pharmacy of choice. Pharmacy phone # [phone].

## 2025-05-28 ENCOUNTER — HOSPITAL ENCOUNTER (OUTPATIENT)
Dept: RADIOLOGY | Facility: HOSPITAL | Age: 80
Discharge: HOME | End: 2025-05-28
Payer: MEDICARE

## 2025-05-28 ENCOUNTER — TELEPHONE (OUTPATIENT)
Dept: PRIMARY CARE | Facility: CLINIC | Age: 80
End: 2025-05-28

## 2025-05-28 ENCOUNTER — OFFICE VISIT (OUTPATIENT)
Dept: ORTHOPEDIC SURGERY | Facility: CLINIC | Age: 80
End: 2025-05-28
Payer: MEDICARE

## 2025-05-28 DIAGNOSIS — M54.16 LUMBAR RADICULOPATHY: Primary | ICD-10-CM

## 2025-05-28 DIAGNOSIS — M62.830 BACK MUSCLE SPASM: ICD-10-CM

## 2025-05-28 DIAGNOSIS — M54.16 LUMBAR RADICULOPATHY: ICD-10-CM

## 2025-05-28 PROCEDURE — 72100 X-RAY EXAM L-S SPINE 2/3 VWS: CPT | Performed by: RADIOLOGY

## 2025-05-28 PROCEDURE — 99213 OFFICE O/P EST LOW 20 MIN: CPT | Performed by: PHYSICIAN ASSISTANT

## 2025-05-28 PROCEDURE — 72100 X-RAY EXAM L-S SPINE 2/3 VWS: CPT

## 2025-05-28 RX ORDER — METHOCARBAMOL 750 MG/1
750 TABLET, FILM COATED ORAL 3 TIMES DAILY
Qty: 30 TABLET | Refills: 0 | Status: SHIPPED | OUTPATIENT
Start: 2025-05-28 | End: 2025-06-07

## 2025-05-28 RX ORDER — PREDNISONE 10 MG/1
TABLET ORAL
Qty: 30 TABLET | Refills: 0 | Status: SHIPPED | OUTPATIENT
Start: 2025-05-28

## 2025-05-28 NOTE — TELEPHONE ENCOUNTER
"Rx Refill Request Telephone Encounter: Patient states her allergies are starting to \"act-up\" and requesting this \"nose-spray\" to be sent to local pharmacy. Please assist. Thanks!    Name:  Rosalba Salcedo  :  [id number]  Medication Name:  fluticasone (Flonase) 50 mcg/actuation nasal spray   Dose: 1 spray   Route: Each Nostril   Frequency: Daily PRN for rhinitis, allergies   Dispense Quantity: 16 g     Refills: 3     Specific Pharmacy location:  Pharmacy    [facility] - [place], [place] - [address]   [address], [facility] [address]  Phone: [phone]  Fax: [phone]  [place] #: --     Date of last appointment:  25  Date of next appointment:  6/10/25  Best number to reach patient:   [phone]             "

## 2025-05-28 NOTE — PROGRESS NOTES
Established patient to the practice.  Had surgery with Dr. Arizmendi said she was lifting something Saturday and felt a pop in her back.  Denies bowel or bladder complaints saddle anesthesia gait or balance changes.  Denies any recent treatment.    Physical exam: Well-nourished, well kept.  No lymphangitis or lymphadenopathy in the examined extremities.  Patient walking with antalgic gait.  Decreased range of motion flexion-extension rotation lumbar spine tender good strength no instability moving lower extremities by any major difficulty motor strength intact no redness, abrasions, or lesions on the lower extremities bilaterally.  Patient neurologically intact    X-ray: X-ray lumbar spine taken today AP lateral show good placement of instrumentation no loose or migration of any hardware or screws.  Good bony alignment compared to prior x-rays report reviewed as well I do not see much change.    Assessment: This a patient with basically lumbar sprain strain.  After picking something up over the weekend.  Told her we can treat this conservatively with some medication.    Plan: I will send some steroid muscle relaxant to her pharmacy.  She will follow-up with us as needed.

## 2025-05-30 ENCOUNTER — TELEPHONE (OUTPATIENT)
Dept: PRIMARY CARE | Facility: CLINIC | Age: 80
End: 2025-05-30
Payer: MEDICARE

## 2025-05-30 DIAGNOSIS — K21.9 GASTRO-ESOPHAGEAL REFLUX DISEASE WITHOUT ESOPHAGITIS: ICD-10-CM

## 2025-05-30 RX ORDER — PANTOPRAZOLE SODIUM 40 MG/1
40 TABLET, DELAYED RELEASE ORAL DAILY
Qty: 90 TABLET | Refills: 1 | Status: SHIPPED | OUTPATIENT
Start: 2025-05-30

## 2025-05-31 ENCOUNTER — APPOINTMENT (OUTPATIENT)
Dept: CT IMAGING | Age: 80
End: 2025-05-31
Payer: MEDICARE

## 2025-05-31 ENCOUNTER — APPOINTMENT (OUTPATIENT)
Dept: GENERAL RADIOLOGY | Age: 80
End: 2025-05-31
Payer: MEDICARE

## 2025-05-31 ENCOUNTER — HOSPITAL ENCOUNTER (EMERGENCY)
Age: 80
Discharge: HOME | End: 2025-05-31
Payer: MEDICARE

## 2025-05-31 VITALS
HEART RATE: 63 BPM | RESPIRATION RATE: 22 BRPM | HEIGHT: 64 IN | OXYGEN SATURATION: 100 % | DIASTOLIC BLOOD PRESSURE: 71 MMHG | WEIGHT: 189.6 LBS | TEMPERATURE: 97.9 F | SYSTOLIC BLOOD PRESSURE: 142 MMHG | BODY MASS INDEX: 32.37 KG/M2

## 2025-05-31 DIAGNOSIS — R07.9 CHEST PAIN, UNSPECIFIED TYPE: Primary | ICD-10-CM

## 2025-05-31 DIAGNOSIS — R51.9 ACUTE NONINTRACTABLE HEADACHE, UNSPECIFIED HEADACHE TYPE: ICD-10-CM

## 2025-05-31 LAB
ALBUMIN SERPL-MCNC: 3.9 G/DL (ref 3.5–4.6)
ALP SERPL-CCNC: 66 U/L (ref 40–130)
ALT SERPL-CCNC: 11 U/L (ref 0–33)
ANION GAP SERPL CALCULATED.3IONS-SCNC: 14 MEQ/L (ref 9–15)
AST SERPL-CCNC: 18 U/L (ref 0–35)
BASOPHILS # BLD: 0.1 K/UL (ref 0–0.2)
BASOPHILS NFR BLD: 0.7 %
BILIRUB SERPL-MCNC: 0.6 MG/DL (ref 0.2–0.7)
BUN SERPL-MCNC: 29 MG/DL (ref 8–23)
CALCIUM SERPL-MCNC: 9.2 MG/DL (ref 8.5–9.9)
CHLORIDE SERPL-SCNC: 104 MEQ/L (ref 95–107)
CO2 SERPL-SCNC: 20 MEQ/L (ref 20–31)
CREAT SERPL-MCNC: 1.36 MG/DL (ref 0.5–0.9)
EKG ATRIAL RATE: 55 BPM
EKG ATRIAL RATE: 57 BPM
EKG DIAGNOSIS: NORMAL
EKG DIAGNOSIS: NORMAL
EKG P AXIS: 112 DEGREES
EKG P AXIS: 38 DEGREES
EKG P-R INTERVAL: 174 MS
EKG P-R INTERVAL: 198 MS
EKG Q-T INTERVAL: 466 MS
EKG Q-T INTERVAL: 496 MS
EKG QRS DURATION: 132 MS
EKG QRS DURATION: 140 MS
EKG QTC CALCULATION (BAZETT): 453 MS
EKG QTC CALCULATION (BAZETT): 474 MS
EKG R AXIS: -17 DEGREES
EKG R AXIS: -17 DEGREES
EKG T AXIS: 10 DEGREES
EKG T AXIS: 7 DEGREES
EKG VENTRICULAR RATE: 55 BPM
EKG VENTRICULAR RATE: 57 BPM
EOSINOPHIL # BLD: 0 K/UL (ref 0–0.7)
EOSINOPHIL NFR BLD: 0.1 %
ERYTHROCYTE [DISTWIDTH] IN BLOOD BY AUTOMATED COUNT: 15.9 % (ref 11.5–14.5)
GLOBULIN SER CALC-MCNC: 3 G/DL (ref 2.3–3.5)
GLUCOSE SERPL-MCNC: 97 MG/DL (ref 70–99)
HCT VFR BLD AUTO: 38.6 % (ref 37–47)
HGB BLD-MCNC: 12.4 G/DL (ref 12–16)
LIPASE SERPL-CCNC: 32 U/L (ref 12–95)
LYMPHOCYTES # BLD: 1.8 K/UL (ref 1–4.8)
LYMPHOCYTES NFR BLD: 22.1 %
MAGNESIUM SERPL-MCNC: 2.3 MG/DL (ref 1.7–2.4)
MCH RBC QN AUTO: 27.3 PG (ref 27–31.3)
MCHC RBC AUTO-ENTMCNC: 32.1 % (ref 33–37)
MCV RBC AUTO: 84.8 FL (ref 79.4–94.8)
MONOCYTES # BLD: 0.9 K/UL (ref 0.2–0.8)
MONOCYTES NFR BLD: 10.9 %
NEUTROPHILS # BLD: 5.4 K/UL (ref 1.4–6.5)
NEUTS SEG NFR BLD: 65.6 %
PLATELET # BLD AUTO: 229 K/UL (ref 130–400)
POTASSIUM SERPL-SCNC: 3.7 MEQ/L (ref 3.4–4.9)
PROT SERPL-MCNC: 6.9 G/DL (ref 6.3–8)
RBC # BLD AUTO: 4.55 M/UL (ref 4.2–5.4)
SODIUM SERPL-SCNC: 138 MEQ/L (ref 135–144)
TROPONIN, HIGH SENSITIVITY: 23 NG/L (ref 0–19)
TROPONIN, HIGH SENSITIVITY: 27 NG/L (ref 0–19)
TROPONIN, HIGH SENSITIVITY: 28 NG/L (ref 0–19)
TROPONIN, HIGH SENSITIVITY: 30 NG/L (ref 0–19)
WBC # BLD AUTO: 8.2 K/UL (ref 4.8–10.8)

## 2025-05-31 PROCEDURE — 6370000000 HC RX 637 (ALT 250 FOR IP)

## 2025-05-31 PROCEDURE — 83690 ASSAY OF LIPASE: CPT

## 2025-05-31 PROCEDURE — 71045 X-RAY EXAM CHEST 1 VIEW: CPT

## 2025-05-31 PROCEDURE — 96361 HYDRATE IV INFUSION ADD-ON: CPT

## 2025-05-31 PROCEDURE — 70450 CT HEAD/BRAIN W/O DYE: CPT

## 2025-05-31 PROCEDURE — 84484 ASSAY OF TROPONIN QUANT: CPT

## 2025-05-31 PROCEDURE — 83735 ASSAY OF MAGNESIUM: CPT

## 2025-05-31 PROCEDURE — 85025 COMPLETE CBC W/AUTO DIFF WBC: CPT

## 2025-05-31 PROCEDURE — 96360 HYDRATION IV INFUSION INIT: CPT

## 2025-05-31 PROCEDURE — 2580000003 HC RX 258

## 2025-05-31 PROCEDURE — 80053 COMPREHEN METABOLIC PANEL: CPT

## 2025-05-31 PROCEDURE — 93005 ELECTROCARDIOGRAM TRACING: CPT

## 2025-05-31 PROCEDURE — 99285 EMERGENCY DEPT VISIT HI MDM: CPT

## 2025-05-31 PROCEDURE — 36415 COLL VENOUS BLD VENIPUNCTURE: CPT

## 2025-05-31 RX ORDER — METHOCARBAMOL 500 MG/1
750 TABLET, FILM COATED ORAL ONCE
Status: COMPLETED | OUTPATIENT
Start: 2025-05-31 | End: 2025-05-31

## 2025-05-31 RX ORDER — 0.9 % SODIUM CHLORIDE 0.9 %
1000 INTRAVENOUS SOLUTION INTRAVENOUS ONCE
Status: COMPLETED | OUTPATIENT
Start: 2025-05-31 | End: 2025-05-31

## 2025-05-31 RX ORDER — ACETAMINOPHEN 500 MG
1000 TABLET ORAL ONCE
Status: COMPLETED | OUTPATIENT
Start: 2025-05-31 | End: 2025-05-31

## 2025-05-31 RX ADMIN — SODIUM CHLORIDE 1000 ML: 0.9 INJECTION, SOLUTION INTRAVENOUS at 10:20

## 2025-05-31 RX ADMIN — METHOCARBAMOL 750 MG: 500 TABLET ORAL at 13:46

## 2025-05-31 RX ADMIN — ACETAMINOPHEN 1000 MG: 500 TABLET ORAL at 09:41

## 2025-05-31 ASSESSMENT — PAIN DESCRIPTION - LOCATION: LOCATION: HEAD

## 2025-05-31 ASSESSMENT — HEART SCORE: ECG: NORMAL

## 2025-05-31 ASSESSMENT — LIFESTYLE VARIABLES
HOW OFTEN DO YOU HAVE A DRINK CONTAINING ALCOHOL: NEVER
HOW MANY STANDARD DRINKS CONTAINING ALCOHOL DO YOU HAVE ON A TYPICAL DAY: PATIENT DOES NOT DRINK

## 2025-05-31 ASSESSMENT — PAIN DESCRIPTION - DESCRIPTORS: DESCRIPTORS: ACHING

## 2025-05-31 ASSESSMENT — PAIN SCALES - GENERAL
PAINLEVEL_OUTOF10: 0
PAINLEVEL_OUTOF10: 7

## 2025-05-31 ASSESSMENT — PAIN - FUNCTIONAL ASSESSMENT: PAIN_FUNCTIONAL_ASSESSMENT: NONE - DENIES PAIN

## 2025-05-31 NOTE — ED TRIAGE NOTES
Patient presents via EMS from home for complaints of chest pain when she woke up this morning. Patient reports she stayed in bed and rubbed her chest while she decided what to do. Patient then decided to come to ED. Patient denies chest pain on arrival. Patient is very cheerful and talkative on arrival. Skin pink, warm, and dry. Respirations equal and unlabored. No distress noted on arrival. Patient denies shortness of breath, diaphoresis. She complains of a headache now, EMS gave her two baby aspirin.

## 2025-05-31 NOTE — ED NOTES
Discharge education reviewed.  Patient instructed to follow up with cardiologist and come back to the ED with any new or worsening symptoms.   No questions or concerns at this time.  Patient is ambulatory out of the ED.

## 2025-05-31 NOTE — ED NOTES
This nurse explained the need for 4th troponin blood draw.   The patient was given lunch tray and warm blankets.

## 2025-05-31 NOTE — ED PROVIDER NOTES
[facility] EMERGENCY DEPARTMENT  EMERGENCY DEPARTMENT ENCOUNTER      Pt Name: Jasmyn Lockhart  MRN: [id number]  Birthdate 1945  Date of evaluation: 5/31/2025  Provider: MORGAN Poe  Note Started: 5/31/25 9:04 AM EDT    CHIEF COMPLAINT       Chief Complaint   Patient presents with    Chest Pain     Chest pain when waking up this morning, denies chest pain now         HISTORY OF PRESENT ILLNESS   (Location/Symptom, Timing/Onset, Context/Setting, Quality, Duration, Modifying Factors, Severity)  Note limiting factors.   Jasmyn Lockhart is a 80 y.o. female whom per chart review has a PMHx of CAD s/p CABG x 3, GERD, hyperlipidemia, GI bleed, hypertension, obesity, RBBB, RLS, vitamin D deficiency presents to ED via EMS for evaluation of chest pain.  Patient states that when she awoke this a.m. she had pain beneath her R breast.  Patient is also noting headache, dizziness.  Denies a spinning sensation.  States that she is unable to describe the dizziness.  Denies thunderclap onset.  Patient states that pain beneath R breast resolved PTA without intervention, however states that her family wanted her to be evaluated in the emergency department.  Patient was given 162 mg of ASA PTA.  No additional complaints verbalized.  Patient denies shortness of breath, N/V/D, fever, chills.    Patient notes that she does follow with Dr. Dominguez of [facility].  Last visit on 05/20/2025.  Last nuclear stress test completed on 10/05/2020 with Dr. Savage of [facility] unremarkable.  No evidence of ischemia or myocardial infarction by perfusion imaging.  Ejection fraction 75%.  Low risk stress test.     HPI    Nursing Notes were reviewed.    REVIEW OF SYSTEMS    (2-9 systems for level 4, 10 or more for level 5)     Review of Systems   Cardiovascular:  Positive for chest pain.   Neurological:  Positive for dizziness and headaches.   All other systems reviewed and are negative.      Except as noted above the remainder of the

## 2025-05-31 NOTE — DISCHARGE INSTRUCTIONS
Follow-up with your primary care physician, as well as with Dr. Dominguez.     Return to ED if any new, or worsening symptoms.

## 2025-06-02 ENCOUNTER — OFFICE VISIT (OUTPATIENT)
Dept: PRIMARY CARE | Facility: CLINIC | Age: 80
End: 2025-06-02
Payer: MEDICARE

## 2025-06-02 ENCOUNTER — TELEPHONE (OUTPATIENT)
Dept: PRIMARY CARE | Facility: CLINIC | Age: 80
End: 2025-06-02

## 2025-06-02 VITALS
OXYGEN SATURATION: 97 % | HEIGHT: 64 IN | WEIGHT: 203 LBS | SYSTOLIC BLOOD PRESSURE: 138 MMHG | HEART RATE: 72 BPM | BODY MASS INDEX: 34.66 KG/M2 | DIASTOLIC BLOOD PRESSURE: 93 MMHG | RESPIRATION RATE: 18 BRPM | TEMPERATURE: 97 F

## 2025-06-02 DIAGNOSIS — G44.89 OTHER HEADACHE SYNDROME: ICD-10-CM

## 2025-06-02 DIAGNOSIS — I65.23 BILATERAL CAROTID ARTERY STENOSIS: ICD-10-CM

## 2025-06-02 DIAGNOSIS — I10 ESSENTIAL HYPERTENSION: Primary | ICD-10-CM

## 2025-06-02 DIAGNOSIS — Z95.1 S/P CABG (CORONARY ARTERY BYPASS GRAFT): ICD-10-CM

## 2025-06-02 PROBLEM — H40.001 GLAUCOMA SUSPECT OF RIGHT EYE: Status: ACTIVE | Noted: 2025-04-29

## 2025-06-02 PROBLEM — H40.1221 LOW-TENSION GLAUCOMA OF LEFT EYE, MILD STAGE: Status: ACTIVE | Noted: 2025-04-29

## 2025-06-02 PROBLEM — I12.9 CHRONIC KIDNEY DISEASE DUE TO HYPERTENSION: Status: ACTIVE | Noted: 2025-06-02

## 2025-06-02 PROBLEM — H35.372 EPIRETINAL MEMBRANE (ERM) OF LEFT EYE: Status: ACTIVE | Noted: 2025-04-29

## 2025-06-02 LAB
EKG ATRIAL RATE: 55 BPM
EKG ATRIAL RATE: 57 BPM
EKG DIAGNOSIS: NORMAL
EKG DIAGNOSIS: NORMAL
EKG P AXIS: 112 DEGREES
EKG P AXIS: 38 DEGREES
EKG P-R INTERVAL: 174 MS
EKG P-R INTERVAL: 198 MS
EKG Q-T INTERVAL: 466 MS
EKG Q-T INTERVAL: 496 MS
EKG QRS DURATION: 132 MS
EKG QRS DURATION: 140 MS
EKG QTC CALCULATION (BAZETT): 453 MS
EKG QTC CALCULATION (BAZETT): 474 MS
EKG R AXIS: -17 DEGREES
EKG R AXIS: -17 DEGREES
EKG T AXIS: 10 DEGREES
EKG T AXIS: 7 DEGREES
EKG VENTRICULAR RATE: 55 BPM
EKG VENTRICULAR RATE: 57 BPM

## 2025-06-02 PROCEDURE — 3080F DIAST BP >= 90 MM HG: CPT | Performed by: FAMILY MEDICINE

## 2025-06-02 PROCEDURE — 99214 OFFICE O/P EST MOD 30 MIN: CPT | Performed by: FAMILY MEDICINE

## 2025-06-02 PROCEDURE — 1160F RVW MEDS BY RX/DR IN RCRD: CPT | Performed by: FAMILY MEDICINE

## 2025-06-02 PROCEDURE — 3075F SYST BP GE 130 - 139MM HG: CPT | Performed by: FAMILY MEDICINE

## 2025-06-02 PROCEDURE — 1036F TOBACCO NON-USER: CPT | Performed by: FAMILY MEDICINE

## 2025-06-02 PROCEDURE — 1159F MED LIST DOCD IN RCRD: CPT | Performed by: FAMILY MEDICINE

## 2025-06-02 RX ORDER — AMLODIPINE BESYLATE 2.5 MG/1
2.5 TABLET ORAL DAILY
Qty: 90 TABLET | Refills: 2 | Status: SHIPPED | OUTPATIENT
Start: 2025-06-02 | End: 2025-11-29

## 2025-06-02 ASSESSMENT — PATIENT HEALTH QUESTIONNAIRE - PHQ9
1. LITTLE INTEREST OR PLEASURE IN DOING THINGS: NOT AT ALL
2. FEELING DOWN, DEPRESSED OR HOPELESS: NOT AT ALL
SUM OF ALL RESPONSES TO PHQ9 QUESTIONS 1 AND 2: 0

## 2025-06-02 NOTE — ASSESSMENT & PLAN NOTE
review of Dr. Hoang Ho's note dated 5/20/2025..  Show patient had been on Coreg 6.25 twice daily along with 40 mg of Crestor with excellent control of lipids...According to those medical records her blood pressure was excessively controlled and amlodipine was discontinued on the office date of 11/19/2024.  At that time she was having lower extremity edema which was problematic......  amlodipine 2.5 mg    one  po q day

## 2025-06-02 NOTE — ASSESSMENT & PLAN NOTE
Patient had elevated troponin x 3 with repeat and discussed possible admission however this was not done her last stress test was 2020 we will get her back to cardiology for opinion regarding stress test...

## 2025-06-02 NOTE — TELEPHONE ENCOUNTER
Pt called in asking if we could attempt to reach her cardiologist for her to get in sooner rather than later. Please advise

## 2025-06-02 NOTE — PROGRESS NOTES
Subjective   Patient ID: Rosalba Salcedo is a 80 y.o. female who presents for Hospital Follow-up.  HPI  Patient here for follow-up after recent emergency room visit in 5/31/2025.  According to medical records she presented by squad for evaluation of chest pain.  Medical records indicates she woke in the morning of the evaluation with pain beneath her right breast along with accompanying headache dizziness she had difficulty describing the dizziness she stated that pain was underneath the right breast and resolved prior to evaluation and that due to the symptoms she was felt to be breast served by being evaluated the ED per her family.  She was given aspirin and denied shortness of breath nausea vomiting diarrhea fever or chills.  Review of prior medical records does been cardiology patient did have nuclear stress test 10/20 and unremarkable with ejection fraction 75%.  In the emergency department workup remarkable for elevated blood pressure 142/71 recheck 155/90 with EKG showing sinus bradycardia with right bundle branch block and normal axis without evidence of changes.  CT of the head was ordered in light of symptoms regarding headache with results indicating no acute intracranial abnormality.  Chest x-ray however did show cardiomegaly and that tests were remarkable for elevated troponin at 30 recheck at 27 and following that 28 with oxygen level being excellent and [facility] cardiology was consulted during stay recommending a fourth troponin which was done and discussion regarding admission which patient refused and was advised to follow-up with Dr. Brian Moran and Dr. Hoang Ho.  At the time of discharge patient was sent home with acute non-intractable headache with chest pain unspecified.  No major changes and her.. meds for that were noted.    Review of Dr. Hoang Ho's note dated 5/20/2025..  Show patient had been on Coreg 6.25 twice daily along with 40 mg of Crestor with excellent control of  lipids...According to those medical records her blood pressure was excessively controlled and amlodipine was discontinued on the office date of 11/19/2024.  At that time she was having lower extremity edema which was problematic.  Dizziness   .. And room  is spinning   Review of Systems  All other  pertinent  systems reviewed and are negative except  those  mentioned  in HPI   Objective   Physical Exam  Vitals: I have reviewed the vitals  General: Well-developed.  In no acute distress.  Eyes:   sclera nonicteric.  Conjunctiva not injected.  No discharge.   HEAD: Normocephalic, atraumatic.  HEENT   Mucous membranes moist.  Posterior oropharynx nonerythematous, no tonsillar exudates.      No cervical lymphadenopathy.  Cardio: Regular rate and rhythm.  No murmur, rub or gallop.  Pulmonary: Lungs clear to auscultation in all fields.  No accessory muscle use.  GI/: Normal active bowel sounds.  Soft, nontender.  No masses or organomegaly appreciated.  Musculoskeletal: No gross deformities appreciated.  Neuro: Alert, age-appropriate.  Normal muscle tone.  Moving all extremities.  Skin: No rash, bruises or lesions.   Labs        Assessment/Plan   Problem List Items Addressed This Visit       Essential hypertension - Primary    review of Dr. Hoang Ho's note dated 5/20/2025..  Show patient had been on Coreg 6.25 twice daily along with 40 mg of Crestor with excellent control of lipids...According to those medical records her blood pressure was excessively controlled and amlodipine was discontinued on the office date of 11/19/2024.  At that time she was having lower extremity edema which was problematic......  amlodipine 2.5 mg    one  po q day          Relevant Medications    amLODIPine (Norvasc) 2.5 mg tablet    Headache    Suspect that it was secondary hypertension.  Will go ahead and restart amlodipine in light of At home and advised to start and get back to cardiology for possible stress testing in light of recent  episode of chest pain         S/P CABG (coronary artery bypass graft)    Patient had elevated troponin x 3 with repeat and discussed possible admission however this was not done her last stress test was 2020 we will get her back to cardiology for opinion regarding stress test...

## 2025-06-02 NOTE — ASSESSMENT & PLAN NOTE
Suspect that it was secondary hypertension.  Will go ahead and restart amlodipine in light of At home and advised to start and get back to cardiology for possible stress testing in light of recent episode of chest pain

## 2025-06-03 ENCOUNTER — TELEPHONE (OUTPATIENT)
Dept: CARDIOLOGY | Facility: CLINIC | Age: 80
End: 2025-06-03
Payer: MEDICARE

## 2025-06-03 NOTE — TELEPHONE ENCOUNTER
Advised patient of message and verbalized understanding.    Patient states she is still not feeling well, she has a headache. Patient advised to keep appointment with Dr. Hoang Ho MD, FACC 6/5/25.

## 2025-06-03 NOTE — TELEPHONE ENCOUNTER
Pt calls in states that she went to [facility] ER on Sat. for chest pain, and was told she did not have a heart attack. Pt states since leaving the ER she has been having high BP readings;  yesterday was 161/90, and this morning's /107 before taking meds. Pt saw Dr. Leroy yesterday in the office and he prescribed Amlodipine. Pt is schedule to see Dr. Hoang Ho MD, FACC on 06/05/2025. Pt would like to have Dr. Hoang Ho MD, FACC recommendations. Kiet PIRES

## 2025-06-05 ENCOUNTER — OFFICE VISIT (OUTPATIENT)
Dept: CARDIOLOGY | Facility: CLINIC | Age: 80
End: 2025-06-05
Payer: MEDICARE

## 2025-06-05 ENCOUNTER — TELEPHONE (OUTPATIENT)
Dept: PRIMARY CARE | Facility: CLINIC | Age: 80
End: 2025-06-05

## 2025-06-05 VITALS
HEIGHT: 64 IN | SYSTOLIC BLOOD PRESSURE: 140 MMHG | DIASTOLIC BLOOD PRESSURE: 66 MMHG | BODY MASS INDEX: 35.03 KG/M2 | HEART RATE: 66 BPM | WEIGHT: 205.2 LBS

## 2025-06-05 DIAGNOSIS — I25.10 CORONARY ARTERY DISEASE INVOLVING NATIVE HEART WITHOUT ANGINA PECTORIS, UNSPECIFIED VESSEL OR LESION TYPE: ICD-10-CM

## 2025-06-05 DIAGNOSIS — R07.9 CHEST PAIN, UNSPECIFIED TYPE: ICD-10-CM

## 2025-06-05 DIAGNOSIS — I10 HYPERTENSION, UNSPECIFIED TYPE: ICD-10-CM

## 2025-06-05 PROCEDURE — 1159F MED LIST DOCD IN RCRD: CPT | Performed by: INTERNAL MEDICINE

## 2025-06-05 PROCEDURE — G2211 COMPLEX E/M VISIT ADD ON: HCPCS | Performed by: INTERNAL MEDICINE

## 2025-06-05 PROCEDURE — 3078F DIAST BP <80 MM HG: CPT | Performed by: INTERNAL MEDICINE

## 2025-06-05 PROCEDURE — 1036F TOBACCO NON-USER: CPT | Performed by: INTERNAL MEDICINE

## 2025-06-05 PROCEDURE — 3077F SYST BP >= 140 MM HG: CPT | Performed by: INTERNAL MEDICINE

## 2025-06-05 PROCEDURE — 99214 OFFICE O/P EST MOD 30 MIN: CPT | Performed by: INTERNAL MEDICINE

## 2025-06-05 NOTE — PATIENT INSTRUCTIONS
FOLLOW UP WITH Dr. Hoang Ho MD, [facility] IN NOVEMBER     DID YOU KNOW  We have a pharmacy here in the [facility].  They can fill all prescriptions, not just cardiac medications.  Prescriptions from other pharmacies can easily be transferred to the  pharmacy by the  pharmacist on site.   pharmacies offer FREE HOME DELIVERY on medications to anywhere in [place]. They can sync your medications. Typically prescriptions can be ready in 10 - 15 minutes. If pharmacy is unable to fill your  prescription or if cost is more than your paying now the Pharmacist can easily transfer back to your Pharmacy of choice. Pharmacy phone # [phone].     Please bring all medicines, vitamins, and herbal supplements with you in original bottles to every appointment! This is the best way to ensure your medication list in your chart is accurate.    Prescriptions will not be filled unless you are compliant with your follow up appointments or have a follow up appointment scheduled as per instruction of your physician. Refills should be requested at the time of your visit.

## 2025-06-05 NOTE — PROGRESS NOTES
Patient:  Rosalba Salcedo  YOB: 1945  MRN: [id number]       Impression/Plan:     Diagnoses and all orders for this visit:  Chest pain, unspecified type  -     She had right sided chest pain at rest with no significant troponin pattern to suggest acute plaque rupture and EKGs without change.  He said the discomfort lasted less than 20 minutes.  It has not recurred.  -      She was hypertensive when presented to the emergency department.  She also had twisted her back which was the initial cause of the worsening back pain that resulted in steroid treatment.  I believe this particular chest pain was more likely musculoskeletal possibly related to spinal column issues.  I do not think at this point perfusion study would be of benefit  -       She is very much against any sort of a stress test because she said she is sure it is not her heart I think she is probably correct based on her description and findings at time of ER visit.  She would be amenable to noninvasive testing should symptoms begin to recur.  Coronary artery disease involving native heart without angina pectoris, unspecified vessel or lesion type  -     Except for the 1 episode of chest pain which is probably not cardiac as described above she has had no angina or CHF symptoms  Hypertension, unspecified type  -     Elevated blood pressure is improved at this time likely related to steroid use.  Headache may have been related to combination of steroids plus the resultant hypertension.  She no longer has a headache at this time I had      Chief Complaint/Active Symptoms:      Chief Complaint   Patient presents with    ER Follow-up     Presents today for follow up of ER VISIT. Pt was told to follow up with cardiologist       Rosalba Salcedo is a 80 y.o. female who presents with coronary artery disease with coronary bypass graft surgery 2009 (L-LAD, 2 vein grafts).  Normal Lexiscan perfusion with 75% ejection fraction 10/5/2020.  Also  history hypertension, hyperlipidemia, esophageal reflux and osteoarthritis.     October 2023 extensive lumbar spine surgery/March 2024 right total hip replacement both uncomplicated from a cardiovascular standpoint    9/30/2024 underwent posterior cervical laminoplasty C3-C7 with globus canopy laminoplasty system.  No cardiac complications described.    Last seen her 5/20/2025 at which time no angina or CHF symptoms.  Blood pressure was well-controlled cholesterol is well-controlled.  Some postoperative neck discomfort otherwise was asymptomatic.    Was in the emergency room [facility] 5/31/2025 with chest discomfort and headache..  Initial blood pressure 155/92 heart rate 68   100% O2 sat.  CT of the brain was unremarkable.  Chest x-ray without CHF.  Elevation troponin 28-30.  No acute EKG changes.  Had follow-up with primary care which time blood pressure 138/93.    Prior to her hospitalization she had seen orthopedics and was having continue back and neck pain and was given a Medrol Dosepak.  Also when she came to the ER she described chest discomfort which was inframammary on the right lateral aspect radiating just slightly around to the front.  Did not feel like prior angina to her.  She has had no recurrence of any sort of chest tightness.  She is not short of breath she does have slight lower extremity edema.               Review of Systems: Unremarkable except as noted above    Meds     Current Outpatient Medications   Medication Instructions    acetaminophen (TYLENOL) 650 mg, oral, Every 6 hours PRN    acetic acid (Vosol) 2 % otic solution 5 drops 3 times daily in affected ear for itching    alendronate (FOSAMAX) 70 mg, oral, Every 7 days, Take in the morning with a full glass of water, on an empty stomach, and do not take anything else by mouth or lie down for the next 30 min.    amLODIPine (NORVASC) 2.5 mg, oral, Daily    aspirin 81 mg, Daily    carvedilol (COREG) 6.25 mg, oral, 2 times  daily (morning and late afternoon)    cetirizine (ZYRTEC) 10 mg, oral, Daily    diclofenac sodium (Voltaren) 1 % gel gel APPLY TO AFFECTED FOOT UP TO 3 TIMES DAILY AS NEEDED FOR PAIN    famotidine (PEPCID) 20 mg, oral, 2 times daily    fluticasone (Flonase) 50 mcg/actuation nasal spray 1 spray, Each Nostril, Daily PRN    furosemide (Lasix) 20 mg tablet Take one tab daily as needed for leg swelling    levothyroxine (SYNTHROID, LEVOXYL) 25 mcg, oral, Daily    loratadine 10 mg capsule Take 1 capsule by mouth once daily as needed.    methocarbamol (ROBAXIN) 750 mg, oral, 3 times daily    metroNIDAZOLE (Metrocream) 0.75 % cream Topical, 2 times daily, Apply twice daily to rosacea    nitroglycerin (NITROSTAT) 0.4 mg, sublingual, Every 5 min PRN    ofloxacin (Floxin) 0.3 % otic solution 5 drops, Each Ear, 2 times daily, 5 drops twice daily for 7 days for infection that is pain in the affected ear    pantoprazole (PROTONIX) 40 mg, oral, Daily    predniSONE (Deltasone) 10 mg tablet 50MG FOR 2 DAYS, 40MG FOR 2 DAYS, 30MG FOR 2 DAYS, 20MG FOR 2 DAYS, 10MG FOR 2 DAYS    rosuvastatin (Crestor) 40 mg tablet TAKE 1 TABLET ONCE EVERY 24HOURS        Allergies   Allergies[1]      Annotated Problems     Specialty Problems          Cardiology Problems    CAD (coronary artery disease)    2020 Normal Lexiscan perfusion with 75% EF   2009 CABG (L-LAD, 2 vein grafts)         Carotid bruit    Easy bruising    Essential hypertension    Hyperlipidemia    Right bundle branch block (RBBB)    S/P CABG (coronary artery bypass graft)     coronary bypass graft surgery 2009 (L-LAD, 2 vein grafts)         Atherosclerosis of coronary artery without angina pectoris    Raynaud's disease without gangrene        Problem List     Patient Active Problem List    Diagnosis Date Noted    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 06/02/2025    Epiretinal membrane (ERM) of left eye 04/29/2025    Glaucoma suspect of right eye 04/29/2025    Low-tension glaucoma  of left eye, mild stage 04/29/2025    Raynaud's disease without gangrene 04/08/2025    Obesity (BMI 30.0-34.9) 03/11/2025    Bilateral sensorineural hearing loss 01/06/2025    Acute reactive otitis externa of right ear 12/03/2024    Tinnitus of both ears 10/31/2024    Encounter for preoperative assessment 09/12/2024    Cervical spinal stenosis 09/09/2024    Weakness of right lower extremity 05/23/2024    Hearing loss of right ear 04/10/2024    Anxiety 04/10/2024    Atherosclerosis of coronary artery without angina pectoris 03/13/2024    PONV (postoperative nausea and vomiting) 03/12/2024    S/P total right hip arthroplasty 03/11/2024    Cup to disc asymmetry, bilateral 03/01/2024    Other specified diseases of spinal cord 02/27/2024    Stage 3a chronic kidney disease (Multi) 02/27/2024    Avascular necrosis of bone of hip, right (Multi) 02/27/2024    Back pain of lumbar region with sciatica 10/17/2023    Hx of degenerative disc disease 09/05/2023    Status post total left knee replacement 09/05/2023    Pain in joint, lower leg 09/05/2023    Hip pain, left 09/05/2023    Back pain 09/05/2023    Knee osteoarthritis 09/05/2023    Class 2 obesity with body mass index (BMI) of 35.0 to 35.9 in adult 09/05/2023    S/P CABG (coronary artery bypass graft) 09/05/2023    History of traumatic fracture 09/05/2023    Abnormal mammogram 05/22/2023    Allergic rhinitis 05/22/2023    Anemia 05/22/2023    Angioedema 05/22/2023    Ankle pain 05/22/2023    Foot pain 05/22/2023    Ankle strain 05/22/2023    Acute bronchitis 05/22/2023    Arthritis 05/22/2023    Bronchitis 05/22/2023    COPD with acute exacerbation (Multi) 05/22/2023    Aseptic loosening of prosthetic knee 05/22/2023    Osteoarthritis of knee 05/22/2023    CAD (coronary artery disease) 05/22/2023    Carotid bruit 05/22/2023    Abdominal pain 05/22/2023    Constipation 05/22/2023    COVID-19 05/22/2023    Dizziness 05/22/2023    Dry skin 05/22/2023    Limb pain 05/22/2023     Easy bruising 05/22/2023    Localized edema 05/22/2023    Erythrasma 05/22/2023    Essential hypertension 05/22/2023    External hemorrhoid 05/22/2023    Other fatigue 05/22/2023    Food allergy 05/22/2023    Foot sprain 05/22/2023    Fracture of radius, distal, closed 05/22/2023    Fracture of unspecified part of neck of right femur, subsequent encounter for closed fracture with routine healing 05/22/2023    GERD (gastroesophageal reflux disease) 05/22/2023    Hair loss 05/22/2023    Hematuria 05/22/2023    Hip bursitis, left 05/22/2023    Hip fracture, right 05/22/2023    Post-traumatic osteoarthritis of right hip 05/22/2023    Hyperlipidemia 05/22/2023    Hypothyroidism 05/22/2023    Impacted cerumen of right ear 05/22/2023    Impaired gait and mobility 05/22/2023    Influenza A 05/22/2023    Keloid 05/22/2023    Left knee pain 05/22/2023    Leg edema, left 05/22/2023    Low back pain 05/22/2023    Lumbar radicular pain 05/22/2023    Lumbar sprain 05/22/2023    Lung nodule 05/22/2023    Menopausal osteoporosis 05/22/2023    Muscle spasms of lower extremity 05/22/2023    Olecranon bursitis of right elbow 05/22/2023    Olecranon bursitis 05/22/2023    Osteopenia 05/22/2023    Post-op pain 05/22/2023    Renal insufficiency 05/22/2023    Restless legs syndrome 05/22/2023    Rib pain on left side 05/22/2023    Right bundle branch block (RBBB) 05/22/2023    Iliotibial band syndrome of right side 05/22/2023    Right hip pain 05/22/2023    Rosacea 05/22/2023    Scar 05/22/2023    Sinobronchitis 05/22/2023    Sinusitis 05/22/2023    Shoulder impingement 05/22/2023    Tibial tendinitis, posterior 05/22/2023    Trochanteric bursitis of right hip 05/22/2023    Urinary incontinence 05/22/2023    Urinary tract infection 05/22/2023    Leukocytes in urine 05/22/2023    Urine ketones 05/22/2023    Benign neoplasm of sigmoid colon 05/22/2023    Diabetes mellitus without complication 05/22/2023    Diverticulosis of large  "intestine without diverticulitis 05/22/2023    Dysphagia 05/22/2023    Gastric polyp 05/22/2023    Vaginal vault prolapse 05/22/2023    Vitamin D deficiency 05/22/2023    Muscle weakness (generalized) 08/26/2022    Major depressive disorder, recurrent, unspecified 08/26/2022    Difficulty in walking, not elsewhere classified 08/26/2022    Abnormal posture 08/26/2022    Depressive disorder 08/26/2022    Closed displaced fracture of right femoral neck 12/09/2021    Vestibular hypofunction, bilateral 06/11/2019    Headache 06/11/2019    Cervicalgia 06/11/2019    Ataxic gait 06/11/2019    Urge incontinence of urine 05/14/2019    Ataxia 05/04/2019    Ancramdale-Walker grade 2 rectocele 01/07/2019    OAB (overactive bladder) 12/18/2018    Vaginal enterocele 12/18/2018    Vaginal irritation 12/18/2018    Presence of artificial knee joint, bilateral 01/29/2018    Pseudophakia of both eyes 01/22/2016    Dry eye syndrome, bilateral 12/21/2015    Dermatochalasis of both eyelids 12/21/2015       Objective:     Vitals:    06/05/25 1513   BP: 140/66   BP Location: Left arm   Patient Position: Sitting   Pulse: 66   Weight: 93.1 kg (205 lb 3.2 oz)   Height: 1.626 m (5' 4\")      Wt Readings from Last 4 Encounters:   06/05/25 93.1 kg (205 lb 3.2 oz)   06/02/25 92.1 kg (203 lb)   05/20/25 91.4 kg (201 lb 6.4 oz)   04/08/25 90.3 kg (199 lb)           LAB:     Lab Results   Component Value Date    WBC 7.6 11/12/2024    HGB 12.1 11/12/2024    HCT 38.0 11/12/2024     11/12/2024    CHOL 145 11/12/2024    TRIG 131 11/12/2024    HDL 56.2 11/12/2024    ALT 12 11/12/2024    AST 14 11/12/2024     11/12/2024    K 4.3 11/12/2024     (H) 11/12/2024    CREATININE 1.08 (H) 11/12/2024    BUN 14 11/12/2024    CO2 25 11/12/2024    TSH 1.95 06/10/2024    INR 1.0 10/06/2023    HGBA1C 6.0 09/09/2024         Physical Exam     General Appearance: alert and oriented to person, place and time, in no acute distress  Cardiovascular: normal rate, " regular rhythm, normal S1 and S2, no murmurs, rubs, clicks, or gallops,  no JVD  Pulmonary/Chest: clear to auscultation bilaterally- no wheezes, rales or rhonchi, normal air movement, no respiratory distress  Abdomen: soft, non-tender, non-distended, normal bowel sounds, no masses   Extremities: no cyanosis, clubbing. Trace lower extremity edema  Skin: warm and dry, no rash or erythema  Eyes: EOMI  Neck: supple and non-tender without mass, no thyromegaly   Neurological: alert, oriented, normal speech, no focal findings or movement disorder noted      Scribe Attestation  By signing my name below, I, Ivette Palomino RN, scribe   attest that this documentation has been prepared under the direction and in the presence of Hoang Ho MD.        Provider attestation-scribe documentation  Any medical record entries made by the scribe were at my discretion and personally dictated by me.  I have reviewed the chart and agree that the record accurately reflects my personal performance of the history, physical exam, discussion and plan.                 [1]   Allergies  Allergen Reactions    Codeine Unknown    Morphine Hives, Itching, Rash and Unknown     Drowsiness with Percocet and Vicodin

## 2025-06-05 NOTE — TELEPHONE ENCOUNTER
"Patient wanted me to give you a couple of FYI's.     In regards to her \"fat legs\" the lasix are working. She went to the bathroom 4 times throughout the night. She has lost 5 pounds in water wait in two days. Her legs are now \"skinny\" she never realized that was all of her water weight  She seen Dr. Ho today. Dr. Ho thinks this episode was due to the prednisone. She would like you to review his note from today.  She is scheduled for [facility] US CAROTID ARTERY DUPLEX BILATERAL on 6-19-25.  She is scheduled to have a routine follow-up visit in September with you, and with Dr. Ho to have a routine follow-up in November.       She just wanted to update you. Thanks.    She is requesting \"netting for her neck.\" Can she stop in for this? Please advise, Thanks!    She uses this before she places her neck brace on.  "

## 2025-06-10 ENCOUNTER — APPOINTMENT (OUTPATIENT)
Dept: PRIMARY CARE | Facility: CLINIC | Age: 80
End: 2025-06-10
Payer: MEDICARE

## 2025-06-19 ENCOUNTER — HOSPITAL ENCOUNTER (OUTPATIENT)
Dept: RADIOLOGY | Facility: HOSPITAL | Age: 80
Discharge: HOME | End: 2025-06-19
Payer: MEDICARE

## 2025-06-19 ENCOUNTER — APPOINTMENT (OUTPATIENT)
Dept: RADIOLOGY | Facility: HOSPITAL | Age: 80
End: 2025-06-19
Payer: MEDICARE

## 2025-06-19 DIAGNOSIS — I65.23 BILATERAL CAROTID ARTERY STENOSIS: ICD-10-CM

## 2025-06-19 PROCEDURE — 93880 EXTRACRANIAL BILAT STUDY: CPT | Performed by: RADIOLOGY

## 2025-06-19 PROCEDURE — 93880 EXTRACRANIAL BILAT STUDY: CPT

## 2025-06-20 ENCOUNTER — TELEPHONE (OUTPATIENT)
Dept: PRIMARY CARE | Facility: CLINIC | Age: 80
End: 2025-06-20
Payer: MEDICARE

## 2025-07-01 ENCOUNTER — APPOINTMENT (OUTPATIENT)
Dept: PRIMARY CARE | Facility: CLINIC | Age: 80
End: 2025-07-01
Payer: MEDICARE

## 2025-07-08 ENCOUNTER — TELEPHONE (OUTPATIENT)
Dept: PRIMARY CARE | Facility: CLINIC | Age: 80
End: 2025-07-08
Payer: MEDICARE

## 2025-07-08 DIAGNOSIS — K21.9 GASTROESOPHAGEAL REFLUX DISEASE, UNSPECIFIED WHETHER ESOPHAGITIS PRESENT: ICD-10-CM

## 2025-07-08 DIAGNOSIS — I25.10 CORONARY ARTERY DISEASE INVOLVING NATIVE HEART WITHOUT ANGINA PECTORIS, UNSPECIFIED VESSEL OR LESION TYPE: ICD-10-CM

## 2025-07-08 DIAGNOSIS — I10 PRIMARY HYPERTENSION: ICD-10-CM

## 2025-07-08 RX ORDER — FAMOTIDINE 20 MG/1
20 TABLET, FILM COATED ORAL 2 TIMES DAILY
Qty: 180 TABLET | Refills: 3 | Status: SHIPPED | OUTPATIENT
Start: 2025-07-08

## 2025-07-08 RX ORDER — FAMOTIDINE 20 MG/1
20 TABLET, FILM COATED ORAL 2 TIMES DAILY
Qty: 180 TABLET | Refills: 3 | OUTPATIENT
Start: 2025-07-08

## 2025-07-08 NOTE — TELEPHONE ENCOUNTER
Pt left message on nurse line requesting update.  Attempted to contact patient but sent to voicemail

## 2025-07-08 NOTE — TELEPHONE ENCOUNTER
Rx Refill Request Telephone Encounter    Name:  Rosalba Salcedo  :  [id number]  Medication Name:  famotidine (Pepcid) 20 mg tablet   Dose: 20 mg Route: oral Frequency: 2 times daily   Dispense Quantity: 180 tablet (90 day supply) Refills: 3    Duration: -- Dispense As Written: No          Sig: Take 1 tablet (20 mg) by mouth 2 times a day.     Specific Pharmacy location:  Pharmacy    [id number] - [place], [place] - [address]   [address] [place], [place] [address]  Phone: [phone]  Fax: [phone]  [place] #: --       Best number to reach patient:   [phone]

## 2025-07-08 NOTE — TELEPHONE ENCOUNTER
RN received return phone call from patient stating that ever since the temperatures have gotten warmer she has been experiencing more swelling in her feet.  RN advised patient to elevate feet as much as possible and to use SHANTE hose, or support stocking to help reduce swelling.  She states that she was directed by Dr. Hoang Ho MD, FACC to use her Lasix 20mg on a PRN basis, however, she denies taking medication.      Patient denies difficulty breathing and SOB.  RN advised patient to go ahead and take her Lasix at this time and that message would be sent to Dr. Hoang Ho MD, FACC for additional recommendations. Patient verbalizes good understanding and is agreeable to plan.    Routed message to Dr. Hoang Ho MD, FACC to review and advise.    Ivette Palomino RN

## 2025-07-08 NOTE — TELEPHONE ENCOUNTER
RN received VM from patient stating that she has been experiencing lower leg swelling, however, no further details were left.  RN attempted to reach patient, LVM asking for patient to return call so additional information can be gathered.    Ivette Palomino RN

## 2025-07-08 NOTE — TELEPHONE ENCOUNTER
Spoke with patient and notified of Dr. Leroy's response. Patient states she already has a call out to Dr. Ho's office. -AH

## 2025-07-09 RX ORDER — CARVEDILOL 12.5 MG/1
TABLET ORAL
Qty: 135 TABLET | Refills: 3 | Status: SHIPPED | OUTPATIENT
Start: 2025-07-09

## 2025-07-09 NOTE — TELEPHONE ENCOUNTER
RN updated medication list to reflect the discontinuation of Amlodipine per Dr. Hoang Ho MD, [facility] orders.    Ivette Palomino RN

## 2025-07-09 NOTE — TELEPHONE ENCOUNTER
Advised patient of message and verbalized understanding.    RX pending authorization.   Patient transferred to secretaries to schedule appointment with AMIRAH Melton, KATHRYN.   Blaire Gómez MA

## 2025-07-15 ENCOUNTER — TELEPHONE (OUTPATIENT)
Dept: PRIMARY CARE | Facility: CLINIC | Age: 80
End: 2025-07-15
Payer: MEDICARE

## 2025-07-15 DIAGNOSIS — I10 ESSENTIAL HYPERTENSION: ICD-10-CM

## 2025-07-15 DIAGNOSIS — I10 PRIMARY HYPERTENSION: ICD-10-CM

## 2025-07-15 DIAGNOSIS — I25.10 CORONARY ARTERY DISEASE INVOLVING NATIVE HEART WITHOUT ANGINA PECTORIS, UNSPECIFIED VESSEL OR LESION TYPE: ICD-10-CM

## 2025-07-15 RX ORDER — CARVEDILOL 6.25 MG/1
6.25 TABLET ORAL 2 TIMES DAILY
Start: 2025-07-15

## 2025-07-15 RX ORDER — AMLODIPINE BESYLATE 2.5 MG/1
2.5 TABLET ORAL DAILY
Qty: 90 TABLET | Refills: 3 | Status: SHIPPED | OUTPATIENT
Start: 2025-07-15 | End: 2025-07-16 | Stop reason: ALTCHOICE

## 2025-07-15 NOTE — TELEPHONE ENCOUNTER
Patient called and LVM.  RN attempted to reach patient, however, she was unavailable. LVM asking for return phone call.    RN received phone call back from patient.  She states that she is experiencing dizziness on and off. She almost fell this morning but caught herself at the last minute.  She states that she took carvedilol last night and she was due to take it this morning however, she has not done so due to the dizziness.      RN forwarded message from Dr. Leroy's staff to Dr. Hoang Ho as well as current message for further recommendations.      Ivette Palomino RN

## 2025-07-15 NOTE — TELEPHONE ENCOUNTER
"Patient called into Dr. Leroy's office this morning stating she is not doing well with the medication change...carvedilol (Coreg) 12.5 mg tablet, and with discontinuing the amLODIPine .     She states her blood pressure is currently 145/80 WITHOUT taking the Carvedilol 12.5 MG.    Second attempt at blood pressure this morning is 151/76.     She states she almost fell down there stairs this morning.   This is the 3rd episode of her almost falling due to her blurry vision, headaches, and dizziness.   She states she hasn't been able to drive to do her vision being so blurry.   She cannot handle this headache, and dizziness.     She states she has a call out to Dr. Ho's office this morning 7/15/25. She also states she spoke with the pharmacist via phone at [facility] in regards to these \"side effects.\", and the pharmacist advised her that she needed to give the medication at least a week to take effect. Patient states she cannot handle these symptoms for a week.      I did offer to schedule her with Dr. Ho for 1:15pm, or 4:15pm today 7/15/25 as the slots were open in [facility]. However the patient declined to schedule until speaking with someone from Dr. Ho's office directly.       Thanks,   Sima      "

## 2025-07-15 NOTE — TELEPHONE ENCOUNTER
RN called patient to advise of Dr. Hoang Ho MD, FACC message.  Patient verbalizes good understanding and is agreeable to plan.  She does not need script sent to pharmacy for Carvedilol as she was taking this dose previously.  She states that she will need a prescription sent to her pharmacy for Amlodipine 2.5mg daily.      RN made changes in patients med list to reflect medication changes.      RN routed prescription to Dr. Hoang Ho MD, FACC to sign.     Ivette Palomino RN

## 2025-07-16 NOTE — TELEPHONE ENCOUNTER
RN called patient to advise of Dr. Hoang Ho MD, [facility] message to go back on original dose of Carvedilol 6.25mg one tablet twice daily.  Only introduce 3rd dose IF blood pressure is consistently over 150 systolic and to discontinue amlodipine altogether.  Patient verbalizes good understanding.  She does not need any prescriptions sent to pharmacy at this time.    Ivette Palomino RN

## 2025-07-16 NOTE — TELEPHONE ENCOUNTER
RN received phone call from patient stating she has concerns with going back on Amlodipine 2.5mg daily.  Dr. Hoang Ho MD, FACC recommended that she discontinue medication secondary to lower leg swelling.  She has not started back on the medication even with the recommendation of Dr. Hoang Ho MD, FACC just yesterday.  She states that she has only taken her Carvedilol today and checked her blood pressure about an hour after which was 134/85.    Patient has upcoming appointment with DANNIE Tariq on 07/24/25.      Routed message to Dr. Hoang Ho MD, FACC for any recommendations.      Ivette Palomino RN

## 2025-07-24 ENCOUNTER — OFFICE VISIT (OUTPATIENT)
Dept: CARDIOLOGY | Facility: CLINIC | Age: 80
End: 2025-07-24
Payer: MEDICARE

## 2025-07-24 ENCOUNTER — APPOINTMENT (OUTPATIENT)
Dept: CARDIOLOGY | Facility: CLINIC | Age: 80
End: 2025-07-24
Payer: MEDICARE

## 2025-07-24 VITALS
DIASTOLIC BLOOD PRESSURE: 76 MMHG | SYSTOLIC BLOOD PRESSURE: 120 MMHG | WEIGHT: 204.4 LBS | HEIGHT: 70 IN | BODY MASS INDEX: 29.26 KG/M2

## 2025-07-24 DIAGNOSIS — R60.0 LOCALIZED EDEMA: ICD-10-CM

## 2025-07-24 DIAGNOSIS — I10 PRIMARY HYPERTENSION: Primary | ICD-10-CM

## 2025-07-24 PROCEDURE — 1159F MED LIST DOCD IN RCRD: CPT | Performed by: NURSE PRACTITIONER

## 2025-07-24 PROCEDURE — 99213 OFFICE O/P EST LOW 20 MIN: CPT | Performed by: NURSE PRACTITIONER

## 2025-07-24 PROCEDURE — 3078F DIAST BP <80 MM HG: CPT | Performed by: NURSE PRACTITIONER

## 2025-07-24 PROCEDURE — 1036F TOBACCO NON-USER: CPT | Performed by: NURSE PRACTITIONER

## 2025-07-24 PROCEDURE — 3074F SYST BP LT 130 MM HG: CPT | Performed by: NURSE PRACTITIONER

## 2025-07-24 PROCEDURE — 1160F RVW MEDS BY RX/DR IN RCRD: CPT | Performed by: NURSE PRACTITIONER

## 2025-07-24 RX ORDER — CYCLOBENZAPRINE HCL 10 MG
1 TABLET ORAL 3 TIMES DAILY PRN
COMMUNITY
Start: 2025-07-08

## 2025-07-24 RX ORDER — AMOXICILLIN 500 MG/1
CAPSULE ORAL
COMMUNITY
Start: 2025-07-22

## 2025-07-24 RX ORDER — LATANOPROST 50 UG/ML
1 SOLUTION/ DROPS OPHTHALMIC
COMMUNITY
Start: 2025-06-03

## 2025-07-24 NOTE — PROGRESS NOTES
Chief Complaint:  Chief Complaint   Patient presents with    Follow-up     Pt is here today following up to discuss medication changes        Rosalba Salcedo is a 80 y.o. female that presents to the office today for cardiac follow up.  She follows with her  primary cardiologist Dr. Ho  and was added to my schedule today.   She  has a PMH of hypertension, hyperlipidemia, CAD, COPD, GERD, hypothyroidism.    She presents to the office today for evaluation after medication adjustment for blood pressure.  She reports she is tolerating the Coreg 6.25 mg twice day without negative side effects. Reports she has not resumed Amlodipine.       Testing Reviewed  CBC:   Lab Results   Component Value Date    WBC 7.6 11/12/2024    RBC 4.57 11/12/2024    HGB 12.1 11/12/2024    HCT 38.0 11/12/2024     11/12/2024        CMP:    Lab Results   Component Value Date     11/12/2024    K 4.3 11/12/2024     (H) 11/12/2024    CO2 25 11/12/2024    BUN 14 11/12/2024    CREATININE 1.08 (H) 11/12/2024    GLUCOSE 103 (H) 11/12/2024    CALCIUM 9.1 11/12/2024       Lipid Profile:    Lab Results   Component Value Date    TRIG 131 11/12/2024    HDL 56.2 11/12/2024    LDLCALC 63 11/12/2024       BMP:  Lab Results   Component Value Date     11/12/2024     05/23/2024     04/11/2024    K 4.3 11/12/2024    K 4.3 05/23/2024    K 4.3 04/11/2024     (H) 11/12/2024     05/23/2024     04/11/2024    CO2 25 11/12/2024    CO2 25 05/23/2024    CO2 25 04/11/2024    BUN 14 11/12/2024    BUN 21 05/23/2024    BUN 18 04/11/2024    CREATININE 1.08 (H) 11/12/2024    CREATININE 1.09 (H) 05/23/2024    CREATININE 1.11 (H) 04/11/2024       CBC:  Lab Results   Component Value Date    WBC 7.6 11/12/2024    WBC 5.4 05/23/2024    WBC 7.9 04/11/2024    RBC 4.57 11/12/2024    RBC 3.88 (L) 05/23/2024    RBC 4.25 04/11/2024    HGB 12.1 11/12/2024    HGB 10.9 (L) 05/23/2024    HGB 12.0 04/11/2024    HCT 38.0 11/12/2024     "HCT 34.7 (L) 05/23/2024    HCT 38.6 04/11/2024    MCV 83 11/12/2024    MCV 89 05/23/2024    MCV 91 04/11/2024    MCH 26.5 11/12/2024    MCH 28.1 05/23/2024    MCH 28.2 04/11/2024    MCHC 31.8 (L) 11/12/2024    MCHC 31.4 (L) 05/23/2024    MCHC 31.1 (L) 04/11/2024    RDW 15.3 (H) 11/12/2024    RDW 13.8 05/23/2024    RDW 15.5 (H) 04/11/2024     11/12/2024     05/23/2024     04/11/2024    MPV 10.6 10/21/2023    MPV 10.4 10/20/2023    MPV 10.2 10/19/2023       Hepatic Function Panel:    Lab Results   Component Value Date    ALKPHOS 82 11/12/2024    ALT 12 11/12/2024    AST 14 11/12/2024    PROT 7.2 11/12/2024    BILITOT 0.6 11/12/2024       HgBA1c:    Lab Results   Component Value Date    HGBA1C 6.0 09/09/2024       Magnesium:    Lab Results   Component Value Date    MG 1.94 11/12/2024       TSH:    Lab Results   Component Value Date    TSH 1.95 06/10/2024         PT/INR:    Lab Results   Component Value Date    PROTIME 10.7 10/06/2023    INR 1.0 10/06/2023       Problem List[1]    Social History[2]    Medical History[3]    Current Medications[4]    Codeine and Morphine    Family History[5]    Surgical History[6]       Review of systems  Constitutional: No weight loss, fever, chills, weakness or fatigue  HEENT: No visual loss, blurred vision, double vision or yellow sclerae  Skin: No rash or itching  Cardiovascular: No chest pain, pressure or discomfort, No palpitations or edema.  Respiratory: No shortness of breath, cough or sputum  Neurological: No headache, lightheadedness, dizziness, syncope.   Musculoskeletal: No muscle, back pain, joint pain or stiffness.  Hematologic: No anemia, bleeding or bruising.    /76 (BP Location: Left arm, Patient Position: Sitting)   Ht 1.778 m (5' 10\")   Wt 92.7 kg (204 lb 6.4 oz)   BMI 29.33 kg/m²     Physical Exam  Constitutional: Well developed, awake/alert x 3, no distress.  Head/Neck: No JVD, No bruits  Respiratory/Thorax: patent airways, CTAB, normal " breath sounds with good expansion.  Cardiovascular: Regular rate and rhythm, no murmurs, normal S1 and S2,   Gastrointestinal: Non distended, soft, non-tender, no rebound tenderness or guarding.  Extremities: No cyanosis, edema.   Neurological: Alert and oriented x 3. Moves extremities spontaneous with purpose.  Psychological: Appropriate mood and behavior  Skin: Warm and Dry. No lesions or rashes.     Assessment/Plan  Hypertension:  Well controlled on current medical regimen.  Continue Coreg  Edema: Resolved with discontinuing Amlodipine  Follow in office with Dr. Ho as scheduled or sooner if needed.     Please excuse any errors in grammar or translation related to dictation, voice recognition software was used to prepare this document.         [1]   Patient Active Problem List  Diagnosis    Abnormal mammogram    Allergic rhinitis    Anemia    Angioedema    Ankle pain    Foot pain    Ankle strain    Acute bronchitis    Arthritis    Bronchitis    COPD with acute exacerbation (Multi)    Aseptic loosening of prosthetic knee    Osteoarthritis of knee    CAD (coronary artery disease)    Carotid bruit    Abdominal pain    Chest pain    Constipation    COVID-19    Dizziness    Dry skin    Limb pain    Easy bruising    Localized edema    Erythrasma    Hypertension    External hemorrhoid    Other fatigue    Food allergy    Foot sprain    Fracture of radius, distal, closed    Fracture of unspecified part of neck of right femur, subsequent encounter for closed fracture with routine healing    GERD (gastroesophageal reflux disease)    Hair loss    Hematuria    Hip bursitis, left    Hip fracture, right    Post-traumatic osteoarthritis of right hip    Hyperlipidemia    Hypothyroidism    Impacted cerumen of right ear    Impaired gait and mobility    Influenza A    Keloid    Left knee pain    Leg edema, left    Low back pain    Lumbar radicular pain    Lumbar sprain    Lung nodule    Menopausal osteoporosis    Muscle spasms of  lower extremity    Olecranon bursitis of right elbow    Olecranon bursitis    Osteopenia    Post-op pain    Renal insufficiency    Restless legs syndrome    Rib pain on left side    Right bundle branch block (RBBB)    Iliotibial band syndrome of right side    Right hip pain    Rosacea    Scar    Sinobronchitis    Sinusitis    Shoulder impingement    Tibial tendinitis, posterior    Trochanteric bursitis of right hip    Urinary incontinence    Urinary tract infection    Leukocytes in urine    Urine ketones    Ataxia    Benign neoplasm of sigmoid colon    Diabetes mellitus without complication    Diverticulosis of large intestine without diverticulitis    Dysphagia    Gastric polyp    OAB (overactive bladder)    Urge incontinence of urine    Reliance-Walker grade 2 rectocele    Vaginal enterocele    Vaginal irritation    Vaginal vault prolapse    Vitamin D deficiency    Vestibular hypofunction, bilateral    Pseudophakia of both eyes    Presence of artificial knee joint, bilateral    Dry eye syndrome, bilateral    Dermatochalasis of both eyelids    Hx of degenerative disc disease    Status post total left knee replacement    Pain in joint, lower leg    Hip pain, left    Headache    Cervicalgia    Back pain    Closed displaced fracture of right femoral neck    Ataxic gait    Knee osteoarthritis    Class 2 obesity with body mass index (BMI) of 35.0 to 35.9 in adult    S/P CABG (coronary artery bypass graft)    History of traumatic fracture    Muscle weakness (generalized)    Major depressive disorder, recurrent, unspecified    Difficulty in walking, not elsewhere classified    Abnormal posture    Back pain of lumbar region with sciatica    Depressive disorder    Other specified diseases of spinal cord    Stage 3a chronic kidney disease (Multi)    Avascular necrosis of bone of hip, right (Multi)    Cup to disc asymmetry, bilateral    PONV (postoperative nausea and vomiting)    S/P total right hip arthroplasty    Hearing loss  of right ear    Anxiety    Weakness of right lower extremity    Cervical spinal stenosis    Encounter for preoperative assessment    Tinnitus of both ears    Acute reactive otitis externa of right ear    Bilateral sensorineural hearing loss    Obesity (BMI 30.0-34.9)    Atherosclerosis of coronary artery without angina pectoris    Raynaud's disease without gangrene    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    Epiretinal membrane (ERM) of left eye    Glaucoma suspect of right eye    Low-tension glaucoma of left eye, mild stage   [2]   Social History  Tobacco Use    Smoking status: Never     Passive exposure: Never    Smokeless tobacco: Never   Vaping Use    Vaping status: Never Used   Substance Use Topics    Alcohol use: Yes     Comment: occasional    Drug use: Never   [3]   Past Medical History:  Diagnosis Date    Other bursitis of elbow, left elbow 05/27/2020    Bursitis of left elbow    Other specified anxiety disorders 07/27/2022    Depression with anxiety    Pain in unspecified elbow 08/05/2020    Elbow pain    Personal history of other mental and behavioral disorders 04/02/2022    History of depression   [4]   Current Outpatient Medications:     acetaminophen (Tylenol) 325 mg tablet, Take 2 tablets (650 mg) by mouth every 6 hours if needed (pain)., Disp: 180 tablet, Rfl: 0    acetic acid (Vosol) 2 % otic solution, 5 drops 3 times daily in affected ear for itching, Disp: 15 mL, Rfl: 11    alendronate (Fosamax) 70 mg tablet, Take 1 tablet (70 mg) by mouth every 7 days. Take in the morning with a full glass of water, on an empty stomach, and do not take anything else by mouth or lie down for the next 30 min., Disp: 12 tablet, Rfl: 3    amoxicillin (Amoxil) 500 mg capsule, take 4 capsules by mouth one hour prior to dental appointment., Disp: , Rfl:     aspirin 81 mg EC tablet, Take 1 tablet (81 mg) by mouth once daily., Disp: , Rfl:     carvedilol (Coreg) 6.25 mg tablet, Take 1 tablet (6.25 mg) by mouth 2 times  a day., Disp: , Rfl:     cetirizine (ZyrTEC) 10 mg tablet, Take 1 tablet (10 mg) by mouth once daily., Disp: 30 tablet, Rfl: 11    cyclobenzaprine (Flexeril) 10 mg tablet, Take 1 tablet (10 mg) by mouth 3 times a day as needed for muscle spasms., Disp: , Rfl:     diclofenac sodium (Voltaren) 1 % gel gel, APPLY TO AFFECTED FOOT UP TO 3 TIMES DAILY AS NEEDED FOR PAIN, Disp: , Rfl:     famotidine (Pepcid) 20 mg tablet, Take 1 tablet (20 mg) by mouth 2 times a day., Disp: 180 tablet, Rfl: 3    fluticasone (Flonase) 50 mcg/actuation nasal spray, Administer 1 spray into each nostril once daily as needed for rhinitis or allergies., Disp: 16 g, Rfl: 3    furosemide (Lasix) 20 mg tablet, Take one tab daily as needed for leg swelling, Disp: 90 tablet, Rfl: 3    latanoprost (Xalatan) 0.005 % ophthalmic solution, Administer 1 drop into both eyes., Disp: , Rfl:     levothyroxine (Synthroid, Levoxyl) 25 mcg tablet, Take 1 tablet (25 mcg) by mouth once daily., Disp: 90 tablet, Rfl: 3    metroNIDAZOLE (Metrocream) 0.75 % cream, Apply topically 2 times a day. Apply twice daily to rosacea, Disp: 45 g, Rfl: 11    nitroglycerin (Nitrostat) 0.4 mg SL tablet, Place 1 tablet (0.4 mg) under the tongue every 5 minutes if needed for chest pain., Disp: 25 tablet, Rfl: 5    pantoprazole (ProtoNix) 40 mg EC tablet, Take 1 tablet (40 mg) by mouth once daily., Disp: 90 tablet, Rfl: 1    rosuvastatin (Crestor) 40 mg tablet, TAKE 1 TABLET ONCE EVERY 24HOURS, Disp: 90 tablet, Rfl: 3    loratadine 10 mg capsule, Take 1 capsule by mouth once daily as needed. (Patient not taking: Reported on 5/20/2025), Disp: 90 capsule, Rfl: 3    methocarbamol (Robaxin) 750 mg tablet, Take 1 tablet (750 mg) by mouth 3 times a day for 10 days. (Patient not taking: Reported on 7/24/2025), Disp: 30 tablet, Rfl: 0    ofloxacin (Floxin) 0.3 % otic solution, Administer 5 drops into each ear 2 times a day. 5 drops twice daily for 7 days for infection that is pain in the  affected ear (Patient not taking: Reported on 7/24/2025), Disp: 10 mL, Rfl: 3  [5]   Family History  Problem Relation Name Age of Onset    Heart disease Mother      Hypertension Mother      Stomach cancer Mother      Other (cardiac disorder) Mother      Hypertension Father      Cancer Father      Prostate cancer Father      Other (cardiac disorder) Father      No Known Problems Sibling     [6]   Past Surgical History:  Procedure Laterality Date    CT GUIDED ABSCESS FLUID COLLECTION DRAINAGE  08/23/2022    CT GUIDED ABSCESS FLUID COLLECTION DRAINAGE 8/23/2022 [facility]    NECK SURGERY N/A 09/2024    OTHER SURGICAL HISTORY  08/14/2019    Wrist surgery    OTHER SURGICAL HISTORY  08/14/2019    Hysterectomy    OTHER SURGICAL HISTORY  08/14/2019    Breast reduction    OTHER SURGICAL HISTORY  08/14/2019    Cholecystectomy    OTHER SURGICAL HISTORY  04/04/2022    Vaginoplasty    OTHER SURGICAL HISTORY  04/04/2022    Bladder surgery    OTHER SURGICAL HISTORY  04/04/2022    Complete colonoscopy    OTHER SURGICAL HISTORY  04/04/2022    Esophagogastroduodenoscopy    OTHER SURGICAL HISTORY  04/04/2022    Elbow surgery    OTHER SURGICAL HISTORY  04/04/2022    Cataract surgery    OTHER SURGICAL HISTORY  05/11/2021    Breast biopsy    OTHER SURGICAL HISTORY  04/04/2022    Knee surgery    OTHER SURGICAL HISTORY  03/11/2020    Coronary artery bypass graft

## 2025-08-14 ENCOUNTER — TELEPHONE (OUTPATIENT)
Dept: PRIMARY CARE | Facility: CLINIC | Age: 80
End: 2025-08-14
Payer: MEDICARE

## 2025-08-14 DIAGNOSIS — K59.00 CONSTIPATION, UNSPECIFIED CONSTIPATION TYPE: Primary | ICD-10-CM

## 2025-08-14 DIAGNOSIS — J30.1 ALLERGIC RHINITIS DUE TO POLLEN, UNSPECIFIED SEASONALITY: ICD-10-CM

## 2025-08-14 RX ORDER — DOCUSATE SODIUM 100 MG/1
100 CAPSULE, LIQUID FILLED ORAL 2 TIMES DAILY
COMMUNITY
End: 2025-08-14 | Stop reason: SDUPTHER

## 2025-08-14 RX ORDER — DOCUSATE SODIUM 100 MG/1
100 CAPSULE, LIQUID FILLED ORAL 2 TIMES DAILY
Qty: 180 CAPSULE | Refills: 0 | Status: SHIPPED | OUTPATIENT
Start: 2025-08-14

## 2025-08-14 RX ORDER — CETIRIZINE HYDROCHLORIDE 10 MG/1
10 TABLET ORAL DAILY
Qty: 90 TABLET | Refills: 1 | Status: SHIPPED | OUTPATIENT
Start: 2025-08-14 | End: 2025-11-12

## 2025-08-18 ENCOUNTER — APPOINTMENT (OUTPATIENT)
Dept: CARDIOLOGY | Facility: CLINIC | Age: 80
End: 2025-08-18
Payer: MEDICARE

## 2025-08-18 VITALS
BODY MASS INDEX: 34.42 KG/M2 | HEIGHT: 64 IN | DIASTOLIC BLOOD PRESSURE: 66 MMHG | HEART RATE: 69 BPM | WEIGHT: 201.6 LBS | SYSTOLIC BLOOD PRESSURE: 128 MMHG

## 2025-08-18 DIAGNOSIS — I87.2 VENOUS INSUFFICIENCY (CHRONIC) (PERIPHERAL): ICD-10-CM

## 2025-08-18 DIAGNOSIS — I25.10 CORONARY ARTERY DISEASE INVOLVING NATIVE HEART WITHOUT ANGINA PECTORIS, UNSPECIFIED VESSEL OR LESION TYPE: ICD-10-CM

## 2025-08-18 DIAGNOSIS — R60.0 LOCALIZED EDEMA: ICD-10-CM

## 2025-08-18 PROCEDURE — 99213 OFFICE O/P EST LOW 20 MIN: CPT | Performed by: INTERNAL MEDICINE

## 2025-08-18 PROCEDURE — 3078F DIAST BP <80 MM HG: CPT | Performed by: INTERNAL MEDICINE

## 2025-08-18 PROCEDURE — 3074F SYST BP LT 130 MM HG: CPT | Performed by: INTERNAL MEDICINE

## 2025-08-18 PROCEDURE — G2211 COMPLEX E/M VISIT ADD ON: HCPCS | Performed by: INTERNAL MEDICINE

## 2025-08-18 PROCEDURE — 1036F TOBACCO NON-USER: CPT | Performed by: INTERNAL MEDICINE

## 2025-08-18 PROCEDURE — 1159F MED LIST DOCD IN RCRD: CPT | Performed by: INTERNAL MEDICINE

## 2025-08-18 RX ORDER — FUROSEMIDE 20 MG/1
TABLET ORAL
Qty: 90 TABLET | Refills: 3 | Status: SHIPPED | OUTPATIENT
Start: 2025-08-18

## 2025-08-26 ENCOUNTER — TELEPHONE (OUTPATIENT)
Dept: PRIMARY CARE | Facility: CLINIC | Age: 80
End: 2025-08-26
Payer: MEDICARE

## 2025-09-04 ENCOUNTER — APPOINTMENT (OUTPATIENT)
Dept: PRIMARY CARE | Facility: CLINIC | Age: 80
End: 2025-09-04
Payer: MEDICARE

## 2025-11-05 ENCOUNTER — APPOINTMENT (OUTPATIENT)
Dept: CARDIOLOGY | Facility: CLINIC | Age: 80
End: 2025-11-05
Payer: MEDICARE

## 2026-02-18 ENCOUNTER — APPOINTMENT (OUTPATIENT)
Dept: CARDIOLOGY | Facility: CLINIC | Age: 81
End: 2026-02-18
Payer: MEDICARE

## 2026-05-19 ENCOUNTER — APPOINTMENT (OUTPATIENT)
Dept: CARDIOLOGY | Facility: CLINIC | Age: 81
End: 2026-05-19
Payer: MEDICARE

## (undated) DEVICE — GLOVE, SURGICAL, PROTEXIS PI BLUE W/NEUTHERA, 8.0, PF, LF

## (undated) DEVICE — BUR, 5.0MM, ROUND, AGGRESSIVE, FLUTED, LF

## (undated) DEVICE — GLOVE, SURGICAL, PROTEXIS PI MICRO, 7.5, PF, LF

## (undated) DEVICE — DRESSING, GAUZE, PETROLATUM, PATCH, XEROFORM, 1 X 8 IN, STERILE

## (undated) DEVICE — PADDING UNDERCAST W6INXL4YD RAYON POLY SYN NONADHESIVE

## (undated) DEVICE — SUTURE, VICRYL, 1, 27 IN, CT-1, UNDYED

## (undated) DEVICE — SYRINGE MED 10ML TRNSLUC BRL PLUNG BLK MRK POLYPR CTRL

## (undated) DEVICE — BLADE, BLUNT, 25MM, WITH TUBESET

## (undated) DEVICE — ELECTRODE, ELECTROSURGICAL, BLADE EXT 4 INCH, INSULATED

## (undated) DEVICE — GOWN,AURORA,NONREINFORCED,LARGE: Brand: MEDLINE

## (undated) DEVICE — GUIDEWIRE

## (undated) DEVICE — TOTAL HIP: Brand: MEDLINE INDUSTRIES, INC.

## (undated) DEVICE — TOTAL TRAY, DB, 100% SILI FOLEY, 16FR 10: Brand: MEDLINE

## (undated) DEVICE — TOWEL PACK, STERILE, 4/PACK, BLUE

## (undated) DEVICE — APPLICATOR MEDICATED 26 CC SOLUTION HI LT ORNG CHLORAPREP

## (undated) DEVICE — BUR, 3MM X 3.8MM, PRECISION, NEURO DRILL

## (undated) DEVICE — DRAPE, INCISE, ANTIMICROBIAL, IOBAN 2, STERI DRAPE, 23 X 33 IN, DISPOSABLE, STERILE

## (undated) DEVICE — DRAPE, MICROSCOPE, UNIVERSAL, F/ZEISS OR LEICA, LF

## (undated) DEVICE — SET, AUTOTRANSFUSION, AT1

## (undated) DEVICE — DRAPE SURG UTIL 26X15 IN W/ TAPE N INVASIVE MULT LAYR DISP

## (undated) DEVICE — APPLICATOR, CHLORAPREP, W/ORANGE TINT, 26ML

## (undated) DEVICE — NEEDLE SPNL 18GA L3.5IN W/ QNCKE SHARPER BVL DURA CLICK

## (undated) DEVICE — FAN SPRAY KIT: Brand: PULSAVAC®

## (undated) DEVICE — Device

## (undated) DEVICE — SEALANT, HEMOSTATIC, FLOSEAL, 10 ML

## (undated) DEVICE — SUTURE, CTD, VICRYL, 2-0, UND, BR, CT-2

## (undated) DEVICE — 3M™ IOBAN™ 2 ANTIMICROBIAL INCISE DRAPE 6650EZ: Brand: IOBAN™ 2

## (undated) DEVICE — ADHESIVE, SKIN, MASTISOL, 2/3 CC VIAL

## (undated) DEVICE — Z DISCONTINUED NO SUB IDED PACKING VAG W2XL180IN 28X24 MESH 4 PLY SURG GZ FAN FLD RADPQ

## (undated) DEVICE — GLOVE SURG SZ 85 L12IN FNGR THK94MIL TRNSLUC YEL LTX

## (undated) DEVICE — 450 ML BOTTLE OF 0.05% CHLORHEXIDINE GLUCONATE IN 99.95% STERILE WATER FOR IRRIGATION, USP AND APPLICATOR.: Brand: IRRISEPT ANTIMICROBIAL WOUND LAVAGE

## (undated) DEVICE — NEPTUNE E-SEP SMOKE EVACUATION PENCIL, COATED, 70MM BLADE, PUSH BUTTON SWITCH: Brand: NEPTUNE E-SEP

## (undated) DEVICE — DRAPE, INSTRUMENT, W/POUCH, STERI DRAPE, 7 X 11 IN, DISPOSABLE, STERILE

## (undated) DEVICE — COUNTER NDL 40 COUNT HLD 70 FOAM BLK ADH W/ MAG

## (undated) DEVICE — PACK,SET UP,DRAPE: Brand: MEDLINE

## (undated) DEVICE — Device: Brand: BOOT LINER, DISPOSABLE

## (undated) DEVICE — LABEL MED MINI W/ MARKER

## (undated) DEVICE — Device: Brand: PROTECTORS, LEG SPAR BALL JOINT, 12/PR

## (undated) DEVICE — HYPODERMIC SAFETY NEEDLE: Brand: MAGELLAN

## (undated) DEVICE — ELECTROSURGICAL PENCIL BUTTON SWITCH E-Z CLEAN COATED BLADE ELECTRODE 10 FT (3 M) CORD HOLSTER: Brand: MEGADYNE

## (undated) DEVICE — GAUZE,SPONGE,4"X4",16PLY,XRAY,STRL,LF: Brand: MEDLINE

## (undated) DEVICE — SUTURE VCRL SZ 2-0 L36IN ABSRB UD L36MM CT-1 1/2 CIR J945H

## (undated) DEVICE — NEEDLE, SPINAL, QUINCKE, 18 G X 3.5 IN, PINK HUB

## (undated) DEVICE — SUTURE MCRYL SZ 4-0 L27IN ABSRB UD L19MM PS-2 1/2 CIR PRIM Y426H

## (undated) DEVICE — IMPLANTABLE DEVICE
Type: IMPLANTABLE DEVICE | Status: NON-FUNCTIONAL
Brand: JUGGERKNOT SOFT ANCHORS
Removed: 2024-03-11

## (undated) DEVICE — TRAY PREP DRY W/ PREM GLV 2 APPL 6 SPNG 2 UNDPD 1 OVERWRAP

## (undated) DEVICE — TAPE,ELASTIC,FOAM,CURAD,3"X5.5YD,LF: Brand: CURAD

## (undated) DEVICE — STAPLER SKIN H3.9MM WIRE DIA0.58MM CRWN 6.9MM 35 STPL FIX

## (undated) DEVICE — GOWN,SIRUS,NONRNF,SETINSLV,2XL,18/CS: Brand: MEDLINE

## (undated) DEVICE — SYRINGE MED 50ML LUERLOCK TIP

## (undated) DEVICE — INTENDED FOR TISSUE SEPARATION, AND OTHER PROCEDURES THAT REQUIRE A SHARP SURGICAL BLADE TO PUNCTURE OR CUT.: Brand: BARD-PARKER ® CARBON RIB-BACK BLADES

## (undated) DEVICE — RHYTHMLINK BALL TIP

## (undated) DEVICE — CATHETER, IV, INSYTE, AUTOGUARD, SHIELDED, 14 G X 1.75 IN, VIALON

## (undated) DEVICE — Z DISCONTINUED PER MEDLINE USE 2741943 DRESSING AQUACEL 10 IN ALG W9XL25CM SIL CVR WTRPRF VIR BACT BARR ANTIMIC

## (undated) DEVICE — COVER, TABLE, 54X90

## (undated) DEVICE — SYRINGE IRRIG 60ML SFT PLIABLE BLB EZ TO GRP 1 HND USE W/

## (undated) DEVICE — SOLUTION, IRRIGATION, STERILE WATER, 1000 ML, POUR BOTTLE

## (undated) DEVICE — COVER LT HNDL BLU PLAS

## (undated) DEVICE — NEEDLE, PEDIGUARD, CANNULATED P2

## (undated) DEVICE — SOLUTION, IRRIGATION, SODIUM CHLORIDE 0.9%, 1000 ML, POUR BOTTLE

## (undated) DEVICE — SUTURE CAPTURING DEVICE: Brand: CAPIO SLIM

## (undated) DEVICE — SUTURE VCRL SZ 0 L36IN ABSRB UD L36MM CT-1 1/2 CIR J946H

## (undated) DEVICE — SUTURE ETHBND EXCEL SZ 1 L30IN NONABSORBABLE GRN L48MM CTX X865H

## (undated) DEVICE — GOWN,SIRUS,POLYRNF,BRTHSLV,XLN/XL,20/CS: Brand: MEDLINE

## (undated) DEVICE — LITHOTOMY DRAPE WITH FLUID CONTROL POUCH: Brand: CONVERTORS

## (undated) DEVICE — BLADE SAW SAG 80X21X0.89 MM OFFSET TOOTH FOR LG BNE

## (undated) DEVICE — DRESSING HYDROFIBER AQUACEL AG ADVANTAGE 3.5X12 IN

## (undated) DEVICE — WAX, BONE, 2.5 GM

## (undated) DEVICE — Device: Brand: COVER, PERINEAL POST, 12 PK

## (undated) DEVICE — ELECTRODE PT RET AD L9FT HI MOIST COND ADH HYDRGEL CORDED

## (undated) DEVICE — 3M™ STERI-DRAPE™ U-DRAPE 1015: Brand: STERI-DRAPE™

## (undated) DEVICE — TUBING, SUCTION, 1/4" X 10', STRAIGHT: Brand: MEDLINE

## (undated) DEVICE — GLOVE, SURGICAL, PROTEXIS PI , 7.5, PF, LF

## (undated) DEVICE — HANDLE, PEDIGUARD, CANNULATED

## (undated) DEVICE — DRAPE,ISOLATION,INCISE,INVISISHIELD: Brand: MEDLINE

## (undated) DEVICE — MEDI-VAC YANKAUER SUCTION HANDLE W/BULBOUS TIP: Brand: CARDINAL HEALTH

## (undated) DEVICE — CORD, BIPOLAR, 2 PIN CONNECTING

## (undated) DEVICE — ABSORBENT ROLL ECODRI-SAFE

## (undated) DEVICE — DRESSING GZ W1XL8IN COT XRFRM N ADH OVERWRAP CURAD

## (undated) DEVICE — GOWN, SURGICAL, IMPLT, BACK, XLARGE, XLONG, STERILE

## (undated) DEVICE — STRIP, SKIN CLOSURE, STERI STRIP, REINFORCED, 0.5 X 4 IN

## (undated) DEVICE — BIT DRL DIA2.9MM FOR SFT ANCHR SHT SHFT PK JUGGERKNOT

## (undated) DEVICE — BIT DRL L30MM DIA3.2MM DISP FOR G7 2 MOBILITY CONSTRUCT

## (undated) DEVICE — CYSTO/BLADDER IRRIGATION SET, REGULATING CLAMP

## (undated) DEVICE — LINER PAD CONTOUR SUPER PEACH 7X14IN

## (undated) DEVICE — GLOVE ORANGE PI 8 1/2   MSG9085

## (undated) DEVICE — TIP, SUCTION, FRAZIER, W/CONTROL VENT, 12 FR

## (undated) DEVICE — DRAPE,HIP,W/POUCHES,STERILE: Brand: MEDLINE

## (undated) DEVICE — DRAPE, SHEET, XL

## (undated) DEVICE — SUTURE, STRATAFIX, 1 PDO CTX 36 X 36CM

## (undated) DEVICE — TUBING, SUCTION, 9/32" X 12', STRAIGHT: Brand: MEDLINE INDUSTRIES, INC.

## (undated) DEVICE — EVACUATOR, WOUND, SUCTION, CLOSED, JACKSON-PRATT, 100 CC, SILICONE

## (undated) DEVICE — SUTURE ETHBND EXCEL SZ 1 L30IN NONABSORBABLE GRN L36MM CT-1 X425H

## (undated) DEVICE — 4-PORT MANIFOLD: Brand: NEPTUNE 2

## (undated) DEVICE — LIQUIBAND RAPID ADHESIVE 36/CS 0.8ML: Brand: MEDLINE

## (undated) DEVICE — GLOVE ORANGE PI 8   MSG9080

## (undated) DEVICE — GLOVE ORANGE PI 7 1/2   MSG9075

## (undated) DEVICE — SUTURE VCRL SZ 0 L27IN ABSRB UD SH L26MM 1/2 CIR TAPERPOINT J418H

## (undated) DEVICE — SUTURE, VICRYL, 0 X 27 IN, CT-1, UNDYED BR

## (undated) DEVICE — GAS, NITROUS OXIDE, E CYLINDER

## (undated) DEVICE — MARKER SURG SKIN GENTIAN VLT REG TIP W/ 6IN RUL

## (undated) DEVICE — SUTURE VCRL SZ 1 L36IN ABSRB UD L36MM CT-1 1/2 CIR J947H

## (undated) DEVICE — SPONGE GZ W4XL4IN COT 12 PLY TYP VII WVN C FLD DSGN

## (undated) DEVICE — SEALER, BIPOLAR, AQUA MANTYS 6.0

## (undated) DEVICE — CONTAINER STERILE SPECIMEN 90ML, STERILE

## (undated) DEVICE — DRAPE, SHEET, 17 X 23 IN

## (undated) DEVICE — TOWEL,OR,DSP,ST,BLUE,STD,4/PK,20PK/CS: Brand: MEDLINE

## (undated) DEVICE — RESERVOIRE, ATR 120 COLLECTION

## (undated) DEVICE — GUIDEWIRE ORTH L300MM DIA2.8MM S STL THRD SHRP TRCR TIP FOR

## (undated) DEVICE — PACK,BASIC IV,SIRUS: Brand: MEDLINE

## (undated) DEVICE — DRAPE EQUIP TRNSPRT CONTAINMENT FOR BK TAB

## (undated) DEVICE — DRESSING, TRANSPARENT, TEGADERM, 4 X 4-3/4 IN

## (undated) DEVICE — SYRINGE, 20 CC, LUER LOCK

## (undated) DEVICE — SPONGE,LAP,18"X18",DLX,XR,ST,5/PK,40/PK: Brand: MEDLINE

## (undated) DEVICE — DRESSING, GAUZE, SUPER KERLIX, 6X6